# Patient Record
Sex: FEMALE | Race: WHITE | Employment: FULL TIME | URBAN - METROPOLITAN AREA
[De-identification: names, ages, dates, MRNs, and addresses within clinical notes are randomized per-mention and may not be internally consistent; named-entity substitution may affect disease eponyms.]

---

## 2017-01-31 DIAGNOSIS — E89.0 POSTOPERATIVE HYPOTHYROIDISM: ICD-10-CM

## 2017-01-31 RX ORDER — NORETHINDRONE ACETATE AND ETHINYL ESTRADIOL 1MG-20(21)
1 KIT ORAL DAILY
Qty: 28 TABLET | Refills: 6 | Status: SHIPPED | OUTPATIENT
Start: 2017-01-31 | End: 2017-07-19 | Stop reason: SDUPTHER

## 2017-02-13 DIAGNOSIS — R11.0 NAUSEA: ICD-10-CM

## 2017-02-13 RX ORDER — ELETRIPTAN HYDROBROMIDE 40 MG/1
40 TABLET, FILM COATED ORAL
Qty: 6 TABLET | Refills: 12 | Status: SHIPPED | OUTPATIENT
Start: 2017-02-13 | End: 2017-08-18 | Stop reason: ALTCHOICE

## 2017-02-14 RX ORDER — ONDANSETRON 4 MG/1
4 TABLET, FILM COATED ORAL EVERY 12 HOURS PRN
Qty: 10 TABLET | Refills: 1 | Status: SHIPPED | OUTPATIENT
Start: 2017-02-14 | End: 2017-07-19 | Stop reason: SDUPTHER

## 2017-03-31 ENCOUNTER — EMPLOYEE WELLNESS (OUTPATIENT)
Dept: OTHER | Age: 42
End: 2017-03-31

## 2017-07-19 DIAGNOSIS — E89.0 POSTOPERATIVE HYPOTHYROIDISM: ICD-10-CM

## 2017-07-19 DIAGNOSIS — R11.0 NAUSEA: ICD-10-CM

## 2017-07-19 RX ORDER — ONDANSETRON 4 MG/1
4 TABLET, FILM COATED ORAL EVERY 12 HOURS PRN
Qty: 10 TABLET | Refills: 1 | Status: SHIPPED | OUTPATIENT
Start: 2017-07-19 | End: 2018-01-08 | Stop reason: SDUPTHER

## 2017-07-19 RX ORDER — NORETHINDRONE ACETATE AND ETHINYL ESTRADIOL 1MG-20(21)
1 KIT ORAL DAILY
Qty: 28 TABLET | Refills: 6 | Status: SHIPPED | OUTPATIENT
Start: 2017-07-19 | End: 2018-01-29 | Stop reason: SDUPTHER

## 2017-08-09 ENCOUNTER — OFFICE VISIT (OUTPATIENT)
Dept: SURGERY | Age: 42
End: 2017-08-09

## 2017-08-09 VITALS
SYSTOLIC BLOOD PRESSURE: 132 MMHG | BODY MASS INDEX: 35.68 KG/M2 | WEIGHT: 222 LBS | HEIGHT: 66 IN | DIASTOLIC BLOOD PRESSURE: 84 MMHG | HEART RATE: 69 BPM

## 2017-08-09 DIAGNOSIS — E89.0 POSTOPERATIVE HYPOTHYROIDISM: Primary | ICD-10-CM

## 2017-08-09 DIAGNOSIS — E66.09 NON MORBID OBESITY DUE TO EXCESS CALORIES: ICD-10-CM

## 2017-08-09 PROCEDURE — 99213 OFFICE O/P EST LOW 20 MIN: CPT | Performed by: INTERNAL MEDICINE

## 2017-08-09 RX ORDER — LEVOTHYROXINE SODIUM 0.12 MG/1
125 TABLET ORAL DAILY
Qty: 90 TABLET | Refills: 3 | Status: SHIPPED | OUTPATIENT
Start: 2017-08-09 | End: 2018-11-01 | Stop reason: SDUPTHER

## 2017-08-09 ASSESSMENT — ENCOUNTER SYMPTOMS: NAUSEA: 1

## 2017-11-07 ENCOUNTER — OFFICE VISIT (OUTPATIENT)
Dept: FAMILY MEDICINE CLINIC | Age: 42
End: 2017-11-07

## 2017-11-07 VITALS
BODY MASS INDEX: 35.72 KG/M2 | DIASTOLIC BLOOD PRESSURE: 72 MMHG | WEIGHT: 222.3 LBS | SYSTOLIC BLOOD PRESSURE: 138 MMHG | OXYGEN SATURATION: 97 % | HEIGHT: 66 IN | HEART RATE: 71 BPM

## 2017-11-07 DIAGNOSIS — G43.109 MIGRAINE WITH AURA AND WITHOUT STATUS MIGRAINOSUS, NOT INTRACTABLE: Primary | ICD-10-CM

## 2017-11-07 DIAGNOSIS — Z79.899 HIGH RISK MEDICATION USE: ICD-10-CM

## 2017-11-07 DIAGNOSIS — Z00.00 WELLNESS EXAMINATION: ICD-10-CM

## 2017-11-07 PROCEDURE — 99214 OFFICE O/P EST MOD 30 MIN: CPT | Performed by: FAMILY MEDICINE

## 2017-11-07 RX ORDER — BUTALBITAL, ACETAMINOPHEN AND CAFFEINE 50; 325; 40 MG/1; MG/1; MG/1
1 TABLET ORAL EVERY 4 HOURS PRN
Qty: 12 TABLET | Refills: 3 | Status: SHIPPED | OUTPATIENT
Start: 2017-11-07 | End: 2018-04-19 | Stop reason: SDUPTHER

## 2017-11-07 ASSESSMENT — PATIENT HEALTH QUESTIONNAIRE - PHQ9
SUM OF ALL RESPONSES TO PHQ QUESTIONS 1-9: 0
SUM OF ALL RESPONSES TO PHQ9 QUESTIONS 1 & 2: 0
2. FEELING DOWN, DEPRESSED OR HOPELESS: 0
1. LITTLE INTEREST OR PLEASURE IN DOING THINGS: 0

## 2017-11-07 ASSESSMENT — ENCOUNTER SYMPTOMS
ABDOMINAL PAIN: 0
SHORTNESS OF BREATH: 0
PHOTOPHOBIA: 1
COUGH: 0
SORE THROAT: 0

## 2017-11-07 NOTE — PROGRESS NOTES
Subjective  Monika Lawrence, 38 y.o. female presents today with:  Chief Complaint   Patient presents with    1 Year Follow Up       Otalgia    There is pain in the left ear. This is a new problem. The current episode started today. The problem has been unchanged. There has been no fever. Pertinent negatives include no abdominal pain, coughing, rash or sore throat. She has tried nothing for the symptoms. Migraine    This is a chronic problem. The current episode started more than 1 year ago. The problem occurs monthly (about once a month). The problem has been gradually worsening. The pain is located in the bilateral region. The pain does not radiate. The pain quality is similar to prior headaches. The quality of the pain is described as throbbing. The pain is at a severity of 10/10. The pain is severe. Associated symptoms include ear pain and photophobia. Pertinent negatives include no abdominal pain, coughing, fever or sore throat. She has tried triptans for the symptoms. The treatment provided mild relief. Here today for yearly check up. She is taking synthroid, OCP's, zyrtec and flonase for Allergic rhinitis. For her migraines she has used relpax but it is no longer working for her. Review of Systems   Constitutional: Negative for fever. HENT: Positive for ear pain. Negative for sore throat. Eyes: Positive for photophobia. Respiratory: Negative for cough and shortness of breath. Cardiovascular: Negative for chest pain. Gastrointestinal: Negative for abdominal pain. Skin: Negative for rash.        Past Medical History:   Diagnosis Date    Allergic rhinitis     Asplenia     Bilateral hand pain     CTS (carpal tunnel syndrome) 05/2016    bilateral    External hemorrhoid     Fatigue     History of ITP 2004    had spenectomy    Hypothyroidism     Hypothyroidism     Irregular periods     Meniere disease     Migraine     Migraine headache     take relpax    Multinodular goiter MG tablet Take 1 tablet by mouth 2 times daily (with meals) 60 tablet 1    hydrocortisone (ANUSOL-HC) 25 MG suppository Place 1 suppository rectally 2 times daily 60 suppository 0    Calcium Carbonate-Vit D-Min (CALCIUM 1200) 6317-0090 MG-UNIT CHEW Take by mouth      cetirizine (ZYRTEC) 10 MG tablet Take 10 mg by mouth daily      ferrous sulfate 325 (65 FE) MG tablet Take 325 mg by mouth daily (with breakfast)      fluticasone (FLONASE) 50 MCG/ACT nasal spray 1 spray by Nasal route daily. 1 Bottle 06    meclizine (ANTIVERT) 12.5 MG tablet Take 12.5 mg by mouth 3 times daily as needed. No current facility-administered medications for this visit. Objective    Vitals:    11/07/17 1659   BP: 138/72   Pulse: 71   SpO2: 97%   Weight: 222 lb 4.8 oz (100.8 kg)   Height: 5' 6\" (1.676 m)       Physical Exam   Constitutional: She appears well-developed and well-nourished. HENT:   Head: Normocephalic and atraumatic. Right Ear: Tympanic membrane, external ear and ear canal normal.   Left Ear: Tympanic membrane, external ear and ear canal normal.   Nose: Nose normal.   Mouth/Throat: Uvula is midline, oropharynx is clear and moist and mucous membranes are normal.   Neck: Normal range of motion. Neck supple. Cardiovascular: Normal rate, regular rhythm and normal heart sounds. No murmur heard. Pulmonary/Chest: Effort normal and breath sounds normal. She has no wheezes. Lymphadenopathy:     She has no cervical adenopathy. Skin: Skin is warm and dry. No rash noted. Assessment & Plan   1. Migraine with aura and without status migrainosus, not intractable  butalbital-acetaminophen-caffeine (FIORICET, ESGIC) -40 MG per tablet   2. High risk medication use     3. Wellness examination       Plan as noted above. F/u 3 months. No orders of the defined types were placed in this encounter.     Orders Placed This Encounter   Medications    butalbital-acetaminophen-caffeine (Cantu Notice) -40 MG per tablet     Sig: Take 1 tablet by mouth every 4 hours as needed for Headaches     Dispense:  12 tablet     Refill:  3     There are no discontinued medications. Return in about 3 months (around 2/7/2018).     Terrell Lara MD

## 2018-01-08 DIAGNOSIS — R11.0 NAUSEA: ICD-10-CM

## 2018-01-08 RX ORDER — ONDANSETRON 4 MG/1
4 TABLET, FILM COATED ORAL EVERY 12 HOURS PRN
Qty: 10 TABLET | Refills: 1 | Status: SHIPPED | OUTPATIENT
Start: 2018-01-08 | End: 2018-04-11 | Stop reason: SDUPTHER

## 2018-01-29 DIAGNOSIS — E89.0 POSTOPERATIVE HYPOTHYROIDISM: ICD-10-CM

## 2018-01-29 RX ORDER — NORETHINDRONE ACETATE AND ETHINYL ESTRADIOL 1MG-20(21)
1 KIT ORAL DAILY
Qty: 28 TABLET | Refills: 6 | Status: CANCELLED | OUTPATIENT
Start: 2018-01-29

## 2018-01-29 RX ORDER — NORETHINDRONE ACETATE AND ETHINYL ESTRADIOL 1MG-20(21)
1 KIT ORAL DAILY
Qty: 28 TABLET | Refills: 6 | Status: SHIPPED | OUTPATIENT
Start: 2018-01-29 | End: 2018-08-08 | Stop reason: SDUPTHER

## 2018-01-29 NOTE — TELEPHONE ENCOUNTER
From: Earnest Raza  Sent: 1/29/2018 1:26 PM EST  Subject: Medication Renewal Request    Earnest Luz Marina would like a refill of the following medications:  norethindrone-ethinyl estradiol (JUNEL FE 1/20) 1-20 MG-MCG per tablet Joann Brannon MD]    Preferred pharmacy: [facility] [address], [address] [phone] - F [phone]    Comment:  Please send to [facility], [facility]. This doesn't have to go through [facility] mail order prescriptions. Have a great day!!!  Thank you, Be Watson

## 2018-02-13 ENCOUNTER — OFFICE VISIT (OUTPATIENT)
Dept: FAMILY MEDICINE CLINIC | Age: 43
End: 2018-02-13
Payer: COMMERCIAL

## 2018-02-13 VITALS
WEIGHT: 219 LBS | HEIGHT: 66 IN | BODY MASS INDEX: 35.2 KG/M2 | OXYGEN SATURATION: 96 % | SYSTOLIC BLOOD PRESSURE: 130 MMHG | DIASTOLIC BLOOD PRESSURE: 88 MMHG | HEART RATE: 78 BPM

## 2018-02-13 DIAGNOSIS — G43.109 MIGRAINE WITH AURA AND WITHOUT STATUS MIGRAINOSUS, NOT INTRACTABLE: ICD-10-CM

## 2018-02-13 DIAGNOSIS — G89.29 CHRONIC HEEL PAIN, LEFT: Primary | ICD-10-CM

## 2018-02-13 DIAGNOSIS — Z12.31 SCREENING MAMMOGRAM, ENCOUNTER FOR: ICD-10-CM

## 2018-02-13 DIAGNOSIS — M79.672 CHRONIC HEEL PAIN, LEFT: Primary | ICD-10-CM

## 2018-02-13 PROCEDURE — 99214 OFFICE O/P EST MOD 30 MIN: CPT | Performed by: FAMILY MEDICINE

## 2018-02-13 ASSESSMENT — ENCOUNTER SYMPTOMS
PHOTOPHOBIA: 1
SHORTNESS OF BREATH: 0

## 2018-02-13 NOTE — PROGRESS NOTES
Subjective  Bhavna Choudhury, 38 y.o. female presents today with:  Chief Complaint   Patient presents with    3 Month Follow-Up     left heel pain       Migraine    This is a chronic problem. The current episode started more than 1 year ago. The problem occurs monthly (about once a month). The problem has been gradually worsening. The pain is located in the bilateral region. The pain does not radiate. The pain quality is similar to prior headaches. The quality of the pain is described as throbbing. The pain is at a severity of 10/10. The pain is severe. Associated symptoms include photophobia. Pertinent negatives include no fever. She has tried triptans (fioricet) for the symptoms. The treatment provided significant relief. Foot Pain   This is a chronic problem. The current episode started more than 1 year ago. The problem occurs constantly. The problem has been gradually worsening. Pertinent negatives include no chest pain or fever. She has tried NSAIDs for the symptoms. The treatment provided mild relief. Here today for heel pain. She sees Dr. Neto Gandhi for management of her hypothyroidism. She has a past medical history significant for migraine headaches and allergic rhinitis. Her ObGyn is Dr. Georgie Cantu. She is taking synthroid, OCP's, zyrtec and flonase. For her migraines she has used relpax but it was not working so we put her on fioricet and that is helping but she takes 2. Review of Systems   Constitutional: Negative for fever. Eyes: Positive for photophobia. Respiratory: Negative for shortness of breath. Cardiovascular: Negative for chest pain.        Past Medical History:   Diagnosis Date    Allergic rhinitis     Asplenia     Bilateral hand pain     Chronic heel pain, left     CTS (carpal tunnel syndrome) 05/2016    bilateral    External hemorrhoid     Fatigue     History of ITP 2004    had spenectomy    Hypothyroidism     Hypothyroidism     Irregular periods     Meniere disease  Migraine headache     Multinodular goiter     Nail fungus     Pain in right lower leg      Past Surgical History:   Procedure Laterality Date    CARPAL TUNNEL RELEASE Right 07/12/2016    LAPAROSCOPIC SPLENECTOMY N/A 2004    done for ITP    THYROIDECTOMY, COMPLETION Left 2014    frozen section inconclusive    THYROIDECTOMY, PARTIAL Right 2011    Benign nodule     Social History     Social History    Marital status:      Spouse name: N/A    Number of children: 0    Years of education: N/A     Occupational History    medical records      Social History Main Topics    Smoking status: Former Smoker     Quit date: 11/17/2010    Smokeless tobacco: Never Used    Alcohol use 0.0 oz/week      Comment: socially    Drug use: No    Sexual activity: Not on file     Other Topics Concern    Not on file     Social History Narrative    No narrative on file     Family History   Problem Relation Age of Onset    High Blood Pressure Mother     Anemia Mother     Heart Disease Father     Cancer Paternal Aunt     Cancer Paternal Grandmother      Allergies   Allergen Reactions    Penicillins Anaphylaxis    Codeine Itching, Swelling and Rash     OK with Tylenol #3 per pt    Percocet [Oxycodone-Acetaminophen] Itching, Swelling and Rash    Bee Venom Itching and Swelling    Tape [Adhesive Tape]     Hydrocodone Itching, Swelling and Rash    Protonix [Pantoprazole Sodium] Swelling and Rash     Current Outpatient Prescriptions   Medication Sig Dispense Refill    norethindrone-ethinyl estradiol (JUNEL FE 1/20) 1-20 MG-MCG per tablet Take 1 tablet by mouth daily 28 tablet 06    ondansetron (ZOFRAN) 4 MG tablet Take 1 tablet by mouth every 12 hours as needed for Nausea or Vomiting 10 tablet 1    butalbital-acetaminophen-caffeine (FIORICET, ESGIC) -40 MG per tablet Take 1 tablet by mouth every 4 hours as needed for Headaches 12 tablet 3    levothyroxine (SYNTHROID) 125 MCG tablet Take 1 tablet by

## 2018-03-20 VITALS — BODY MASS INDEX: 35.67 KG/M2 | WEIGHT: 221 LBS

## 2018-04-11 ENCOUNTER — TELEPHONE (OUTPATIENT)
Dept: ENDOCRINOLOGY | Age: 43
End: 2018-04-11

## 2018-04-11 DIAGNOSIS — K64.4 EXTERNAL HEMORRHOID: ICD-10-CM

## 2018-04-11 DIAGNOSIS — R11.0 NAUSEA: ICD-10-CM

## 2018-04-11 RX ORDER — HYDROCORTISONE ACETATE 25 MG/1
25 SUPPOSITORY RECTAL 2 TIMES DAILY
Qty: 60 SUPPOSITORY | Refills: 0 | Status: SHIPPED | OUTPATIENT
Start: 2018-04-11 | End: 2018-04-12

## 2018-04-11 RX ORDER — ONDANSETRON 4 MG/1
4 TABLET, FILM COATED ORAL EVERY 12 HOURS PRN
Qty: 10 TABLET | Refills: 1 | Status: SHIPPED | OUTPATIENT
Start: 2018-04-11 | End: 2018-08-31 | Stop reason: SDUPTHER

## 2018-04-12 DIAGNOSIS — K64.4 EXTERNAL HEMORRHOID: ICD-10-CM

## 2018-04-12 RX ORDER — HYDROCORTISONE ACETATE 25 MG/1
25 SUPPOSITORY RECTAL 2 TIMES DAILY
Qty: 60 SUPPOSITORY | Refills: 0 | Status: SHIPPED | OUTPATIENT
Start: 2018-04-12 | End: 2019-03-07 | Stop reason: SDUPTHER

## 2018-04-19 DIAGNOSIS — G43.109 MIGRAINE WITH AURA AND WITHOUT STATUS MIGRAINOSUS, NOT INTRACTABLE: ICD-10-CM

## 2018-04-19 RX ORDER — BUTALBITAL, ACETAMINOPHEN AND CAFFEINE 50; 325; 40 MG/1; MG/1; MG/1
TABLET ORAL
Qty: 12 TABLET | Refills: 5 | Status: SHIPPED | OUTPATIENT
Start: 2018-04-19 | End: 2019-03-07 | Stop reason: SDUPTHER

## 2018-05-07 ENCOUNTER — EMPLOYEE WELLNESS (OUTPATIENT)
Dept: OTHER | Age: 43
End: 2018-05-07

## 2018-05-07 ENCOUNTER — OFFICE VISIT (OUTPATIENT)
Dept: FAMILY MEDICINE CLINIC | Age: 43
End: 2018-05-07
Payer: COMMERCIAL

## 2018-05-07 VITALS
OXYGEN SATURATION: 98 % | HEIGHT: 66 IN | SYSTOLIC BLOOD PRESSURE: 130 MMHG | WEIGHT: 218 LBS | HEART RATE: 81 BPM | DIASTOLIC BLOOD PRESSURE: 86 MMHG | BODY MASS INDEX: 35.03 KG/M2

## 2018-05-07 DIAGNOSIS — M77.8 FOREARM TENDONITIS: Primary | ICD-10-CM

## 2018-05-07 LAB
CHOLESTEROL, TOTAL: 174 MG/DL (ref 0–199)
GLUCOSE BLD-MCNC: 88 MG/DL (ref 74–109)
HDLC SERPL-MCNC: 47 MG/DL (ref 40–59)
LDL CHOLESTEROL CALCULATED: 101 MG/DL (ref 0–129)
TRIGL SERPL-MCNC: 132 MG/DL (ref 0–200)

## 2018-05-07 PROCEDURE — 99213 OFFICE O/P EST LOW 20 MIN: CPT | Performed by: FAMILY MEDICINE

## 2018-05-07 RX ORDER — NAPROXEN 500 MG/1
500 TABLET ORAL 2 TIMES DAILY WITH MEALS
Qty: 60 TABLET | Refills: 2 | Status: SHIPPED | OUTPATIENT
Start: 2018-05-07 | End: 2019-02-12

## 2018-05-12 ENCOUNTER — HOSPITAL ENCOUNTER (OUTPATIENT)
Dept: WOMENS IMAGING | Age: 43
Discharge: HOME | End: 2018-05-14
Payer: COMMERCIAL

## 2018-05-12 DIAGNOSIS — Z12.31 SCREENING MAMMOGRAM, ENCOUNTER FOR: ICD-10-CM

## 2018-05-12 PROCEDURE — 77063 BREAST TOMOSYNTHESIS BI: CPT

## 2018-05-14 VITALS — WEIGHT: 216 LBS | BODY MASS INDEX: 34.86 KG/M2

## 2018-06-27 ENCOUNTER — HOSPITAL ENCOUNTER (INPATIENT)
Age: 43
LOS: 1 days | Discharge: HOME | DRG: 310 | End: 2018-06-28
Attending: EMERGENCY MEDICINE | Admitting: INTERNAL MEDICINE
Payer: COMMERCIAL

## 2018-06-27 ENCOUNTER — APPOINTMENT (OUTPATIENT)
Dept: CT IMAGING | Age: 43
DRG: 310 | End: 2018-06-27
Payer: COMMERCIAL

## 2018-06-27 ENCOUNTER — APPOINTMENT (OUTPATIENT)
Dept: GENERAL RADIOLOGY | Age: 43
DRG: 310 | End: 2018-06-27
Payer: COMMERCIAL

## 2018-06-27 ENCOUNTER — NURSE TRIAGE (OUTPATIENT)
Dept: OTHER | Facility: CLINIC | Age: 43
End: 2018-06-27

## 2018-06-27 DIAGNOSIS — I48.91 ATRIAL FIBRILLATION WITH RAPID VENTRICULAR RESPONSE (HCC): Primary | ICD-10-CM

## 2018-06-27 LAB
ALBUMIN SERPL-MCNC: 3.8 G/DL (ref 3.9–4.9)
ALP BLD-CCNC: 102 U/L (ref 40–130)
ALT SERPL-CCNC: 15 U/L (ref 0–33)
AMPHETAMINE SCREEN, URINE: ABNORMAL
ANION GAP SERPL CALCULATED.3IONS-SCNC: 14 MEQ/L (ref 7–13)
AST SERPL-CCNC: 12 U/L (ref 0–35)
BACTERIA: NORMAL /HPF
BARBITURATE SCREEN URINE: POSITIVE
BASOPHILS ABSOLUTE: 0.1 K/UL (ref 0–0.2)
BASOPHILS RELATIVE PERCENT: 0.9 %
BENZODIAZEPINE SCREEN, URINE: ABNORMAL
BILIRUB SERPL-MCNC: <0.2 MG/DL (ref 0–1.2)
BILIRUBIN URINE: NEGATIVE
BLOOD, URINE: ABNORMAL
BUN BLDV-MCNC: 12 MG/DL (ref 6–20)
CALCIUM SERPL-MCNC: 10 MG/DL (ref 8.6–10.2)
CANNABINOID SCREEN URINE: ABNORMAL
CHLORIDE BLD-SCNC: 103 MEQ/L (ref 98–107)
CHP ED QC CHECK: NORMAL
CLARITY: CLEAR
CO2: 24 MEQ/L (ref 22–29)
COCAINE METABOLITE SCREEN URINE: ABNORMAL
COLOR: YELLOW
CREAT SERPL-MCNC: 0.68 MG/DL (ref 0.5–0.9)
D DIMER: 0.68 MG/L FEU (ref 0–0.5)
EOSINOPHILS ABSOLUTE: 0.1 K/UL (ref 0–0.7)
EOSINOPHILS RELATIVE PERCENT: 1.3 %
EPITHELIAL CELLS, UA: NORMAL /HPF
GFR AFRICAN AMERICAN: >60
GFR NON-AFRICAN AMERICAN: >60
GLOBULIN: 3.7 G/DL (ref 2.3–3.5)
GLUCOSE BLD-MCNC: 121 MG/DL (ref 74–109)
GLUCOSE BLD-MCNC: 127 MG/DL
GLUCOSE BLD-MCNC: 127 MG/DL (ref 60–115)
GLUCOSE URINE: NEGATIVE MG/DL
HCT VFR BLD CALC: 44.1 % (ref 37–47)
HEMOGLOBIN: 14.2 G/DL (ref 12–16)
KETONES, URINE: NEGATIVE MG/DL
LEUKOCYTE ESTERASE, URINE: NEGATIVE
LYMPHOCYTES ABSOLUTE: 3.5 K/UL (ref 1–4.8)
LYMPHOCYTES RELATIVE PERCENT: 30 %
Lab: ABNORMAL
MAGNESIUM: 2.3 MG/DL (ref 1.7–2.3)
MCH RBC QN AUTO: 28.9 PG (ref 27–31.3)
MCHC RBC AUTO-ENTMCNC: 32.2 % (ref 33–37)
MCV RBC AUTO: 89.8 FL (ref 82–100)
MONOCYTES ABSOLUTE: 0.7 K/UL (ref 0.2–0.8)
MONOCYTES RELATIVE PERCENT: 6.3 %
NEUTROPHILS ABSOLUTE: 7.1 K/UL (ref 1.4–6.5)
NEUTROPHILS RELATIVE PERCENT: 61.5 %
NITRITE, URINE: NEGATIVE
OPIATE SCREEN URINE: ABNORMAL
PDW BLD-RTO: 14.3 % (ref 11.5–14.5)
PERFORMED ON: ABNORMAL
PH UA: 6.5 (ref 5–9)
PHENCYCLIDINE SCREEN URINE: ABNORMAL
PLATELET # BLD: 476 K/UL (ref 130–400)
POTASSIUM SERPL-SCNC: 4.4 MEQ/L (ref 3.5–5.1)
PROTEIN UA: NEGATIVE MG/DL
RBC # BLD: 4.91 M/UL (ref 4.2–5.4)
RBC UA: NORMAL /HPF (ref 0–2)
SODIUM BLD-SCNC: 141 MEQ/L (ref 132–144)
SPECIFIC GRAVITY UA: 1.01 (ref 1–1.03)
TOTAL CK: 47 U/L (ref 0–170)
TOTAL PROTEIN: 7.5 G/DL (ref 6.4–8.1)
TROPONIN: <0.01 NG/ML (ref 0–0.01)
TSH SERPL DL<=0.05 MIU/L-ACNC: 1.37 UIU/ML (ref 0.27–4.2)
URINE REFLEX TO CULTURE: YES
UROBILINOGEN, URINE: 0.2 E.U./DL
WBC # BLD: 11.5 K/UL (ref 4.8–10.8)
WBC UA: NORMAL /HPF (ref 0–5)

## 2018-06-27 PROCEDURE — 6360000002 HC RX W HCPCS: Performed by: INTERNAL MEDICINE

## 2018-06-27 PROCEDURE — 81001 URINALYSIS AUTO W/SCOPE: CPT

## 2018-06-27 PROCEDURE — 71275 CT ANGIOGRAPHY CHEST: CPT

## 2018-06-27 PROCEDURE — 6370000000 HC RX 637 (ALT 250 FOR IP): Performed by: EMERGENCY MEDICINE

## 2018-06-27 PROCEDURE — 2580000003 HC RX 258: Performed by: INTERNAL MEDICINE

## 2018-06-27 PROCEDURE — 83735 ASSAY OF MAGNESIUM: CPT

## 2018-06-27 PROCEDURE — 87086 URINE CULTURE/COLONY COUNT: CPT

## 2018-06-27 PROCEDURE — 6360000004 HC RX CONTRAST MEDICATION: Performed by: EMERGENCY MEDICINE

## 2018-06-27 PROCEDURE — 96372 THER/PROPH/DIAG INJ SC/IM: CPT

## 2018-06-27 PROCEDURE — 80307 DRUG TEST PRSMV CHEM ANLYZR: CPT

## 2018-06-27 PROCEDURE — 2060000000 HC ICU INTERMEDIATE R&B

## 2018-06-27 PROCEDURE — 96366 THER/PROPH/DIAG IV INF ADDON: CPT

## 2018-06-27 PROCEDURE — 99285 EMERGENCY DEPT VISIT HI MDM: CPT

## 2018-06-27 PROCEDURE — 6370000000 HC RX 637 (ALT 250 FOR IP): Performed by: INTERNAL MEDICINE

## 2018-06-27 PROCEDURE — 36415 COLL VENOUS BLD VENIPUNCTURE: CPT

## 2018-06-27 PROCEDURE — 2580000003 HC RX 258: Performed by: NURSE PRACTITIONER

## 2018-06-27 PROCEDURE — 2500000003 HC RX 250 WO HCPCS: Performed by: EMERGENCY MEDICINE

## 2018-06-27 PROCEDURE — 85379 FIBRIN DEGRADATION QUANT: CPT

## 2018-06-27 PROCEDURE — 2580000003 HC RX 258: Performed by: EMERGENCY MEDICINE

## 2018-06-27 PROCEDURE — 71045 X-RAY EXAM CHEST 1 VIEW: CPT

## 2018-06-27 PROCEDURE — 93005 ELECTROCARDIOGRAM TRACING: CPT

## 2018-06-27 PROCEDURE — 85025 COMPLETE CBC W/AUTO DIFF WBC: CPT

## 2018-06-27 PROCEDURE — 84443 ASSAY THYROID STIM HORMONE: CPT

## 2018-06-27 PROCEDURE — 96365 THER/PROPH/DIAG IV INF INIT: CPT

## 2018-06-27 PROCEDURE — 93010 ELECTROCARDIOGRAM REPORT: CPT | Performed by: INTERNAL MEDICINE

## 2018-06-27 PROCEDURE — 84484 ASSAY OF TROPONIN QUANT: CPT

## 2018-06-27 PROCEDURE — 82550 ASSAY OF CK (CPK): CPT

## 2018-06-27 PROCEDURE — 80053 COMPREHEN METABOLIC PANEL: CPT

## 2018-06-27 PROCEDURE — 6360000002 HC RX W HCPCS: Performed by: EMERGENCY MEDICINE

## 2018-06-27 PROCEDURE — 99223 1ST HOSP IP/OBS HIGH 75: CPT | Performed by: INTERNAL MEDICINE

## 2018-06-27 RX ORDER — CETIRIZINE HYDROCHLORIDE 10 MG/1
10 TABLET ORAL DAILY
Status: DISCONTINUED | OUTPATIENT
Start: 2018-06-27 | End: 2018-06-28 | Stop reason: HOSPADM

## 2018-06-27 RX ORDER — ONDANSETRON 2 MG/ML
4 INJECTION INTRAMUSCULAR; INTRAVENOUS EVERY 6 HOURS PRN
Status: DISCONTINUED | OUTPATIENT
Start: 2018-06-27 | End: 2018-06-28 | Stop reason: HOSPADM

## 2018-06-27 RX ORDER — TRAMADOL HYDROCHLORIDE 50 MG/1
50 TABLET ORAL EVERY 6 HOURS PRN
Status: DISCONTINUED | OUTPATIENT
Start: 2018-06-27 | End: 2018-06-28 | Stop reason: HOSPADM

## 2018-06-27 RX ORDER — NORETHINDRONE ACETATE AND ETHINYL ESTRADIOL 1MG-20(21)
1 KIT ORAL DAILY
Status: DISCONTINUED | OUTPATIENT
Start: 2018-06-27 | End: 2018-06-27 | Stop reason: RX

## 2018-06-27 RX ORDER — ONDANSETRON 4 MG/1
4 TABLET, FILM COATED ORAL EVERY 12 HOURS PRN
Status: DISCONTINUED | OUTPATIENT
Start: 2018-06-27 | End: 2018-06-28 | Stop reason: HOSPADM

## 2018-06-27 RX ORDER — LOPERAMIDE HYDROCHLORIDE 2 MG/1
2 CAPSULE ORAL ONCE
Status: COMPLETED | OUTPATIENT
Start: 2018-06-27 | End: 2018-06-27

## 2018-06-27 RX ORDER — SODIUM CHLORIDE 0.9 % (FLUSH) 0.9 %
10 SYRINGE (ML) INJECTION PRN
Status: DISCONTINUED | OUTPATIENT
Start: 2018-06-27 | End: 2018-06-28 | Stop reason: HOSPADM

## 2018-06-27 RX ORDER — ACETAMINOPHEN 325 MG/1
650 TABLET ORAL EVERY 4 HOURS PRN
Status: DISCONTINUED | OUTPATIENT
Start: 2018-06-27 | End: 2018-06-28 | Stop reason: HOSPADM

## 2018-06-27 RX ORDER — HYDROCORTISONE ACETATE 25 MG/1
25 SUPPOSITORY RECTAL 2 TIMES DAILY
Status: DISCONTINUED | OUTPATIENT
Start: 2018-06-27 | End: 2018-06-28 | Stop reason: HOSPADM

## 2018-06-27 RX ORDER — LEVOTHYROXINE SODIUM 0.12 MG/1
125 TABLET ORAL DAILY
Status: DISCONTINUED | OUTPATIENT
Start: 2018-06-27 | End: 2018-06-28 | Stop reason: HOSPADM

## 2018-06-27 RX ORDER — DIPHENHYDRAMINE HYDROCHLORIDE 50 MG/ML
25 INJECTION INTRAMUSCULAR; INTRAVENOUS EVERY 6 HOURS PRN
Status: DISCONTINUED | OUTPATIENT
Start: 2018-06-27 | End: 2018-06-28 | Stop reason: HOSPADM

## 2018-06-27 RX ORDER — DILTIAZEM HYDROCHLORIDE 5 MG/ML
10 INJECTION INTRAVENOUS ONCE
Status: COMPLETED | OUTPATIENT
Start: 2018-06-27 | End: 2018-06-27

## 2018-06-27 RX ORDER — FERROUS SULFATE 325(65) MG
325 TABLET ORAL
Status: DISCONTINUED | OUTPATIENT
Start: 2018-06-28 | End: 2018-06-28 | Stop reason: HOSPADM

## 2018-06-27 RX ORDER — MECLIZINE HCL 12.5 MG/1
12.5 TABLET ORAL 3 TIMES DAILY PRN
Status: DISCONTINUED | OUTPATIENT
Start: 2018-06-27 | End: 2018-06-28 | Stop reason: HOSPADM

## 2018-06-27 RX ORDER — 0.9 % SODIUM CHLORIDE 0.9 %
2000 INTRAVENOUS SOLUTION INTRAVENOUS ONCE
Status: COMPLETED | OUTPATIENT
Start: 2018-06-27 | End: 2018-06-27

## 2018-06-27 RX ORDER — SODIUM CHLORIDE 0.9 % (FLUSH) 0.9 %
10 SYRINGE (ML) INJECTION EVERY 12 HOURS SCHEDULED
Status: DISCONTINUED | OUTPATIENT
Start: 2018-06-27 | End: 2018-06-28 | Stop reason: HOSPADM

## 2018-06-27 RX ORDER — FLUTICASONE PROPIONATE 50 MCG
1 SPRAY, SUSPENSION (ML) NASAL DAILY
Status: DISCONTINUED | OUTPATIENT
Start: 2018-06-27 | End: 2018-06-28 | Stop reason: HOSPADM

## 2018-06-27 RX ADMIN — TRAMADOL HYDROCHLORIDE 50 MG: 50 TABLET, FILM COATED ORAL at 23:05

## 2018-06-27 RX ADMIN — SODIUM CHLORIDE 2000 ML: 9 INJECTION, SOLUTION INTRAVENOUS at 07:40

## 2018-06-27 RX ADMIN — IOPAMIDOL 100 ML: 755 INJECTION, SOLUTION INTRAVENOUS at 09:49

## 2018-06-27 RX ADMIN — METOPROLOL TARTRATE 25 MG: 25 TABLET ORAL at 15:13

## 2018-06-27 RX ADMIN — Medication 10 ML: at 23:07

## 2018-06-27 RX ADMIN — ENOXAPARIN SODIUM 100 MG: 100 INJECTION SUBCUTANEOUS at 08:31

## 2018-06-27 RX ADMIN — CETIRIZINE HYDROCHLORIDE 10 MG: 10 TABLET, FILM COATED ORAL at 23:05

## 2018-06-27 RX ADMIN — LOPERAMIDE HYDROCHLORIDE 2 MG: 2 CAPSULE ORAL at 09:06

## 2018-06-27 RX ADMIN — ACETAMINOPHEN 650 MG: 325 TABLET ORAL at 18:56

## 2018-06-27 RX ADMIN — RIVAROXABAN 20 MG: 20 TABLET, FILM COATED ORAL at 23:05

## 2018-06-27 RX ADMIN — DILTIAZEM HYDROCHLORIDE 10 MG/HR: 5 INJECTION INTRAVENOUS at 08:46

## 2018-06-27 RX ADMIN — DILTIAZEM HYDROCHLORIDE 10 MG: 5 INJECTION INTRAVENOUS at 08:36

## 2018-06-27 RX ADMIN — DIPHENHYDRAMINE HYDROCHLORIDE 25 MG: 50 INJECTION, SOLUTION INTRAMUSCULAR; INTRAVENOUS at 23:06

## 2018-06-27 ASSESSMENT — ENCOUNTER SYMPTOMS
NAUSEA: 0
GASTROINTESTINAL NEGATIVE: 1
ABDOMINAL DISTENTION: 0
RHINORRHEA: 0
ABDOMINAL PAIN: 0
SHORTNESS OF BREATH: 1
FACIAL SWELLING: 0
HEMOPTYSIS: 0
PHOTOPHOBIA: 0
EYE DISCHARGE: 0
VOMITING: 0
EYES NEGATIVE: 1
ORTHOPNEA: 0
CHEST TIGHTNESS: 1
SHORTNESS OF BREATH: 0
WHEEZING: 0
COLOR CHANGE: 0

## 2018-06-27 ASSESSMENT — PAIN SCALES - GENERAL
PAINLEVEL_OUTOF10: 10
PAINLEVEL_OUTOF10: 9

## 2018-06-27 NOTE — ED PROVIDER NOTES
[address] [facility] ED  eMERGENCY dEPARTMENT eNCOUnter      Pt Name: Chivo Bellamy  MRN: [id number]  Zaragfcami 1975  Date of evaluation: 6/27/2018  Provider: Aarti Cantu, [address] [place] [address]       Chief Complaint   Patient presents with    Tachycardia     x 3 days         HISTORY OF PRESENT ILLNESS   (Location/Symptom, Timing/Onset, Context/Setting, Quality, Duration, Modifying Factors, Severity)  Note limiting factors. Chivo Bellamy is a 37 y.o. female who presents to the emergency department With a chief complaint of three-day history of feeling heart palpitations. Patient states over the course of 3 days she has noticed increased fatigue and difficulty taking a full breath in. She feels like her chest is tight all the time. Patient has never had this sensation before. She has a caffeine consumer but denies any over-the-counter sinus medication or cold medication. She has a thyroid history with total thyroidectomy. She denies any lower extremity edema, hemoptysis, or significant difficulty of breathing. HPI    Nursing Notes were reviewed. REVIEW OF SYSTEMS    (2-9 systems for level 4, 10 or more for level 5)     Review of Systems   Constitutional: Positive for fatigue. Negative for activity change and appetite change. HENT: Negative for congestion, facial swelling and rhinorrhea. Eyes: Negative for photophobia and discharge. Respiratory: Positive for chest tightness and shortness of breath. Negative for wheezing. Cardiovascular: Positive for palpitations. Negative for chest pain and leg swelling. Gastrointestinal: Negative for abdominal distention, abdominal pain and vomiting. Endocrine: Negative for polydipsia and polyphagia. Genitourinary: Negative for difficulty urinating, frequency, vaginal bleeding and vaginal discharge. Musculoskeletal: Negative for gait problem. Skin: Negative for color change. Allergic/Immunologic: Negative for immunocompromised state. Neurological: Negative for dizziness, weakness and light-headedness. Hematological: Negative for adenopathy. Psychiatric/Behavioral: Negative for behavioral problems. Except as noted above the remainder of the review of systems was reviewed and negative. PAST MEDICAL HISTORY     Past Medical History:   Diagnosis Date    Allergic rhinitis     Asplenia     Bilateral hand pain     CTS (carpal tunnel syndrome) 05/2016    bilateral    External hemorrhoid     Fatigue     Heart murmur     History of ITP 2004    had spenectomy    Hypothyroidism     Hypothyroidism     Irregular periods     Meniere disease     Migraine headache     Multinodular goiter     Nail fungus     Pain in right lower leg     Plantar fasciitis, left     Dr. Trevor Henson       Past Surgical History:   Procedure Laterality Date    CARPAL TUNNEL RELEASE Right 07/12/2016    LAPAROSCOPIC SPLENECTOMY N/A 2004    done for ITP    THYROIDECTOMY, COMPLETION Left 2014    frozen section inconclusive    THYROIDECTOMY, PARTIAL Right 2011    Benign nodule         CURRENT MEDICATIONS       Previous Medications    BUTALBITAL-ACETAMINOPHEN-CAFFEINE (FIORICET, ESGIC) -40 MG PER TABLET    take 1 tablet by mouth every 4 hours as needed for headache    CALCIUM CARBONATE-VIT D-MIN (CALCIUM 1200) 1762-7008 MG-UNIT CHEW    Take by mouth    CETIRIZINE (ZYRTEC) 10 MG TABLET    Take 10 mg by mouth daily    FERROUS SULFATE 325 (65 FE) MG TABLET    Take 325 mg by mouth daily (with breakfast)    FLUTICASONE (FLONASE) 50 MCG/ACT NASAL SPRAY    1 spray by Nasal route daily. HYDROCORTISONE (ANUSOL-HC) 25 MG SUPPOSITORY    Place 1 suppository rectally 2 times daily    LEVOTHYROXINE (SYNTHROID) 125 MCG TABLET    Take 1 tablet by mouth Daily    MECLIZINE (ANTIVERT) 12.5 MG TABLET    Take 12.5 mg by mouth 3 times daily as needed.     NAPROXEN (NAPROSYN) 500 MG TABLET    Take 1 tablet by mouth 2 times daily (with meals) NORETHINDRONE-ETHINYL ESTRADIOL (JUNEL FE 1/20) 1-20 MG-MCG PER TABLET    Take 1 tablet by mouth daily    ONDANSETRON (ZOFRAN) 4 MG TABLET    Take 1 tablet by mouth every 12 hours as needed for Nausea or Vomiting       ALLERGIES     Penicillins; Codeine; Percocet [oxycodone-acetaminophen]; Bee venom; Tape [adhesive tape]; Hydrocodone; and Protonix [pantoprazole sodium]    FAMILY HISTORY       Family History   Problem Relation Age of Onset    High Blood Pressure Mother     Anemia Mother     Heart Disease Father     Cancer Paternal Aunt     Cancer Paternal Grandmother           SOCIAL HISTORY       Social History     Social History    Marital status:      Spouse name: N/A    Number of children: 0    Years of education: N/A     Occupational History    medical records      Social History Main Topics    Smoking status: Former Smoker     Quit date: 11/17/2010    Smokeless tobacco: Never Used    Alcohol use 0.0 oz/week      Comment: socially    Drug use: No    Sexual activity: Not Asked     Other Topics Concern    None     Social History Narrative    None       SCREENINGS             PHYSICAL EXAM    (up to 7 for level 4, 8 or more for level 5)     ED Triage Vitals [06/27/18 0710]   BP Temp Temp Source Pulse Resp SpO2 Height Weight   115/69 98.6 °F (37 °C) Oral 132 18 99 % 5' 7\" (1.702 m) 216 lb (98 kg)       Physical Exam   Constitutional: She is oriented to person, place, and time. She appears well-developed and well-nourished. HENT:   Head: Normocephalic and atraumatic. Eyes: Conjunctivae and EOM are normal. Pupils are equal, round, and reactive to light. Neck: Normal range of motion. Neck supple. No JVD present. Cardiovascular:   irr irr and tachy   Pulmonary/Chest: Effort normal.   Abdominal: Soft. Bowel sounds are normal. There is no tenderness. Musculoskeletal: Normal range of motion. She exhibits no edema. Neurological: She is alert and oriented to person, place, and time.  She CONSULTS:  IP CONSULT TO CARDIOLOGY    PROCEDURES:  Unless otherwise noted below, none     Procedures    FINAL IMPRESSION      1. Atrial fibrillation with rapid ventricular response (HCC)          DISPOSITION/PLAN   DISPOSITION        PATIENT REFERRED TO:  No follow-up provider specified.     DISCHARGE MEDICATIONS:  New Prescriptions    No medications on file          (Please note that portions of this note were completed with a voice recognition program.  Efforts were made to edit the dictations but occasionally words are mis-transcribed.)    Yony Graham DO (electronically signed)  Attending Emergency Physician          Yony Graham DO  06/27/18 1042

## 2018-06-27 NOTE — ED NOTES
Pt alert, oriented, family at bedside. pt and family aware  Of impending admit. respiraitons unlabored at this time. Denies pain at this time. nsr on monitor.  IV infusing without difficulty     Pennie Pastor RN  06/27/18 0870

## 2018-06-27 NOTE — LETTER
[address] [facility]  [place] [address] [facility]  Phone: [phone]             June 28, 2018    Patient: Candy Smith   YOB: 1975   Date of Visit: 6/27/2018       To Whom It May Concern:    Candy Smith was seen and treated in our facility  beginning 6/27/2018 until 6/28/18 under the care of Dr. Elisabeth Alfaro and Dr. Marcell Merchant. She may return to work on Monday July 2nd.

## 2018-06-27 NOTE — H&P
Chief Complaint   Patient presents with    Tachycardia     x 3 days        Patient is a 37 y.o. female who presents with a chief complaint of palpitations. Patient is followed on a regular basis by Dr. Leon Schuster MD. Had palpiations for the past 2 days, noted to be in new onset afibb with RVR in ER. Started on cardizem gtt and converted to NSR spontaneously. No hx of MI, CHF or arrhythmia. No hx of LHC or stress test. No excessive caffeine or ETOH. States she is sensitive and has a lot of allergies. Takes Zyrtec. No smoking. No hx of DM, HTN or HLP. On synthroid and  TSH is WNL. S/p CTA of chest without evidence of PE.      Past Medical History:   Diagnosis Date    Allergic rhinitis     Asplenia     Bilateral hand pain     CTS (carpal tunnel syndrome) 05/2016    bilateral    External hemorrhoid     Fatigue     Heart murmur     History of ITP 2004    had spenectomy    Hypothyroidism     Hypothyroidism     Irregular periods     Meniere disease     Migraine headache     Multinodular goiter     Nail fungus     Pain in right lower leg     Plantar fasciitis, left     Dr. Justo Hutchins      Patient Active Problem List   Diagnosis    Hypothyroidism    Obesity    Meniere disease    Irregular periods    Asplenia    Nail fungus    Allergic rhinitis    Migraine    External hemorrhoid    Pain in right lower leg    Multinodular goiter    CTS (carpal tunnel syndrome)    Migraine headache    Plantar fasciitis, left    Atrial fibrillation with RVR ([address]       Past Surgical History:   Procedure Laterality Date    CARPAL TUNNEL RELEASE Right 07/12/2016    LAPAROSCOPIC SPLENECTOMY N/A 2004    done for ITP    THYROIDECTOMY, COMPLETION Left 2014    frozen section inconclusive    THYROIDECTOMY, PARTIAL Right 2011    Benign nodule       Social History     Social History    Marital status:      Spouse name: N/A    Number of children: 0    Years of education: N/A     Occupational History    Codeine; Percocet [oxycodone-acetaminophen]; Bee venom; Tape [adhesive tape]; Hydrocodone; and Protonix [pantoprazole sodium]    Review of Systems   Constitutional: Negative. Negative for chills, fever and weight loss. HENT: Negative. Eyes: Negative. Respiratory: Negative for hemoptysis, shortness of breath and wheezing. Cardiovascular: Positive for palpitations. Negative for chest pain, orthopnea, claudication, leg swelling and PND. Gastrointestinal: Negative. Negative for abdominal pain, nausea and vomiting. Genitourinary: Negative. Musculoskeletal: Negative. Skin: Negative. Negative for rash. Neurological: Negative for dizziness, loss of consciousness, weakness and headaches. Endo/Heme/Allergies: Negative. Psychiatric/Behavioral: Negative. VITALS:  Blood pressure 120/75, pulse 84, temperature 98.6 °F (37 °C), temperature source Oral, resp. rate 19, height 5' 7\" (1.702 m), weight 216 lb (98 kg), SpO2 96 %, not currently breastfeeding. Body mass index is 33.83 kg/m². Physical Exam   Constitutional: She is oriented to person, place, and time. She appears well-developed and well-nourished. HENT:   Head: Normocephalic and atraumatic. Eyes: Pupils are equal, round, and reactive to light. Neck: Normal range of motion. Neck supple. No JVD present. No tracheal deviation present. No thyromegaly present. Cardiovascular: Normal rate, regular rhythm, normal heart sounds and intact distal pulses. PMI is not displaced. Exam reveals no gallop, no S3, no distant heart sounds and no friction rub. No murmur heard. Pulmonary/Chest: No respiratory distress. She has no wheezes. She has no rales. She exhibits no tenderness. Abdominal: Soft. Bowel sounds are normal. She exhibits no distension and no mass. There is no tenderness. There is no rebound and no guarding. Musculoskeletal: She exhibits no edema. Neurological: She is alert and oriented to person, place, and time.  No cranial nerve deficit. Skin: Skin is warm and dry. No rash noted. She is not diaphoretic. No erythema. No pallor. Psychiatric: She has a normal mood and affect.  Her behavior is normal. Judgment and thought content normal.       LABS:  Recent Results (from the past 24 hour(s))   EKG 12 Lead    Collection Time: 06/27/18  7:20 AM   Result Value Ref Range    Ventricular Rate 141 BPM    Atrial Rate 197 BPM    QRS Duration 76 ms    Q-T Interval 288 ms    QTc Calculation (Bazett) 441 ms    R Axis 30 degrees    T Axis -23 degrees   CBC Auto Differential    Collection Time: 06/27/18  7:30 AM   Result Value Ref Range    WBC 11.5 (H) 4.8 - 10.8 K/uL    RBC 4.91 4.20 - 5.40 M/uL    Hemoglobin 14.2 12.0 - 16.0 g/dL    Hematocrit 44.1 37.0 - 47.0 %    MCV 89.8 82.0 - 100.0 fL    MCH 28.9 27.0 - 31.3 pg    MCHC 32.2 (L) 33.0 - 37.0 %    RDW 14.3 11.5 - 14.5 %    Platelets 079 (H) 669 - 400 K/uL    Neutrophils % 61.5 %    Lymphocytes % 30.0 %    Monocytes % 6.3 %    Eosinophils % 1.3 %    Basophils % 0.9 %    Neutrophils # 7.1 (H) 1.4 - 6.5 K/uL    Lymphocytes # 3.5 1.0 - 4.8 K/uL    Monocytes # 0.7 0.2 - 0.8 K/uL    Eosinophils # 0.1 0.0 - 0.7 K/uL    Basophils # 0.1 0.0 - 0.2 K/uL   Comprehensive Metabolic Panel    Collection Time: 06/27/18  7:30 AM   Result Value Ref Range    Sodium 141 132 - 144 mEq/L    Potassium 4.4 3.5 - 5.1 mEq/L    Chloride 103 98 - 107 mEq/L    CO2 24 22 - 29 mEq/L    Anion Gap 14 (H) 7 - 13 mEq/L    Glucose 121 (H) 74 - 109 mg/dL    BUN 12 6 - 20 mg/dL    CREATININE 0.68 0.50 - 0.90 mg/dL    GFR Non-African American >60.0 >60    GFR  >60.0 >60    Calcium 10.0 8.6 - 10.2 mg/dL    Total Protein 7.5 6.4 - 8.1 g/dL    Alb 3.8 (L) 3.9 - 4.9 g/dL    Total Bilirubin <0.2 0.0 - 1.2 mg/dL    Alkaline Phosphatase 102 40 - 130 U/L    ALT 15 0 - 33 U/L    AST 12 0 - 35 U/L    Globulin 3.7 (H) 2.3 - 3.5 g/dL   Urinalysis Reflex to Culture    Collection Time: 06/27/18  7:30 AM   Result Value Ref

## 2018-06-27 NOTE — ED NOTES
Pharmacy called regarding  dilitiazem drip.  Medicine to be sent asa     April L [place] DEAN Richter  06/27/18 7246

## 2018-06-27 NOTE — ED NOTES
Blood and urine specimens labeled and sent to lab via tube system     Leanna Dumont RN  06/27/18 0803

## 2018-06-28 VITALS
OXYGEN SATURATION: 97 % | BODY MASS INDEX: 33.9 KG/M2 | SYSTOLIC BLOOD PRESSURE: 129 MMHG | WEIGHT: 216 LBS | RESPIRATION RATE: 18 BRPM | DIASTOLIC BLOOD PRESSURE: 67 MMHG | HEIGHT: 67 IN | HEART RATE: 75 BPM | TEMPERATURE: 98.4 F

## 2018-06-28 LAB
ANION GAP SERPL CALCULATED.3IONS-SCNC: 12 MEQ/L (ref 7–13)
BUN BLDV-MCNC: 9 MG/DL (ref 6–20)
CALCIUM SERPL-MCNC: 8.4 MG/DL (ref 8.6–10.2)
CHLORIDE BLD-SCNC: 105 MEQ/L (ref 98–107)
CHOLESTEROL, TOTAL: 166 MG/DL (ref 0–199)
CO2: 24 MEQ/L (ref 22–29)
CREAT SERPL-MCNC: 0.63 MG/DL (ref 0.5–0.9)
EKG ATRIAL RATE: 197 BPM
EKG ATRIAL RATE: 64 BPM
EKG ATRIAL RATE: 70 BPM
EKG P AXIS: 48 DEGREES
EKG P-R INTERVAL: 152 MS
EKG P-R INTERVAL: 168 MS
EKG Q-T INTERVAL: 288 MS
EKG Q-T INTERVAL: 378 MS
EKG Q-T INTERVAL: 430 MS
EKG QRS DURATION: 76 MS
EKG QRS DURATION: 82 MS
EKG QRS DURATION: 84 MS
EKG QTC CALCULATION (BAZETT): 408 MS
EKG QTC CALCULATION (BAZETT): 441 MS
EKG QTC CALCULATION (BAZETT): 443 MS
EKG R AXIS: 21 DEGREES
EKG R AXIS: 27 DEGREES
EKG R AXIS: 30 DEGREES
EKG T AXIS: -23 DEGREES
EKG T AXIS: 17 DEGREES
EKG T AXIS: 21 DEGREES
EKG VENTRICULAR RATE: 141 BPM
EKG VENTRICULAR RATE: 64 BPM
EKG VENTRICULAR RATE: 70 BPM
GFR AFRICAN AMERICAN: >60
GFR NON-AFRICAN AMERICAN: >60
GLUCOSE BLD-MCNC: 91 MG/DL (ref 74–109)
HCT VFR BLD CALC: 37.7 % (ref 37–47)
HDLC SERPL-MCNC: 42 MG/DL (ref 40–59)
HEMOGLOBIN: 12.7 G/DL (ref 12–16)
LDL CHOLESTEROL CALCULATED: 105 MG/DL (ref 0–129)
LV EF: 55 %
LVEF MODALITY: NORMAL
MCH RBC QN AUTO: 30.5 PG (ref 27–31.3)
MCHC RBC AUTO-ENTMCNC: 33.8 % (ref 33–37)
MCV RBC AUTO: 90.1 FL (ref 82–100)
PDW BLD-RTO: 14.3 % (ref 11.5–14.5)
PLATELET # BLD: 427 K/UL (ref 130–400)
POTASSIUM REFLEX MAGNESIUM: 4 MEQ/L (ref 3.5–5.1)
RBC # BLD: 4.18 M/UL (ref 4.2–5.4)
SODIUM BLD-SCNC: 141 MEQ/L (ref 132–144)
TRIGL SERPL-MCNC: 93 MG/DL (ref 0–200)
URINE CULTURE, ROUTINE: NORMAL
WBC # BLD: 10 K/UL (ref 4.8–10.8)

## 2018-06-28 PROCEDURE — 80048 BASIC METABOLIC PNL TOTAL CA: CPT

## 2018-06-28 PROCEDURE — 93306 TTE W/DOPPLER COMPLETE: CPT

## 2018-06-28 PROCEDURE — 80061 LIPID PANEL: CPT

## 2018-06-28 PROCEDURE — 99238 HOSP IP/OBS DSCHRG MGMT 30/<: CPT | Performed by: INTERNAL MEDICINE

## 2018-06-28 PROCEDURE — 93005 ELECTROCARDIOGRAM TRACING: CPT

## 2018-06-28 PROCEDURE — 6370000000 HC RX 637 (ALT 250 FOR IP): Performed by: INTERNAL MEDICINE

## 2018-06-28 PROCEDURE — 2580000003 HC RX 258: Performed by: INTERNAL MEDICINE

## 2018-06-28 PROCEDURE — 6360000002 HC RX W HCPCS: Performed by: INTERNAL MEDICINE

## 2018-06-28 PROCEDURE — 36415 COLL VENOUS BLD VENIPUNCTURE: CPT

## 2018-06-28 PROCEDURE — 85027 COMPLETE CBC AUTOMATED: CPT

## 2018-06-28 RX ORDER — BUTALBITAL, ACETAMINOPHEN AND CAFFEINE 300; 40; 50 MG/1; MG/1; MG/1
1 CAPSULE ORAL EVERY 4 HOURS PRN
Status: DISCONTINUED | OUTPATIENT
Start: 2018-06-28 | End: 2018-06-28 | Stop reason: HOSPADM

## 2018-06-28 RX ORDER — MAGNESIUM OXIDE 400 MG/1
400 TABLET ORAL DAILY
COMMUNITY
End: 2018-07-13 | Stop reason: DRUGHIGH

## 2018-06-28 RX ADMIN — Medication 10 ML: at 09:27

## 2018-06-28 RX ADMIN — LEVOTHYROXINE SODIUM 125 MCG: 125 TABLET ORAL at 06:09

## 2018-06-28 RX ADMIN — FERROUS SULFATE TAB 325 MG (65 MG ELEMENTAL FE) 325 MG: 325 (65 FE) TAB at 09:27

## 2018-06-28 RX ADMIN — BUTALBITAL, ACETAMINOPHEN AND CAFFEINE 1 CAPSULE: 300; 40; 50 CAPSULE ORAL at 13:17

## 2018-06-28 RX ADMIN — METOPROLOL TARTRATE 25 MG: 25 TABLET ORAL at 09:27

## 2018-06-28 RX ADMIN — DIPHENHYDRAMINE HYDROCHLORIDE 25 MG: 50 INJECTION, SOLUTION INTRAMUSCULAR; INTRAVENOUS at 09:27

## 2018-06-28 RX ADMIN — TRAMADOL HYDROCHLORIDE 50 MG: 50 TABLET, FILM COATED ORAL at 09:27

## 2018-06-28 ASSESSMENT — PAIN DESCRIPTION - LOCATION: LOCATION: HEAD

## 2018-06-28 ASSESSMENT — PAIN SCALES - GENERAL
PAINLEVEL_OUTOF10: 7
PAINLEVEL_OUTOF10: 8
PAINLEVEL_OUTOF10: 5

## 2018-06-28 ASSESSMENT — PAIN DESCRIPTION - PAIN TYPE: TYPE: CHRONIC PAIN

## 2018-06-28 NOTE — PLAN OF CARE
Problem: Pain:  Goal: Patient's pain/discomfort is manageable  Patient's pain/discomfort is manageable  Outcome: Ongoing

## 2018-06-28 NOTE — CARE COORDINATION
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

## 2018-06-28 NOTE — DISCHARGE SUMMARY
Cardiology Discharge Summary      Patient Identification:  Haley Monroy  : 1975  MRN: [id number]   Account: [de-identified]     Admit date: 2018  Discharge date: 18   Attending provider: Gary Mccarthy DO        Primary care provider: Mylene Wayne MD     Admission Diagnoses:  <principal problem not specified>         Discharge Diagnoses: Active Hospital Problems    Diagnosis Date Noted    Atrial fibrillation with RVR [facility]) [I48.91] 2018          Hospital Course:   Haley Monroy is a 37 y.o. female admitted to [facility] on 2018 for paroxysmal afib converted spontaneously to NSR. Normal ECHO. Procedures:   none     Consults:   none    Examination:  /67   Pulse 75   Temp 98.4 °F (36.9 °C) (Oral)   Resp 18   Ht 5' 7\" (1.702 m)   Wt 216 lb (98 kg)   SpO2 97%   BMI 33.83 kg/m²    Physical Exam   Constitutional: She is oriented to person, place, and time. She appears well-developed and well-nourished. HENT:   Head: Normocephalic and atraumatic. Eyes: Pupils are equal, round, and reactive to light. Neck: Normal range of motion. Neck supple. Cardiovascular: Normal rate and regular rhythm. Exam reveals no gallop and no friction rub. No murmur heard. Pulmonary/Chest: Effort normal and breath sounds normal. No respiratory distress. She has no wheezes. She has no rales. She exhibits no tenderness. Abdominal: Soft. Bowel sounds are normal. She exhibits no distension. There is no tenderness. Musculoskeletal: Normal range of motion. She exhibits no edema. Neurological: She is alert and oriented to person, place, and time. No cranial nerve deficit. Coordination normal.   Skin: Skin is warm and dry. Psychiatric: She has a normal mood and affect.        Medications:  Current Discharge Medication List      START taking these medications    Details   rivaroxaban (XARELTO) 20 MG TABS tablet Unspecified hypothyroidism; Menorrhagia; Weight gain             Significant Diagnostics:   Radiology: Cta Chest W Wo  (pe Study)    Result Date: 6/27/2018  EXAM: CT SCAN OF THE THORAX WITH CONTRAST/PE PROTOCOL/ CTA CHEST COMPARISON: CHEST RADIOGRAPH EARLIER TODAY. REASON FOR EXAMINATION:  A. FIB WITH SHORTNESS OF BREATH TECHNIQUE: Helical CTA was performed through the chest utilizing 100 cc of Isovue 370 intravenous contrast.  Images were obtained with bolus tracking in order to opacify the pulmonary arteries. Thick section coronal MIP 3D reconstructions were performed  on a separate workstation. FINDINGS: No intraluminal filling defects pulmonary arterial tree. Thoracic aorta normal in course and caliber. No pericardial effusion. Cardiac size upper limits of normal. No hilar, mediastinal, or axillary lymph node enlargement. Right and left lungs are clear. Limited imaging upper abdomen shows adrenal glands without anomaly. Mild diffuse disc space narrowing with anterior osteophytes, thoracic spine. No CT evidence pulmonary embolism. All CT scans at this facility use dose modulation, iterative reconstruction, and/or weight based dosing when appropriate to reduce radiation dose to as low as reasonably achievable. Xr Chest Portable    Result Date: 6/27/2018  EXAMINATION: AP erect portable  CLINICAL HISTORY: Tachycardia COMPARISONS: None  FINDINGS: Two views of the chest are submitted. The cardiac silhouette is of normal size configuration. The mediastinum is unremarkable. Pulmonary vascular is attenuated, lungs are hyperinflated and there is coarsening of the interstitium. Right sided trachea. No focal infiltrates. No effusions. Pneumothoraces. NO ACUTE ACTIVE CARDIOPULMONARY PROCESS. RADIOGRAPHIC FINDINGS SUGGESTIVE OF COPD. CORRELATE CLINICALLY.       Labs:   Recent Results (from the past 72 hour(s))   EKG 12 Lead UA Negative Negative mg/dL    Urobilinogen, Urine 0.2 <2.0 E.U./dL    Nitrite, Urine Negative Negative    Leukocyte Esterase, Urine Negative Negative    Urine Reflex to Culture YES    Urine Drug Screen    Collection Time: 06/27/18  7:30 AM   Result Value Ref Range    Amphetamine Screen, Urine Neg Negative <1000 ng/mL    Barbiturate Screen, Ur POSITIVE (A) Negative < 200 ng/mL    Benzodiazepine Screen, Urine Neg Negative < 200 ng/mL    Cannabinoid Scrn, Ur Neg Negative < 50 ng/mL    COCAINE METABOLITE SCREEN URINE Neg Negative < 300 ng/mL    Opiate Scrn, Ur Neg Negative < 300 ng/mL    PCP Scrn, Ur Neg Negative < 25 ng/mL    Drug Screen Comment: see below    Troponin    Collection Time: 06/27/18  7:30 AM   Result Value Ref Range    Troponin <0.010 0.000 - 0.010 ng/mL   CK    Collection Time: 06/27/18  7:30 AM   Result Value Ref Range    Total CK 47 0 - 170 U/L   Magnesium    Collection Time: 06/27/18  7:30 AM   Result Value Ref Range    Magnesium 2.3 1.7 - 2.3 mg/dL   TSH without Reflex    Collection Time: 06/27/18  7:30 AM   Result Value Ref Range    TSH 1.370 0.270 - 4.200 uIU/mL   Urine Culture    Collection Time: 06/27/18  7:30 AM   Result Value Ref Range    Urine Culture, Routine No growth 24 hours    Microscopic Urinalysis    Collection Time: 06/27/18  7:30 AM   Result Value Ref Range    WBC, UA 0-2 0 - 5 /HPF    RBC, UA 0-2 0 - 2 /HPF    Epi Cells 5-10 /HPF    Bacteria, UA Few /HPF   D-Dimer, Quantitative    Collection Time: 06/27/18  7:30 AM   Result Value Ref Range    D-Dimer, Quant 0.68 (H) 0.00 - 0.50 mg/L FEU   POCT Glucose    Collection Time: 06/27/18  7:31 AM   Result Value Ref Range    POC Glucose 127 (H) 60 - 115 mg/dl    Performed on ACCU-CHEK    POCT Glucose    Collection Time: 06/27/18  7:39 AM   Result Value Ref Range    Glucose 127 mg/dL    QC OK? ok    EKG 12 Lead    Collection Time: 06/27/18 10:36 AM   Result Value Ref Range    Ventricular Rate 70 BPM    Atrial Rate 70 BPM    P-R Interval 168 ms Assessment:  Active Hospital Problems    Diagnosis Date Noted    Atrial fibrillation with RVR ([facility] [address] [I48.91] 06/27/2018         Plan:   1. F/U 2 weeks.         Electronically signed by Fernando Pina MD on 6/28/2018 at 3:50 PM

## 2018-06-29 ENCOUNTER — CARE COORDINATION (OUTPATIENT)
Dept: CASE MANAGEMENT | Age: 43
End: 2018-06-29

## 2018-06-29 ENCOUNTER — CARE COORDINATION (OUTPATIENT)
Dept: CARE COORDINATION | Age: 43
End: 2018-06-29

## 2018-06-29 DIAGNOSIS — I48.91 ATRIAL FIBRILLATION WITH RVR (HCC): Primary | ICD-10-CM

## 2018-06-29 PROCEDURE — 1111F DSCHRG MED/CURRENT MED MERGE: CPT | Performed by: FAMILY MEDICINE

## 2018-07-02 ENCOUNTER — CARE COORDINATION (OUTPATIENT)
Dept: FAMILY MEDICINE CLINIC | Age: 43
End: 2018-07-02

## 2018-07-03 ENCOUNTER — TELEPHONE (OUTPATIENT)
Dept: CARDIOLOGY CLINIC | Age: 43
End: 2018-07-03

## 2018-07-03 NOTE — TELEPHONE ENCOUNTER
PT RECENTLY INPT AT [facility] FOR NEW ONSET A-FIB. PUT ON METOPROLOL 25 MG BID. HAS BEEN FEELING \"OFF\" AND SLUGGISH SINCE STARTING MEDICATION. PLEASE ADVISE.

## 2018-07-03 NOTE — TELEPHONE ENCOUNTER
PER DR. HOLIDAY CHANGE TO LOPRESSOR 12.5 MG BID. SPOKE WITH PT.  AWARE OF DR. HERNANDEZ'S INSTRUCTIONS.

## 2018-07-05 ENCOUNTER — TELEPHONE (OUTPATIENT)
Dept: CARDIOLOGY CLINIC | Age: 43
End: 2018-07-05

## 2018-07-13 ENCOUNTER — TELEPHONE (OUTPATIENT)
Dept: CARDIOLOGY CLINIC | Age: 43
End: 2018-07-13

## 2018-07-13 ENCOUNTER — OFFICE VISIT (OUTPATIENT)
Dept: CARDIOLOGY CLINIC | Age: 43
End: 2018-07-13
Payer: COMMERCIAL

## 2018-07-13 VITALS
WEIGHT: 219 LBS | RESPIRATION RATE: 16 BRPM | BODY MASS INDEX: 34.3 KG/M2 | DIASTOLIC BLOOD PRESSURE: 86 MMHG | OXYGEN SATURATION: 98 % | SYSTOLIC BLOOD PRESSURE: 160 MMHG | HEART RATE: 82 BPM

## 2018-07-13 DIAGNOSIS — I48.91 ATRIAL FIBRILLATION WITH RVR (HCC): Primary | ICD-10-CM

## 2018-07-13 PROCEDURE — 99213 OFFICE O/P EST LOW 20 MIN: CPT | Performed by: INTERNAL MEDICINE

## 2018-07-13 RX ORDER — DILTIAZEM HYDROCHLORIDE 120 MG/1
120 CAPSULE, EXTENDED RELEASE ORAL 2 TIMES DAILY
Qty: 60 CAPSULE | Refills: 6 | Status: SHIPPED | OUTPATIENT
Start: 2018-07-13 | End: 2018-07-16 | Stop reason: ALTCHOICE

## 2018-07-13 RX ORDER — CHOLECALCIFEROL (VITAMIN D3) 125 MCG
500 CAPSULE ORAL DAILY
COMMUNITY

## 2018-07-13 RX ORDER — MULTIVITAMIN WITH IRON
250 TABLET ORAL DAILY
COMMUNITY

## 2018-07-13 RX ORDER — CALCIUM/MAGNESIUM/ZINC 333-133 MG
TABLET ORAL DAILY
COMMUNITY

## 2018-07-13 NOTE — PROGRESS NOTES
done for ITP    THYROIDECTOMY, COMPLETION Left 2014    frozen section inconclusive    THYROIDECTOMY, PARTIAL Right 2011    Benign nodule       Social History     Social History    Marital status:      Spouse name: N/A    Number of children: 0    Years of education: N/A     Occupational History    medical records      Social History Main Topics    Smoking status: Former Smoker     Quit date: 11/17/2010    Smokeless tobacco: Never Used    Alcohol use 0.0 oz/week      Comment: socially    Drug use: No    Sexual activity: Not Asked     Other Topics Concern    None     Social History Narrative    None       Family History   Problem Relation Age of Onset    High Blood Pressure Mother     Anemia Mother     Heart Disease Father     Cancer Paternal Aunt     Cancer Paternal Grandmother        Current Outpatient Prescriptions   Medication Sig Dispense Refill    magnesium (MAGNESIUM-OXIDE) 250 MG TABS tablet Take 250 mg by mouth daily      vitamin B-12 (CYANOCOBALAMIN) 500 MCG tablet Take 500 mcg by mouth daily      zinc 50 MG CAPS Take by mouth      Echinacea 400 MG CAPS Take by mouth      diltiazem (CARDIZEM 12 HR) 120 MG extended release capsule Take 1 capsule by mouth 2 times daily 60 capsule 6    rivaroxaban (XARELTO) 20 MG TABS tablet Take 1 tablet by mouth daily 30 tablet 3    naproxen (NAPROSYN) 500 MG tablet Take 1 tablet by mouth 2 times daily (with meals) 60 tablet 2    butalbital-acetaminophen-caffeine (FIORICET, ESGIC) -40 MG per tablet take 1 tablet by mouth every 4 hours as needed for headache 12 tablet 5    hydrocortisone (ANUSOL-HC) 25 MG suppository Place 1 suppository rectally 2 times daily 60 suppository 0    ondansetron (ZOFRAN) 4 MG tablet Take 1 tablet by mouth every 12 hours as needed for Nausea or Vomiting 10 tablet 1    norethindrone-ethinyl estradiol (JUNEL FE 1/20) 1-20 MG-MCG per tablet Take 1 tablet by mouth daily 28 tablet 06    levothyroxine (SYNTHROID) 125 MCG tablet Take 1 tablet by mouth Daily 90 tablet 03    Calcium Carbonate-Vit D-Min (CALCIUM 1200) 2663-3432 MG-UNIT CHEW Take by mouth      cetirizine (ZYRTEC) 10 MG tablet Take 10 mg by mouth daily      ferrous sulfate 325 (65 FE) MG tablet Take 325 mg by mouth daily (with breakfast)      fluticasone (FLONASE) 50 MCG/ACT nasal spray 1 spray by Nasal route daily. 1 Bottle 06    meclizine (ANTIVERT) 12.5 MG tablet Take 12.5 mg by mouth 3 times daily as needed. No current facility-administered medications for this visit. Penicillins; Codeine; Percocet [oxycodone-acetaminophen]; Bee venom; Tape [adhesive tape]; Hydrocodone; and Protonix [pantoprazole sodium]    Review of Systems:  General ROS: negative  Psychological ROS: negative  Hematological and Lymphatic ROS: No history of blood clots or bleeding disorder. Respiratory ROS: no cough, shortness of breath, or wheezing  Cardiovascular ROS: no chest pain or dyspnea on exertion  Gastrointestinal ROS: no abdominal pain, change in bowel habits, or black or bloody stools  Genito-Urinary ROS: no dysuria, trouble voiding, or hematuria  Musculoskeletal ROS: negative  Neurological ROS: no TIA or stroke symptoms  Dermatological ROS: negative    VITALS:  Blood pressure (!) 160/86, pulse 82, resp. rate 16, weight 219 lb (99.3 kg), SpO2 98 %, not currently breastfeeding. Body mass index is 34.3 kg/m². Physical Examination:  General appearance - alert, well appearing, and in no distress  Mental status - alert, oriented to person, place, and time  Neck - Neck is supple, no JVD or carotid bruits. No thyromegaly or adenopathy.    Chest - clear to auscultation, no wheezes, rales or rhonchi, symmetric air entry  Heart - normal rate, regular rhythm, normal S1, S2, no murmurs, rubs, clicks or gallops  Abdomen - soft, nontender, nondistended, no masses or organomegaly  Neurological - alert, oriented, normal speech, no focal findings or movement disorder

## 2018-07-16 RX ORDER — DILTIAZEM HYDROCHLORIDE 120 MG/1
120 CAPSULE, COATED, EXTENDED RELEASE ORAL DAILY
Qty: 30 CAPSULE | Refills: 6
Start: 2018-07-16 | End: 2019-01-18 | Stop reason: SDUPTHER

## 2018-07-27 DIAGNOSIS — E89.0 POSTOPERATIVE HYPOTHYROIDISM: ICD-10-CM

## 2018-07-27 LAB
T4 FREE: 1.69 NG/DL (ref 0.93–1.7)
TSH SERPL DL<=0.05 MIU/L-ACNC: 0.94 UIU/ML (ref 0.27–4.2)

## 2018-08-08 ENCOUNTER — OFFICE VISIT (OUTPATIENT)
Dept: ENDOCRINOLOGY | Age: 43
End: 2018-08-08
Payer: COMMERCIAL

## 2018-08-08 VITALS
SYSTOLIC BLOOD PRESSURE: 130 MMHG | WEIGHT: 218 LBS | HEART RATE: 62 BPM | HEIGHT: 66 IN | BODY MASS INDEX: 35.03 KG/M2 | DIASTOLIC BLOOD PRESSURE: 80 MMHG

## 2018-08-08 DIAGNOSIS — E89.0 POSTOPERATIVE HYPOTHYROIDISM: Primary | ICD-10-CM

## 2018-08-08 PROCEDURE — 99213 OFFICE O/P EST LOW 20 MIN: CPT | Performed by: INTERNAL MEDICINE

## 2018-08-08 RX ORDER — NORETHINDRONE ACETATE AND ETHINYL ESTRADIOL 1MG-20(21)
1 KIT ORAL DAILY
Qty: 28 TABLET | Refills: 6 | Status: SHIPPED | OUTPATIENT
Start: 2018-08-08 | End: 2019-02-08 | Stop reason: ALTCHOICE

## 2018-08-08 NOTE — PROGRESS NOTES
Subjective:      Patient ID: Brea Broderick is a 37 y.o. female. Other   This is a chronic (hypothyroidism) problem. The current episode started more than 1 year ago. The problem has been waxing and waning. Associated symptoms include fatigue. Exacerbated by: thyroidectomy. Treatments tried: synthyroid. The treatment provided moderate relief. Recent admission at [facility] for AFIB 6/2018     PT WANTS A REFILL OF BIRTH CONTROL PILLS       12 MONTH F/U   Obesity Body mass index is 35.19 kg/m².       Patient Active Problem List   Diagnosis    Hypothyroidism    Obesity    Meniere disease    Irregular periods    Asplenia    Nail fungus    Allergic rhinitis    Migraine    External hemorrhoid    Pain in right lower leg    Multinodular goiter    CTS (carpal tunnel syndrome)    Migraine headache    Plantar fasciitis, left    Atrial fibrillation with RVR (HCC)         Current Outpatient Prescriptions:     diltiazem (CARDIZEM CD) 120 MG extended release capsule, Take 1 capsule by mouth daily, Disp: 30 capsule, Rfl: 6    magnesium (MAGNESIUM-OXIDE) 250 MG TABS tablet, Take 250 mg by mouth daily, Disp: , Rfl:     vitamin B-12 (CYANOCOBALAMIN) 500 MCG tablet, Take 500 mcg by mouth daily, Disp: , Rfl:     zinc 50 MG CAPS, Take by mouth, Disp: , Rfl:     Echinacea 400 MG CAPS, Take by mouth, Disp: , Rfl:     rivaroxaban (XARELTO) 20 MG TABS tablet, Take 1 tablet by mouth daily, Disp: 30 tablet, Rfl: 3    naproxen (NAPROSYN) 500 MG tablet, Take 1 tablet by mouth 2 times daily (with meals), Disp: 60 tablet, Rfl: 2    butalbital-acetaminophen-caffeine (FIORICET, ESGIC) -40 MG per tablet, take 1 tablet by mouth every 4 hours as needed for headache, Disp: 12 tablet, Rfl: 5    hydrocortisone (ANUSOL-HC) 25 MG suppository, Place 1 suppository rectally 2 times daily, Disp: 60 suppository, Rfl: 0    ondansetron (ZOFRAN) 4 MG tablet, Take 1 tablet by mouth every 12 hours as needed for Nausea or Vomiting, Disp: 10 tablet, Rfl: 1    norethindrone-ethinyl estradiol (JUNEL FE 1/20) 1-20 MG-MCG per tablet, Take 1 tablet by mouth daily, Disp: 28 tablet, Rfl: 06    levothyroxine (SYNTHROID) 125 MCG tablet, Take 1 tablet by mouth Daily, Disp: 90 tablet, Rfl: 03    Calcium Carbonate-Vit D-Min (CALCIUM 1200) 6581-7988 MG-UNIT CHEW, Take by mouth, Disp: , Rfl:     cetirizine (ZYRTEC) 10 MG tablet, Take 10 mg by mouth daily, Disp: , Rfl:     ferrous sulfate 325 (65 FE) MG tablet, Take 325 mg by mouth daily (with breakfast), Disp: , Rfl:     fluticasone (FLONASE) 50 MCG/ACT nasal spray, 1 spray by Nasal route daily. , Disp: 1 Bottle, Rfl: 06    meclizine (ANTIVERT) 12.5 MG tablet, Take 12.5 mg by mouth 3 times daily as needed. , Disp: , Rfl:       Review of Systems   Constitutional: Positive for fatigue. Cardiovascular: Negative. Genitourinary: Positive for menstrual problem. All other systems reviewed and are negative. Vitals:    08/08/18 1305   BP: 130/80   Site: Left Arm   Position: Sitting   Cuff Size: Medium Adult   Pulse: 62   Weight: 218 lb (98.9 kg)   Height: 5' 6\" (1.676 m)       Objective:   Physical Exam   Constitutional: She appears well-developed and well-nourished. HENT:   Head: Atraumatic. Eyes: Conjunctivae are normal.   Neck:       Cardiovascular: Normal rate. Abdominal:   Obese    Musculoskeletal: Normal range of motion. Neurological: She is alert. Skin: Skin is warm. Psychiatric: She has a normal mood and affect. Results for Teri Pelaez (MRN [id number]) as of 8/8/2018 13:23   Ref. Range 7/27/2018 12:27   TSH Latest Ref Range: 0.270 - 4.200 uIU/mL 0.939   T4 Free Latest Ref Range: 0.93 - 1.70 ng/dL 1.69       Assessment:       Diagnosis Orders   1.  Postoperative hypothyroidism  T4, Free    TSH without Reflex           Plan:      Orders Placed This Encounter   Procedures    T4, Free     Standing Status:   Future     Standing Expiration Date:   8/8/2019    TSH without Reflex

## 2018-08-14 ENCOUNTER — OFFICE VISIT (OUTPATIENT)
Dept: FAMILY MEDICINE CLINIC | Age: 43
End: 2018-08-14
Payer: COMMERCIAL

## 2018-08-14 VITALS
OXYGEN SATURATION: 98 % | HEIGHT: 66 IN | SYSTOLIC BLOOD PRESSURE: 148 MMHG | WEIGHT: 220.6 LBS | HEART RATE: 70 BPM | DIASTOLIC BLOOD PRESSURE: 78 MMHG | BODY MASS INDEX: 35.45 KG/M2

## 2018-08-14 DIAGNOSIS — G43.109 MIGRAINE WITH AURA AND WITHOUT STATUS MIGRAINOSUS, NOT INTRACTABLE: ICD-10-CM

## 2018-08-14 DIAGNOSIS — F51.01 PRIMARY INSOMNIA: ICD-10-CM

## 2018-08-14 DIAGNOSIS — E89.0 POSTOPERATIVE HYPOTHYROIDISM: ICD-10-CM

## 2018-08-14 DIAGNOSIS — I48.91 ATRIAL FIBRILLATION WITH RVR (HCC): Primary | ICD-10-CM

## 2018-08-14 DIAGNOSIS — I10 ESSENTIAL HYPERTENSION: ICD-10-CM

## 2018-08-14 DIAGNOSIS — J30.9 ALLERGIC RHINITIS, UNSPECIFIED SEASONALITY, UNSPECIFIED TRIGGER: ICD-10-CM

## 2018-08-14 DIAGNOSIS — H92.03 ACUTE EAR PAIN, BILATERAL: ICD-10-CM

## 2018-08-14 PROCEDURE — 99214 OFFICE O/P EST MOD 30 MIN: CPT | Performed by: FAMILY MEDICINE

## 2018-08-14 ASSESSMENT — ENCOUNTER SYMPTOMS
ABDOMINAL PAIN: 0
SHORTNESS OF BREATH: 0

## 2018-08-14 NOTE — PROGRESS NOTES
Subjective  Mauri Lani, 37 y.o. female presents today with:  Chief Complaint   Patient presents with    6 Month Follow-Up     bi-lateral ear pain       Otalgia    There is pain in both ears. This is a new problem. The current episode started in the past 7 days. The problem occurs constantly. The problem has been unchanged. There has been no fever. Pertinent negatives include no abdominal pain or rash. Here today for 6 month follow-up on chronic problems including A. Fib, hypothyroidism, allergic rhinitis, migraines. Also c/o above. She was hospitalized back in June for A. fib with rapid ventricular response. Her regular cardiologist is Dr. Sol Jones. She sees Dr. Patrick Webster for management of her thyroid. She also has a past medical history significant for migraine headaches and allergic rhinitis. Her ObGyn is Dr. Scarlet Lorenzo. On her new blood thinner she is having heavy periods so she is going to see Dr. Scarlet Lorenzo. She is taking synthroid, OCP's, zyrtec and flonase. For her migraines she uses fioricet prn. C/o insomnia. Trouble falling asleep. Review of Systems   Constitutional: Negative for fever. HENT: Positive for ear pain. Respiratory: Negative for shortness of breath. Cardiovascular: Negative for chest pain. Gastrointestinal: Negative for abdominal pain. Skin: Negative for rash.        Past Medical History:   Diagnosis Date    Allergic rhinitis     Asplenia     Atrial fibrillation with RVR (HCC)     Dr. Clemons Dural Bilateral hand pain     CTS (carpal tunnel syndrome) 05/2016    bilateral    External hemorrhoid     Fatigue     Heart murmur     History of ITP 2004    had spenectomy    Hypertension     Hypothyroidism     Hypothyroidism     Insomnia     Irregular periods     Meniere disease     Migraine headache     Multinodular goiter     Nail fungus     Pain in right lower leg     Plantar fasciitis, left     Dr. Clydene Crigler     Past Surgical History:   Procedure Laterality Date times daily (with meals) 60 tablet 2    butalbital-acetaminophen-caffeine (FIORICET, ESGIC) -40 MG per tablet take 1 tablet by mouth every 4 hours as needed for headache 12 tablet 5    hydrocortisone (ANUSOL-HC) 25 MG suppository Place 1 suppository rectally 2 times daily 60 suppository 0    ondansetron (ZOFRAN) 4 MG tablet Take 1 tablet by mouth every 12 hours as needed for Nausea or Vomiting 10 tablet 1    levothyroxine (SYNTHROID) 125 MCG tablet Take 1 tablet by mouth Daily 90 tablet 03    Calcium Carbonate-Vit D-Min (CALCIUM 1200) 4090-9713 MG-UNIT CHEW Take by mouth      cetirizine (ZYRTEC) 10 MG tablet Take 10 mg by mouth daily      ferrous sulfate 325 (65 FE) MG tablet Take 325 mg by mouth daily (with breakfast)      fluticasone (FLONASE) 50 MCG/ACT nasal spray 1 spray by Nasal route daily. 1 Bottle 06    meclizine (ANTIVERT) 12.5 MG tablet Take 12.5 mg by mouth 3 times daily as needed. No current facility-administered medications for this visit. Objective    Vitals:    08/14/18 1550 08/14/18 1552   BP: (!) 150/78 (!) 148/78   Pulse: 70    SpO2: 98%    Weight: 220 lb 9.6 oz (100.1 kg)    Height: 5' 6\" (1.676 m)        Physical Exam   Constitutional: She appears well-developed and well-nourished. HENT:   Head: Normocephalic and atraumatic. Right Ear: Hearing, tympanic membrane, external ear and ear canal normal.   Left Ear: Hearing, tympanic membrane, external ear and ear canal normal.   Nose: Nose normal.   Mouth/Throat: Uvula is midline, oropharynx is clear and moist and mucous membranes are normal.   Neck: Normal range of motion. Neck supple. Cardiovascular: Normal rate, regular rhythm and normal heart sounds. No murmur heard. Pulmonary/Chest: Effort normal and breath sounds normal. She has no wheezes. Musculoskeletal: Normal range of motion. Lymphadenopathy:     She has no cervical adenopathy. Skin: Skin is warm and dry. No rash noted.        Assessment & Plan Diagnosis Orders   1. Atrial fibrillation with RVR ([facility] [address]     2. Migraine with aura and without status migrainosus, not intractable     3. Allergic rhinitis, unspecified seasonality, unspecified trigger     4. Postoperative hypothyroidism     5. Primary insomnia     6. Essential hypertension     7. Acute ear pain, bilateral  Referral To External ENT - Juan R Gonzalez MD     A fib stable and managed per Dr. Nenita Gonzales. Migraines stable on naprosyn and fioricet prn. Allergic rhinitis stable. She will resume flonase and f/u with ENT if not better in 2 weeks. Plan as noted above. F/u 6 months. She will f/u with Dr. Nenita Gonzales as directed. Orders Placed This Encounter   Procedures    Referral To External ENT - Juan R Gonzalez MD     Referral Priority:   Routine     Referral Type:   Eval and Treat     Referral Reason:   Specialty Services Required     Referred to Provider:   Aryan Downing MD     Requested Specialty:   Otolaryngology     Number of Visits Requested:   1     No orders of the defined types were placed in this encounter. There are no discontinued medications. Return in about 6 months (around 2/14/2019), or if symptoms worsen or fail to improve.     Brenda Hannah MD

## 2018-08-31 DIAGNOSIS — R11.0 NAUSEA: ICD-10-CM

## 2018-08-31 RX ORDER — ONDANSETRON 4 MG/1
4 TABLET, FILM COATED ORAL EVERY 12 HOURS PRN
Qty: 60 TABLET | Refills: 1 | Status: SHIPPED | OUTPATIENT
Start: 2018-08-31 | End: 2019-05-15 | Stop reason: SDUPTHER

## 2018-10-10 ENCOUNTER — PATIENT MESSAGE (OUTPATIENT)
Dept: CARDIOLOGY CLINIC | Age: 43
End: 2018-10-10

## 2018-11-01 DIAGNOSIS — E89.0 POSTOPERATIVE HYPOTHYROIDISM: ICD-10-CM

## 2018-11-01 RX ORDER — LEVOTHYROXINE SODIUM 0.12 MG/1
125 TABLET ORAL DAILY
Qty: 90 TABLET | Refills: 1 | Status: SHIPPED | OUTPATIENT
Start: 2018-11-01 | End: 2019-05-15 | Stop reason: SDUPTHER

## 2018-12-17 ENCOUNTER — OFFICE VISIT (OUTPATIENT)
Dept: FAMILY MEDICINE CLINIC | Age: 43
End: 2018-12-17
Payer: COMMERCIAL

## 2018-12-17 VITALS
TEMPERATURE: 98.2 F | OXYGEN SATURATION: 98 % | WEIGHT: 218.8 LBS | BODY MASS INDEX: 35.17 KG/M2 | HEIGHT: 66 IN | DIASTOLIC BLOOD PRESSURE: 76 MMHG | SYSTOLIC BLOOD PRESSURE: 134 MMHG | HEART RATE: 78 BPM

## 2018-12-17 DIAGNOSIS — M54.50 ACUTE MIDLINE LOW BACK PAIN WITHOUT SCIATICA: Primary | ICD-10-CM

## 2018-12-17 PROCEDURE — 99213 OFFICE O/P EST LOW 20 MIN: CPT | Performed by: NURSE PRACTITIONER

## 2018-12-17 RX ORDER — CYCLOBENZAPRINE HCL 10 MG
10 TABLET ORAL 3 TIMES DAILY PRN
Qty: 15 TABLET | Refills: 0 | Status: SHIPPED | OUTPATIENT
Start: 2018-12-17 | End: 2018-12-20

## 2018-12-17 ASSESSMENT — PATIENT HEALTH QUESTIONNAIRE - PHQ9
SUM OF ALL RESPONSES TO PHQ QUESTIONS 1-9: 0
SUM OF ALL RESPONSES TO PHQ9 QUESTIONS 1 & 2: 0
1. LITTLE INTEREST OR PLEASURE IN DOING THINGS: 0
2. FEELING DOWN, DEPRESSED OR HOPELESS: 0
SUM OF ALL RESPONSES TO PHQ QUESTIONS 1-9: 0

## 2018-12-17 ASSESSMENT — ENCOUNTER SYMPTOMS
COLOR CHANGE: 0
CHEST TIGHTNESS: 0
ABDOMINAL PAIN: 0
BOWEL INCONTINENCE: 0
BACK PAIN: 1
SHORTNESS OF BREATH: 0

## 2018-12-17 NOTE — LETTER
[address] [place] [address], [address]  [address]  Phone: [phone]  Fax: [phone] Ken Sorto NP        December 17, 2018     Patient: Keek May   YOB: 1975   Date of Visit: 12/17/2018       To Whom it May Concern:    Keke May was seen in my clinic on 12/17/2018. She may return to work on 12/19/2018 with no restrictions. Please excuse 12/17/2018 and 12/18/2019. If you have any questions or concerns, please don't hesitate to call.     Sincerely,         CASE Josue NP

## 2018-12-17 NOTE — PROGRESS NOTES
Subjective  Lorenza Warner, 37 y.o. female presents today with:  Chief Complaint   Patient presents with    Back Pain     Pt presents today c/o low middle back pain x 1 day. Pt thinks she pulled a muscle wrapping presents. Pt tried Tramadol with some relief,       Back Pain   This is a new problem. The current episode started yesterday. The problem occurs constantly. The problem has been gradually improving since onset. The pain is present in the lumbar spine. The pain does not radiate. The pain is moderate. The pain is worse during the day. The symptoms are aggravated by bending and twisting. Pertinent negatives include no abdominal pain, bladder incontinence, bowel incontinence, chest pain, dysuria, fever, headaches, leg pain, numbness, paresis, paresthesias, pelvic pain, tingling or weakness. She has tried analgesics and heat for the symptoms. The treatment provided moderate relief. Review of Systems   Constitutional: Negative for chills, diaphoresis, fatigue and fever. Respiratory: Negative for chest tightness and shortness of breath. Cardiovascular: Negative for chest pain. Gastrointestinal: Negative for abdominal pain and bowel incontinence. Genitourinary: Negative for bladder incontinence, dysuria and pelvic pain. Musculoskeletal: Positive for back pain and gait problem (back pain with position changes). Negative for arthralgias, joint swelling, myalgias, neck pain and neck stiffness. Skin: Negative for color change, pallor, rash and wound. Neurological: Negative for tingling, weakness, numbness, headaches and paresthesias. Objective    Vitals:    12/17/18 1326   BP: 134/76   Pulse: 78   Temp: 98.2 °F (36.8 °C)   TempSrc: Temporal   SpO2: 98%   Weight: 218 lb 12.8 oz (99.2 kg)   Height: 5' 6\" (1.676 m)       Physical Exam   Constitutional: She is oriented to person, place, and time. She appears well-developed and well-nourished. She is active. No distress.    HENT:   Head:

## 2019-01-18 ENCOUNTER — OFFICE VISIT (OUTPATIENT)
Dept: CARDIOLOGY CLINIC | Age: 44
End: 2019-01-18
Payer: COMMERCIAL

## 2019-01-18 VITALS
OXYGEN SATURATION: 98 % | HEART RATE: 68 BPM | DIASTOLIC BLOOD PRESSURE: 70 MMHG | WEIGHT: 220 LBS | RESPIRATION RATE: 14 BRPM | HEIGHT: 66 IN | SYSTOLIC BLOOD PRESSURE: 118 MMHG | BODY MASS INDEX: 35.36 KG/M2

## 2019-01-18 DIAGNOSIS — E66.9 CLASS 2 OBESITY WITHOUT SERIOUS COMORBIDITY WITH BODY MASS INDEX (BMI) OF 35.0 TO 35.9 IN ADULT, UNSPECIFIED OBESITY TYPE: ICD-10-CM

## 2019-01-18 DIAGNOSIS — I48.91 ATRIAL FIBRILLATION WITH RVR (HCC): Primary | ICD-10-CM

## 2019-01-18 DIAGNOSIS — I10 ESSENTIAL HYPERTENSION: ICD-10-CM

## 2019-01-18 PROCEDURE — 93000 ELECTROCARDIOGRAM COMPLETE: CPT | Performed by: INTERNAL MEDICINE

## 2019-01-18 PROCEDURE — 99213 OFFICE O/P EST LOW 20 MIN: CPT | Performed by: INTERNAL MEDICINE

## 2019-01-18 RX ORDER — DILTIAZEM HYDROCHLORIDE 120 MG/1
120 CAPSULE, COATED, EXTENDED RELEASE ORAL DAILY
Qty: 90 CAPSULE | Refills: 2 | Status: SHIPPED | OUTPATIENT
Start: 2019-01-18 | End: 2019-02-19 | Stop reason: DRUGHIGH

## 2019-01-22 DIAGNOSIS — N84.1 CERVICAL POLYP: ICD-10-CM

## 2019-01-22 DIAGNOSIS — Z01.419 WOMEN'S ANNUAL ROUTINE GYNECOLOGICAL EXAMINATION: ICD-10-CM

## 2019-02-04 DIAGNOSIS — E89.0 POSTOPERATIVE HYPOTHYROIDISM: ICD-10-CM

## 2019-02-04 LAB
T4 FREE: 1.34 NG/DL (ref 0.93–1.7)
TSH SERPL DL<=0.05 MIU/L-ACNC: 1.57 UIU/ML (ref 0.27–4.2)

## 2019-02-08 ENCOUNTER — OFFICE VISIT (OUTPATIENT)
Dept: ENDOCRINOLOGY | Age: 44
End: 2019-02-08
Payer: COMMERCIAL

## 2019-02-08 VITALS
WEIGHT: 221 LBS | OXYGEN SATURATION: 96 % | HEIGHT: 66 IN | DIASTOLIC BLOOD PRESSURE: 82 MMHG | HEART RATE: 63 BPM | BODY MASS INDEX: 35.52 KG/M2 | SYSTOLIC BLOOD PRESSURE: 125 MMHG

## 2019-02-08 DIAGNOSIS — E03.9 HYPOTHYROIDISM, UNSPECIFIED TYPE: Primary | ICD-10-CM

## 2019-02-08 PROCEDURE — 99213 OFFICE O/P EST LOW 20 MIN: CPT | Performed by: INTERNAL MEDICINE

## 2019-02-12 ENCOUNTER — NURSE TRIAGE (OUTPATIENT)
Dept: OTHER | Facility: CLINIC | Age: 44
End: 2019-02-12

## 2019-02-12 ENCOUNTER — OFFICE VISIT (OUTPATIENT)
Dept: FAMILY MEDICINE CLINIC | Age: 44
End: 2019-02-12
Payer: COMMERCIAL

## 2019-02-12 ENCOUNTER — HOSPITAL ENCOUNTER (EMERGENCY)
Age: 44
Discharge: HOME | End: 2019-02-12
Attending: EMERGENCY MEDICINE
Payer: COMMERCIAL

## 2019-02-12 VITALS
TEMPERATURE: 98.7 F | HEART RATE: 83 BPM | HEIGHT: 65 IN | SYSTOLIC BLOOD PRESSURE: 133 MMHG | WEIGHT: 220 LBS | OXYGEN SATURATION: 98 % | DIASTOLIC BLOOD PRESSURE: 84 MMHG | RESPIRATION RATE: 18 BRPM | BODY MASS INDEX: 36.65 KG/M2

## 2019-02-12 VITALS
HEIGHT: 66 IN | OXYGEN SATURATION: 98 % | HEART RATE: 96 BPM | DIASTOLIC BLOOD PRESSURE: 104 MMHG | SYSTOLIC BLOOD PRESSURE: 160 MMHG | WEIGHT: 224 LBS | BODY MASS INDEX: 36 KG/M2

## 2019-02-12 DIAGNOSIS — R00.2 PALPITATIONS: Primary | ICD-10-CM

## 2019-02-12 DIAGNOSIS — M76.52 PATELLAR TENDINITIS OF LEFT KNEE: ICD-10-CM

## 2019-02-12 DIAGNOSIS — H81.09 MENIERE'S DISEASE, UNSPECIFIED LATERALITY: ICD-10-CM

## 2019-02-12 DIAGNOSIS — E04.2 MULTINODULAR GOITER: ICD-10-CM

## 2019-02-12 DIAGNOSIS — I10 ESSENTIAL HYPERTENSION: ICD-10-CM

## 2019-02-12 DIAGNOSIS — G43.109 MIGRAINE WITH AURA AND WITHOUT STATUS MIGRAINOSUS, NOT INTRACTABLE: ICD-10-CM

## 2019-02-12 DIAGNOSIS — E03.9 HYPOTHYROIDISM, UNSPECIFIED TYPE: ICD-10-CM

## 2019-02-12 DIAGNOSIS — F51.01 PRIMARY INSOMNIA: ICD-10-CM

## 2019-02-12 DIAGNOSIS — I48.91 ATRIAL FIBRILLATION WITH RVR (HCC): Primary | ICD-10-CM

## 2019-02-12 DIAGNOSIS — Q89.01 ASPLENIA: ICD-10-CM

## 2019-02-12 DIAGNOSIS — E66.9 CLASS 2 OBESITY WITHOUT SERIOUS COMORBIDITY WITH BODY MASS INDEX (BMI) OF 35.0 TO 35.9 IN ADULT, UNSPECIFIED OBESITY TYPE: ICD-10-CM

## 2019-02-12 LAB
ALBUMIN SERPL-MCNC: 3.8 G/DL (ref 3.5–4.6)
ALP BLD-CCNC: 106 U/L (ref 40–130)
ALT SERPL-CCNC: 20 U/L (ref 0–33)
ANION GAP SERPL CALCULATED.3IONS-SCNC: 12 MEQ/L (ref 9–15)
AST SERPL-CCNC: 16 U/L (ref 0–35)
BASOPHILS ABSOLUTE: 0 K/UL (ref 0–0.2)
BASOPHILS RELATIVE PERCENT: 0 %
BILIRUB SERPL-MCNC: <0.2 MG/DL (ref 0.2–0.7)
BUN BLDV-MCNC: 7 MG/DL (ref 6–20)
CALCIUM SERPL-MCNC: 9.5 MG/DL (ref 8.5–9.9)
CHLORIDE BLD-SCNC: 101 MEQ/L (ref 95–107)
CO2: 25 MEQ/L (ref 20–31)
CREAT SERPL-MCNC: 0.66 MG/DL (ref 0.5–0.9)
EKG ATRIAL RATE: 82 BPM
EKG P AXIS: 22 DEGREES
EKG P-R INTERVAL: 146 MS
EKG Q-T INTERVAL: 366 MS
EKG QRS DURATION: 78 MS
EKG QTC CALCULATION (BAZETT): 427 MS
EKG R AXIS: 13 DEGREES
EKG T AXIS: 16 DEGREES
EKG VENTRICULAR RATE: 82 BPM
EOSINOPHILS ABSOLUTE: 0.1 K/UL (ref 0–0.7)
EOSINOPHILS RELATIVE PERCENT: 1 %
GFR AFRICAN AMERICAN: >60
GFR NON-AFRICAN AMERICAN: >60
GLOBULIN: 3.5 G/DL (ref 2.3–3.5)
GLUCOSE BLD-MCNC: 107 MG/DL (ref 70–99)
HCT VFR BLD CALC: 43.6 % (ref 37–47)
HEMOGLOBIN: 14.5 G/DL (ref 12–16)
LYMPHOCYTES ABSOLUTE: 5.3 K/UL (ref 1–4.8)
LYMPHOCYTES RELATIVE PERCENT: 36 %
MCH RBC QN AUTO: 30.2 PG (ref 27–31.3)
MCHC RBC AUTO-ENTMCNC: 33.3 % (ref 33–37)
MCV RBC AUTO: 90.7 FL (ref 82–100)
MONOCYTES ABSOLUTE: 0.9 K/UL (ref 0.2–0.8)
MONOCYTES RELATIVE PERCENT: 6 %
NEUTROPHILS ABSOLUTE: 8.4 K/UL (ref 1.4–6.5)
NEUTROPHILS RELATIVE PERCENT: 57 %
PDW BLD-RTO: 14.5 % (ref 11.5–14.5)
PLATELET # BLD: 434 K/UL (ref 130–400)
PLATELET SLIDE REVIEW: ABNORMAL
POTASSIUM SERPL-SCNC: 4 MEQ/L (ref 3.4–4.9)
RBC # BLD: 4.81 M/UL (ref 4.2–5.4)
RBC # BLD: NORMAL 10*6/UL
SLIDE REVIEW: ABNORMAL
SODIUM BLD-SCNC: 138 MEQ/L (ref 135–144)
TOTAL PROTEIN: 7.3 G/DL (ref 6.3–8)
TROPONIN: <0.01 NG/ML (ref 0–0.01)
WBC # BLD: 14.7 K/UL (ref 4.8–10.8)

## 2019-02-12 PROCEDURE — 80053 COMPREHEN METABOLIC PANEL: CPT

## 2019-02-12 PROCEDURE — 99213 OFFICE O/P EST LOW 20 MIN: CPT | Performed by: FAMILY MEDICINE

## 2019-02-12 PROCEDURE — 96365 THER/PROPH/DIAG IV INF INIT: CPT

## 2019-02-12 PROCEDURE — 84484 ASSAY OF TROPONIN QUANT: CPT

## 2019-02-12 PROCEDURE — 93005 ELECTROCARDIOGRAM TRACING: CPT

## 2019-02-12 PROCEDURE — 6360000002 HC RX W HCPCS: Performed by: EMERGENCY MEDICINE

## 2019-02-12 PROCEDURE — 99285 EMERGENCY DEPT VISIT HI MDM: CPT

## 2019-02-12 PROCEDURE — 85025 COMPLETE CBC W/AUTO DIFF WBC: CPT

## 2019-02-12 RX ORDER — MAGNESIUM SULFATE IN WATER 40 MG/ML
2 INJECTION, SOLUTION INTRAVENOUS ONCE
Status: COMPLETED | OUTPATIENT
Start: 2019-02-12 | End: 2019-02-12

## 2019-02-12 RX ADMIN — MAGNESIUM SULFATE HEPTAHYDRATE 2 G: 40 INJECTION, SOLUTION INTRAVENOUS at 21:04

## 2019-02-12 ASSESSMENT — ENCOUNTER SYMPTOMS
COUGH: 0
PHOTOPHOBIA: 0
SHORTNESS OF BREATH: 0
SHORTNESS OF BREATH: 0
ABDOMINAL PAIN: 0
SORE THROAT: 0
VOMITING: 0
ABDOMINAL DISTENTION: 0
WHEEZING: 0
CHEST TIGHTNESS: 0
EYE DISCHARGE: 0

## 2019-02-12 ASSESSMENT — PATIENT HEALTH QUESTIONNAIRE - PHQ9
2. FEELING DOWN, DEPRESSED OR HOPELESS: 0
SUM OF ALL RESPONSES TO PHQ9 QUESTIONS 1 & 2: 0
1. LITTLE INTEREST OR PLEASURE IN DOING THINGS: 0
SUM OF ALL RESPONSES TO PHQ QUESTIONS 1-9: 0
SUM OF ALL RESPONSES TO PHQ QUESTIONS 1-9: 0

## 2019-02-13 ENCOUNTER — PATIENT MESSAGE (OUTPATIENT)
Dept: CARDIOLOGY CLINIC | Age: 44
End: 2019-02-13

## 2019-02-13 PROCEDURE — 93010 ELECTROCARDIOGRAM REPORT: CPT | Performed by: INTERNAL MEDICINE

## 2019-02-18 RX ORDER — DILTIAZEM HYDROCHLORIDE 180 MG/1
180 CAPSULE, COATED, EXTENDED RELEASE ORAL DAILY
Qty: 30 CAPSULE | Refills: 5 | Status: SHIPPED | OUTPATIENT
Start: 2019-02-18 | End: 2019-02-19 | Stop reason: SDUPTHER

## 2019-02-19 RX ORDER — DILTIAZEM HYDROCHLORIDE 180 MG/1
180 CAPSULE, COATED, EXTENDED RELEASE ORAL DAILY
Qty: 30 CAPSULE | Refills: 5 | Status: SHIPPED | OUTPATIENT
Start: 2019-02-19 | End: 2019-07-25 | Stop reason: SDUPTHER

## 2019-03-07 ENCOUNTER — PATIENT MESSAGE (OUTPATIENT)
Dept: FAMILY MEDICINE CLINIC | Age: 44
End: 2019-03-07

## 2019-03-07 DIAGNOSIS — K64.4 EXTERNAL HEMORRHOID: ICD-10-CM

## 2019-03-07 DIAGNOSIS — G43.109 MIGRAINE WITH AURA AND WITHOUT STATUS MIGRAINOSUS, NOT INTRACTABLE: ICD-10-CM

## 2019-03-07 RX ORDER — HYDROCORTISONE ACETATE 25 MG/1
25 SUPPOSITORY RECTAL 2 TIMES DAILY
Qty: 60 SUPPOSITORY | Refills: 0 | Status: SHIPPED | OUTPATIENT
Start: 2019-03-07 | End: 2019-10-30 | Stop reason: SDUPTHER

## 2019-03-07 RX ORDER — BUTALBITAL, ACETAMINOPHEN AND CAFFEINE 50; 325; 40 MG/1; MG/1; MG/1
1 TABLET ORAL EVERY 4 HOURS PRN
Qty: 12 TABLET | Refills: 5 | Status: ON HOLD | OUTPATIENT
Start: 2019-03-07 | End: 2019-12-16 | Stop reason: HOSPADM

## 2019-04-29 ENCOUNTER — OFFICE VISIT (OUTPATIENT)
Dept: FAMILY MEDICINE CLINIC | Age: 44
End: 2019-04-29
Payer: COMMERCIAL

## 2019-04-29 VITALS
TEMPERATURE: 98.1 F | OXYGEN SATURATION: 98 % | SYSTOLIC BLOOD PRESSURE: 128 MMHG | HEART RATE: 86 BPM | RESPIRATION RATE: 16 BRPM | BODY MASS INDEX: 37.82 KG/M2 | HEIGHT: 65 IN | DIASTOLIC BLOOD PRESSURE: 90 MMHG | WEIGHT: 227 LBS

## 2019-04-29 DIAGNOSIS — M54.50 LUMBAR BACK PAIN: Primary | ICD-10-CM

## 2019-04-29 PROCEDURE — 99213 OFFICE O/P EST LOW 20 MIN: CPT | Performed by: NURSE PRACTITIONER

## 2019-04-29 RX ORDER — CYCLOBENZAPRINE HCL 10 MG
10 TABLET ORAL 3 TIMES DAILY PRN
Qty: 15 TABLET | Refills: 0 | Status: SHIPPED | OUTPATIENT
Start: 2019-04-29 | End: 2019-05-02

## 2019-04-29 ASSESSMENT — ENCOUNTER SYMPTOMS
SHORTNESS OF BREATH: 0
NAUSEA: 0
SORE THROAT: 0
COUGH: 0
DIARRHEA: 0
RHINORRHEA: 0
WHEEZING: 0
ABDOMINAL PAIN: 0
VOMITING: 0
BACK PAIN: 1

## 2019-04-29 NOTE — PROGRESS NOTES
Subjective  Eloina Swartz, 53 y.o. female presents today with:  Chief Complaint   Patient presents with    Back Pain     lower back pain and hip pain on left side that radiates down her leg, was helping take decorations down and felt a twinge in her back. Has tried at home remedies with no relief. onset friday       Back Pain   This is a new problem. Episode onset: 4 days ago. The problem occurs constantly. The problem is unchanged. The pain is present in the lumbar spine. The pain radiates to the right thigh. The pain is at a severity of 10/10. The pain is severe. The pain is the same all the time. The symptoms are aggravated by twisting and position. Associated symptoms include leg pain (occasional). Pertinent negatives include no abdominal pain, chest pain, fever, headaches, numbness or tingling. She has tried heat and muscle relaxant (icy hot) for the symptoms. The treatment provided moderate (heat and muscle relaxer) relief. Review of Systems   Constitutional: Positive for activity change. Negative for chills, fatigue and fever. HENT: Negative for congestion, rhinorrhea and sore throat. Respiratory: Negative for cough, shortness of breath and wheezing. Cardiovascular: Negative for chest pain and palpitations. Gastrointestinal: Negative for abdominal pain, diarrhea, nausea and vomiting. Musculoskeletal: Positive for back pain and myalgias. Neurological: Negative for dizziness, tingling, numbness and headaches.        Past Medical History:   Diagnosis Date    Allergic rhinitis     Asplenia     Atrial fibrillation (HCC)     Atrial fibrillation with RVR ([facility])     Dr. 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    Hypertension     Hypothyroidism     Hypothyroidism     Insomnia     Irregular periods     Meniere disease     Migraine headache Anemia Mother     Heart Disease Father     Cancer Paternal Aunt     Cancer Paternal Grandmother      Allergies   Allergen Reactions    Penicillins Anaphylaxis    Codeine Itching, Swelling and Rash     OK with Tylenol #3 per pt    Percocet [Oxycodone-Acetaminophen] Itching, Swelling and Rash    Bee Venom Itching and Swelling    Tape [Adhesive Tape]     Hydrocodone Itching, Swelling and Rash    Protonix [Pantoprazole Sodium] Swelling and Rash     Current Outpatient Medications   Medication Sig Dispense Refill    cyclobenzaprine (FLEXERIL) 10 MG tablet Take 1 tablet by mouth 3 times daily as needed for Muscle spasms Start with 1/2 tab. Do not drive or operate machinery while taking muscle relaxers.  15 tablet 0    butalbital-acetaminophen-caffeine (FIORICET, ESGIC) -40 MG per tablet Take 1 tablet by mouth every 4 hours as needed for Headaches 12 tablet 5    hydrocortisone (ANUSOL-HC) 25 MG suppository Place 1 suppository rectally 2 times daily 60 suppository 0    diltiazem (CARDIZEM CD) 180 MG extended release capsule Take 1 capsule by mouth daily 30 capsule 5    diclofenac sodium 1 % GEL Apply 4 g topically 4 times daily 2 Tube 0    levothyroxine (SYNTHROID) 125 MCG tablet Take 1 tablet by mouth Daily 90 tablet 1    rivaroxaban (XARELTO) 20 MG TABS tablet Take 1 tablet by mouth daily 90 tablet 3    ondansetron (ZOFRAN) 4 MG tablet Take 1 tablet by mouth every 12 hours as needed for Nausea or Vomiting 60 tablet 1    magnesium (MAGNESIUM-OXIDE) 250 MG TABS tablet Take 250 mg by mouth three times a week       vitamin B-12 (CYANOCOBALAMIN) 500 MCG tablet Take 500 mcg by mouth daily      zinc 50 MG CAPS Take by mouth      Echinacea 400 MG CAPS Take by mouth      Calcium Carbonate-Vit D-Min (CALCIUM 1200) 2884-7179 MG-UNIT CHEW Take by mouth      cetirizine (ZYRTEC) 10 MG tablet Take 10 mg by mouth daily      ferrous sulfate 325 (65 FE) MG tablet Take 325 mg by mouth daily (with breakfast)  fluticasone (FLONASE) 50 MCG/ACT nasal spray 1 spray by Nasal route daily. 1 Bottle 06    meclizine (ANTIVERT) 12.5 MG tablet Take 12.5 mg by mouth 3 times daily as needed. No current facility-administered medications for this visit. PMH, Surgical Hx, Family Hx, and Social Hx reviewed and updated. Health Maintenance reviewed. Objective  Vitals:    04/29/19 1521 04/29/19 1528   BP: (!) 128/90 (!) 128/90   Site: Left Upper Arm    Position: Sitting    Cuff Size: Large Adult    Pulse: 86    Resp: 16    Temp: 98.1 °F (36.7 °C)    TempSrc: Tympanic    SpO2: 98%    Weight: 227 lb (103 kg)    Height: 5' 5\" (1.651 m)      BP Readings from Last 3 Encounters:   04/29/19 (!) 128/90   04/23/19 136/84   02/12/19 133/84     Wt Readings from Last 3 Encounters:   04/29/19 227 lb (103 kg)   04/23/19 228 lb (103.4 kg)   02/12/19 220 lb (99.8 kg)     Physical Exam   Constitutional: She is oriented to person, place, and time. She appears well-developed and well-nourished. She is active. HENT:   Right Ear: Tympanic membrane normal.   Left Ear: Tympanic membrane normal.   Nose: Nose normal. Right sinus exhibits no maxillary sinus tenderness and no frontal sinus tenderness. Left sinus exhibits no maxillary sinus tenderness and no frontal sinus tenderness. Mouth/Throat: Uvula is midline, oropharynx is clear and moist and mucous membranes are normal.   Eyes: Pupils are equal, round, and reactive to light. Conjunctivae and EOM are normal.   Neck: Full passive range of motion without pain. No muscular tenderness present. Cardiovascular: Normal rate, regular rhythm, S1 normal, S2 normal and normal heart sounds. Pulmonary/Chest: Effort normal and breath sounds normal. No respiratory distress. She has no wheezes. She has no rhonchi. She has no rales. Musculoskeletal:   Pain with palpation to right lower lumbar area and right hip. With patient supine, able to straight leg raise right leg and hold >5 sec.   Denies numbness or tingling to RLE. Pain to right hip and right lumbar area with rt leg at 90 degrees and outward rotation of the hip. + Pedal push/pulls/adduction/abduction strength, +pulses, +sensation intact   Neurological: She is alert and oriented to person, place, and time. She has normal strength. Skin: Skin is warm, dry and intact. No rash noted. Psychiatric: She has a normal mood and affect. Her speech is normal and behavior is normal. Thought content normal.     Assessment & Plan    Diagnosis Orders   1. Lumbar back pain  cyclobenzaprine (FLEXERIL) 10 MG tablet     No orders of the defined types were placed in this encounter. Orders Placed This Encounter   Medications    cyclobenzaprine (FLEXERIL) 10 MG tablet     Sig: Take 1 tablet by mouth 3 times daily as needed for Muscle spasms Start with 1/2 tab. Do not drive or operate machinery while taking muscle relaxers. Dispense:  15 tablet     Refill:  0     There are no discontinued medications. Return in about 2 weeks (around 5/13/2019), or if symptoms worsen or fail to improve, for follow up with PCP. Reviewed with the patient: current clinical status,medications, activities and diet. Pt declined PT due to cost, alternative chiropractor  Continue what helps, ibuprofen and heat  Sleep with pillow under knees to relieve lumbar pressure    Side effects, adverse effects of themedication prescribed today, as well as treatment plan/ rationale and result expectations have been discussed with the patient who expresses understanding and desires to proceed. Close follow up to evaluate treatment results and for coordination of care. I have reviewed the patient's medical history in detail and updated the computerized patient record.     CASE Muñiz

## 2019-04-29 NOTE — PATIENT INSTRUCTIONS
Patient Education        Back Strain: Care Instructions  Overview    A back strain happens when you overstretch, or pull, a muscle in your back. You may hurt your back in an accident or when you exercise or lift something. Sometimes you may not know how you hurt your back. Most back pain will get better with rest and time. You can take care of yourself at home to help your back heal.  Follow-up care is a key part of your treatment and safety. Be sure to make and go to all appointments, and call your doctor if you are having problems. It's also a good idea to know your test results and keep a list of the medicines you take. How can you care for yourself at home? · Try to stay as active as you can, but stop or reduce any activity that causes pain. · Put ice or a cold pack on the sore muscle for 10 to 20 minutes at a time to stop swelling. Try this every 1 to 2 hours for 3 days (when you are awake) or until the swelling goes down. Put a thin cloth between the ice pack and your skin. · After 2 or 3 days, apply a heating pad on low or a warm cloth to your back. Some doctors suggest that you go back and forth between hot and cold treatments. · Take pain medicines exactly as directed. ? If the doctor gave you a prescription medicine for pain, take it as prescribed. ? If you are not taking a prescription pain medicine, ask your doctor if you can take an over-the-counter medicine. · Try sleeping on your side with a pillow between your legs. Or put a pillow under your knees when you lie on your back. These measures can ease pain in your lower back. · Return to your usual level of activity slowly. When should you call for help? Call 911 anytime you think you may need emergency care. For example, call if:    · You are unable to move a leg at all.   [facility] your doctor now or seek immediate medical care if:    · You have new or worse symptoms in your legs, belly, or buttocks.  Symptoms may include:  ? Numbness or tingling. ? Weakness. ? Pain.     · You lose bladder or bowel control.    Watch closely for changes in your health, and be sure to contact your doctor if:    · You have a fever, lose weight, or don't feel well.     · You are not getting better as expected. Where can you learn more? Go to https://chpepiceweb.Chekkt.com. org and sign in to your Tripda account. Enter U045 in the [employer] box to learn more about \"Back Strain: Care Instructions. \"     If you do not have an account, please click on the \"Sign Up Now\" link. Current as of: September 20, 2018  Content Version: 11.9  © 0216-2982 [employer]. Care instructions adapted under license by [facility] ([facility]). If you have questions about a medical condition or this instruction, always ask your healthcare professional. [address] any warranty or liability for your use of this information. Patient Education        Back Care and Preventing Injuries: Care Instructions  Your Care Instructions    You can hurt your back doing many everyday activities: lifting a heavy box, bending down to garden, exercising at the gym, and even getting out of bed. But you can keep your back strong and healthy by doing some exercises. You also can follow a few tips for sitting, sleeping, and lifting to avoid hurting your back again. Talk to your doctor before you start an exercise program. Ask for help if you want to learn more about keeping your back healthy. Follow-up care is a key part of your treatment and safety. Be sure to make and go to all appointments, and call your doctor if you are having problems. It's also a good idea to know your test results and keep a list of the medicines you take. How can you care for yourself at home? · Stay at a healthy weight to avoid strain on your lower back. · Do not smoke. Smoking increases the risk of osteoporosis, which weakens the spine.  If you need help quitting, talk to your your shoulders toward your knees using a smooth, slow motion. Keep your arms folded across your chest. If this bothers your neck, try putting your hands behind your neck (not your head), with your elbows spread apart. ? Lie on your back with your knees bent and your feet flat on the floor. Tighten your belly muscles, and then push with your feet and raise your buttocks up a few inches. Hold this position 6 seconds as you continue to breathe normally, then lower yourself slowly to the floor. Repeat 8 to 12 times. ? If you like group exercise, try Pilates or yoga. These classes have poses that strengthen the core muscles. Protect your back when you sit  · Place a small pillow, a rolled-up towel, or a lumbar roll in the curve of your back if you need extra support. · Sit in a chair that is low enough to let you place both feet flat on the floor with both knees nearly level with your hips. If your chair or desk is too high, use a foot rest to raise your knees. · When driving, keep your knees nearly level with your hips. Sit straight, and drive with both hands on the steering wheel. Your arms should be in a slightly bent position. · Try a kneeling chair, which helps tilt your hips forward. This takes pressure off your lower back. · Try sitting on an exercise ball. It can rock from side to side, which helps keep your back loose. Lift properly  · Squat down, bending at the hips and knees only. If you need to, put one knee to the floor and extend your other knee in front of you, bent at a right angle (half kneeling). · Press your chest straight forward. This helps keep your upper back straight while keeping a slight arch in your low back. · Hold the load as close to your body as possible, at the level of your navel. · Use your feet to change direction, taking small steps. · Lead with your hips as you change direction. Keep your shoulders in line with your hips as you move. Do not twist your body.   · Set down and lower back. Hold this stretch for as long as it feels comfortable, or about 15 to 30 seconds. 3. Return to the starting position with a flat back while you are on your hands and knees. 4. Let your back sway by pressing your stomach toward the floor. Lift your buttocks toward the ceiling. 5. Hold this position for 15 to 30 seconds. 6. Repeat 2 to 4 times. Follow-up care is a key part of your treatment and safety. Be sure to make and go to all appointments, and call your doctor if you are having problems. It's also a good idea to know your test results and keep a list of the medicines you take. Where can you learn more? Go to https://Cesscorp World Wide.Local Geek PC [employer]air. org and sign in to your in3Depth account. Enter G353 in the [employer] box to learn more about \"Sciatica: Exercises. \"     If you do not have an account, please click on the \"Sign Up Now\" link. Current as of: September 20, 2018  Content Version: 11.9  © 9242-0334 [employer]. Care instructions adapted under license by [facility] ([facility]). If you have questions about a medical condition or this instruction, always ask your healthcare professional. [place] [address] any warranty or liability for your use of this information. Patient Education        Low Back Pain: Exercises  Your Care Instructions  Here are some examples of typical rehabilitation exercises for your condition. Start each exercise slowly. Ease off the exercise if you start to have pain. Your doctor or physical therapist will tell you when you can start these exercises and which ones will work best for you. How to do the exercises  Press-up    1. Lie on your stomach, supporting your body with your forearms. 2. Press your elbows down into the floor to raise your upper back. As you do this, relax your stomach muscles and allow your back to arch without using your back muscles.  As your press up, do not let your hips or pelvis come off the floor.  3. Hold for 15 to 30 seconds, then relax. 4. Repeat 2 to 4 times. Alternate arm and leg (bird dog) exercise    1. Start on the floor, on your hands and knees. 2. Tighten your belly muscles. 3. Raise one leg off the floor, and hold it straight out behind you. Be careful not to let your hip drop down, because that will twist your trunk. 4. Hold for about 6 seconds, then lower your leg and switch to the other leg. 5. Repeat 8 to 12 times on each leg. 6. Over time, work up to holding for 10 to 30 seconds each time. 7. If you feel stable and secure with your leg raised, try raising the opposite arm straight out in front of you at the same time. Knee-to-chest exercise    1. Lie on your back with your knees bent and your feet flat on the floor. 2. Bring one knee to your chest, keeping the other foot flat on the floor (or keeping the other leg straight, whichever feels better on your lower back). 3. Keep your lower back pressed to the floor. Hold for at least 15 to 30 seconds. 4. Relax, and lower the knee to the starting position. 5. Repeat with the other leg. Repeat 2 to 4 times with each leg. 6. To get more stretch, put your other leg flat on the floor while pulling your knee to your chest.    Curl-ups    1. Lie on the floor on your back with your knees bent at a 90-degree angle. Your feet should be flat on the floor, about 12 inches from your buttocks. 2. Cross your arms over your chest. If this bothers your neck, try putting your hands behind your neck (not your head), with your elbows spread apart. 3. Slowly tighten your belly muscles and raise your shoulder blades off the floor. 4. Keep your head in line with your body, and do not press your chin to your chest.  5. Hold this position for 1 or 2 seconds, then slowly lower yourself back down to the floor. 6. Repeat 8 to 12 times. Pelvic tilt exercise    1. Lie on your back with your knees bent. 2. \"Brace\" your stomach.  This means to the ceiling until you feel a nice stretch in your upper, middle, and lower back. Hold this stretch for as long as it feels comfortable, or about 15 to 30 seconds. 9. Return to the starting position with a flat back while you are on your hands and knees. 10. Let your back sway by pressing your stomach toward the floor. Lift your buttocks toward the ceiling. 11. Hold this position for 15 to 30 seconds. 12. Repeat 2 to 4 times. Follow-up care is a key part of your treatment and safety. Be sure to make and go to all appointments, and call your doctor if you are having problems. It's also a good idea to know your test results and keep a list of the medicines you take. Where can you learn more? Go to https://OneAway.Blendspace. org and sign in to your Neurelis account. Enter B088 in the [employer] box to learn more about \"Acute Low Back Pain: Exercises. \"     If you do not have an account, please click on the \"Sign Up Now\" link. Current as of: September 20, 2018  Content Version: 11.9  © 1418-3592 [employer]. Care instructions adapted under license by [facility] ([facility]). If you have questions about a medical condition or this instruction, always ask your healthcare professional. [employer] Page any warranty or liability for your use of this information.

## 2019-05-13 ENCOUNTER — EMPLOYEE WELLNESS (OUTPATIENT)
Dept: OTHER | Age: 44
End: 2019-05-13

## 2019-05-13 LAB
CHOLESTEROL, TOTAL: 187 MG/DL (ref 0–199)
GLUCOSE BLD-MCNC: 70 MG/DL (ref 70–99)
HDLC SERPL-MCNC: 49 MG/DL (ref 40–59)
LDL CHOLESTEROL CALCULATED: 114 MG/DL (ref 0–129)
TRIGL SERPL-MCNC: 121 MG/DL (ref 0–150)

## 2019-05-15 DIAGNOSIS — E89.0 POSTOPERATIVE HYPOTHYROIDISM: ICD-10-CM

## 2019-05-15 DIAGNOSIS — R11.0 NAUSEA: ICD-10-CM

## 2019-05-15 RX ORDER — LEVOTHYROXINE SODIUM 0.12 MG/1
125 TABLET ORAL DAILY
Qty: 90 TABLET | Refills: 1 | Status: SHIPPED | OUTPATIENT
Start: 2019-05-15 | End: 2019-10-09 | Stop reason: SDUPTHER

## 2019-05-15 RX ORDER — ONDANSETRON 4 MG/1
4 TABLET, FILM COATED ORAL EVERY 12 HOURS PRN
Qty: 60 TABLET | Refills: 1 | Status: SHIPPED | OUTPATIENT
Start: 2019-05-15 | End: 2020-02-18 | Stop reason: SDUPTHER

## 2019-05-28 VITALS — WEIGHT: 225 LBS | BODY MASS INDEX: 37.44 KG/M2

## 2019-06-08 ENCOUNTER — HOSPITAL ENCOUNTER (OUTPATIENT)
Dept: WOMENS IMAGING | Age: 44
Discharge: HOME | End: 2019-06-10
Payer: COMMERCIAL

## 2019-06-08 DIAGNOSIS — Z12.39 SCREENING BREAST EXAMINATION: ICD-10-CM

## 2019-06-08 PROCEDURE — 77063 BREAST TOMOSYNTHESIS BI: CPT

## 2019-07-25 DIAGNOSIS — I48.91 ATRIAL FIBRILLATION WITH RVR (HCC): Primary | ICD-10-CM

## 2019-07-25 DIAGNOSIS — I10 ESSENTIAL HYPERTENSION: ICD-10-CM

## 2019-07-26 RX ORDER — DILTIAZEM HYDROCHLORIDE 180 MG/1
CAPSULE, COATED, EXTENDED RELEASE ORAL
Qty: 30 CAPSULE | Refills: 5 | Status: SHIPPED | OUTPATIENT
Start: 2019-07-26 | End: 2020-01-24

## 2019-08-12 ENCOUNTER — OFFICE VISIT (OUTPATIENT)
Dept: FAMILY MEDICINE CLINIC | Age: 44
End: 2019-08-12
Payer: COMMERCIAL

## 2019-08-12 VITALS
OXYGEN SATURATION: 97 % | HEIGHT: 65 IN | SYSTOLIC BLOOD PRESSURE: 138 MMHG | DIASTOLIC BLOOD PRESSURE: 80 MMHG | WEIGHT: 227 LBS | BODY MASS INDEX: 37.82 KG/M2 | HEART RATE: 69 BPM

## 2019-08-12 DIAGNOSIS — Z23 NEED FOR VACCINATION AGAINST STREPTOCOCCUS PNEUMONIAE: ICD-10-CM

## 2019-08-12 DIAGNOSIS — I48.91 ATRIAL FIBRILLATION WITH RVR (HCC): Primary | ICD-10-CM

## 2019-08-12 DIAGNOSIS — E03.9 HYPOTHYROIDISM, UNSPECIFIED TYPE: ICD-10-CM

## 2019-08-12 DIAGNOSIS — I10 ESSENTIAL HYPERTENSION: ICD-10-CM

## 2019-08-12 DIAGNOSIS — Z23 NEED FOR PROPHYLACTIC VACCINATION AND INOCULATION AGAINST MENINGOCOCCUS: ICD-10-CM

## 2019-08-12 DIAGNOSIS — Q89.01 ASPLENIA: ICD-10-CM

## 2019-08-12 PROCEDURE — 90732 PPSV23 VACC 2 YRS+ SUBQ/IM: CPT | Performed by: FAMILY MEDICINE

## 2019-08-12 PROCEDURE — 90472 IMMUNIZATION ADMIN EACH ADD: CPT | Performed by: FAMILY MEDICINE

## 2019-08-12 PROCEDURE — 90471 IMMUNIZATION ADMIN: CPT | Performed by: FAMILY MEDICINE

## 2019-08-12 PROCEDURE — 90734 MENACWYD/MENACWYCRM VACC IM: CPT | Performed by: FAMILY MEDICINE

## 2019-08-12 PROCEDURE — 99213 OFFICE O/P EST LOW 20 MIN: CPT | Performed by: FAMILY MEDICINE

## 2019-08-12 ASSESSMENT — ENCOUNTER SYMPTOMS: SHORTNESS OF BREATH: 0

## 2019-08-12 NOTE — PROGRESS NOTES
After obtaining consent, and per orders of Dr. Betsey Bell, injection of Meningitis, [id number]  given in Right and left deltoid by Rory Nguyen. Patient instructed to remain in clinic for 20 minutes afterwards, and to report any adverse reaction to me immediately.

## 2019-08-13 ENCOUNTER — OFFICE VISIT (OUTPATIENT)
Dept: OBGYN CLINIC | Age: 44
End: 2019-08-13
Payer: COMMERCIAL

## 2019-08-13 VITALS — WEIGHT: 228 LBS | SYSTOLIC BLOOD PRESSURE: 138 MMHG | BODY MASS INDEX: 37.94 KG/M2 | DIASTOLIC BLOOD PRESSURE: 68 MMHG

## 2019-08-13 DIAGNOSIS — N92.0 MENORRHAGIA WITH REGULAR CYCLE: Primary | ICD-10-CM

## 2019-08-13 PROCEDURE — 99213 OFFICE O/P EST LOW 20 MIN: CPT | Performed by: OBSTETRICS & GYNECOLOGY

## 2019-08-13 ASSESSMENT — ENCOUNTER SYMPTOMS
CONSTIPATION: 0
BLOOD IN STOOL: 0
VOICE CHANGE: 0
ABDOMINAL DISTENTION: 0
VOMITING: 0
WHEEZING: 0
BACK PAIN: 0
ABDOMINAL PAIN: 0
NAUSEA: 0
COLOR CHANGE: 0
SHORTNESS OF BREATH: 0
TROUBLE SWALLOWING: 0
COUGH: 0
SORE THROAT: 0
CHEST TIGHTNESS: 0

## 2019-08-13 NOTE — PROGRESS NOTES
Subjective: Patient has had 3 menstrual cycles over the last 3 months that have been extremely heavy. She is on Xarelto for A. fib. Patient ID: Divina Ng is a 53 y.o. female. HPI    Review of Systems   Constitutional: Negative for activity change, appetite change, fatigue and unexpected weight change. HENT: Negative for dental problem, ear pain, hearing loss, nosebleeds, sore throat, trouble swallowing and voice change. Eyes: Negative for visual disturbance. Respiratory: Negative for cough, chest tightness, shortness of breath and wheezing. Cardiovascular: Negative for chest pain and palpitations. Gastrointestinal: Negative for abdominal distention, abdominal pain, blood in stool, constipation, nausea and vomiting. Endocrine: Negative for cold intolerance, heat intolerance, polydipsia, polyphagia and polyuria. Genitourinary: Positive for menstrual problem. Negative for difficulty urinating, dyspareunia, dysuria, flank pain, frequency, genital sores, hematuria, pelvic pain, urgency, vaginal bleeding, vaginal discharge and vaginal pain. Musculoskeletal: Negative for arthralgias, back pain, joint swelling and myalgias. Skin: Negative for color change and rash. Allergic/Immunologic: Negative for environmental allergies, food allergies and immunocompromised state. Neurological: Negative for dizziness, seizures, syncope, speech difficulty, weakness, numbness and headaches. Hematological: Negative for adenopathy. Does not bruise/bleed easily. Psychiatric/Behavioral: Negative for agitation, behavioral problems, confusion, decreased concentration, dysphoric mood and suicidal ideas. The patient is not nervous/anxious and is not hyperactive. Objective:   Physical Exam   Constitutional: Vital signs are normal. She appears well-developed and well-nourished. Assessment:    Menorrhagia      Plan:    Recommend endometrial ablation.   Pelvic ultrasound to rule out uterine

## 2019-10-09 DIAGNOSIS — E89.0 POSTOPERATIVE HYPOTHYROIDISM: ICD-10-CM

## 2019-10-09 RX ORDER — LEVOTHYROXINE SODIUM 125 MCG
TABLET ORAL
Qty: 90 TABLET | Refills: 1 | Status: SHIPPED | OUTPATIENT
Start: 2019-10-09 | End: 2020-05-01

## 2019-10-18 ENCOUNTER — OFFICE VISIT (OUTPATIENT)
Dept: CARDIOLOGY CLINIC | Age: 44
End: 2019-10-18
Payer: COMMERCIAL

## 2019-10-18 VITALS
BODY MASS INDEX: 37.99 KG/M2 | DIASTOLIC BLOOD PRESSURE: 88 MMHG | OXYGEN SATURATION: 98 % | SYSTOLIC BLOOD PRESSURE: 138 MMHG | WEIGHT: 228 LBS | HEIGHT: 65 IN | HEART RATE: 71 BPM

## 2019-10-18 DIAGNOSIS — E66.9 CLASS 2 OBESITY WITHOUT SERIOUS COMORBIDITY WITH BODY MASS INDEX (BMI) OF 35.0 TO 35.9 IN ADULT, UNSPECIFIED OBESITY TYPE: ICD-10-CM

## 2019-10-18 DIAGNOSIS — I48.91 ATRIAL FIBRILLATION WITH RVR (HCC): Primary | ICD-10-CM

## 2019-10-18 DIAGNOSIS — I10 ESSENTIAL HYPERTENSION: ICD-10-CM

## 2019-10-18 PROCEDURE — 99213 OFFICE O/P EST LOW 20 MIN: CPT | Performed by: INTERNAL MEDICINE

## 2019-10-22 ENCOUNTER — HOSPITAL ENCOUNTER (OUTPATIENT)
Dept: ULTRASOUND IMAGING | Age: 44
Discharge: HOME | End: 2019-10-24
Payer: COMMERCIAL

## 2019-10-22 DIAGNOSIS — N92.0 MENORRHAGIA WITH REGULAR CYCLE: ICD-10-CM

## 2019-10-22 PROCEDURE — 76830 TRANSVAGINAL US NON-OB: CPT

## 2019-10-22 PROCEDURE — 76856 US EXAM PELVIC COMPLETE: CPT

## 2019-10-23 ENCOUNTER — NURSE ONLY (OUTPATIENT)
Dept: FAMILY MEDICINE CLINIC | Age: 44
End: 2019-10-23
Payer: COMMERCIAL

## 2019-10-23 DIAGNOSIS — Z23 NEED FOR MENACTRA VACCINATION: Primary | ICD-10-CM

## 2019-10-23 PROCEDURE — 90734 MENACWYD/MENACWYCRM VACC IM: CPT | Performed by: NURSE PRACTITIONER

## 2019-10-23 PROCEDURE — 90471 IMMUNIZATION ADMIN: CPT | Performed by: NURSE PRACTITIONER

## 2019-10-30 DIAGNOSIS — K64.4 EXTERNAL HEMORRHOID: ICD-10-CM

## 2019-10-31 RX ORDER — HYDROCORTISONE ACETATE 25 MG/1
25 SUPPOSITORY RECTAL 2 TIMES DAILY
Qty: 60 SUPPOSITORY | Refills: 0 | Status: ON HOLD | OUTPATIENT
Start: 2019-10-31 | End: 2019-12-16 | Stop reason: HOSPADM

## 2019-11-14 ENCOUNTER — OFFICE VISIT (OUTPATIENT)
Dept: OBGYN CLINIC | Age: 44
End: 2019-11-14
Payer: COMMERCIAL

## 2019-11-14 VITALS
BODY MASS INDEX: 39.27 KG/M2 | DIASTOLIC BLOOD PRESSURE: 70 MMHG | SYSTOLIC BLOOD PRESSURE: 128 MMHG | WEIGHT: 236 LBS | HEART RATE: 70 BPM

## 2019-11-14 DIAGNOSIS — N95.1 PERIMENOPAUSE: ICD-10-CM

## 2019-11-14 DIAGNOSIS — N92.0 MENORRHAGIA WITH REGULAR CYCLE: Primary | ICD-10-CM

## 2019-11-14 PROCEDURE — 99213 OFFICE O/P EST LOW 20 MIN: CPT | Performed by: OBSTETRICS & GYNECOLOGY

## 2019-11-14 ASSESSMENT — ENCOUNTER SYMPTOMS
SORE THROAT: 0
BACK PAIN: 0
ABDOMINAL DISTENTION: 0
COUGH: 0
TROUBLE SWALLOWING: 0
CONSTIPATION: 0
WHEEZING: 0
NAUSEA: 0
BLOOD IN STOOL: 0
COLOR CHANGE: 0
CHEST TIGHTNESS: 0
VOMITING: 0
ABDOMINAL PAIN: 0
VOICE CHANGE: 0
SHORTNESS OF BREATH: 0

## 2019-11-21 ENCOUNTER — TELEPHONE (OUTPATIENT)
Dept: CARDIOLOGY CLINIC | Age: 44
End: 2019-11-21

## 2019-12-02 ENCOUNTER — HOSPITAL ENCOUNTER (OUTPATIENT)
Dept: PREADMISSION TESTING | Age: 44
Discharge: HOME | End: 2019-12-06
Payer: COMMERCIAL

## 2019-12-02 VITALS
HEART RATE: 71 BPM | HEIGHT: 66 IN | DIASTOLIC BLOOD PRESSURE: 59 MMHG | SYSTOLIC BLOOD PRESSURE: 129 MMHG | OXYGEN SATURATION: 98 % | RESPIRATION RATE: 20 BRPM | TEMPERATURE: 97.5 F | BODY MASS INDEX: 37.16 KG/M2 | WEIGHT: 231.25 LBS

## 2019-12-02 PROBLEM — N92.0 MENORRHAGIA: Status: ACTIVE | Noted: 2019-12-02

## 2019-12-02 LAB
ABO/RH: NORMAL
ANION GAP SERPL CALCULATED.3IONS-SCNC: 12 MEQ/L (ref 9–15)
ANTIBODY SCREEN: NORMAL
BUN BLDV-MCNC: 9 MG/DL (ref 6–20)
CALCIUM SERPL-MCNC: 9.1 MG/DL (ref 8.5–9.9)
CHLORIDE BLD-SCNC: 104 MEQ/L (ref 95–107)
CO2: 24 MEQ/L (ref 20–31)
CREAT SERPL-MCNC: 0.67 MG/DL (ref 0.5–0.9)
EKG ATRIAL RATE: 55 BPM
EKG P AXIS: 41 DEGREES
EKG P-R INTERVAL: 144 MS
EKG Q-T INTERVAL: 432 MS
EKG QRS DURATION: 84 MS
EKG QTC CALCULATION (BAZETT): 413 MS
EKG R AXIS: 19 DEGREES
EKG T AXIS: 30 DEGREES
EKG VENTRICULAR RATE: 55 BPM
GFR AFRICAN AMERICAN: >60
GFR NON-AFRICAN AMERICAN: >60
GLUCOSE BLD-MCNC: 112 MG/DL (ref 70–99)
HCT VFR BLD CALC: 41.1 % (ref 37–47)
HEMOGLOBIN: 13.3 G/DL (ref 12–16)
MCH RBC QN AUTO: 29.4 PG (ref 27–31.3)
MCHC RBC AUTO-ENTMCNC: 32.4 % (ref 33–37)
MCV RBC AUTO: 90.6 FL (ref 82–100)
PDW BLD-RTO: 14.5 % (ref 11.5–14.5)
PLATELET # BLD: 471 K/UL (ref 130–400)
POTASSIUM SERPL-SCNC: 3.9 MEQ/L (ref 3.4–4.9)
RBC # BLD: 4.53 M/UL (ref 4.2–5.4)
SODIUM BLD-SCNC: 140 MEQ/L (ref 135–144)
WBC # BLD: 8.7 K/UL (ref 4.8–10.8)

## 2019-12-02 PROCEDURE — 86900 BLOOD TYPING SEROLOGIC ABO: CPT

## 2019-12-02 PROCEDURE — 85027 COMPLETE CBC AUTOMATED: CPT

## 2019-12-02 PROCEDURE — 86901 BLOOD TYPING SEROLOGIC RH(D): CPT

## 2019-12-02 PROCEDURE — 86850 RBC ANTIBODY SCREEN: CPT

## 2019-12-02 PROCEDURE — 93005 ELECTROCARDIOGRAM TRACING: CPT | Performed by: NURSE PRACTITIONER

## 2019-12-02 PROCEDURE — 80048 BASIC METABOLIC PNL TOTAL CA: CPT

## 2019-12-02 RX ORDER — SODIUM CHLORIDE 0.9 % (FLUSH) 0.9 %
10 SYRINGE (ML) INJECTION PRN
Status: CANCELLED | OUTPATIENT
Start: 2019-12-16

## 2019-12-02 RX ORDER — LIDOCAINE HYDROCHLORIDE 10 MG/ML
1 INJECTION, SOLUTION EPIDURAL; INFILTRATION; INTRACAUDAL; PERINEURAL
Status: CANCELLED | OUTPATIENT
Start: 2019-12-16 | End: 2019-12-16

## 2019-12-02 RX ORDER — CLINDAMYCIN PHOSPHATE 600 MG/50ML
600 INJECTION INTRAVENOUS ONCE
Status: CANCELLED | OUTPATIENT
Start: 2019-12-16

## 2019-12-02 RX ORDER — ACETAMINOPHEN 325 MG/1
650 TABLET ORAL EVERY 6 HOURS PRN
Status: ON HOLD | COMMUNITY
End: 2019-12-16 | Stop reason: HOSPADM

## 2019-12-02 RX ORDER — SODIUM CHLORIDE, SODIUM LACTATE, POTASSIUM CHLORIDE, CALCIUM CHLORIDE 600; 310; 30; 20 MG/100ML; MG/100ML; MG/100ML; MG/100ML
INJECTION, SOLUTION INTRAVENOUS CONTINUOUS
Status: CANCELLED | OUTPATIENT
Start: 2019-12-16

## 2019-12-02 RX ORDER — SODIUM CHLORIDE 0.9 % (FLUSH) 0.9 %
10 SYRINGE (ML) INJECTION EVERY 12 HOURS SCHEDULED
Status: CANCELLED | OUTPATIENT
Start: 2019-12-16

## 2019-12-02 ASSESSMENT — ENCOUNTER SYMPTOMS
SHORTNESS OF BREATH: 0
ABDOMINAL PAIN: 0
NAUSEA: 0
WHEEZING: 0
EYES NEGATIVE: 1
BACK PAIN: 1
ALLERGIC/IMMUNOLOGIC NEGATIVE: 1
DIARRHEA: 0
STRIDOR: 0
VOMITING: 0
CHEST TIGHTNESS: 0
COUGH: 0
SORE THROAT: 0
TROUBLE SWALLOWING: 0
CONSTIPATION: 0

## 2019-12-03 PROCEDURE — 93010 ELECTROCARDIOGRAM REPORT: CPT | Performed by: INTERNAL MEDICINE

## 2019-12-16 ENCOUNTER — ANESTHESIA (OUTPATIENT)
Dept: OPERATING ROOM | Age: 44
End: 2019-12-16
Payer: COMMERCIAL

## 2019-12-16 ENCOUNTER — ANESTHESIA EVENT (OUTPATIENT)
Dept: OPERATING ROOM | Age: 44
End: 2019-12-16
Payer: COMMERCIAL

## 2019-12-16 ENCOUNTER — HOSPITAL ENCOUNTER (OUTPATIENT)
Age: 44
Setting detail: OUTPATIENT SURGERY
Discharge: HOME | End: 2019-12-16
Attending: OBSTETRICS & GYNECOLOGY | Admitting: OBSTETRICS & GYNECOLOGY
Payer: COMMERCIAL

## 2019-12-16 VITALS
BODY MASS INDEX: 38.32 KG/M2 | HEIGHT: 65 IN | OXYGEN SATURATION: 94 % | TEMPERATURE: 97.3 F | DIASTOLIC BLOOD PRESSURE: 68 MMHG | HEART RATE: 83 BPM | SYSTOLIC BLOOD PRESSURE: 108 MMHG | RESPIRATION RATE: 16 BRPM | WEIGHT: 230 LBS

## 2019-12-16 VITALS — SYSTOLIC BLOOD PRESSURE: 121 MMHG | TEMPERATURE: 95.5 F | DIASTOLIC BLOOD PRESSURE: 82 MMHG | OXYGEN SATURATION: 92 %

## 2019-12-16 DIAGNOSIS — G89.18 POSTOPERATIVE PAIN: Primary | ICD-10-CM

## 2019-12-16 LAB
HCG, URINE, POC: NEGATIVE
Lab: NORMAL
NEGATIVE QC PASS/FAIL: NORMAL
POSITIVE QC PASS/FAIL: NORMAL

## 2019-12-16 PROCEDURE — 2580000003 HC RX 258: Performed by: OBSTETRICS & GYNECOLOGY

## 2019-12-16 PROCEDURE — 3600000004 HC SURGERY LEVEL 4 BASE: Performed by: OBSTETRICS & GYNECOLOGY

## 2019-12-16 PROCEDURE — 2580000003 HC RX 258: Performed by: NURSE PRACTITIONER

## 2019-12-16 PROCEDURE — 88305 TISSUE EXAM BY PATHOLOGIST: CPT

## 2019-12-16 PROCEDURE — 7100000010 HC PHASE II RECOVERY - FIRST 15 MIN: Performed by: OBSTETRICS & GYNECOLOGY

## 2019-12-16 PROCEDURE — 2709999900 HC NON-CHARGEABLE SUPPLY: Performed by: OBSTETRICS & GYNECOLOGY

## 2019-12-16 PROCEDURE — 3600000014 HC SURGERY LEVEL 4 ADDTL 15MIN: Performed by: OBSTETRICS & GYNECOLOGY

## 2019-12-16 PROCEDURE — 6360000002 HC RX W HCPCS: Performed by: NURSE ANESTHETIST, CERTIFIED REGISTERED

## 2019-12-16 PROCEDURE — 7100000011 HC PHASE II RECOVERY - ADDTL 15 MIN: Performed by: OBSTETRICS & GYNECOLOGY

## 2019-12-16 PROCEDURE — 6360000002 HC RX W HCPCS: Performed by: ANESTHESIOLOGY

## 2019-12-16 PROCEDURE — 7100000001 HC PACU RECOVERY - ADDTL 15 MIN: Performed by: OBSTETRICS & GYNECOLOGY

## 2019-12-16 PROCEDURE — 3700000000 HC ANESTHESIA ATTENDED CARE: Performed by: OBSTETRICS & GYNECOLOGY

## 2019-12-16 PROCEDURE — 2720000010 HC SURG SUPPLY STERILE: Performed by: OBSTETRICS & GYNECOLOGY

## 2019-12-16 PROCEDURE — 2500000003 HC RX 250 WO HCPCS: Performed by: NURSE PRACTITIONER

## 2019-12-16 PROCEDURE — 3700000001 HC ADD 15 MINUTES (ANESTHESIA): Performed by: OBSTETRICS & GYNECOLOGY

## 2019-12-16 PROCEDURE — 6360000002 HC RX W HCPCS: Performed by: NURSE PRACTITIONER

## 2019-12-16 PROCEDURE — 58563 HYSTEROSCOPY ABLATION: CPT | Performed by: OBSTETRICS & GYNECOLOGY

## 2019-12-16 PROCEDURE — 7100000000 HC PACU RECOVERY - FIRST 15 MIN: Performed by: OBSTETRICS & GYNECOLOGY

## 2019-12-16 RX ORDER — TRAMADOL HYDROCHLORIDE 50 MG/1
50 TABLET ORAL EVERY 6 HOURS PRN
Qty: 12 TABLET | Refills: 0 | Status: SHIPPED | OUTPATIENT
Start: 2019-12-16 | End: 2019-12-19

## 2019-12-16 RX ORDER — MAGNESIUM HYDROXIDE 1200 MG/15ML
LIQUID ORAL CONTINUOUS PRN
Status: COMPLETED | OUTPATIENT
Start: 2019-12-16 | End: 2019-12-16

## 2019-12-16 RX ORDER — MEPERIDINE HYDROCHLORIDE 25 MG/ML
12.5 INJECTION INTRAMUSCULAR; INTRAVENOUS; SUBCUTANEOUS EVERY 5 MIN PRN
Status: DISCONTINUED | OUTPATIENT
Start: 2019-12-16 | End: 2019-12-16 | Stop reason: HOSPADM

## 2019-12-16 RX ORDER — SODIUM CHLORIDE 0.9 % (FLUSH) 0.9 %
10 SYRINGE (ML) INJECTION PRN
Status: CANCELLED | OUTPATIENT
Start: 2019-12-16

## 2019-12-16 RX ORDER — ONDANSETRON 2 MG/ML
4 INJECTION INTRAMUSCULAR; INTRAVENOUS
Status: DISCONTINUED | OUTPATIENT
Start: 2019-12-16 | End: 2019-12-16 | Stop reason: HOSPADM

## 2019-12-16 RX ORDER — SODIUM CHLORIDE, SODIUM LACTATE, POTASSIUM CHLORIDE, CALCIUM CHLORIDE 600; 310; 30; 20 MG/100ML; MG/100ML; MG/100ML; MG/100ML
INJECTION, SOLUTION INTRAVENOUS CONTINUOUS
Status: CANCELLED | OUTPATIENT
Start: 2019-12-16

## 2019-12-16 RX ORDER — MIDAZOLAM HYDROCHLORIDE 1 MG/ML
INJECTION INTRAMUSCULAR; INTRAVENOUS PRN
Status: DISCONTINUED | OUTPATIENT
Start: 2019-12-16 | End: 2019-12-16 | Stop reason: SDUPTHER

## 2019-12-16 RX ORDER — FENTANYL CITRATE 50 UG/ML
INJECTION, SOLUTION INTRAMUSCULAR; INTRAVENOUS PRN
Status: DISCONTINUED | OUTPATIENT
Start: 2019-12-16 | End: 2019-12-16 | Stop reason: SDUPTHER

## 2019-12-16 RX ORDER — DIPHENHYDRAMINE HYDROCHLORIDE 50 MG/ML
12.5 INJECTION INTRAMUSCULAR; INTRAVENOUS
Status: DISCONTINUED | OUTPATIENT
Start: 2019-12-16 | End: 2019-12-16 | Stop reason: HOSPADM

## 2019-12-16 RX ORDER — FENTANYL CITRATE 50 UG/ML
25 INJECTION, SOLUTION INTRAMUSCULAR; INTRAVENOUS EVERY 10 MIN PRN
Status: DISCONTINUED | OUTPATIENT
Start: 2019-12-16 | End: 2019-12-16 | Stop reason: HOSPADM

## 2019-12-16 RX ORDER — TRAMADOL HYDROCHLORIDE 50 MG/1
50 TABLET ORAL EVERY 6 HOURS PRN
Status: CANCELLED | OUTPATIENT
Start: 2019-12-16

## 2019-12-16 RX ORDER — METOCLOPRAMIDE HYDROCHLORIDE 5 MG/ML
10 INJECTION INTRAMUSCULAR; INTRAVENOUS
Status: DISCONTINUED | OUTPATIENT
Start: 2019-12-16 | End: 2019-12-16 | Stop reason: HOSPADM

## 2019-12-16 RX ORDER — LIDOCAINE HYDROCHLORIDE 10 MG/ML
1 INJECTION, SOLUTION EPIDURAL; INFILTRATION; INTRACAUDAL; PERINEURAL
Status: COMPLETED | OUTPATIENT
Start: 2019-12-16 | End: 2019-12-16

## 2019-12-16 RX ORDER — SODIUM CHLORIDE, SODIUM LACTATE, POTASSIUM CHLORIDE, CALCIUM CHLORIDE 600; 310; 30; 20 MG/100ML; MG/100ML; MG/100ML; MG/100ML
INJECTION, SOLUTION INTRAVENOUS CONTINUOUS
Status: DISCONTINUED | OUTPATIENT
Start: 2019-12-16 | End: 2019-12-16 | Stop reason: HOSPADM

## 2019-12-16 RX ORDER — SODIUM CHLORIDE 0.9 % (FLUSH) 0.9 %
10 SYRINGE (ML) INJECTION EVERY 12 HOURS SCHEDULED
Status: DISCONTINUED | OUTPATIENT
Start: 2019-12-16 | End: 2019-12-16 | Stop reason: HOSPADM

## 2019-12-16 RX ORDER — IBUPROFEN 400 MG/1
400 TABLET ORAL EVERY 6 HOURS PRN
Status: CANCELLED | OUTPATIENT
Start: 2019-12-16

## 2019-12-16 RX ORDER — ONDANSETRON 2 MG/ML
INJECTION INTRAMUSCULAR; INTRAVENOUS PRN
Status: DISCONTINUED | OUTPATIENT
Start: 2019-12-16 | End: 2019-12-16 | Stop reason: SDUPTHER

## 2019-12-16 RX ORDER — CLINDAMYCIN PHOSPHATE 600 MG/50ML
600 INJECTION INTRAVENOUS ONCE
Status: COMPLETED | OUTPATIENT
Start: 2019-12-16 | End: 2019-12-16

## 2019-12-16 RX ORDER — SODIUM CHLORIDE 0.9 % (FLUSH) 0.9 %
10 SYRINGE (ML) INJECTION EVERY 12 HOURS SCHEDULED
Status: CANCELLED | OUTPATIENT
Start: 2019-12-16

## 2019-12-16 RX ORDER — PROPOFOL 10 MG/ML
INJECTION, EMULSION INTRAVENOUS PRN
Status: DISCONTINUED | OUTPATIENT
Start: 2019-12-16 | End: 2019-12-16 | Stop reason: SDUPTHER

## 2019-12-16 RX ORDER — ONDANSETRON 2 MG/ML
4 INJECTION INTRAMUSCULAR; INTRAVENOUS EVERY 8 HOURS PRN
Status: CANCELLED | OUTPATIENT
Start: 2019-12-16

## 2019-12-16 RX ORDER — SODIUM CHLORIDE 0.9 % (FLUSH) 0.9 %
10 SYRINGE (ML) INJECTION PRN
Status: DISCONTINUED | OUTPATIENT
Start: 2019-12-16 | End: 2019-12-16 | Stop reason: HOSPADM

## 2019-12-16 RX ORDER — LIDOCAINE HYDROCHLORIDE 20 MG/ML
INJECTION, SOLUTION INTRAVENOUS PRN
Status: DISCONTINUED | OUTPATIENT
Start: 2019-12-16 | End: 2019-12-16 | Stop reason: SDUPTHER

## 2019-12-16 RX ORDER — KETOROLAC TROMETHAMINE 30 MG/ML
30 INJECTION, SOLUTION INTRAMUSCULAR; INTRAVENOUS EVERY 6 HOURS
Status: CANCELLED | OUTPATIENT
Start: 2019-12-16 | End: 2019-12-18

## 2019-12-16 RX ORDER — KETOROLAC TROMETHAMINE 30 MG/ML
INJECTION, SOLUTION INTRAMUSCULAR; INTRAVENOUS PRN
Status: DISCONTINUED | OUTPATIENT
Start: 2019-12-16 | End: 2019-12-16 | Stop reason: SDUPTHER

## 2019-12-16 RX ORDER — DEXAMETHASONE SODIUM PHOSPHATE 4 MG/ML
INJECTION, SOLUTION INTRA-ARTICULAR; INTRALESIONAL; INTRAMUSCULAR; INTRAVENOUS; SOFT TISSUE PRN
Status: DISCONTINUED | OUTPATIENT
Start: 2019-12-16 | End: 2019-12-16 | Stop reason: SDUPTHER

## 2019-12-16 RX ADMIN — LIDOCAINE HYDROCHLORIDE 50 MG: 20 INJECTION, SOLUTION INTRAVENOUS at 11:08

## 2019-12-16 RX ADMIN — FENTANYL CITRATE 50 MCG: 50 INJECTION, SOLUTION INTRAMUSCULAR; INTRAVENOUS at 11:08

## 2019-12-16 RX ADMIN — ONDANSETRON 4 MG: 2 INJECTION INTRAMUSCULAR; INTRAVENOUS at 11:20

## 2019-12-16 RX ADMIN — LIDOCAINE HYDROCHLORIDE 1 ML: 10 INJECTION, SOLUTION EPIDURAL; INFILTRATION; INTRACAUDAL; PERINEURAL at 09:23

## 2019-12-16 RX ADMIN — FENTANYL CITRATE 50 MCG: 50 INJECTION, SOLUTION INTRAMUSCULAR; INTRAVENOUS at 11:21

## 2019-12-16 RX ADMIN — PROPOFOL 200 MG: 10 INJECTION, EMULSION INTRAVENOUS at 11:08

## 2019-12-16 RX ADMIN — SODIUM CHLORIDE, POTASSIUM CHLORIDE, SODIUM LACTATE AND CALCIUM CHLORIDE: 600; 310; 30; 20 INJECTION, SOLUTION INTRAVENOUS at 09:23

## 2019-12-16 RX ADMIN — MIDAZOLAM HYDROCHLORIDE 2 MG: 2 INJECTION, SOLUTION INTRAMUSCULAR; INTRAVENOUS at 11:00

## 2019-12-16 RX ADMIN — KETOROLAC TROMETHAMINE 30 MG: 30 INJECTION, SOLUTION INTRAMUSCULAR at 11:26

## 2019-12-16 RX ADMIN — GENTAMICIN SULFATE 120 MG: 40 INJECTION, SOLUTION INTRAMUSCULAR; INTRAVENOUS at 11:02

## 2019-12-16 RX ADMIN — FENTANYL CITRATE 25 MCG: 50 INJECTION, SOLUTION INTRAMUSCULAR; INTRAVENOUS at 12:14

## 2019-12-16 RX ADMIN — CLINDAMYCIN IN 5 PERCENT DEXTROSE 600 MG: 12 INJECTION, SOLUTION INTRAVENOUS at 11:02

## 2019-12-16 RX ADMIN — DEXAMETHASONE SODIUM PHOSPHATE 4 MG: 4 INJECTION, SOLUTION INTRA-ARTICULAR; INTRALESIONAL; INTRAMUSCULAR; INTRAVENOUS; SOFT TISSUE at 11:08

## 2019-12-16 ASSESSMENT — PULMONARY FUNCTION TESTS
PIF_VALUE: 0
PIF_VALUE: 5
PIF_VALUE: 21
PIF_VALUE: 4
PIF_VALUE: 0
PIF_VALUE: 4
PIF_VALUE: 17
PIF_VALUE: 5
PIF_VALUE: 4
PIF_VALUE: 4
PIF_VALUE: 5
PIF_VALUE: 20
PIF_VALUE: 0
PIF_VALUE: 0
PIF_VALUE: 2
PIF_VALUE: 4
PIF_VALUE: 3
PIF_VALUE: 3
PIF_VALUE: 10
PIF_VALUE: 4
PIF_VALUE: 5
PIF_VALUE: 1
PIF_VALUE: 1
PIF_VALUE: 4
PIF_VALUE: 3
PIF_VALUE: 4
PIF_VALUE: 2
PIF_VALUE: 1
PIF_VALUE: 3
PIF_VALUE: 1
PIF_VALUE: 0
PIF_VALUE: 4
PIF_VALUE: 1
PIF_VALUE: 2
PIF_VALUE: 16
PIF_VALUE: 4
PIF_VALUE: 8

## 2019-12-16 ASSESSMENT — PAIN SCALES - GENERAL
PAINLEVEL_OUTOF10: 7
PAINLEVEL_OUTOF10: 0
PAINLEVEL_OUTOF10: 1
PAINLEVEL_OUTOF10: 0
PAINLEVEL_OUTOF10: 4

## 2019-12-16 ASSESSMENT — PAIN - FUNCTIONAL ASSESSMENT: PAIN_FUNCTIONAL_ASSESSMENT: 0-10

## 2020-01-21 ENCOUNTER — OFFICE VISIT (OUTPATIENT)
Dept: OBGYN CLINIC | Age: 45
End: 2020-01-21

## 2020-01-21 VITALS — SYSTOLIC BLOOD PRESSURE: 122 MMHG | DIASTOLIC BLOOD PRESSURE: 70 MMHG | BODY MASS INDEX: 38.44 KG/M2 | WEIGHT: 231 LBS

## 2020-01-21 PROCEDURE — 99024 POSTOP FOLLOW-UP VISIT: CPT | Performed by: OBSTETRICS & GYNECOLOGY

## 2020-01-24 RX ORDER — DILTIAZEM HYDROCHLORIDE 180 MG/1
180 CAPSULE, COATED, EXTENDED RELEASE ORAL DAILY
Qty: 30 CAPSULE | Refills: 5 | Status: SHIPPED
Start: 2020-01-24 | End: 2020-03-04 | Stop reason: SDUPTHER

## 2020-02-13 ENCOUNTER — OFFICE VISIT (OUTPATIENT)
Dept: FAMILY MEDICINE CLINIC | Age: 45
End: 2020-02-13
Payer: COMMERCIAL

## 2020-02-13 VITALS
DIASTOLIC BLOOD PRESSURE: 78 MMHG | SYSTOLIC BLOOD PRESSURE: 118 MMHG | HEIGHT: 66 IN | WEIGHT: 230 LBS | HEART RATE: 56 BPM | TEMPERATURE: 97.6 F | OXYGEN SATURATION: 98 % | RESPIRATION RATE: 16 BRPM | BODY MASS INDEX: 36.96 KG/M2

## 2020-02-13 DIAGNOSIS — E03.9 HYPOTHYROIDISM, UNSPECIFIED TYPE: ICD-10-CM

## 2020-02-13 LAB
T4 FREE: 1.33 NG/DL (ref 0.84–1.68)
TSH REFLEX: 1.52 UIU/ML (ref 0.44–3.86)

## 2020-02-13 PROCEDURE — 99213 OFFICE O/P EST LOW 20 MIN: CPT | Performed by: FAMILY MEDICINE

## 2020-02-13 RX ORDER — TIZANIDINE 4 MG/1
4 TABLET ORAL EVERY 8 HOURS PRN
Qty: 30 TABLET | Refills: 1 | Status: SHIPPED | OUTPATIENT
Start: 2020-02-13 | End: 2020-12-17 | Stop reason: SDUPTHER

## 2020-02-13 ASSESSMENT — PATIENT HEALTH QUESTIONNAIRE - PHQ9
SUM OF ALL RESPONSES TO PHQ QUESTIONS 1-9: 0
2. FEELING DOWN, DEPRESSED OR HOPELESS: 0
1. LITTLE INTEREST OR PLEASURE IN DOING THINGS: 0
SUM OF ALL RESPONSES TO PHQ QUESTIONS 1-9: 0
SUM OF ALL RESPONSES TO PHQ9 QUESTIONS 1 & 2: 0

## 2020-02-13 ASSESSMENT — ENCOUNTER SYMPTOMS: SHORTNESS OF BREATH: 0

## 2020-02-18 ENCOUNTER — OFFICE VISIT (OUTPATIENT)
Dept: ENDOCRINOLOGY | Age: 45
End: 2020-02-18
Payer: COMMERCIAL

## 2020-02-18 VITALS
DIASTOLIC BLOOD PRESSURE: 81 MMHG | SYSTOLIC BLOOD PRESSURE: 137 MMHG | BODY MASS INDEX: 36.8 KG/M2 | WEIGHT: 229 LBS | HEIGHT: 66 IN | HEART RATE: 57 BPM

## 2020-02-18 PROCEDURE — 99213 OFFICE O/P EST LOW 20 MIN: CPT | Performed by: INTERNAL MEDICINE

## 2020-02-18 RX ORDER — ONDANSETRON HYDROCHLORIDE 8 MG/1
8 TABLET, FILM COATED ORAL EVERY 12 HOURS PRN
Qty: 30 TABLET | Refills: 2 | Status: SHIPPED | OUTPATIENT
Start: 2020-02-18 | End: 2021-02-03 | Stop reason: SDUPTHER

## 2020-02-18 ASSESSMENT — ENCOUNTER SYMPTOMS: NAUSEA: 1

## 2020-03-13 ENCOUNTER — OFFICE VISIT (OUTPATIENT)
Dept: OBGYN CLINIC | Age: 45
End: 2020-03-13
Payer: COMMERCIAL

## 2020-03-13 VITALS
DIASTOLIC BLOOD PRESSURE: 76 MMHG | BODY MASS INDEX: 35.67 KG/M2 | HEART RATE: 80 BPM | SYSTOLIC BLOOD PRESSURE: 122 MMHG | WEIGHT: 221 LBS

## 2020-03-13 PROCEDURE — 99213 OFFICE O/P EST LOW 20 MIN: CPT | Performed by: OBSTETRICS & GYNECOLOGY

## 2020-03-13 RX ORDER — HYDROXYZINE PAMOATE 25 MG/1
25 CAPSULE ORAL 4 TIMES DAILY PRN
Qty: 30 CAPSULE | Refills: 1 | Status: SHIPPED | OUTPATIENT
Start: 2020-03-13 | End: 2020-11-02

## 2020-03-13 ASSESSMENT — ENCOUNTER SYMPTOMS
ABDOMINAL DISTENTION: 0
SHORTNESS OF BREATH: 0
NAUSEA: 0
CONSTIPATION: 0
TROUBLE SWALLOWING: 0
COUGH: 0
WHEEZING: 0
SORE THROAT: 0
CHEST TIGHTNESS: 0
BLOOD IN STOOL: 0
ABDOMINAL PAIN: 0
VOICE CHANGE: 0
VOMITING: 0
COLOR CHANGE: 0
BACK PAIN: 0

## 2020-03-13 NOTE — PROGRESS NOTES
Plan:    Prometrium 200 mg at at bedtime day 16 through 30 of each cycle  Vistaril for anxiety.   Follow-up in 3 months to see if this is working        [employer], DO

## 2020-03-26 RX ORDER — BUTALBITAL, ACETAMINOPHEN AND CAFFEINE 50; 325; 40 MG/1; MG/1; MG/1
1 TABLET ORAL EVERY 4 HOURS PRN
Qty: 12 TABLET | Refills: 0 | Status: SHIPPED | OUTPATIENT
Start: 2020-03-26 | End: 2020-06-06 | Stop reason: SDUPTHER

## 2020-03-31 DIAGNOSIS — R30.0 DYSURIA: ICD-10-CM

## 2020-03-31 LAB
BILIRUBIN URINE: NEGATIVE
BLOOD, URINE: NEGATIVE
CLARITY: CLEAR
COLOR: YELLOW
GLUCOSE URINE: NEGATIVE MG/DL
KETONES, URINE: NEGATIVE MG/DL
LEUKOCYTE ESTERASE, URINE: NEGATIVE
NITRITE, URINE: NEGATIVE
PH UA: 6.5 (ref 5–9)
PROTEIN UA: NEGATIVE MG/DL
SPECIFIC GRAVITY UA: 1 (ref 1–1.03)
UROBILINOGEN, URINE: 0.2 E.U./DL

## 2020-04-02 LAB — URINE CULTURE, ROUTINE: NORMAL

## 2020-05-01 RX ORDER — LEVOTHYROXINE SODIUM 0.12 MG/1
TABLET ORAL
Qty: 90 TABLET | Refills: 1 | Status: SHIPPED | OUTPATIENT
Start: 2020-05-01 | End: 2020-11-17

## 2020-05-05 RX ORDER — LEVOTHYROXINE SODIUM 0.12 MG/1
TABLET ORAL
Qty: 90 TABLET | Refills: 1 | OUTPATIENT
Start: 2020-05-05

## 2020-06-09 ENCOUNTER — HOSPITAL ENCOUNTER (OUTPATIENT)
Dept: WOMENS IMAGING | Age: 45
Discharge: HOME | End: 2020-06-11
Payer: COMMERCIAL

## 2020-06-09 PROCEDURE — 77063 BREAST TOMOSYNTHESIS BI: CPT

## 2020-06-10 RX ORDER — BUTALBITAL, ACETAMINOPHEN AND CAFFEINE 50; 325; 40 MG/1; MG/1; MG/1
1 TABLET ORAL EVERY 4 HOURS PRN
Qty: 12 TABLET | Refills: 0 | Status: SHIPPED | OUTPATIENT
Start: 2020-06-10 | End: 2020-08-29 | Stop reason: SDUPTHER

## 2020-06-11 ENCOUNTER — OFFICE VISIT (OUTPATIENT)
Dept: OBGYN CLINIC | Age: 45
End: 2020-06-11
Payer: COMMERCIAL

## 2020-06-11 VITALS
BODY MASS INDEX: 34.86 KG/M2 | HEART RATE: 70 BPM | WEIGHT: 216 LBS | SYSTOLIC BLOOD PRESSURE: 116 MMHG | DIASTOLIC BLOOD PRESSURE: 74 MMHG

## 2020-06-11 PROCEDURE — 99213 OFFICE O/P EST LOW 20 MIN: CPT | Performed by: OBSTETRICS & GYNECOLOGY

## 2020-06-11 ASSESSMENT — ENCOUNTER SYMPTOMS
WHEEZING: 0
CONSTIPATION: 0
COLOR CHANGE: 0
ABDOMINAL PAIN: 0
TROUBLE SWALLOWING: 0
CHEST TIGHTNESS: 0
VOMITING: 0
COUGH: 0
BACK PAIN: 0
SHORTNESS OF BREATH: 0
BLOOD IN STOOL: 0
NAUSEA: 0
VOICE CHANGE: 0
SORE THROAT: 0
ABDOMINAL DISTENTION: 0

## 2020-08-05 DIAGNOSIS — Q89.01 ASPLENIA: ICD-10-CM

## 2020-08-05 LAB
HCT VFR BLD CALC: 42.1 % (ref 37–47)
HEMOGLOBIN: 13.5 G/DL (ref 12–16)
MCH RBC QN AUTO: 29.2 PG (ref 27–31.3)
MCHC RBC AUTO-ENTMCNC: 32 % (ref 33–37)
MCV RBC AUTO: 91.3 FL (ref 82–100)
PDW BLD-RTO: 14.7 % (ref 11.5–14.5)
PLATELET # BLD: 408 K/UL (ref 130–400)
RBC # BLD: 4.62 M/UL (ref 4.2–5.4)
WBC # BLD: 13.1 K/UL (ref 4.8–10.8)

## 2020-08-13 ENCOUNTER — OFFICE VISIT (OUTPATIENT)
Dept: FAMILY MEDICINE CLINIC | Age: 45
End: 2020-08-13
Payer: COMMERCIAL

## 2020-08-13 VITALS
BODY MASS INDEX: 35.84 KG/M2 | HEART RATE: 70 BPM | OXYGEN SATURATION: 97 % | WEIGHT: 223 LBS | TEMPERATURE: 97.5 F | HEIGHT: 66 IN | DIASTOLIC BLOOD PRESSURE: 86 MMHG | SYSTOLIC BLOOD PRESSURE: 132 MMHG

## 2020-08-13 PROCEDURE — 99213 OFFICE O/P EST LOW 20 MIN: CPT | Performed by: FAMILY MEDICINE

## 2020-08-13 RX ORDER — DOXYCYCLINE HYCLATE 100 MG
100 TABLET ORAL 2 TIMES DAILY
Qty: 20 TABLET | Refills: 0 | Status: SHIPPED | OUTPATIENT
Start: 2020-08-13 | End: 2020-08-23

## 2020-08-13 SDOH — ECONOMIC STABILITY: TRANSPORTATION INSECURITY
IN THE PAST 12 MONTHS, HAS LACK OF TRANSPORTATION KEPT YOU FROM MEETINGS, WORK, OR FROM GETTING THINGS NEEDED FOR DAILY LIVING?: NO

## 2020-08-13 SDOH — ECONOMIC STABILITY: TRANSPORTATION INSECURITY
IN THE PAST 12 MONTHS, HAS THE LACK OF TRANSPORTATION KEPT YOU FROM MEDICAL APPOINTMENTS OR FROM GETTING MEDICATIONS?: NO

## 2020-08-13 SDOH — ECONOMIC STABILITY: INCOME INSECURITY: HOW HARD IS IT FOR YOU TO PAY FOR THE VERY BASICS LIKE FOOD, HOUSING, MEDICAL CARE, AND HEATING?: NOT HARD AT ALL

## 2020-08-13 SDOH — ECONOMIC STABILITY: FOOD INSECURITY: WITHIN THE PAST 12 MONTHS, YOU WORRIED THAT YOUR FOOD WOULD RUN OUT BEFORE YOU GOT MONEY TO BUY MORE.: NEVER TRUE

## 2020-08-13 SDOH — ECONOMIC STABILITY: FOOD INSECURITY: WITHIN THE PAST 12 MONTHS, THE FOOD YOU BOUGHT JUST DIDN'T LAST AND YOU DIDN'T HAVE MONEY TO GET MORE.: NEVER TRUE

## 2020-08-13 ASSESSMENT — ENCOUNTER SYMPTOMS
SHORTNESS OF BREATH: 0
SINUS COMPLAINT: 1

## 2020-08-13 NOTE — PROGRESS NOTES
Subjective  Felisha Muhammad, 50 y.o. female presents today with:  Chief Complaint   Patient presents with    Other     FMLA needs filled out    Sinus Problem     stinus started draining, have ear pain and sore throat        Knee Pain      Migraine      Other     Sinus Problem   Pertinent negatives include no shortness of breath. Here today for 6 month follow-up on chronic problems including A. Fib, hypothyroidism, allergic rhinitis, migraines. Also c/o above. Hx of A. fib with rapid ventricular response. Her regular cardiologist is Dr. Candace Akins. She sees Dr. Mertie Meckel for management of her thyroid. She also has a past medical history significant for migraine headaches and allergic rhinitis. Her ObGyn is Dr. Bhumi Domingo. O.  She is taking synthroid, OCP's, zyrtec and flonase. For her migraines she uses fioricet prn. Needs FMLA updated/renewed    She does monitor bp at home     Sinus drainage, ear pressure, sore throat  Started as allergy symptoms  No fever  sinus pressure and some sweating   Tylenol for pain yesterday  No cough or SOB      Review of Systems   Respiratory: Negative for shortness of breath.         Past Medical History:   Diagnosis Date    Allergic rhinitis     Arthritis     hands    Asplenia     Atrial fibrillation (HCC)     Atrial fibrillation with RVR (HCC)     Dr. 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 Iram Morales     Past Surgical History:   Procedure Laterality Date    CARPAL TUNNEL RELEASE Right 07/12/2016    DILATION AND CURETTAGE OF UTERUS N/A 12/16/2019 Mother         pacemaker    Heart Disease Mother     Sleep Apnea Mother     Vision Loss Mother         histoplasmosis    Heart Disease Father         CAD / Triple bypass    Diabetes Father     Other Father         Fuck's dystropy / corneal transplants / AAA    Cancer Paternal Aunt     Cancer Paternal Grandmother     Sleep Apnea Sister     Alcohol Abuse Brother     Other Sister         killed by drunk      Allergies   Allergen Reactions    Penicillins Anaphylaxis    Codeine Itching, Swelling and Rash     OK with Tylenol #3 per pt    Percocet [Oxycodone-Acetaminophen] Itching, Swelling and Rash    Bee Venom Itching and Swelling    Tape [Adhesive Tape]      Skin irritation     Hydrocodone Itching, Swelling and Rash    Protonix [Pantoprazole Sodium] Swelling and Rash     Caused thrush     Current Outpatient Medications   Medication Sig Dispense Refill    doxycycline hyclate (VIBRA-TABS) 100 MG tablet Take 1 tablet by mouth 2 times daily for 10 days 20 tablet 0    butalbital-acetaminophen-caffeine (FIORICET, ESGIC) -40 MG per tablet Take 1 tablet by mouth every 4 hours as needed for Headaches 12 tablet 0    levothyroxine (SYNTHROID) 125 MCG tablet TAKE 1 TABLET BY MOUTH ONE TIME A DAY 90 tablet 1    XARELTO 20 MG TABS tablet TAKE 1 TABLET BY MOUTH ONE TIME A DAY 90 tablet 2    ondansetron (ZOFRAN) 8 MG tablet Take 1 tablet by mouth every 12 hours as needed for Nausea or Vomiting 30 tablet 02    tiZANidine (ZANAFLEX) 4 MG tablet Take 1 tablet by mouth every 8 hours as needed (spasm) 30 tablet 1    diltiazem (CARDIZEM CD) 180 MG extended release capsule TAKE 1 CAPSULE BY MOUTH ONE TIME A DAY 90 capsule 2    diclofenac sodium 1 % GEL Apply 4 g topically 4 times daily 2 Tube 0    magnesium (MAGNESIUM-OXIDE) 250 MG TABS tablet Take 250 mg by mouth three times a week       vitamin B-12 (CYANOCOBALAMIN) 500 MCG tablet Take 500 mcg by mouth daily      zinc 50 MG CAPS Take by mouth daily  Echinacea 400 MG CAPS Take by mouth daily       Calcium Carbonate-Vit D-Min (CALCIUM 1200) 1363-9473 MG-UNIT CHEW Take by mouth daily       cetirizine (ZYRTEC) 10 MG tablet Take 10 mg by mouth daily      ferrous sulfate 325 (65 FE) MG tablet Take 325 mg by mouth daily (with breakfast)      fluticasone (FLONASE) 50 MCG/ACT nasal spray 1 spray by Nasal route daily. 1 Bottle 06    meclizine (ANTIVERT) 12.5 MG tablet Take 12.5 mg by mouth 3 times daily as needed. No current facility-administered medications for this visit. Objective    Vitals:    08/13/20 1536   BP: 132/86   Site: Left Upper Arm   Pulse: 70   Temp: 97.5 °F (36.4 °C)   SpO2: 97%   Weight: 223 lb (101.2 kg)   Height: 5' 6\" (1.676 m)       Physical Exam   Constitutional: She appears well-developed and well-nourished. HENT:   Head: Normocephalic and atraumatic. Right Ear: Hearing, tympanic membrane, external ear and ear canal normal.   Left Ear: Hearing, tympanic membrane, external ear and ear canal normal.   Nose: Nose normal.   Mouth/Throat: Uvula is midline, oropharynx is clear and moist and mucous membranes are normal.   Neck: Normal range of motion. Neck supple. Cardiovascular: Normal rate, regular rhythm and normal heart sounds. No murmur heard. Pulmonary/Chest: Effort normal and breath sounds normal. She has no wheezes. Musculoskeletal: Normal range of motion. Lymphadenopathy:     She has no cervical adenopathy. Skin: Skin is warm and dry. No rash noted. Left shoulder with full ROM, but some pain with abduction and adduction across the chest    Assessment & Plan    Diagnosis Orders   1. Acute otitis media, unspecified otitis media type  doxycycline hyclate (VIBRA-TABS) 100 MG tablet   2. Atrial fibrillation with RVR ([address]     3. Asplenia     4. Hypothyroidism, unspecified type     5. Essential hypertension       A fib managed by Dr. Bonnie Frank. Migraines stable on naprosyn and fioricet prn.   She will f/u with

## 2020-08-31 RX ORDER — BUTALBITAL, ACETAMINOPHEN AND CAFFEINE 50; 325; 40 MG/1; MG/1; MG/1
1 TABLET ORAL EVERY 4 HOURS PRN
Qty: 12 TABLET | Refills: 0 | Status: SHIPPED | OUTPATIENT
Start: 2020-08-31 | End: 2020-12-17 | Stop reason: SDUPTHER

## 2020-08-31 NOTE — TELEPHONE ENCOUNTER
requesting medication refill.  Please approve or deny this request.    Rx requested:  Requested Prescriptions     Pending Prescriptions Disp Refills    butalbital-acetaminophen-caffeine (FIORICET, ESGIC) -40 MG per tablet 12 tablet 0     Sig: Take 1 tablet by mouth every 4 hours as needed for Headaches         Last Office Visit:   Visit date not found      Next Visit Date:  Future Appointments   Date Time Provider Corby Andersen   10/23/2020  9:00 AM Ronald Guevara DO L [facility]   1/21/2021  3:30 PM Kirby Huynh DO [facility] [address] [place] [address]   2/12/2021  4:00 PM [facility]   2/18/2021  3:30 PM Sudhakar Broderick MD [facility]

## 2020-10-23 ENCOUNTER — OFFICE VISIT (OUTPATIENT)
Dept: CARDIOLOGY CLINIC | Age: 45
End: 2020-10-23
Payer: COMMERCIAL

## 2020-10-23 VITALS
WEIGHT: 223 LBS | HEART RATE: 52 BPM | SYSTOLIC BLOOD PRESSURE: 138 MMHG | BODY MASS INDEX: 35.99 KG/M2 | DIASTOLIC BLOOD PRESSURE: 86 MMHG

## 2020-10-23 PROBLEM — I48.0 PAROXYSMAL ATRIAL FIBRILLATION (HCC): Status: ACTIVE | Noted: 2020-10-23

## 2020-10-23 PROBLEM — I48.91 ATRIAL FIBRILLATION WITH RVR (HCC): Status: RESOLVED | Noted: 2018-06-27 | Resolved: 2020-10-23

## 2020-10-23 PROCEDURE — 99213 OFFICE O/P EST LOW 20 MIN: CPT | Performed by: INTERNAL MEDICINE

## 2020-10-23 PROCEDURE — 93000 ELECTROCARDIOGRAM COMPLETE: CPT | Performed by: INTERNAL MEDICINE

## 2020-10-23 RX ORDER — DILTIAZEM HYDROCHLORIDE 180 MG/1
CAPSULE, COATED, EXTENDED RELEASE ORAL
Qty: 90 CAPSULE | Refills: 3 | Status: SHIPPED | OUTPATIENT
Start: 2020-10-23 | End: 2021-10-20

## 2020-10-23 NOTE — PROGRESS NOTES
caffeine. Not able to lose much weight. 10-18-19: Pt denies chest pain, dyspnea, dyspnea on exertion, change in exercise capacity,   nausea, vomiting, diarrhea, constipation, motor weakness, insomnia, weight loss, syncope, dizziness, lightheadedness, palpitations, PND, orthopnea, or claudication. No nitro use. BP and hr are good. CAD is stable. No LE discoloration or ulcers. No LE edema. No CHF type symptoms. Lipid profile is normal. No recent hospitalization. No change in meds. Did not have ekg done today. She can feel when she goes into afib. She is fatigued and has some stress issues. On DOAC, no bleeding issues. 10-23-30: Pt denies chest pain, dyspnea, dyspnea on exertion, change in exercise capacity, fatigue,  nausea, vomiting, diarrhea, constipation, motor weakness, insomnia, weight loss, syncope, dizziness, lightheadedness, palpitations, PND, orthopnea, or claudication. No nitro use. BP and hr are good. CAD is stable. No LE discoloration or ulcers. No LE edema. No CHF type symptoms. Lipid profile is normal. No recent hospitalization. No change in meds. With history of paroxysmal atrial fibrillation on oral anticoagulation. She can feel when she goes into atrial fibrillation. Placed On anxiety medications. Weight is about the same.   With normal sinus rhythm, no ischemia    Patient Active Problem List   Diagnosis    Hypothyroidism    Obesity    Meniere disease    Irregular periods    Asplenia    Allergic rhinitis    Migraine    Multinodular goiter    Migraine headache    Insomnia    Hypertension    Menorrhagia    Pelvic pain    Postoperative pain    Paroxysmal atrial fibrillation [facility])       Past Surgical History:   Procedure Laterality Date    CARPAL TUNNEL RELEASE Right 07/12/2016    DILATION AND CURETTAGE OF UTERUS N/A 12/16/2019    HYSTEROSCOPY NOVASURE ABLATION D&C performed by Tapan Jerome DO at [facility] [place] [place] N/A 2004    done for ITP    THYROIDECTOMY, COMPLETION Left 2014    frozen section inconclusive    THYROIDECTOMY, PARTIAL Right 2011    Benign nodule       Social History     Socioeconomic History    Marital status:      Spouse name: Not on file    Number of children: 0    Years of education: Not on file    Highest education level: Not on file   Occupational History    Occupation: medical records   Social Needs    Financial resource strain: Not hard at all   Mode-Carlos A insecurity     Worry: Never true     Inability: Never true   [employer] needs     Medical: No     Non-medical: No   Tobacco Use    Smoking status: Former Smoker     Packs/day: 0.50     Years: 10.00     Pack years: 5.00     Last attempt to quit: 2010     Years since quittin.9    Smokeless tobacco: Never Used   Substance and Sexual Activity    Alcohol use:  Yes     Alcohol/week: 0.0 standard drinks     Comment: socially    Drug use: No    Sexual activity: Not on file   Lifestyle    Physical activity     Days per week: Not on file     Minutes per session: Not on file    Stress: Not on file   Relationships    Social connections     Talks on phone: Not on file     Gets together: Not on file     Attends Christianity service: Not on file     Active member of club or organization: Not on file     Attends meetings of clubs or organizations: Not on file     Relationship status: Not on file    Intimate partner violence     Fear of current or ex partner: Not on file     Emotionally abused: Not on file     Physically abused: Not on file     Forced sexual activity: Not on file   Other Topics Concern    Not on file   Social History Narrative    Not on file       Family History   Problem Relation Age of Onset    High Blood Pressure Mother     Anemia Mother     Atrial Fibrillation Mother     Heart Failure Mother     Other Mother         pacemaker    Heart Disease Mother     Sleep Apnea Mother     Vision Loss Mother         histoplasmosis    Heart Disease Father         CAD / Triple bypass    Diabetes Father     Other Father         Fuck's dystropy / corneal transplants / AAA    Cancer Paternal Aunt     Cancer Paternal Grandmother     Sleep Apnea Sister     Alcohol Abuse Brother     Other Sister         killed by drunk        Current Outpatient Medications   Medication Sig Dispense Refill    rivaroxaban (XARELTO) 20 MG TABS tablet TAKE 1 TABLET BY MOUTH ONE TIME A DAY 90 tablet 3    dilTIAZem (CARDIZEM CD) 180 MG extended release capsule TAKE 1 CAPSULE BY MOUTH ONE TIME A DAY 90 capsule 3    butalbital-acetaminophen-caffeine (FIORICET, ESGIC) -40 MG per tablet Take 1 tablet by mouth every 4 hours as needed for Headaches 12 tablet 0    levothyroxine (SYNTHROID) 125 MCG tablet TAKE 1 TABLET BY MOUTH ONE TIME A DAY 90 tablet 1    ondansetron (ZOFRAN) 8 MG tablet Take 1 tablet by mouth every 12 hours as needed for Nausea or Vomiting 30 tablet 02    tiZANidine (ZANAFLEX) 4 MG tablet Take 1 tablet by mouth every 8 hours as needed (spasm) 30 tablet 1    diclofenac sodium 1 % GEL Apply 4 g topically 4 times daily 2 Tube 0    magnesium (MAGNESIUM-OXIDE) 250 MG TABS tablet Take 250 mg by mouth three times a week       vitamin B-12 (CYANOCOBALAMIN) 500 MCG tablet Take 500 mcg by mouth daily      zinc 50 MG CAPS Take by mouth daily       Echinacea 400 MG CAPS Take by mouth daily       Calcium Carbonate-Vit D-Min (CALCIUM 1200) 8164-4666 MG-UNIT CHEW Take by mouth daily       cetirizine (ZYRTEC) 10 MG tablet Take 10 mg by mouth daily      ferrous sulfate 325 (65 FE) MG tablet Take 325 mg by mouth daily (with breakfast)      fluticasone (FLONASE) 50 MCG/ACT nasal spray 1 spray by Nasal route daily. 1 Bottle 06    meclizine (ANTIVERT) 12.5 MG tablet Take 12.5 mg by mouth 3 times daily as needed. No current facility-administered medications for this visit. Penicillins; Codeine; Percocet [oxycodone-acetaminophen]; Bee venom;  Tape Izzy Wilfrid tape]; Hydrocodone; and Protonix [pantoprazole sodium]    Review of Systems:  General ROS: negative  Psychological ROS: negative  Hematological and Lymphatic ROS: No history of blood clots or bleeding disorder. Respiratory ROS: no cough, shortness of breath, or wheezing  Cardiovascular ROS: no chest pain or dyspnea on exertion  Gastrointestinal ROS: no abdominal pain, change in bowel habits, or black or bloody stools  Genito-Urinary ROS: no dysuria, trouble voiding, or hematuria  Musculoskeletal ROS: negative  Neurological ROS: no TIA or stroke symptoms  Dermatological ROS: negative    VITALS:  Blood pressure 138/86, pulse 52, weight 223 lb (101.2 kg), not currently breastfeeding. Body mass index is 35.99 kg/m². Physical Examination:  General appearance - alert, well appearing, and in no distress  Mental status - alert, oriented to person, place, and time  Neck - Neck is supple, no JVD or carotid bruits. No thyromegaly or adenopathy. Chest - clear to auscultation, no wheezes, rales or rhonchi, symmetric air entry  Heart - normal rate, regular rhythm, normal S1, S2, no murmurs, rubs, clicks or gallops  Abdomen - soft, nontender, nondistended, no masses or organomegaly  Neurological - alert, oriented, normal speech, no focal findings or movement disorder noted  Extremities - peripheral pulses normal, no pedal edema, no clubbing or cyanosis  Skin - normal coloration and turgor, no rashes, no suspicious skin lesions noted      Orders Placed This Encounter   Procedures    EKG 12 Lead       ASSESSMENT:     Diagnosis Orders   1. Atrial fibrillation with RVR (HCC)  EKG 12 Lead    dilTIAZem (CARDIZEM CD) 180 MG extended release capsule   2. Essential hypertension  dilTIAZem (CARDIZEM CD) 180 MG extended release capsule   3. Paroxysmal atrial fibrillation (HCC)     4. Class 2 obesity without serious comorbidity with body mass index (BMI) of 35.0 to 35.9 in adult, unspecified obesity type     2. Overweight. PLAN:     Patient will need to continue to follow up with you for their general medical care     As always, aggressive risk factor modification is strongly recommended. We should adhere to the JNC VIII guidelines for HTN management and the NCEP ATP III guidelines for LDL-C management. Cardiac diet is always recommended with low fat, cholesterol, calories and sodium. Continue medications at current doses. Cont with NOAC     Check EKG      cardizem CD 120mg daily. Consider INESSA testing in future    Patient is to avoid any excessive caffeine, chocolate, or OTC stimulants. Patient was advised and encouraged to check blood pressure at home or at a pharmacy, maintain a logbook, and also call us back if blood pressure are above the target ranges or if it is low. Patient clearly understands and agrees to the instructions. We will need to continue to monitor muscle and liver enzymes, BUN, CR, and electrolytes.     Electronically signed by Neeru Spence DO on 10/23/2020 at 9:18 AM

## 2020-11-13 ENCOUNTER — PATIENT MESSAGE (OUTPATIENT)
Dept: FAMILY MEDICINE CLINIC | Age: 45
End: 2020-11-13

## 2020-11-13 NOTE — TELEPHONE ENCOUNTER
From: Paris Bourgeois  To: Angela Benton MD  Sent: 11/13/2020 9:20 AM EST  Subject: Prescription Question    Dr. Urban Rush,    May I get a prescription for a handicap placard for my car? I have shortness of breath due to more frequent episodes of anxiety and my afib gets exacerbated by the longer walk from the parking lot to my office at work, at the grocery store, etc., where more often I'm having an Afib episode because of it. I am on medication for my anxiety but I take it as needed, which is all the time. Since covid, I'm having to park further away from the entrance I come into at work. Also, having a handicap placard and allowing me to park closer to my destination I believe, would help minimize how often I'm having an afib episode because of my anxiety.

## 2020-11-17 RX ORDER — LEVOTHYROXINE SODIUM 0.12 MG/1
TABLET ORAL
Qty: 90 TABLET | Refills: 1 | Status: SHIPPED
Start: 2020-11-17 | End: 2021-02-18 | Stop reason: DRUGHIGH

## 2020-12-17 RX ORDER — BUTALBITAL, ACETAMINOPHEN AND CAFFEINE 50; 325; 40 MG/1; MG/1; MG/1
1 TABLET ORAL EVERY 4 HOURS PRN
Qty: 12 TABLET | Refills: 0 | Status: SHIPPED | OUTPATIENT
Start: 2020-12-17 | End: 2021-02-03 | Stop reason: SDUPTHER

## 2020-12-17 RX ORDER — TIZANIDINE 4 MG/1
4 TABLET ORAL EVERY 8 HOURS PRN
Qty: 30 TABLET | Refills: 1 | Status: SHIPPED | OUTPATIENT
Start: 2020-12-17 | End: 2021-07-13 | Stop reason: SDUPTHER

## 2020-12-21 ENCOUNTER — VIRTUAL VISIT (OUTPATIENT)
Dept: FAMILY MEDICINE CLINIC | Age: 45
End: 2020-12-21
Payer: COMMERCIAL

## 2020-12-21 PROCEDURE — 99213 OFFICE O/P EST LOW 20 MIN: CPT | Performed by: NURSE PRACTITIONER

## 2020-12-21 RX ORDER — CLARITHROMYCIN 250 MG/1
250 TABLET, FILM COATED ORAL 2 TIMES DAILY
Qty: 14 TABLET | Refills: 0 | Status: SHIPPED | OUTPATIENT
Start: 2020-12-21 | End: 2020-12-28

## 2020-12-21 RX ORDER — ECHINACEA PURPUREA EXTRACT 125 MG
1 TABLET ORAL PRN
Qty: 1 BOTTLE | Refills: 3 | Status: SHIPPED | OUTPATIENT
Start: 2020-12-21

## 2020-12-21 ASSESSMENT — ENCOUNTER SYMPTOMS
SHORTNESS OF BREATH: 0
SINUS PAIN: 1
DIARRHEA: 0
NAUSEA: 0
ABDOMINAL PAIN: 0
CHEST TIGHTNESS: 0
COUGH: 0
SORE THROAT: 1
WHEEZING: 0
RHINORRHEA: 1
SINUS PRESSURE: 1

## 2020-12-21 NOTE — PROGRESS NOTES
Constitutional: Negative for activity change, appetite change, chills, diaphoresis, fatigue and fever. HENT: Positive for congestion, postnasal drip, rhinorrhea, sinus pressure, sinus pain and sore throat. Respiratory: Negative for cough, chest tightness, shortness of breath and wheezing. Gastrointestinal: Negative for abdominal pain, diarrhea and nausea. Musculoskeletal: Negative for myalgias. Neurological: Negative for dizziness, light-headedness and headaches. Prior to Visit Medications    Medication Sig Taking?  Authorizing Provider   clarithromycin (BIAXIN) 250 MG tablet Take 1 tablet by mouth 2 times daily for 7 days Yes CASE Parks NP   sodium chloride (ALTAMIST SPRAY) 0.65 % nasal spray 1 spray by Nasal route as needed for Congestion Yes CASE Parks NP   tiZANidine (ZANAFLEX) 4 MG tablet Take 1 tablet by mouth every 8 hours as needed (spasm) Yes Cabrera Lazcano MD   butalbital-acetaminophen-caffeine (FIORICET, ESGIC) -40 MG per tablet Take 1 tablet by mouth every 4 hours as needed for Headaches Yes Cabrera Lazcano MD   levothyroxine (SYNTHROID) 125 MCG tablet TAKE 1 TABLET BY MOUTH ONE TIME A DAY Yes Cristina Alvarenga MD   Handicap Placard MISC by Does not apply route Exp 5 years Yes Cabrera Lazcano MD   hydrOXYzine (VISTARIL) 25 MG capsule TAKE ONE CAPSULE BY MOUTH FOUR TIMES A DAY AS NEEDED FOR ITCHING OR ANXIETY Yes Mp Ervin, DO   rivaroxaban (XARELTO) 20 MG TABS tablet TAKE 1 TABLET BY MOUTH ONE TIME A DAY Yes Ronald J Holiday, DO   dilTIAZem (CARDIZEM CD) 180 MG extended release capsule TAKE 1 CAPSULE BY MOUTH ONE TIME A DAY Yes Wes J Holiday, DO   ondansetron (ZOFRAN) 8 MG tablet Take 1 tablet by mouth every 12 hours as needed for Nausea or Vomiting Yes Cristina Alvarenga MD   diclofenac sodium 1 % GEL Apply 4 g topically 4 times daily Yes Cabrera Lazcano MD  Diabetes Father     Other Father         Fuck's dystropy / corneal transplants / AAA    Cancer Paternal Aunt     Cancer Paternal Grandmother     Sleep Apnea Sister     Alcohol Abuse Brother     Other Sister         killed by drunk      Allergies   Allergen Reactions    Penicillins Anaphylaxis    Codeine Itching, Swelling and Rash     OK with Tylenol #3 per pt    Percocet [Oxycodone-Acetaminophen] Itching, Swelling and Rash    Bee Venom Itching and Swelling    Tape [Adhesive Tape]      Skin irritation     Hydrocodone Itching, Swelling and Rash    Protonix [Pantoprazole Sodium] Swelling and Rash     Caused thrush       PMH, Surgical Hx, Family Hx, and Social Hx reviewed and updated. PHYSICAL EXAMINATION: N/A. VV Audio       ASSESSMENT/PLAN:  Assessment & Plan   Rose Marie Kat was seen today for otalgia and sinus problem. Diagnoses and all orders for this visit:    Acute non-recurrent frontal sinusitis  -     clarithromycin (BIAXIN) 250 MG tablet; Take 1 tablet by mouth 2 times daily for 7 days  -     sodium chloride (ALTAMIST SPRAY) 0.65 % nasal spray; 1 spray by Nasal route as needed for Congestion    Otalgia of both ears  -     clarithromycin (BIAXIN) 250 MG tablet; Take 1 tablet by mouth 2 times daily for 7 days      No orders of the defined types were placed in this encounter. Orders Placed This Encounter   Medications    clarithromycin (BIAXIN) 250 MG tablet     Sig: Take 1 tablet by mouth 2 times daily for 7 days     Dispense:  14 tablet     Refill:  0    sodium chloride (ALTAMIST SPRAY) 0.65 % nasal spray     Si spray by Nasal route as needed for Congestion     Dispense:  1 Bottle     Refill:  3     Medications Discontinued During This Encounter   Medication Reason    clarithromycin (BIAXIN) 250 MG tablet REORDER     Return if symptoms worsen or fail to improve, for follow up with PCP. Reviewed with the patient: current clinical status & medications. Side effects, adverse effects of the medication prescribed today, as well as treatment plan/rrationale and result expectations have been discussed with the patient who expresses understanding. When should you call for help? Call your doctor now or seek immediate medical care if:    · You have new or worse swelling or redness in your face or around your eyes.     · You have a new or higher fever. Watch closely for changes in your health, and be sure to contact your doctor if:    · You have new or worse facial pain.     · The mucus from your nose becomes thicker (like pus) or has new blood in it.     · You are not getting better as expected. Close follow up to evaluate treatment results and for coordination of care. I have reviewed the patient's medical history in detail and updated the computerized patient record. Patient identification was verified at the start of the visit: Yes    Total time spent on this encounter: 15 minutes       --CASE Whitehead NP on 12/21/2020 at 5:40 PM    An electronic signature was used to authenticate this note.

## 2021-01-19 ENCOUNTER — VIRTUAL VISIT (OUTPATIENT)
Dept: FAMILY MEDICINE CLINIC | Age: 46
End: 2021-01-19
Payer: COMMERCIAL

## 2021-01-19 DIAGNOSIS — J01.90 ACUTE BACTERIAL SINUSITIS: Primary | ICD-10-CM

## 2021-01-19 DIAGNOSIS — B96.89 ACUTE BACTERIAL SINUSITIS: Primary | ICD-10-CM

## 2021-01-19 PROCEDURE — 99213 OFFICE O/P EST LOW 20 MIN: CPT | Performed by: STUDENT IN AN ORGANIZED HEALTH CARE EDUCATION/TRAINING PROGRAM

## 2021-01-19 RX ORDER — DOXYCYCLINE HYCLATE 100 MG
100 TABLET ORAL 2 TIMES DAILY
Qty: 20 TABLET | Refills: 0 | Status: SHIPPED | OUTPATIENT
Start: 2021-01-19 | End: 2021-01-29

## 2021-01-19 ASSESSMENT — PATIENT HEALTH QUESTIONNAIRE - PHQ9
SUM OF ALL RESPONSES TO PHQ QUESTIONS 1-9: 0
SUM OF ALL RESPONSES TO PHQ QUESTIONS 1-9: 0
SUM OF ALL RESPONSES TO PHQ9 QUESTIONS 1 & 2: 0

## 2021-01-19 ASSESSMENT — ENCOUNTER SYMPTOMS
COUGH: 0
SHORTNESS OF BREATH: 0
SORE THROAT: 1
ABDOMINAL PAIN: 0
SINUS PRESSURE: 1
SINUS PAIN: 1
VOMITING: 0
RHINORRHEA: 1

## 2021-01-19 NOTE — PROGRESS NOTES
2021    TELEHEALTH EVALUATION -- Audio/Visual (During WHJYT-20 public health emergency)    Due to Matthlana 19 outbreak, patient's office visit was converted to a virtual visit. Patient was contacted and agreed to proceed with a virtual visit via [employer]. me  The risks and benefits of converting to a virtual visit were discussed in light of the current infectious disease epidemic. Patient also understood that insurance coverage and co-pays are up to their individual insurance plans. HPI:  Harleen Napier (:  1975) has requested an audio/video evaluation for the following concern(s):  Chief Complaint   Patient presents with    URI     Sinus and ears, started Sun, nasal is dry, all in head, head congestion. ears are on fire, forehead sweating (no were else)? NO SPLEEN      Sick  Symptoms started two days ago  Pt endorsing sinus pressure, pain, nasal congestion and ear pain  No recent sick contacts  Denied fevers,chills, cough, SOB      Review of Systems   Constitutional: Negative for chills and fever. HENT: Positive for ear pain, rhinorrhea, sinus pressure, sinus pain and sore throat. Negative for congestion. Respiratory: Negative for cough and shortness of breath. Cardiovascular: Negative for chest pain and palpitations. Gastrointestinal: Negative for abdominal pain and vomiting. Musculoskeletal: Negative for arthralgias and myalgias. Skin: Negative for rash and wound. Neurological: Negative for speech difficulty and light-headedness. Psychiatric/Behavioral: Negative for suicidal ideas. The patient is not nervous/anxious. Prior to Visit Medications    Medication Sig Taking?  Authorizing Provider   doxycycline hyclate (VIBRA-TABS) 100 MG tablet Take 1 tablet by mouth 2 times daily for 10 days Yes Russell Whittaker DO   sodium chloride (ALTAMIST SPRAY) 0.65 % nasal spray 1 spray by Nasal route as needed for Congestion Yes CASE Jones - NP tiZANidine (ZANAFLEX) 4 MG tablet Take 1 tablet by mouth every 8 hours as needed (spasm) Yes Josh Perdomo MD   butalbital-acetaminophen-caffeine (FIORICET, ESGIC) -40 MG per tablet Take 1 tablet by mouth every 4 hours as needed for Headaches Yes Josh Perdomo MD   levothyroxine (SYNTHROID) 125 MCG tablet TAKE 1 TABLET BY MOUTH Gayla Zepeda Yes Yee Pedraza MD   Handicap Placard MISC by Does not apply route Exp 5 years Yes Josh Perdomo MD   hydrOXYzine (VISTARIL) 25 MG capsule TAKE ONE CAPSULE BY MOUTH FOUR TIMES A DAY AS NEEDED FOR ITCHING OR ANXIETY Yes Ok Macon Blencoe, DO   rivaroxaban (XARELTO) 20 MG TABS tablet TAKE 1 TABLET BY MOUTH ONE TIME A DAY Yes TalkTo Holiday, DO   dilTIAZem (CARDIZEM CD) 180 MG extended release capsule TAKE 1 CAPSULE BY MOUTH ONE TIME A DAY Yes Ronald  Holiday, DO   ondansetron (ZOFRAN) 8 MG tablet Take 1 tablet by mouth every 12 hours as needed for Nausea or Vomiting Yes Yee Pedraza MD   diclofenac sodium 1 % GEL Apply 4 g topically 4 times daily Yes Josh Perdomo MD   magnesium (MAGNESIUM-OXIDE) 250 MG TABS tablet Take 250 mg by mouth three times a week  Yes Historical Provider, MD   vitamin B-12 (CYANOCOBALAMIN) 500 MCG tablet Take 500 mcg by mouth daily Yes Historical Provider, MD   zinc 50 MG CAPS Take by mouth daily  Yes Historical Provider, MD   Echinacea 400 MG CAPS Take by mouth daily  Yes Historical Provider, MD   Calcium Carbonate-Vit D-Min (CALCIUM 1200) 3872-7437 MG-UNIT CHEW Take by mouth daily  Yes Historical Provider, MD   cetirizine (ZYRTEC) 10 MG tablet Take 10 mg by mouth daily Yes Historical Provider, MD   ferrous sulfate 325 (65 FE) MG tablet Take 325 mg by mouth daily (with breakfast) Yes Historical Provider, MD   fluticasone (FLONASE) 50 MCG/ACT nasal spray 1 spray by Nasal route daily. Yes Yee Pedraza MD   meclizine (ANTIVERT) 12.5 MG tablet Take 12.5 mg by mouth 3 times daily as needed.  Yes Historical Provider, MD       Social History     Tobacco Use  Smoking status: Former Smoker     Packs/day: 0.50     Years: 10.00     Pack years: 5.00     Quit date: 11/17/2010     Years since quitting: 10.1    Smokeless tobacco: Never Used   Substance Use Topics    Alcohol use:  Yes     Alcohol/week: 0.0 standard drinks     Comment: socially    Drug use: No        Allergies   Allergen Reactions    Penicillins Anaphylaxis    Codeine Itching, Swelling and Rash     OK with Tylenol #3 per pt    Percocet [Oxycodone-Acetaminophen] Itching, Swelling and Rash    Bee Venom Itching and Swelling    Tape [address] [place]]      Skin irritation     Hydrocodone Itching, Swelling and Rash    Protonix [Pantoprazole Sodium] Swelling and Rash     Caused thrush   ,   Past Medical History:   Diagnosis Date    Allergic rhinitis     Arthritis     hands    Asplenia     [facility]rial fibrillation ([facility])     Atrial fibrillation with RVR ([facility])     Dr. Orquidea Knutson    Bilateral hand pain     CTS (carpal tunnel syndrome) 05/2016    bilateral    External hemorrhoid     Fatigue     Heart murmur     History of ITP 2004    had spenectomy    Hypertension     meds > 1 yrs    Hypothyroidism     meds > 5 yrs    Hypothyroidism     Insomnia     Irregular periods     Meniere disease     Migraine headache     Multinodular goiter     Nail fungus     Pain in right lower leg     Paroxysmal atrial fibrillation (HCC) 10/23/2020    Plantar fasciitis, left     Dr. Beltran Alfred   ,   Past Surgical History:   Procedure Laterality Date    CARPAL TUNNEL RELEASE Right 07/12/2016    DILATION AND CURETTAGE OF UTERUS N/A 12/16/2019    HYSTEROSCOPY NOVASURE ABLATION D&C performed by Raegan Friedman DO at [facility] N/A 2004    done for ITP    THYROIDECTOMY, COMPLETION Left 2014    frozen section inconclusive    THYROIDECTOMY, PARTIAL Right 2011    Benign nodule   ,   Social History     Tobacco Use    Smoking status: Former Smoker     Packs/day: 0.50     Years: 10.00 Pack years: 5.00     Quit date: 11/17/2010     Years since quitting: 10.1    Smokeless tobacco: Never Used   Substance Use Topics    Alcohol use:  Yes     Alcohol/week: 0.0 standard drinks     Comment: socially    Drug use: No   ,   Family History   Problem Relation Age of Onset    High Blood Pressure Mother     Anemia Mother     Atrial Fibrillation Mother     Heart Failure Mother     Other Mother         pacemaker    Heart Disease Mother     Sleep Apnea Mother     Vision Loss Mother         histoplasmosis    Heart Disease Father         CAD / Triple bypass    Diabetes Father     Other Father         Fuck's dystropy / corneal transplants / AAA    Cancer Paternal Aunt     Cancer Paternal Grandmother     Sleep Apnea Sister     Alcohol Abuse Brother     Other Sister         killed by drunk    ,   Immunization History   Administered Date(s) Administered    Influenza Vaccine, unspecified formulation 09/01/2016    Influenza Virus Vaccine 10/05/2017, 10/04/2018, 10/23/2019, 10/13/2020    Influenza Whole 10/27/2015    Meningococcal MCV4P (Menactra) 08/12/2019, 10/23/2019    Pneumococcal Polysaccharide (Timpokmsp51) 10/09/2009, 10/09/2014, 08/12/2019    Tdap (Boostrix, Adacel) 11/17/2015       PHYSICAL EXAMINATION:  [ INSTRUCTIONS:  \"[x]\" Indicates a positive item  \"[]\" Indicates a negative item  -- DELETE ALL ITEMS NOT EXAMINED]  [x] Alert  [] Oriented to person/place/time    [] No apparent distress  [] Toxic appearing    [] Face flushed appearing [x] Sclera clear  [] Lips are cyanotic      [x] Breathing appears normal  [] Appears tachypneic      [] Rash on visible skin    [x] Cranial Nerves II-XII grossly intact    [x] Motor grossly intact in visible upper extremities    [x] Motor grossly intact in visible lower extremities    [x] Normal Mood  [] Anxious appearing    [] Depressed appearing  [] Confused appearing      [] Poor short term memory  [] Poor long term memory    [] OTHER: Due to this being a TeleHealth encounter, evaluation of the following organ systems is limited: Vitals/Constitutional/EENT/Resp/CV/GI//MS/Neuro/Skin/Heme-Lymph-Imm. ASSESSMENT/PLAN:  1. Acute bacterial sinusitis  - Declined COVID testing at this time  - Will treat with abx at this time  - Has taken doxy before and done well as long as she eats a small meal before taking abx  - Pt to call if symptoms persist or do not improve    Return if symptoms worsen or fail to improve. An  electronic signature was used to authenticate this note. --Sarahi Zayas DO on 1/19/2021 at 2:42 PM        Pursuant to the emergency declaration under the [address] [place] [address], [address] [place] [address] authority and the [employer] Act, this Virtual  Visit was conducted, with patient's consent, to reduce the patient's risk of exposure to COVID-19 and provide continuity of care for an established patient. Services were provided through a video synchronous discussion virtually to substitute for in-person clinic visit.

## 2021-01-19 NOTE — PATIENT INSTRUCTIONS
Patient Education        Sinusitis: Care Instructions  Your Care Instructions     Sinusitis is an infection of the lining of the sinus cavities in your head. Sinusitis often follows a cold. It causes pain and pressure in your head and face. In most cases, sinusitis gets better on its own in 1 to 2 weeks. But some mild symptoms may last for several weeks. Sometimes antibiotics are needed. Follow-up care is a key part of your treatment and safety. Be sure to make and go to all appointments, and call your doctor if you are having problems. It's also a good idea to know your test results and keep a list of the medicines you take. How can you care for yourself at home? · Take an over-the-counter pain medicine, such as acetaminophen (Tylenol), ibuprofen (Advil, Motrin), or naproxen (Aleve). Read and follow all instructions on the label. · If the doctor prescribed antibiotics, take them as directed. Do not stop taking them just because you feel better. You need to take the full course of antibiotics. · Be careful when taking over-the-counter cold or flu medicines and Tylenol at the same time. Many of these medicines have acetaminophen, which is Tylenol. Read the labels to make sure that you are not taking more than the recommended dose. Too much acetaminophen (Tylenol) can be harmful. · Breathe warm, moist air from a steamy shower, a hot bath, or a sink filled with hot water. Avoid cold, dry air. Using a humidifier in your home may help. Follow the directions for cleaning the machine. · Use saline (saltwater) nasal washes to help keep your nasal passages open and wash out mucus and bacteria. You can buy saline nose drops at a grocery store or drugstore. Or you can make your own at home by adding 1 teaspoon of salt and 1 teaspoon of baking soda to 2 cups of distilled water. If you make your own, fill a bulb syringe with the solution, insert the tip into your nostril, and squeeze gently. Tanna Dandy your nose. · Put a hot, wet towel or a warm gel pack on your face 3 or 4 times a day for 5 to 10 minutes each time. · Try a decongestant nasal spray like oxymetazoline (Afrin). Do not use it for more than 3 days in a row. Using it for more than 3 days can make your congestion worse. When should you call for help? Call your doctor now or seek immediate medical care if:    · You have new or worse swelling or redness in your face or around your eyes.     · You have a new or higher fever. Watch closely for changes in your health, and be sure to contact your doctor if:    · You have new or worse facial pain.     · The mucus from your nose becomes thicker (like pus) or has new blood in it.     · You are not getting better as expected. Where can you learn more? Go to [email or web address]://[email or web address].[email or web address]. org and sign in to your [employer] account. Enter L922 in the [employer] box to learn more about \"Sinusitis: Care Instructions. \"     If you do not have an account, please click on the \"Sign Up Now\" link. Current as of: April 15, 2020               Content Version: 12.6  © 6147-5024 [employer]. Care instructions adapted under license by [facility] ([facility]). If you have questions about a medical condition or this instruction, always ask your healthcare professional. [address] any warranty or liability for your use of this information.

## 2021-01-20 DIAGNOSIS — E89.0 POSTOPERATIVE HYPOTHYROIDISM: ICD-10-CM

## 2021-01-20 DIAGNOSIS — R73.9 HYPERGLYCEMIA: ICD-10-CM

## 2021-01-20 LAB
ANION GAP SERPL CALCULATED.3IONS-SCNC: 11 MEQ/L (ref 9–15)
BUN BLDV-MCNC: 13 MG/DL (ref 6–20)
CALCIUM SERPL-MCNC: 9.2 MG/DL (ref 8.5–9.9)
CHLORIDE BLD-SCNC: 101 MEQ/L (ref 95–107)
CO2: 26 MEQ/L (ref 20–31)
CREAT SERPL-MCNC: 0.61 MG/DL (ref 0.5–0.9)
GFR AFRICAN AMERICAN: >60
GFR NON-AFRICAN AMERICAN: >60
GLUCOSE BLD-MCNC: 106 MG/DL (ref 70–99)
HBA1C MFR BLD: 6 % (ref 4.8–5.9)
POTASSIUM SERPL-SCNC: 4.3 MEQ/L (ref 3.4–4.9)
SODIUM BLD-SCNC: 138 MEQ/L (ref 135–144)
T4 FREE: 1.52 NG/DL (ref 0.84–1.68)
TSH REFLEX: 0.23 UIU/ML (ref 0.44–3.86)

## 2021-01-21 ENCOUNTER — OFFICE VISIT (OUTPATIENT)
Dept: OBGYN CLINIC | Age: 46
End: 2021-01-21
Payer: COMMERCIAL

## 2021-01-21 VITALS
WEIGHT: 225 LBS | DIASTOLIC BLOOD PRESSURE: 100 MMHG | BODY MASS INDEX: 36.32 KG/M2 | HEART RATE: 82 BPM | SYSTOLIC BLOOD PRESSURE: 140 MMHG

## 2021-01-21 DIAGNOSIS — N95.1 PERIMENOPAUSE: ICD-10-CM

## 2021-01-21 DIAGNOSIS — Z11.51 ENCOUNTER FOR SCREENING FOR HUMAN PAPILLOMAVIRUS (HPV): Primary | ICD-10-CM

## 2021-01-21 DIAGNOSIS — Z12.31 ENCOUNTER FOR SCREENING MAMMOGRAM FOR BREAST CANCER: ICD-10-CM

## 2021-01-21 DIAGNOSIS — Z01.419 WOMEN'S ANNUAL ROUTINE GYNECOLOGICAL EXAMINATION: ICD-10-CM

## 2021-01-21 PROCEDURE — 99396 PREV VISIT EST AGE 40-64: CPT | Performed by: OBSTETRICS & GYNECOLOGY

## 2021-01-21 RX ORDER — MEDROXYPROGESTERONE ACETATE 10 MG/1
10 TABLET ORAL DAILY
Qty: 30 TABLET | Refills: 0 | Status: SHIPPED | OUTPATIENT
Start: 2021-01-21 | End: 2021-07-01

## 2021-01-21 ASSESSMENT — ENCOUNTER SYMPTOMS
CONSTIPATION: 0
BACK PAIN: 0
VOICE CHANGE: 0
ABDOMINAL PAIN: 0
WHEEZING: 0
SORE THROAT: 0
COLOR CHANGE: 0
NAUSEA: 0
SHORTNESS OF BREATH: 0
VOMITING: 0
BLOOD IN STOOL: 0
CHEST TIGHTNESS: 0
COUGH: 0
TROUBLE SWALLOWING: 0
ABDOMINAL DISTENTION: 0

## 2021-01-21 NOTE — PROGRESS NOTES
CHIEF COMPLAINT: Sanya Pritchard is here for a well woman exam.  She is a 39-year-old female. Her last menstrual period was September. She is not having any menopausal symptoms. She denies vaginitis. No pelvic pain no vaginal pain. Did have a NovaSure ablation in 2019 was having some bleeding after that  Chief Complaint   Patient presents with    Annual Exam         HISTORY OF PRESENT ILLNESS:  50 y.o. female presents for her annual exam. She had no concernsor complaints today. Her menses is  absent. She denies breakthrough bleeding, pelvic pain, or abnormal discharge. Bowel and bladder function is normal. Her health overallhas been good.      Past Medical History:   Diagnosis Date    Abnormal Pap smear of cervix     Allergic rhinitis     Arthritis     hands    Asplenia     Atrial fibrillation (HCC)     Atrial fibrillation with RVR ([facility])     Dr. Ro Gatica    Bilateral hand pain     CTS (carpal tunnel syndrome) 05/2016    bilateral    External hemorrhoid     Fatigue     Heart murmur     History of ITP 2004    had spenectomy    Hypertension     meds > 1 yrs    Hypothyroidism     meds > 5 yrs    Hypothyroidism     Insomnia     Irregular periods     Meniere disease     Migraine headache     Multinodular goiter     Nail fungus     Pain in right lower leg     Paroxysmal atrial fibrillation (HCC) 10/23/2020    Plantar fasciitis, left      [facility]     Past Surgical History:   Procedure Laterality Date    CARPAL TUNNEL RELEASE Right 07/12/2016    DILATION AND CURETTAGE OF UTERUS N/A 12/16/2019    HYSTEROSCOPY NOVASURE ABLATION D&C performed by Stephen Harding DO at [facility] N/A 2004    done for ITP    THYROIDECTOMY, COMPLETION Left 2014    frozen section inconclusive    THYROIDECTOMY, PARTIAL Right 2011    Benign nodule     Family History   Problem Relation Age of Onset    High Blood Pressure Mother     Anemia Mother     Atrial Fibrillation Mother     Heart Failure Social History Narrative    Not on file     Allergies:  Penicillins, Codeine, Percocet [oxycodone-acetaminophen], Bee venom, Tape [adhesive tape], Hydrocodone, and Protonix [pantoprazole sodium]  Current Outpatient Medications on File Prior to Visit   Medication Sig Dispense Refill    doxycycline hyclate (VIBRA-TABS) 100 MG tablet Take 1 tablet by mouth 2 times daily for 10 days 20 tablet 0    sodium chloride (ALTAMIST SPRAY) 0.65 % nasal spray 1 spray by Nasal route as needed for Congestion 1 Bottle 3    tiZANidine (ZANAFLEX) 4 MG tablet Take 1 tablet by mouth every 8 hours as needed (spasm) 30 tablet 1    butalbital-acetaminophen-caffeine (FIORICET, ESGIC) -40 MG per tablet Take 1 tablet by mouth every 4 hours as needed for Headaches 12 tablet 0    levothyroxine (SYNTHROID) 125 MCG tablet TAKE 1 TABLET BY MOUTH ONE TIME A DAY 90 tablet 1    Handicap Placard MISC by Does not apply route Exp 5 years 1 each 0    hydrOXYzine (VISTARIL) 25 MG capsule TAKE ONE CAPSULE BY MOUTH FOUR TIMES A DAY AS NEEDED FOR ITCHING OR ANXIETY 30 capsule 1    rivaroxaban (XARELTO) 20 MG TABS tablet TAKE 1 TABLET BY MOUTH ONE TIME A DAY 90 tablet 3    dilTIAZem (CARDIZEM CD) 180 MG extended release capsule TAKE 1 CAPSULE BY MOUTH ONE TIME A DAY 90 capsule 3    ondansetron (ZOFRAN) 8 MG tablet Take 1 tablet by mouth every 12 hours as needed for Nausea or Vomiting 30 tablet 02    diclofenac sodium 1 % GEL Apply 4 g topically 4 times daily 2 Tube 0    magnesium (MAGNESIUM-OXIDE) 250 MG TABS tablet Take 250 mg by mouth three times a week       vitamin B-12 (CYANOCOBALAMIN) 500 MCG tablet Take 500 mcg by mouth daily      zinc 50 MG CAPS Take by mouth daily       Echinacea 400 MG CAPS Take by mouth daily       Calcium Carbonate-Vit D-Min (CALCIUM 1200) 9197-6907 MG-UNIT CHEW Take by mouth daily       cetirizine (ZYRTEC) 10 MG tablet Take 10 mg by mouth daily      ferrous sulfate 325 (65 FE) MG tablet Take 325 mg by mouth daily (with breakfast)      fluticasone (FLONASE) 50 MCG/ACT nasal spray 1 spray by Nasal route daily. 1 Bottle 06    meclizine (ANTIVERT) 12.5 MG tablet Take 12.5 mg by mouth 3 times daily as needed. No current facility-administered medications on file prior to visit. OB History    No obstetric history on file. Patient's medications, allergies, past medical, surgical,social and family histories were reviewed and updated as appropriate. Review of Systems   Constitutional: Negative for activity change, appetite change, fatigue and unexpected weight change. HENT: Negative for dental problem, ear pain, hearing loss, nosebleeds, sore throat, trouble swallowing and voice change. Eyes: Negative for visual disturbance. Respiratory: Negative for cough, chest tightness, shortness of breath and wheezing. Cardiovascular: Negative for chest pain and palpitations. Gastrointestinal: Negative for abdominal distention, abdominal pain, blood in stool, constipation, nausea and vomiting. Endocrine: Negative for cold intolerance, heat intolerance, polydipsia, polyphagia and polyuria. Genitourinary: Negative for difficulty urinating, dyspareunia, dysuria, flank pain, frequency, genital sores, hematuria, menstrual problem, pelvic pain, urgency, vaginal bleeding, vaginal discharge and vaginal pain. Musculoskeletal: Negative for arthralgias, back pain, joint swelling and myalgias. Skin: Negative for color change and rash. Allergic/Immunologic: Negative for environmental allergies, food allergies and immunocompromised state. Neurological: Negative for dizziness, seizures, syncope, speech difficulty, weakness, numbness and headaches. Hematological: Negative for adenopathy. Does not bruise/bleed easily. Psychiatric/Behavioral: Negative for agitation, behavioral problems, confusion, decreased concentration, dysphoric mood and suicidal ideas.  The patient is not nervous/anxious and is not hyperactive. All other systems reviewed and are negative. PHYSICAL EXAM  BP (!) 140/100   Pulse 82   Wt 225 lb (102.1 kg)   BMI 36.32 kg/m²      Physical Exam  Chest:       Genitourinary:                  ASSESSMENT : Routine Annual Exam      Diagnosis Orders   1. Encounter for screening for human papillomavirus (HPV)     2. Women's annual routine gynecological examination  PAP SMEAR   3. Encounter for screening mammogram for breast cancer  YOLY DIGITAL SCREEN W OR WO CAD BILATERAL   Secondary amenorrhea. Perimenopause versus menopause. Status post ablation. PLAN: Have her Provera 10 mg for 10 days to see if she has any type of withdrawal bleed. She is due for mammogram in June  Pap smear obtained  Orders Placed This Encounter   Procedures    YOLY DIGITAL SCREEN W OR WO CAD BILATERAL     Standing Status:   Future     Standing Expiration Date:   3/20/2022     Order Specific Question:   Reason for exam:     Answer:   Yearly mammogram    PAP SMEAR     Standing Status:   Future     Standing Expiration Date:   1/20/2022     Order Specific Question:   Collection Type     Answer: Thin Prep     Order Specific Question:   Prior Abnormal Pap Test     Answer:   No     Order Specific Question:   Screening or Diagnostic     Answer:   Screening     Order Specific Question:   HPV Requested? Answer:   Yes     Order Specific Question:   High Risk Patient     Answer:   N/A     Orders Placed This Encounter   Medications    medroxyPROGESTERone (PROVERA) 10 MG tablet     Sig: Take 1 tablet by mouth daily For 10 days     Dispense:  30 tablet     Refill:  0       Repeat Annual every 1 year. New ASCCP guidelines regarding pap and Hi Risk HPV co-testing reviewed. Cervical Cytology Evaluation begins at 24years old. If Negative Cytology, Follow-upscreening per current  ASCCP guidelines. Mammograms every 1 year. If 35 yo and last mammogram was negative. Calcium and Vitamin D dosing reviewed.   Colonoscopy screening guidelines reviewed as well as onset forbone density testing. Birth control and barrier methods and recommendations discussed. STD counseling and prevention reviewed. Gardisil counseling completed for all patients 7-35 yo. Routine health maintenance per patients PCP.        Electronically signed by Mitch Padron DO on 1/21/21

## 2021-01-22 DIAGNOSIS — Z01.419 WOMEN'S ANNUAL ROUTINE GYNECOLOGICAL EXAMINATION: ICD-10-CM

## 2021-01-27 LAB
HPV COMMENT: NORMAL
HPV TYPE 16: NOT DETECTED
HPV TYPE 18: NOT DETECTED
HPVOH (OTHER TYPES): NOT DETECTED

## 2021-02-03 DIAGNOSIS — R11.0 NAUSEA: ICD-10-CM

## 2021-02-03 DIAGNOSIS — G43.109 MIGRAINE WITH AURA AND WITHOUT STATUS MIGRAINOSUS, NOT INTRACTABLE: ICD-10-CM

## 2021-02-03 RX ORDER — ONDANSETRON HYDROCHLORIDE 8 MG/1
8 TABLET, FILM COATED ORAL EVERY 12 HOURS PRN
Qty: 10 TABLET | Refills: 0 | Status: SHIPPED | OUTPATIENT
Start: 2021-02-03 | End: 2021-07-13 | Stop reason: SDUPTHER

## 2021-02-04 RX ORDER — ONDANSETRON HYDROCHLORIDE 8 MG/1
8 TABLET, FILM COATED ORAL EVERY 12 HOURS PRN
Qty: 30 TABLET | Refills: 2 | OUTPATIENT
Start: 2021-02-04

## 2021-02-04 RX ORDER — BUTALBITAL, ACETAMINOPHEN AND CAFFEINE 50; 325; 40 MG/1; MG/1; MG/1
1 TABLET ORAL EVERY 4 HOURS PRN
Qty: 12 TABLET | Refills: 0 | Status: SHIPPED | OUTPATIENT
Start: 2021-02-04 | End: 2021-07-13 | Stop reason: SDUPTHER

## 2021-02-08 ENCOUNTER — TELEPHONE (OUTPATIENT)
Dept: OBGYN CLINIC | Age: 46
End: 2021-02-08

## 2021-02-08 NOTE — TELEPHONE ENCOUNTER
Patient took provera for 10 days. Had premenstrual symptoms but had no bleeding. Please advise. Colpo appt in 2 weeks.

## 2021-02-12 ENCOUNTER — OFFICE VISIT (OUTPATIENT)
Dept: FAMILY MEDICINE CLINIC | Age: 46
End: 2021-02-12
Payer: COMMERCIAL

## 2021-02-12 VITALS
HEIGHT: 66 IN | SYSTOLIC BLOOD PRESSURE: 130 MMHG | DIASTOLIC BLOOD PRESSURE: 84 MMHG | HEART RATE: 64 BPM | WEIGHT: 227 LBS | TEMPERATURE: 98 F | BODY MASS INDEX: 36.48 KG/M2 | OXYGEN SATURATION: 98 %

## 2021-02-12 DIAGNOSIS — E03.9 HYPOTHYROIDISM, UNSPECIFIED TYPE: ICD-10-CM

## 2021-02-12 DIAGNOSIS — I48.91 ATRIAL FIBRILLATION WITH RVR (HCC): ICD-10-CM

## 2021-02-12 DIAGNOSIS — R35.0 URINE FREQUENCY: Primary | ICD-10-CM

## 2021-02-12 DIAGNOSIS — I10 ESSENTIAL HYPERTENSION: ICD-10-CM

## 2021-02-12 DIAGNOSIS — G43.109 MIGRAINE WITH AURA AND WITHOUT STATUS MIGRAINOSUS, NOT INTRACTABLE: ICD-10-CM

## 2021-02-12 LAB
BILIRUBIN, POC: NORMAL
BLOOD URINE, POC: NORMAL
CLARITY, POC: CLEAR
COLOR, POC: YELLOW
GLUCOSE URINE, POC: NORMAL
KETONES, POC: NORMAL
LEUKOCYTE EST, POC: NORMAL
NITRITE, POC: NORMAL
PH, POC: 5.5
PROTEIN, POC: 15
SPECIFIC GRAVITY, POC: 1.03
UROBILINOGEN, POC: 0.2

## 2021-02-12 PROCEDURE — 99213 OFFICE O/P EST LOW 20 MIN: CPT | Performed by: FAMILY MEDICINE

## 2021-02-12 PROCEDURE — 81002 URINALYSIS NONAUTO W/O SCOPE: CPT | Performed by: FAMILY MEDICINE

## 2021-02-12 ASSESSMENT — ENCOUNTER SYMPTOMS: SHORTNESS OF BREATH: 0

## 2021-02-12 NOTE — PROGRESS NOTES
Subjective  River Saleh, 50 y.o. female presents today with:  Chief Complaint   Patient presents with    Urinary Tract Infection     urine frequency but also under a lot of stress        Knee Pain     Migraine     Urinary Tract Infection       Here today for 6 month follow-up on chronic problems including A. Fib, hypothyroidism, allergic rhinitis, migraines. Also c/o above. Hx of A. fib with rapid ventricular response. Her regular cardiologist is Dr. Maggie Bailey. She sees Dr. Herlinda Rutledge for management of her thyroid. She also has a past medical history significant for migraine headaches and allergic rhinitis. Her ObGyn is Dr. Nuria Torres. O.  She is taking synthroid, OCP's, zyrtec and flonase. For her migraines she uses fioricet prn. Needs FMLA updated/renewed    She is going to have to have colposcopy  Causing some stress    She does monitor bp at home     Urinary frequency    She did have a-fib episode this week     Review of Systems   Respiratory: Negative for shortness of breath.         Past Medical History:   Diagnosis Date    Abnormal Pap smear of cervix     Allergic rhinitis     Arthritis     hands    Asplenia     Atrial fibrillation (HCC)     Atrial fibrillation with RVR (HCC)     Dr. Clemente Padilla    Bilateral hand pain     CTS (carpal tunnel syndrome) 05/2016    bilateral    External hemorrhoid     Fatigue     Heart murmur     History of ITP 2004    had spenectomy    Hypertension     meds > 1 yrs    Hypothyroidism     meds > 5 yrs    Hypothyroidism     Insomnia     Irregular periods     Meniere disease     Migraine headache     Multinodular goiter     Nail fungus     Pain in right lower leg     Paroxysmal atrial fibrillation (HCC) 10/23/2020    Plantar fasciitis, left     Dr. Johanna Farnsworth     Past Surgical History:   Procedure Laterality Date    CARPAL TUNNEL RELEASE Right 07/12/2016    DILATION AND CURETTAGE OF UTERUS N/A 12/16/2019    HYSTEROSCOPY NOVASURE ABLATION D&C performed by Anthony Henry, DO at [facility] N/A 2004    done for ITP    THYROIDECTOMY, COMPLETION Left 2014    frozen section inconclusive    THYROIDECTOMY, PARTIAL Right 2011    Benign nodule     Social History     Socioeconomic History    Marital status:      Spouse name: Not on file    Number of children: 0    Years of education: Not on file    Highest education level: Not on file   Occupational History    Occupation: medical records   Social Needs    Financial resource strain: Not hard at all   Nae-Carlos A insecurity     Worry: Never true     Inability: Never true    Transportation needs     Medical: No     Non-medical: No   Tobacco Use    Smoking status: Former Smoker     Packs/day: 0.50     Years: 10.00     Pack years: 5.00     Quit date: 11/17/2010     Years since quitting: 10.2    Smokeless tobacco: Never Used   Substance and Sexual Activity    Alcohol use:  Yes     Alcohol/week: 0.0 standard drinks     Comment: socially    Drug use: No    Sexual activity: Not on file   Lifestyle    Physical activity     Days per week: Not on file     Minutes per session: Not on file    Stress: Not on file   Relationships    Social connections     Talks on phone: Not on file     Gets together: Not on file     Attends Scientologist service: Not on file     Active member of club or organization: Not on file     Attends meetings of clubs or organizations: Not on file     Relationship status: Not on file    Intimate partner violence     Fear of current or ex partner: Not on file     Emotionally abused: Not on file     Physically abused: Not on file     Forced sexual activity: Not on file   Other Topics Concern    Not on file   Social History Narrative    Not on file     Family History   Problem Relation Age of Onset    High Blood Pressure Mother     Anemia Mother     Atrial Fibrillation Mother     Heart Failure Mother     Other Mother         pacemaker    Heart Disease Mother     Sleep Apnea Mother     Vision Loss Mother         histoplasmosis    Heart Disease Father         CAD / Triple bypass    Diabetes Father     Other Father         Fuck's dystropy / corneal transplants / AAA    Cancer Paternal Aunt     Cancer Paternal Grandmother     Breast Cancer Paternal Grandmother     Sleep Apnea Sister     Alcohol Abuse Brother     Other Sister         killed by drunk      Allergies   Allergen Reactions    Penicillins Anaphylaxis    Codeine Itching, Swelling and Rash     OK with Tylenol #3 per pt    Percocet [Oxycodone-Acetaminophen] Itching, Swelling and Rash    Bee Venom Itching and Swelling    Tape [Adhesive Tape]      Skin irritation     Hydrocodone Itching, Swelling and Rash    Protonix [Pantoprazole Sodium] Swelling and Rash     Caused thrush     Current Outpatient Medications   Medication Sig Dispense Refill    butalbital-acetaminophen-caffeine (FIORICET, ESGIC) -40 MG per tablet Take 1 tablet by mouth every 4 hours as needed for Headaches 12 tablet 0    ondansetron (ZOFRAN) 8 MG tablet Take 1 tablet by mouth every 12 hours as needed for Nausea or Vomiting 10 tablet 0    sodium chloride (ALTAMIST SPRAY) 0.65 % nasal spray 1 spray by Nasal route as needed for Congestion 1 Bottle 3    tiZANidine (ZANAFLEX) 4 MG tablet Take 1 tablet by mouth every 8 hours as needed (spasm) 30 tablet 1    levothyroxine (SYNTHROID) 125 MCG tablet TAKE 1 TABLET BY MOUTH ONE TIME A DAY 90 tablet 1    Handicap Placard MISC by Does not apply route Exp 5 years 1 each 0    hydrOXYzine (VISTARIL) 25 MG capsule TAKE ONE CAPSULE BY MOUTH FOUR TIMES A DAY AS NEEDED FOR ITCHING OR ANXIETY 30 capsule 1    rivaroxaban (XARELTO) 20 MG TABS tablet TAKE 1 TABLET BY MOUTH ONE TIME A DAY 90 tablet 3    dilTIAZem (CARDIZEM CD) 180 MG extended release capsule TAKE 1 CAPSULE BY MOUTH ONE TIME A DAY 90 capsule 3    diclofenac sodium 1 % GEL Apply 4 g topically 4 times daily 2 Tube 0    magnesium (MAGNESIUM-OXIDE) 250 MG TABS tablet Take 250 mg by mouth three times a week       vitamin B-12 (CYANOCOBALAMIN) 500 MCG tablet Take 500 mcg by mouth daily      zinc 50 MG CAPS Take by mouth daily       Echinacea 400 MG CAPS Take by mouth daily       Calcium Carbonate-Vit D-Min (CALCIUM 1200) 8337-3151 MG-UNIT CHEW Take by mouth daily       cetirizine (ZYRTEC) 10 MG tablet Take 10 mg by mouth daily      ferrous sulfate 325 (65 FE) MG tablet Take 325 mg by mouth daily (with breakfast)      fluticasone (FLONASE) 50 MCG/ACT nasal spray 1 spray by Nasal route daily. 1 Bottle 06    meclizine (ANTIVERT) 12.5 MG tablet Take 12.5 mg by mouth 3 times daily as needed.  medroxyPROGESTERone (PROVERA) 10 MG tablet Take 1 tablet by mouth daily For 10 days (Patient not taking: Reported on 2/12/2021) 30 tablet 0     No current facility-administered medications for this visit. Objective    Vitals:    02/12/21 1553   BP: 130/84   Site: Left Upper Arm   Pulse: 64   Temp: 98 °F (36.7 °C)   SpO2: 98%   Weight: 227 lb (103 kg)   Height: 5' 6\" (1.676 m)       Physical Exam   Constitutional: She appears well-developed and well-nourished. HENT:   Head: Normocephalic and atraumatic. Right Ear: Hearing, tympanic membrane, external ear and ear canal normal.   Left Ear: Hearing, tympanic membrane, external ear and ear canal normal.   Nose: Nose normal.   Mouth/Throat: Uvula is midline, oropharynx is clear and moist and mucous membranes are normal.   Neck: Normal range of motion. Neck supple. Cardiovascular: Normal rate, regular rhythm and normal heart sounds. No murmur heard. Pulmonary/Chest: Effort normal and breath sounds normal. She has no wheezes. Musculoskeletal: Normal range of motion. Lymphadenopathy:     She has no cervical adenopathy. Skin: Skin is warm and dry. No rash noted.      Results for POC orders placed in visit on 02/12/21   POCT Urinalysis no Micro   Result Value Ref Range    Color, UA yellow Clarity, UA clear     Glucose, UA POC neg     Bilirubin, UA neg     Ketones, UA neg     Spec Grav, UA 1.030     Blood, UA POC neg     pH, UA 5.5     Protein, UA POC 15     Urobilinogen, UA 0.2     Leukocytes, UA neg     Nitrite, UA neg         Urine dip with small amount of protein      Assessment & Plan    Diagnosis Orders   1. Urine frequency  POCT Urinalysis no Micro   2. Migraine with aura and without status migrainosus, not intractable     3. Atrial fibrillation with RVR ([address]     4. Hypothyroidism, unspecified type     5. Essential hypertension       A fib managed by Dr. Simon Ren. Migraines stable on naprosyn and fioricet prn. She will f/u with Dr. Simon Ren as directed. She also follows with Dr. Mosher Fraction paperwork        Orders Placed This Encounter   Procedures    POCT Urinalysis no Micro     No orders of the defined types were placed in this encounter. There are no discontinued medications. No follow-ups on file.     Norma Eng MD

## 2021-02-18 ENCOUNTER — OFFICE VISIT (OUTPATIENT)
Dept: ENDOCRINOLOGY | Age: 46
End: 2021-02-18
Payer: COMMERCIAL

## 2021-02-18 VITALS
SYSTOLIC BLOOD PRESSURE: 126 MMHG | HEIGHT: 66 IN | OXYGEN SATURATION: 96 % | DIASTOLIC BLOOD PRESSURE: 67 MMHG | HEART RATE: 65 BPM | BODY MASS INDEX: 36.16 KG/M2 | WEIGHT: 225 LBS

## 2021-02-18 DIAGNOSIS — R73.9 HYPERGLYCEMIA: ICD-10-CM

## 2021-02-18 DIAGNOSIS — E89.0 POSTOPERATIVE HYPOTHYROIDISM: Primary | ICD-10-CM

## 2021-02-18 PROCEDURE — 99213 OFFICE O/P EST LOW 20 MIN: CPT | Performed by: INTERNAL MEDICINE

## 2021-02-18 RX ORDER — LEVOTHYROXINE SODIUM 0.1 MG/1
100 TABLET ORAL DAILY
Qty: 90 TABLET | Refills: 3 | Status: SHIPPED | OUTPATIENT
Start: 2021-02-18 | End: 2022-01-14

## 2021-02-18 NOTE — PROGRESS NOTES
Subjective:      Patient ID: Lori Aguilar is a 50 y.o. female. Follow-up on hypothyroidism postop TSH was slightly suppressed patient does complain of palpitations labs also show slightly high glucose of 106  Other  This is a chronic (Hypothyroidism) problem. The current episode started more than 1 year ago. The problem has been waxing and waning. Exacerbated by: Thyroidectomy. Treatments tried: Levothyroxine. The treatment provided moderate relief. Results for Stephany Cortez" (MRN [id number]) as of 2/18/2021 15:48   Ref. Range 7/27/2018 12:27 2/4/2019 10:31 2/13/2020 13:43 1/20/2021 12:45   TSH Latest Ref Range: 0.440 - 3.860 uIU/mL 0.939 1.570 1.520 0.231 (L)       Results for Stephany Cortez" (MRN [id number]) as of 2/18/2021 15:48   Ref.  Range 1/20/2021 12:45   Sodium Latest Ref Range: 135 - 144 mEq/L 138   Potassium Latest Ref Range: 3.4 - 4.9 mEq/L 4.3   Chloride Latest Ref Range: 95 - 107 mEq/L 101   CO2 Latest Ref Range: 20 - 31 mEq/L 26   BUN Latest Ref Range: 6 - 20 mg/dL 13   Creatinine Latest Ref Range: 0.50 - 0.90 mg/dL 0.61   Anion Gap Latest Ref Range: 9 - 15 mEq/L 11   GFR Non- Latest Ref Range: >60  >60.0   GFR African American Latest Ref Range: >60  >60.0   Glucose Latest Ref Range: 70 - 99 mg/dL 106 (H)   Calcium Latest Ref Range: 8.5 - 9.9 mg/dL 9.2   Hemoglobin A1C Latest Ref Range: 4.8 - 5.9 % 6.0 (H)   TSH Latest Ref Range: 0.440 - 3.860 uIU/mL 0.231 (L)   T4 Free Latest Ref Range: 0.84 - 1.68 ng/dL 1.52     Patient Active Problem List   Diagnosis    Hypothyroidism    Obesity    Meniere disease    Irregular periods    Asplenia    Allergic rhinitis    Migraine    Multinodular goiter    Migraine headache    Insomnia    Hypertension    Menorrhagia    Pelvic pain    Postoperative pain    Paroxysmal atrial fibrillation (HCC)     Allergies   Allergen Reactions    Penicillins Anaphylaxis    Codeine Itching, Swelling and Rash OK with Tylenol #3 per pt    Percocet [Oxycodone-Acetaminophen] Itching, Swelling and Rash    Bee Venom Itching and Swelling    Tape [Adhesive Tape]      Skin irritation     Hydrocodone Itching, Swelling and Rash    Protonix [Pantoprazole Sodium] Swelling and Rash     Caused thrush       Current Outpatient Medications:     levothyroxine (SYNTHROID) 100 MCG tablet, Take 1 tablet by mouth daily, Disp: 90 tablet, Rfl: 03    butalbital-acetaminophen-caffeine (FIORICET, ESGIC) -40 MG per tablet, Take 1 tablet by mouth every 4 hours as needed for Headaches, Disp: 12 tablet, Rfl: 0    ondansetron (ZOFRAN) 8 MG tablet, Take 1 tablet by mouth every 12 hours as needed for Nausea or Vomiting, Disp: 10 tablet, Rfl: 0    medroxyPROGESTERone (PROVERA) 10 MG tablet, Take 1 tablet by mouth daily For 10 days, Disp: 30 tablet, Rfl: 0    sodium chloride (ALTAMIST SPRAY) 0.65 % nasal spray, 1 spray by Nasal route as needed for Congestion, Disp: 1 Bottle, Rfl: 3    tiZANidine (ZANAFLEX) 4 MG tablet, Take 1 tablet by mouth every 8 hours as needed (spasm), Disp: 30 tablet, Rfl: 1    Handicap Placard MISC, by Does not apply route Exp 5 years, Disp: 1 each, Rfl: 0    hydrOXYzine (VISTARIL) 25 MG capsule, TAKE ONE CAPSULE BY MOUTH FOUR TIMES A DAY AS NEEDED FOR ITCHING OR ANXIETY, Disp: 30 capsule, Rfl: 1    rivaroxaban (XARELTO) 20 MG TABS tablet, TAKE 1 TABLET BY MOUTH ONE TIME A DAY, Disp: 90 tablet, Rfl: 3    dilTIAZem (CARDIZEM CD) 180 MG extended release capsule, TAKE 1 CAPSULE BY MOUTH ONE TIME A DAY, Disp: 90 capsule, Rfl: 3    diclofenac sodium 1 % GEL, Apply 4 g topically 4 times daily, Disp: 2 Tube, Rfl: 0    magnesium (MAGNESIUM-OXIDE) 250 MG TABS tablet, Take 250 mg by mouth three times a week , Disp: , Rfl:     vitamin B-12 (CYANOCOBALAMIN) 500 MCG tablet, Take 500 mcg by mouth daily, Disp: , Rfl:     zinc 50 MG CAPS, Take by mouth daily , Disp: , Rfl:   Echinacea 400 MG CAPS, Take by mouth daily , Disp: , Rfl:     Calcium Carbonate-Vit D-Min (CALCIUM 1200) 1599-7003 MG-UNIT CHEW, Take by mouth daily , Disp: , Rfl:     cetirizine (ZYRTEC) 10 MG tablet, Take 10 mg by mouth daily, Disp: , Rfl:     ferrous sulfate 325 (65 FE) MG tablet, Take 325 mg by mouth daily (with breakfast), Disp: , Rfl:     fluticasone (FLONASE) 50 MCG/ACT nasal spray, 1 spray by Nasal route daily. , Disp: 1 Bottle, Rfl: 06    meclizine (ANTIVERT) 12.5 MG tablet, Take 12.5 mg by mouth 3 times daily as needed. , Disp: , Rfl:       Review of Systems   Cardiovascular: Positive for palpitations. Endocrine: Negative. All other systems reviewed and are negative. Vitals:    02/18/21 1531   BP: 126/67   Pulse: 65   SpO2: 96%   Weight: 225 lb (102.1 kg)   Height: 5' 6\" (1.676 m)       Objective:   Physical Exam  Vitals signs reviewed. Constitutional:       Appearance: Normal appearance. She is obese. HENT:      Head: Normocephalic and atraumatic. Right Ear: External ear normal.      Left Ear: External ear normal.      Nose: Nose normal.   Neck:      Musculoskeletal: Normal range of motion and neck supple. Cardiovascular:      Rate and Rhythm: Normal rate. Pulmonary:      Effort: Pulmonary effort is normal.   Musculoskeletal: Normal range of motion. Neurological:      General: No focal deficit present. Mental Status: She is alert and oriented to person, place, and time. Psychiatric:         Mood and Affect: Mood normal.         Behavior: Behavior normal.         Assessment:       Diagnosis Orders   1. Postoperative hypothyroidism  T4, Free    TSH with Reflex    Hemoglobin G3Y    Basic Metabolic Panel   2.  Hyperglycemia             Plan:      Orders Placed This Encounter   Procedures    T4, Free     Standing Status:   Future     Standing Expiration Date:   2/18/2022    TSH with Reflex     Standing Status:   Future     Standing Expiration Date:   2/18/2022  Hemoglobin A1C     Standing Status:   Future     Standing Expiration Date:   2/18/2022    Basic Metabolic Panel     Standing Status:   Future     Standing Expiration Date:   2/18/2022     Low-dose of [id number] mcg daily  Orders Placed This Encounter   Medications    levothyroxine (SYNTHROID) 100 MCG tablet     Sig: Take 1 tablet by mouth daily     Dispense:  90 tablet     Refill:  Leann Hand MD

## 2021-02-23 ENCOUNTER — PROCEDURE VISIT (OUTPATIENT)
Dept: OBGYN CLINIC | Age: 46
End: 2021-02-23
Payer: COMMERCIAL

## 2021-02-23 VITALS
DIASTOLIC BLOOD PRESSURE: 90 MMHG | WEIGHT: 224 LBS | BODY MASS INDEX: 36.15 KG/M2 | SYSTOLIC BLOOD PRESSURE: 138 MMHG | HEART RATE: 94 BPM

## 2021-02-23 DIAGNOSIS — R87.613 HGSIL (HIGH GRADE SQUAMOUS INTRAEPITHELIAL LESION) ON PAP SMEAR OF CERVIX: ICD-10-CM

## 2021-02-23 DIAGNOSIS — R87.619 ABNORMAL CERVICAL PAPANICOLAOU SMEAR, UNSPECIFIED ABNORMAL PAP FINDING: Primary | ICD-10-CM

## 2021-02-23 DIAGNOSIS — R87.619 ABNORMAL CERVICAL PAPANICOLAOU SMEAR, UNSPECIFIED ABNORMAL PAP FINDING: ICD-10-CM

## 2021-02-23 DIAGNOSIS — F41.9 ANXIETY: ICD-10-CM

## 2021-02-23 PROCEDURE — 57454 BX/CURETT OF CERVIX W/SCOPE: CPT | Performed by: OBSTETRICS & GYNECOLOGY

## 2021-02-23 RX ORDER — BUSPIRONE HYDROCHLORIDE 7.5 MG/1
7.5 TABLET ORAL 3 TIMES DAILY PRN
Qty: 90 TABLET | Refills: 0 | Status: SHIPPED | OUTPATIENT
Start: 2021-02-23 | End: 2021-03-25

## 2021-02-23 NOTE — PROGRESS NOTES
Colposcopy Procedure Note    Transformation Zone:  [x] Fully Visualized   [] Not Fully Visualized   [] Unsatisfactory    Indications: HGSIL. Negative high risk HPV  Procedure Details   The risks and benefits of the procedure and Verbal and written informed consent obtained. Speculum placed in vagina and excellent visualization of cervix achieved, cervix swabbed x 3 with acetic acid solution. Findings:  Cervix: acetowhite lesion(s) noted at 5:00 and 7:00 o'clock; cervical biopsies taken at 5 ,7 o'clock. Vaginal inspection: vaginal colposcopy not performed. Vulvar colposcopy: vulvar colposcopy not performed. Specimens: Biopsies and ECC    Complications: none. Plan:  Specimens labelled and sent to Pathology.   Call the patient with the results of the biopsies

## 2021-03-02 ENCOUNTER — OFFICE VISIT (OUTPATIENT)
Dept: OBGYN CLINIC | Age: 46
End: 2021-03-02
Payer: COMMERCIAL

## 2021-03-02 VITALS
BODY MASS INDEX: 36.64 KG/M2 | WEIGHT: 227 LBS | DIASTOLIC BLOOD PRESSURE: 74 MMHG | SYSTOLIC BLOOD PRESSURE: 134 MMHG | HEART RATE: 64 BPM

## 2021-03-02 DIAGNOSIS — R87.613 HGSIL (HIGH GRADE SQUAMOUS INTRAEPITHELIAL LESION) ON PAP SMEAR OF CERVIX: Primary | ICD-10-CM

## 2021-03-02 DIAGNOSIS — N95.1 PERIMENOPAUSE: ICD-10-CM

## 2021-03-02 PROCEDURE — 99214 OFFICE O/P EST MOD 30 MIN: CPT | Performed by: OBSTETRICS & GYNECOLOGY

## 2021-03-02 ASSESSMENT — ENCOUNTER SYMPTOMS
WHEEZING: 0
VOICE CHANGE: 0
CONSTIPATION: 0
BACK PAIN: 0
SHORTNESS OF BREATH: 0
ABDOMINAL DISTENTION: 0
COLOR CHANGE: 0
SORE THROAT: 0
TROUBLE SWALLOWING: 0
BLOOD IN STOOL: 0
VOMITING: 0
ABDOMINAL PAIN: 0
CHEST TIGHTNESS: 0
NAUSEA: 0
COUGH: 0

## 2021-03-02 NOTE — PROGRESS NOTES
Sarahi James (: 1975) is a 50 y.o. female, Established patient, here for evaluation of the following chief complaint(s):  Results (colpo)    M had a colposcopy which showed moderate dysplasia negative ECC we discussed options of treatment including LEEP. I was concerned with the size of the lesion in the far lateral location of the lesion and discussed the amount of cervix that would need to be removed to safely and completely remove the lesion. Offered hysterectomy. We had a lengthy discussion discussion regarding that including recurrence risk due to immune compromise. Also discussed preservation of the ovaries versus removal of the ovaries. A lengthy discussion patient has decided that she would like to proceed with an LAVH and bilateral salpingectomy with preservation of the ovaries    SUBJECTIVE/OBJECTIVE:  HPI    Past Surgical History:   Procedure Laterality Date    CARPAL TUNNEL RELEASE Right 2016    DILATION AND CURETTAGE OF UTERUS N/A 2019    HYSTEROSCOPY NOVASURE ABLATION D&C performed by Mitch Padron DO at [facility] N/A     done for ITP    THYROIDECTOMY, COMPLETION Left     frozen section inconclusive    THYROIDECTOMY, PARTIAL Right     Benign nodule        Review of Systems   Constitutional: Negative for activity change, appetite change, fatigue and unexpected weight change. HENT: Negative for dental problem, ear pain, hearing loss, nosebleeds, sore throat, trouble swallowing and voice change. Eyes: Negative for visual disturbance. Respiratory: Negative for cough, chest tightness, shortness of breath and wheezing. Cardiovascular: Negative for chest pain and palpitations. Gastrointestinal: Negative for abdominal distention, abdominal pain, blood in stool, constipation, nausea and vomiting. Endocrine: Negative for cold intolerance, heat intolerance, polydipsia, polyphagia and polyuria.

## 2021-03-29 ENCOUNTER — PATIENT MESSAGE (OUTPATIENT)
Dept: FAMILY MEDICINE CLINIC | Age: 46
End: 2021-03-29

## 2021-03-29 DIAGNOSIS — K64.4 EXTERNAL HEMORRHOID: ICD-10-CM

## 2021-03-29 RX ORDER — HYDROCORTISONE ACETATE 25 MG/1
25 SUPPOSITORY RECTAL 2 TIMES DAILY
Qty: 60 SUPPOSITORY | Refills: 0 | Status: SHIPPED | OUTPATIENT
Start: 2021-03-29 | End: 2022-09-15 | Stop reason: SDUPTHER

## 2021-03-29 NOTE — TELEPHONE ENCOUNTER
From: River Saleh  To: Prince Ricco MD  Sent: 3/29/2021 12:12 PM EDT  Subject: Prescription Question    Dr. Irwin Marcial,    I used to have in my med list, suppositories for my hemorrhoids that Dr. Elaine Giles had prescribed and it's not there anymore. Would it be possible to get a new prescription for that? I have a few still left but the box says Hydrocortisone AC 25mg, qty. 60. I hope that helps you. Have a great day and thank you.

## 2021-05-28 ENCOUNTER — TELEPHONE (OUTPATIENT)
Dept: OBGYN CLINIC | Age: 46
End: 2021-05-28

## 2021-06-16 ENCOUNTER — APPOINTMENT (OUTPATIENT)
Dept: CT IMAGING | Age: 46
End: 2021-06-16
Payer: COMMERCIAL

## 2021-06-16 ENCOUNTER — HOSPITAL ENCOUNTER (EMERGENCY)
Age: 46
Discharge: HOME | End: 2021-06-16
Attending: STUDENT IN AN ORGANIZED HEALTH CARE EDUCATION/TRAINING PROGRAM
Payer: COMMERCIAL

## 2021-06-16 ENCOUNTER — NURSE TRIAGE (OUTPATIENT)
Dept: OTHER | Facility: CLINIC | Age: 46
End: 2021-06-16

## 2021-06-16 VITALS
WEIGHT: 219 LBS | RESPIRATION RATE: 16 BRPM | TEMPERATURE: 98.2 F | HEIGHT: 66 IN | OXYGEN SATURATION: 98 % | HEART RATE: 52 BPM | BODY MASS INDEX: 35.2 KG/M2 | DIASTOLIC BLOOD PRESSURE: 81 MMHG | SYSTOLIC BLOOD PRESSURE: 134 MMHG

## 2021-06-16 DIAGNOSIS — J35.1 LINGUAL TONSIL HYPERTROPHY: ICD-10-CM

## 2021-06-16 DIAGNOSIS — R59.1 DIFFUSE LYMPHADENOPATHY: ICD-10-CM

## 2021-06-16 DIAGNOSIS — L03.211 FACIAL CELLULITIS: Primary | ICD-10-CM

## 2021-06-16 LAB
ALBUMIN SERPL-MCNC: 4 G/DL (ref 3.5–4.6)
ALP BLD-CCNC: 97 U/L (ref 40–130)
ALT SERPL-CCNC: 19 U/L (ref 0–33)
AMYLASE: 37 U/L (ref 22–93)
ANION GAP SERPL CALCULATED.3IONS-SCNC: 8 MEQ/L (ref 9–15)
AST SERPL-CCNC: 16 U/L (ref 0–35)
BASOPHILS ABSOLUTE: 0.1 K/UL (ref 0–0.2)
BASOPHILS RELATIVE PERCENT: 0.5 %
BILIRUB SERPL-MCNC: <0.2 MG/DL (ref 0.2–0.7)
BUN BLDV-MCNC: 9 MG/DL (ref 6–20)
C-REACTIVE PROTEIN: 16.5 MG/L (ref 0–5)
CALCIUM SERPL-MCNC: 9.3 MG/DL (ref 8.5–9.9)
CHLORIDE BLD-SCNC: 100 MEQ/L (ref 95–107)
CO2: 26 MEQ/L (ref 20–31)
CREAT SERPL-MCNC: 0.62 MG/DL (ref 0.5–0.9)
EOSINOPHILS ABSOLUTE: 0.1 K/UL (ref 0–0.7)
EOSINOPHILS RELATIVE PERCENT: 1.3 %
GFR AFRICAN AMERICAN: >60
GFR AFRICAN AMERICAN: >60
GFR NON-AFRICAN AMERICAN: >60
GFR NON-AFRICAN AMERICAN: >60
GLOBULIN: 3.6 G/DL (ref 2.3–3.5)
GLUCOSE BLD-MCNC: 110 MG/DL (ref 70–99)
HCG, URINE, POC: NEGATIVE
HCT VFR BLD CALC: 41.1 % (ref 37–47)
HEMOGLOBIN: 14.1 G/DL (ref 12–16)
LYMPHOCYTES ABSOLUTE: 3.1 K/UL (ref 1–4.8)
LYMPHOCYTES RELATIVE PERCENT: 28.5 %
Lab: NORMAL
MCH RBC QN AUTO: 30.9 PG (ref 27–31.3)
MCHC RBC AUTO-ENTMCNC: 34.3 % (ref 33–37)
MCV RBC AUTO: 90 FL (ref 82–100)
MONOCYTES ABSOLUTE: 0.8 K/UL (ref 0.2–0.8)
MONOCYTES RELATIVE PERCENT: 7.5 %
NEGATIVE QC PASS/FAIL: NORMAL
NEUTROPHILS ABSOLUTE: 6.8 K/UL (ref 1.4–6.5)
NEUTROPHILS RELATIVE PERCENT: 62.2 %
PDW BLD-RTO: 14.7 % (ref 11.5–14.5)
PERFORMED ON: NORMAL
PLATELET # BLD: 409 K/UL (ref 130–400)
POC CREATININE: 0.7 MG/DL (ref 0.6–1.1)
POC SAMPLE TYPE: NORMAL
POSITIVE QC PASS/FAIL: NORMAL
POTASSIUM SERPL-SCNC: 3.9 MEQ/L (ref 3.4–4.9)
PROCALCITONIN: 0.06 NG/ML (ref 0–0.15)
RBC # BLD: 4.57 M/UL (ref 4.2–5.4)
SODIUM BLD-SCNC: 134 MEQ/L (ref 135–144)
TOTAL PROTEIN: 7.6 G/DL (ref 6.3–8)
WBC # BLD: 11 K/UL (ref 4.8–10.8)

## 2021-06-16 PROCEDURE — 85025 COMPLETE CBC W/AUTO DIFF WBC: CPT

## 2021-06-16 PROCEDURE — 84145 PROCALCITONIN (PCT): CPT

## 2021-06-16 PROCEDURE — 70491 CT SOFT TISSUE NECK W/DYE: CPT

## 2021-06-16 PROCEDURE — 80053 COMPREHEN METABOLIC PANEL: CPT

## 2021-06-16 PROCEDURE — 36415 COLL VENOUS BLD VENIPUNCTURE: CPT

## 2021-06-16 PROCEDURE — 99283 EMERGENCY DEPT VISIT LOW MDM: CPT

## 2021-06-16 PROCEDURE — 86735 MUMPS ANTIBODY: CPT

## 2021-06-16 PROCEDURE — 86140 C-REACTIVE PROTEIN: CPT

## 2021-06-16 PROCEDURE — 6360000004 HC RX CONTRAST MEDICATION: Performed by: STUDENT IN AN ORGANIZED HEALTH CARE EDUCATION/TRAINING PROGRAM

## 2021-06-16 PROCEDURE — 82150 ASSAY OF AMYLASE: CPT

## 2021-06-16 PROCEDURE — 6370000000 HC RX 637 (ALT 250 FOR IP): Performed by: STUDENT IN AN ORGANIZED HEALTH CARE EDUCATION/TRAINING PROGRAM

## 2021-06-16 RX ORDER — ACETAMINOPHEN 500 MG
1000 TABLET ORAL EVERY 6 HOURS PRN
Qty: 120 TABLET | Refills: 0 | Status: SHIPPED | OUTPATIENT
Start: 2021-06-16

## 2021-06-16 RX ORDER — SODIUM CHLORIDE 0.9 % (FLUSH) 0.9 %
10 SYRINGE (ML) INJECTION ONCE
Status: DISCONTINUED | OUTPATIENT
Start: 2021-06-16 | End: 2021-06-16 | Stop reason: HOSPADM

## 2021-06-16 RX ORDER — SULFAMETHOXAZOLE AND TRIMETHOPRIM 800; 160 MG/1; MG/1
1 TABLET ORAL ONCE
Status: COMPLETED | OUTPATIENT
Start: 2021-06-16 | End: 2021-06-16

## 2021-06-16 RX ORDER — SULFAMETHOXAZOLE AND TRIMETHOPRIM 800; 160 MG/1; MG/1
1 TABLET ORAL 2 TIMES DAILY
Qty: 20 TABLET | Refills: 0 | Status: SHIPPED | OUTPATIENT
Start: 2021-06-16 | End: 2021-06-26

## 2021-06-16 RX ADMIN — IOPAMIDOL 100 ML: 612 INJECTION, SOLUTION INTRAVENOUS at 18:45

## 2021-06-16 RX ADMIN — SULFAMETHOXAZOLE AND TRIMETHOPRIM 1 TABLET: 800; 160 TABLET ORAL at 20:04

## 2021-06-16 ASSESSMENT — PAIN DESCRIPTION - LOCATION: LOCATION: JAW

## 2021-06-16 ASSESSMENT — ENCOUNTER SYMPTOMS
COUGH: 0
FACIAL SWELLING: 1
BACK PAIN: 0
SINUS PRESSURE: 0
TROUBLE SWALLOWING: 0
DIARRHEA: 0
VOMITING: 0
SHORTNESS OF BREATH: 0
CHEST TIGHTNESS: 0
ABDOMINAL PAIN: 0

## 2021-06-16 ASSESSMENT — PAIN DESCRIPTION - ORIENTATION: ORIENTATION: RIGHT

## 2021-06-16 ASSESSMENT — PAIN DESCRIPTION - DESCRIPTORS: DESCRIPTORS: SHOOTING

## 2021-06-16 ASSESSMENT — PAIN DESCRIPTION - PAIN TYPE: TYPE: ACUTE PAIN

## 2021-06-16 ASSESSMENT — PAIN SCALES - GENERAL: PAINLEVEL_OUTOF10: 9

## 2021-06-16 NOTE — ED TRIAGE NOTES
PATIENT PRESENTS TO THE ER WITH COMPLAINTS OF MINIMAL SWELLING TO THE RIGHT JAW  STATES THAT SHE HAS HAD PAIN FOR THE PAST FEW DAYS  SOME REDNESS NOTED  AFEBRILE  DENIES SOB  DENIES SWALLOWING TROUBLE  DENIES TONGUE SWELLING  STATES THAT SHE DOESN'T BELIEVE IT IS A DENTAL ISSUE EITHER

## 2021-06-16 NOTE — TELEPHONE ENCOUNTER
Reason for Disposition   [1] New onset jaw pain AND [2] unknown cause AND [3] at least one cardiac risk factor (i.e., hypertension, diabetes, obesity, smoker or strong family history of heart disease)    Answer Assessment - Initial Assessment Questions  1. ONSET: \"When did the pain start? \" (e.g., minutes, hours, days)     Two days ago    2. ONSET: \"Does the pain come and go, or has it been constant since it started? \" (e.g., constant, intermittent, fleeting)      Constant    3. SEVERITY: \"How bad is the pain? \"   (Scale 1-10; mild, moderate or severe)    - MILD (1-3): doesn't interfere with normal activities     - MODERATE (4-7): interferes with normal activities or awakens from sleep     - SEVERE (8-10): excruciating pain, unable to do any normal activities       9    4. LOCATION: \"Where does it hurt? \"       Right jaw    5. RASH: \"Is there any redness, rash, or swelling of the face? \"      Swelling, red and hot to touch    6. FEVER: \"Do you have a fever? \" If so, ask: \"What is it, how was it measured, and when did it start? \"       No    7. OTHER SYMPTOMS: \"Do you have any other symptoms? \" (e.g., fever, toothache, nasal discharge, nasal congestion, clicking sensation in jaw joint)      Gums are sore, pain when sucking through a straw, hurts when opening mouth    8. PREGNANCY: \"Is there any chance you are pregnant? \" \"When was your last menstrual period? \"      No, had an ablation    Protocols used: FACE PAIN-ADULT-    Brief description of triage: jaw pain and swelling    Triage indicates for patient to go to the E.D. Care advice provided, patient verbalizes understanding; denies any other questions or concerns; instructed to call back for any new or worsening symptoms. Attention Provider: Thank you for allowing me to participate in the care of your patient. The patient was connected to triage in response to symptoms provided.    Please do not respond through this encounter as the response is not directed to a shared pool.

## 2021-06-17 NOTE — ED PROVIDER NOTES
[address] [facility] ED  eMERGENCY dEPARTMENT eNCOUnter      Pt Name: Bianka Patterson  MRN: [id number]  [place] 1975  Date of evaluation: 6/16/2021  Provider: Felipe Bass, [facility]       Chief Complaint   Patient presents with    Jaw Pain     RIGHT SIDED, GUM PAIN          HISTORY OF PRESENT ILLNESS   (Location/Symptom, Timing/Onset,Context/Setting, Quality, Duration, Modifying Factors, Severity)  Note limiting factors. Bianka Patterson is a 48 y.o. female who presents to the emergency department with complaint of pain to the right side of her face with redness and warmth. The patient also states she has pain near her gum. Patient denies any swelling on exam there is no swelling to the gingiva. Patient denies any fever, chills or cough. Patient does not feel that the pain is worse when she bites down. On exam patient has edema to the inferior portion of the right cheek with erythema. Palpation of this area overlies the parotid and causes tenderness. Was present x3 days. Patient states to touch causes this area to hurt more. Patient denies any dry mouth. The history is provided by the patient. NursingNotes were reviewed. REVIEW OF SYSTEMS    (2-9 systems for level 4, 10 or more for level 5)     Review of Systems   Constitutional: Negative for activity change, appetite change, chills, fever and unexpected weight change. HENT: Positive for facial swelling. Negative for drooling, ear pain, nosebleeds, sinus pressure and trouble swallowing. Respiratory: Negative for cough, chest tightness and shortness of breath. Cardiovascular: Negative for chest pain and leg swelling. Gastrointestinal: Negative for abdominal pain, diarrhea and vomiting. Endocrine: Negative for polydipsia and polyphagia. Genitourinary: Negative for dysuria, flank pain and frequency. Musculoskeletal: Negative for back pain and myalgias. Skin: Negative for pallor and rash.    Neurological: Negative for syncope, weakness and headaches. Hematological: Does not bruise/bleed easily. All other systems reviewed and are negative. Except as noted above the remainder of the review of systems was reviewed and negative.        PAST MEDICAL HISTORY     Past Medical History:   Diagnosis Date    Abnormal Pap smear of cervix     Allergic rhinitis     Arthritis     hands    Asplenia     Atrial fibrillation (HCC)     Atrial fibrillation with RVR (HCC)     Dr. Chidi Smith Bilateral hand pain     CTS (carpal tunnel syndrome) 05/2016    bilateral    External hemorrhoid     Fatigue     Heart murmur     History of ITP 2004    had spenectomy    Hypertension     meds > 1 yrs    Hypothyroidism     meds > 5 yrs    Hypothyroidism     Insomnia     Irregular periods     Meniere disease     Migraine headache     Multinodular goiter     Nail fungus     Pain in right lower leg     Paroxysmal atrial fibrillation (HCC) 10/23/2020    Plantar fasciitis, left     Dr. Jeter Kidney       Past Surgical History:   Procedure Laterality Date    CARPAL TUNNEL RELEASE Right 07/12/2016    DILATION AND CURETTAGE OF UTERUS N/A 12/16/2019    HYSTEROSCOPY NOVASURE ABLATION D&C performed by [facility] D Mclain DO at [facility] N/A 2004    done for ITP    THYROIDECTOMY, COMPLETION Left 2014    frozen section inconclusive    THYROIDECTOMY, PARTIAL Right 2011    Benign nodule         CURRENT MEDICATIONS       Discharge Medication List as of 6/16/2021  8:00 PM      CONTINUE these medications which have NOT CHANGED    Details   hydrocortisone (ANUSOL-HC) 25 MG suppository Place 1 suppository rectally 2 times daily, Disp-60 suppository, R-0Normal      levothyroxine (SYNTHROID) 100 MCG tablet Take 1 tablet by mouth daily, Disp-90 tablet, R-03Normal      butalbital-acetaminophen-caffeine (FIORICET, ESGIC) -40 MG per tablet Take 1 tablet by mouth every 4 hours as needed for Headaches, Disp-12 tablet, R-0Normal      ondansetron (ZOFRAN) 8 MG tablet Take 1 tablet by mouth every 12 hours as needed for Nausea or Vomiting, Disp-10 tablet, R-0Normal      medroxyPROGESTERone (PROVERA) 10 MG tablet Take 1 tablet by mouth daily For 10 days, Disp-30 tablet, R-0Normal      sodium chloride (ALTAMIST SPRAY) 0.65 % nasal spray 1 spray by Nasal route as needed for Congestion, Disp-1 Bottle, R-3Normal      tiZANidine (ZANAFLEX) 4 MG tablet Take 1 tablet by mouth every 8 hours as needed (spasm), Disp-30 tablet, R-1Normal      Handicap Placard MISC Starting Fri 11/13/2020, Disp-1 each,R-0, PrintExp 5 years       hydrOXYzine (VISTARIL) 25 MG capsule TAKE ONE CAPSULE BY MOUTH FOUR TIMES A DAY AS NEEDED FOR ITCHING OR ANXIETY, Disp-30 capsule, R-1Normal      rivaroxaban (XARELTO) 20 MG TABS tablet TAKE 1 TABLET BY MOUTH ONE TIME A DAY, Disp-90 tablet,R-3Normal      dilTIAZem (CARDIZEM CD) 180 MG extended release capsule TAKE 1 CAPSULE BY MOUTH ONE TIME A DAY, Disp-90 capsule,R-3Normal      diclofenac sodium 1 % GEL Apply 4 g topically 4 times daily, Topical, 4 TIMES DAILY Starting Tue 2/12/2019, Disp-2 Tube, R-0, Normal      magnesium (MAGNESIUM-OXIDE) 250 MG TABS tablet Take 250 mg by mouth three times a week Historical Med      vitamin B-12 (CYANOCOBALAMIN) 500 MCG tablet Take 500 mcg by mouth dailyHistorical Med      zinc 50 MG CAPS Take by mouth daily Historical Med      Echinacea 400 MG CAPS Take by mouth daily Historical Med      Calcium Carbonate-Vit D-Min (CALCIUM 1200) 6960-6700 MG-UNIT CHEW Take by mouth daily Historical Med      cetirizine (ZYRTEC) 10 MG tablet Take 10 mg by mouth daily      ferrous sulfate 325 (65 FE) MG tablet Take 325 mg by mouth daily (with breakfast)      fluticasone (FLONASE) 50 MCG/ACT nasal spray 1 spray by Nasal route daily. , Disp-1 Bottle, R-06      meclizine (ANTIVERT) 12.5 MG tablet Take 12.5 mg by mouth 3 times daily as needed.              ALLERGIES     Penicillins,  Days of Exercise per Week:     Minutes of Exercise per Session:    Stress:     Feeling of Stress :    Social Connections:     Frequency of Communication with Friends and Family:     Frequency of Social Gatherings with Friends and Family:     Attends Sikh Services:     Active Member of Clubs or Organizations:     Attends Club or Organization Meetings:     Marital Status:    Intimate Partner Violence:     Fear of Current or Ex-Partner:     Emotionally Abused:     Physically Abused:     Sexually Abused:        SCREENINGS      @FLOW([id number])@      PHYSICAL EXAM    (up to 7 for level 4, 8 or more for level 5)     ED Triage Vitals [06/16/21 1708]   BP Temp Temp Source Pulse Resp SpO2 Height Weight   (!) 169/76 98.2 °F (36.8 °C) Oral 68 16 97 % 5' 6\" (1.676 m) 219 lb (99.3 kg)       Physical Exam  Vitals and nursing note reviewed. Constitutional:       General: She is awake. She is not in acute distress. Appearance: Normal appearance. She is well-developed and normal weight. She is not ill-appearing, toxic-appearing or diaphoretic. Comments: No photophobia. No phonophobia. HENT:      Head: Normocephalic and atraumatic. No Oconnor's sign. Jaw: There is normal jaw occlusion. No trismus or swelling. Right Ear: External ear normal.      Left Ear: External ear normal.      Nose: Nose normal. No congestion or rhinorrhea. Mouth/Throat:      Mouth: Mucous membranes are moist.      Pharynx: Oropharynx is clear. No oropharyngeal exudate or posterior oropharyngeal erythema. Eyes:      General: No scleral icterus. Right eye: No foreign body or discharge. Left eye: No discharge. Extraocular Movements: Extraocular movements intact. Conjunctiva/sclera: Conjunctivae normal.      Left eye: No exudate. Pupils: Pupils are equal, round, and reactive to light. Neck:      Vascular: No JVD. Trachea: No tracheal deviation.       Comments: No meningismus. Cardiovascular:      Rate and Rhythm: Normal rate and regular rhythm. Pulses: Normal pulses. Heart sounds: Normal heart sounds. Heart sounds not distant. No murmur heard. No friction rub. No gallop. Pulmonary:      Effort: Pulmonary effort is normal. No respiratory distress. Breath sounds: Normal breath sounds. No stridor. No wheezing, rhonchi or rales. Chest:      Chest wall: No tenderness. Abdominal:      General: Abdomen is flat. Bowel sounds are normal. There is no distension. Palpations: Abdomen is soft. There is no mass. Tenderness: There is no abdominal tenderness. There is no right CVA tenderness, left CVA tenderness, guarding or rebound. Hernia: No hernia is present. Musculoskeletal:         General: No swelling, tenderness, deformity or signs of injury. Normal range of motion. Cervical back: Normal range of motion and neck supple. No rigidity. Lymphadenopathy:      Head:      Right side of head: No submental adenopathy. Left side of head: No submental adenopathy. Skin:     General: Skin is warm and dry. Capillary Refill: Capillary refill takes less than 2 seconds. Coloration: Skin is not jaundiced or pale. Findings: No bruising, erythema, lesion or rash. Neurological:      General: No focal deficit present. Mental Status: She is alert and oriented to person, place, and time. Mental status is at baseline. Cranial Nerves: No cranial nerve deficit. Sensory: No sensory deficit. Motor: No weakness. Coordination: Coordination normal.      Deep Tendon Reflexes: Reflexes are normal and symmetric. Psychiatric:         Mood and Affect: Mood normal.         Behavior: Behavior normal. Behavior is cooperative. Thought Content:  Thought content normal.         Judgment: Judgment normal.         DIAGNOSTIC RESULTS     EKG: All EKG's are interpreted by the Emergency Department Physician who either signs or Co-signsthis chart in the absence of a cardiologist.        RADIOLOGY:   Non-plain filmimages such as CT, Ultrasound and MRI are read by the radiologist. Plain radiographic images are visualized and preliminarily interpreted by the emergency physician with the below findings:        Interpretation per the Radiologist below, if available at the time ofthis note:    CT SOFT TISSUE NECK W CONTRAST   Final Result   1. THERE IS A TONSILLAR ENLARGEMENT BILATERALLY. THERE IS ALSO SOFT TISSUE DENSITY SEEN WITHIN THE PREGLOTTIC SPACE WHICH MAY REPRESENT MASS AND OR ENLARGED LINGUAL TONSILS. Nadyne Jordy SIMILARLY THERE IS BILATERAL ADENOPATHY AT BOTH LEVELS 2 AND 3. ETIOLOGY OF    THIS IS UNCERTAIN. FINDINGS COULD REPRESENT THAT OF A LYMPHOPROLIFERATIVE DISORDER OR THIS MAY BE RELATED TO THE FACT THE PATIENT HAS HISTORY OF PRIOR SPLENECTOMY. WILL NEED FURTHER EVALUATION. RECOMMEND ENT CONSULT TO FURTHER EVALUATE. 2. OTHER FINDINGS DETAILED ABOVE         All CT scans at this facility use dose modulation, iterative reconstruction, and/or weight based dosing when appropriate to reduce radiation dose to as low as reasonably achievable.             ED BEDSIDE ULTRASOUND:   Performed by ED Physician - none    LABS:  Labs Reviewed   CBC WITH AUTO DIFFERENTIAL - Abnormal; Notable for the following components:       Result Value    WBC 11.0 (*)     RDW 14.7 (*)     Platelets 080 (*)     Neutrophils Absolute 6.8 (*)     All other components within normal limits   COMPREHENSIVE METABOLIC PANEL - Abnormal; Notable for the following components:    Sodium 134 (*)     Anion Gap 8 (*)     Glucose 110 (*)     Globulin 3.6 (*)     All other components within normal limits   C-REACTIVE PROTEIN - Abnormal; Notable for the following components:    CRP 16.5 (*)     All other components within normal limits   AMYLASE   PROCALCITONIN   MUMPS ANTIBODY, IGM   MUMPS ANTIBODY, IGG   POC PREGNANCY UR-QUAL   POCT VENOUS       All other labs were within normal range or not returned as of this dictation. EMERGENCY DEPARTMENT COURSE and DIFFERENTIAL DIAGNOSIS/MDM:   Vitals:    Vitals:    06/16/21 1708 06/16/21 1927   BP: (!) 169/76 134/81   Pulse: 68 52   Resp: 16 16   Temp: 98.2 °F (36.8 °C)    TempSrc: Oral    SpO2: 97% 98%   Weight: 219 lb (99.3 kg)    Height: 5' 6\" (1.676 m)            MDM  Molecular testing with IgG and IgM for mumps was done. Patient has erythema to the skin. Radiologist called reporting that the parotid gland appears normal however there is tonsillar hypertrophy. The patient does admit that sometimes when she is eating certain things the food gets stuck and she has to clear it out of her throat with her finger and that this has been going on for multiple months. The patient was advised that she will need to follow-up with ENT about this. The radiologist also had reported the patient had diffuse lymphadenopathy. The ER physician reported that the patient in the past had low platelets and they had to remove her spleen for treatment of it and that her platelets returned back to normal.  The radiologist states that this could be why there is hypertrophy of lymph nodes to both sides of her neck and it is symmetric. Patient was informed of this. There is erythema to the skin the ER physician is going to treat the patient as if she has a cellulitis with staph. She is allergic to penicillin. Patient is able to take Tylenol but not oxycodone. Patient advised if fever, drooling, trouble swallowing, she is to call 911 or go to the nearest emergency room. The patient verbalized understanding the care and has no further questions. CONSULTS:  None    PROCEDURES:  Unless otherwise noted below, none     Procedures    FINAL IMPRESSION      1. Facial cellulitis    2. Diffuse lymphadenopathy    3.  Lingual tonsil hypertrophy          DISPOSITION/PLAN   DISPOSITION Decision To Discharge 06/16/2021 07:55:07 PM      PATIENT REFERRED TO:  Tru Raygoza [address]  [place] [address]  [phone]    In 1 day  To make a follow up visit for enlarged lingular tonsils.     Jan Long MD  [facility]  [phone]    Call in 1 day        DISCHARGE MEDICATIONS:  Discharge Medication List as of 6/16/2021  8:00 PM      START taking these medications    Details   sulfamethoxazole-trimethoprim (BACTRIM DS) 800-160 MG per tablet Take 1 tablet by mouth 2 times daily for 10 days, Disp-20 tablet, R-0Print      acetaminophen (APAP EXTRA STRENGTH) 500 MG tablet Take 2 tablets by mouth every 6 hours as needed for Pain, Disp-120 tablet, R-0Print                (Please note that portions of this note were completed with a voice recognition program.  Efforts were made to edit the dictations but occasionally words are mis-transcribed.)    Rosie Mueller DO (electronically signed)  Attending Emergency Physician          Rosie Mueller DO  06/16/21 9523

## 2021-06-18 ENCOUNTER — CARE COORDINATION (OUTPATIENT)
Dept: OTHER | Facility: CLINIC | Age: 46
End: 2021-06-18

## 2021-06-18 LAB
MUMPS IGM ANTIBODY: 0.18 IV
MUV IGG SER QL: 246 AU/ML

## 2021-06-21 ENCOUNTER — CARE COORDINATION (OUTPATIENT)
Dept: OTHER | Facility: CLINIC | Age: 46
End: 2021-06-21

## 2021-06-21 NOTE — CARE COORDINATION
[facility] ED Follow Up Call    2021    Patient: Dolores Eng Patient : 1975   MRN: [id number]  Reason for Admission: Facial Celluliits  Discharge Date: 21    Ambulatory Care Manager (ACM) attempted to contact the patient again by telephone to perform post ED visit assessment. Left HIPPA compliant message providing introduction to self and requested a call back. Will continue to outreach to patient. Will send [employer] message in the meantime.         Care Transitions ED Follow Up    Care Transitions Interventions  Do you have all of your prescriptions and are they filled?: Yes

## 2021-06-24 ENCOUNTER — HOSPITAL ENCOUNTER (OUTPATIENT)
Dept: WOMENS IMAGING | Age: 46
Discharge: HOME | End: 2021-06-26
Payer: COMMERCIAL

## 2021-06-24 ENCOUNTER — CARE COORDINATION (OUTPATIENT)
Dept: OTHER | Facility: CLINIC | Age: 46
End: 2021-06-24

## 2021-06-24 DIAGNOSIS — Z12.31 ENCOUNTER FOR SCREENING MAMMOGRAM FOR BREAST CANCER: ICD-10-CM

## 2021-06-24 PROCEDURE — 77063 BREAST TOMOSYNTHESIS BI: CPT

## 2021-06-24 NOTE — CARE COORDINATION
[address] [facility] ED Follow Up Call    2021    Patient: Wilian Hull Patient : 1975   MRN: [id number]  Reason for Admission: Facial Celluliits  Discharge Date: 2021       Care Transitions ED Follow Up    Care Transitions Interventions  Do you have any ongoing symptoms?: No   Do you have a copy of your discharge instructions?: Yes   Do you understand what to report and when to return?: Yes   Are you following your discharge instructions?: Yes   Do you have all of your prescriptions and are they filled?: Yes   Have you scheduled your follow up appointment?: Yes   How are you going to get to your appointment?: Car - drive self   Were you discharged with any Home Care or Post Acute Services or do you currently have any active services?: No              Needs to be reviewed by the provider   Additional needs identified to be addressed with provider No  none           Discussed COVID-19 related testing which was not done at this time. Test results were not done. Patient informed of results, if available? N/A    Ambulatory Care Manager (ACM) received return call from the patient by telephone to perform post ED visit assessment. Call within 2 business days of discharge: Yes; initial ED follow up call attempted 2021 . Verified name and  with patient as identifiers. Patient reports feeling better after recently following up with dentist. [facility] dentist believe the mouth pain is related to patient's mouth guard (that was new in March). [facility] dentist advised her not to wear mouth guard x1 week and see if pain is improved. States if pain improved without mouth guard, dentist will make new mouth guard for patient that is not as thick as current guard. Patient states she called nurse access line prior to recent ED visit and glad she was advised to go to ED for evaluation as they found lymph nodes swollen and tonsils with access tissue.  States she had ENT office visit on Fri, June 18 and was told she may need ablation or tonsillectomy in the near future. Patient states ENT follow up is 7/6/2021. Patient reports taking Bactrim DS as prescribed but states she believe [facility] shorted her one dose. States she will complete the RX tomorrow. Patient states followed soft diet for ~1 week but plans to start regular foods this evening with salad. Reports swelling and redness have improved since ED visit. Denies concerns or needs for ACM at present. Interventions:    Counseling and education provided at today's visit on:   Red Flag symptoms to report  Diet education/compliance  Medication compliance  Provider follow up appointment compliance  Specialist appointment compliance  Reinforced Discharge instructions    Medication reconciliation was performed with patient, who verbalizes understanding of administration of home medications. Advised obtaining a 90-day supply of all daily and as-needed medications. Reinforced resources available to patient including: PCP, Specialist and Benefits related nurse triage line. ACM encouraged outreach to Nurse Access Line and/or ACM for assessment and intervention guidance as needed. ACM encouraged patient to contact 911 for life threatening emergencies and PCP office 24/7 for non life-threatening symptoms. Reminded patient of alternatives to ED such as urgent care, walk in clinics and e-visits as available. Reviewed proper ED utilization using Right Care, Right Place, Right Time Flyer. Discussed follow-up appointments. If no appointment was previously scheduled, appointment scheduling offered: N/A- patient already had ENT follow up. Is follow up appointment scheduled within 7 days of discharge?  Yes    [facility] follow up appointment(s):   Future Appointments   Date Time Provider Corby Andersen   7/1/2021  2:00 PM Ronald Becerra  [facility]   7/22/2021 10:15 AM Cristino Hoffman DO [address]   8/12/2021  4:00 PM Robyn Rashid MD [facility]   8/19/2021  3:30 PM Neto Gong MD [facility]   [facility]22[facility]2021 10[facility]00 [facility]   1/27/2022 10:00 AM Marcell Lutz DO [facility] [address]     [facility] follow up appointment(s):   Follow-up With  Details  Why  Contact Info   Leticia Gee MD  On 7/6/2021    [address] [facility] [phone]     Plan:  Continue weekly outreaches to provide telephonic support, education and resources as needed. Discuss / follow up on:   Red Flag symptoms to report  Utilization of appropriate level of care  Goal Progress      Patient verbalized understanding and is agreeable. Goals      Conditions and Symptoms      I will keep my appointment or reschedule if I have to cancel. I will notify my provider of any barriers to my plan of care. I will notify my provider of any symptoms that indicate a worsening of my condition. Barriers: none  Plan for overcoming my barriers: N/A  Confidence: 10/10  Anticipated Goal Completion Date: 7/2/2021 6/24/2021: Patient states had follow up with ENT and dentist. Has upcoming appt with Dr. Jorge Alva 7/1/2021 for pre-op clearance. Randi Espinosa RN BSN  Associate Care Manager  Phone: [phone]  Email: [email or web address]

## 2021-06-25 DIAGNOSIS — R92.8 ABNORMAL MAMMOGRAM: Primary | ICD-10-CM

## 2021-06-30 ENCOUNTER — HOSPITAL ENCOUNTER (OUTPATIENT)
Dept: WOMENS IMAGING | Age: 46
Discharge: HOME | End: 2021-07-02
Payer: COMMERCIAL

## 2021-06-30 ENCOUNTER — APPOINTMENT (OUTPATIENT)
Dept: ULTRASOUND IMAGING | Age: 46
End: 2021-06-30
Payer: COMMERCIAL

## 2021-06-30 DIAGNOSIS — R92.8 ABNORMAL MAMMOGRAM: ICD-10-CM

## 2021-06-30 PROCEDURE — G0279 TOMOSYNTHESIS, MAMMO: HCPCS

## 2021-07-01 ENCOUNTER — OFFICE VISIT (OUTPATIENT)
Dept: CARDIOLOGY CLINIC | Age: 46
End: 2021-07-01
Payer: COMMERCIAL

## 2021-07-01 VITALS
SYSTOLIC BLOOD PRESSURE: 130 MMHG | BODY MASS INDEX: 35.77 KG/M2 | WEIGHT: 221.6 LBS | DIASTOLIC BLOOD PRESSURE: 74 MMHG | HEART RATE: 54 BPM

## 2021-07-01 DIAGNOSIS — I48.91 ATRIAL FIBRILLATION WITH RVR (HCC): ICD-10-CM

## 2021-07-01 DIAGNOSIS — I48.0 PAROXYSMAL ATRIAL FIBRILLATION (HCC): ICD-10-CM

## 2021-07-01 DIAGNOSIS — E66.9 CLASS 2 OBESITY WITHOUT SERIOUS COMORBIDITY WITH BODY MASS INDEX (BMI) OF 35.0 TO 35.9 IN ADULT, UNSPECIFIED OBESITY TYPE: ICD-10-CM

## 2021-07-01 DIAGNOSIS — Z01.818 PRE-OPERATIVE CLEARANCE: Primary | ICD-10-CM

## 2021-07-01 DIAGNOSIS — I10 ESSENTIAL HYPERTENSION: ICD-10-CM

## 2021-07-01 PROCEDURE — 99213 OFFICE O/P EST LOW 20 MIN: CPT | Performed by: INTERNAL MEDICINE

## 2021-07-01 PROCEDURE — 93000 ELECTROCARDIOGRAM COMPLETE: CPT | Performed by: INTERNAL MEDICINE

## 2021-07-01 RX ORDER — BUSPIRONE HYDROCHLORIDE 7.5 MG/1
7.5 TABLET ORAL PRN
COMMUNITY
End: 2021-07-27 | Stop reason: SDUPTHER

## 2021-07-01 NOTE — PROGRESS NOTES
Chief Complaint   Patient presents with    Cardiac Clearance     DR Artem Meeks 7/12/21 6-27-18: Patient is a 37 y.o. female who presents with a chief complaint of palpitations. Patient is followed on a regular basis by Dr. Jacobo Rivera MD. Had palpiations for the past 2 days, noted to be in new onset afibb with RVR in ER. Started on cardizem gtt and converted to NSR spontaneously. No hx of MI, CHF or arrhythmia. No hx of LHC or stress test. No excessive caffeine or ETOH. States she is sensitive and has a lot of allergies. Takes Zyrtec. No smoking. No hx of DM, HTN or HLP. On synthroid and  TSH is WNL. S/p CTA of chest without evidence of PE.    7-13-18: Patient presents for initial medical evaluation. Patient is followed on a regular basis by Dr. Alesha Zapata MD. Did not tolerate lopressor. Diagnosed with new onset afibb and placed on NOAC. BP is elevated today at 160/88. Pt denies chest pain, dyspnea, dyspnea on exertion, change in exercise capacity, fatigue,  nausea, vomiting, diarrhea, constipation, motor weakness, insomnia, weight loss, syncope, dizziness, lightheadedness, palpitations, PND, orthopnea, or claudication. No nitro use. BP and hr are good. CAD is stable. No LE discoloration or ulcers. No LE edema. No CHF type symptoms. Lipid profile is normal. No recent hospitalization. No change in meds. S/p ECHO with EF of 55%, grade I DD.     1-18-19: states she has about one episode of afibb a month. Has underlying stress. Pt denies chest pain, dyspnea, dyspnea on exertion, change in exercise capacity, fatigue,  nausea, vomiting, diarrhea, constipation, motor weakness, insomnia, weight loss, syncope, dizziness, lightheadedness, palpitations, PND, orthopnea, or claudication. No nitro use. BP and hr are good. . No LE discoloration or ulcers. No LE edema. No CHF type symptoms. Lipid profile is normal. No recent hospitalization. No change in meds. EKG with NSR. On NOAC, no bleeding issues.    no excessive caffeine. Not able to lose much weight. 10-18-19: Pt denies chest pain, dyspnea, dyspnea on exertion, change in exercise capacity,   nausea, vomiting, diarrhea, constipation, motor weakness, insomnia, weight loss, syncope, dizziness, lightheadedness, palpitations, PND, orthopnea, or claudication. No nitro use. BP and hr are good. CAD is stable. No LE discoloration or ulcers. No LE edema. No CHF type symptoms. Lipid profile is normal. No recent hospitalization. No change in meds. Did not have ekg done today. She can feel when she goes into afib. She is fatigued and has some stress issues. On DOAC, no bleeding issues. 10-23-30: Pt denies chest pain, dyspnea, dyspnea on exertion, change in exercise capacity, fatigue,  nausea, vomiting, diarrhea, constipation, motor weakness, insomnia, weight loss, syncope, dizziness, lightheadedness, palpitations, PND, orthopnea, or claudication. No nitro use. BP and hr are good. CAD is stable. No LE discoloration or ulcers. No LE edema. No CHF type symptoms. Lipid profile is normal. No recent hospitalization. No change in meds. With history of paroxysmal atrial fibrillation on oral anticoagulation. She can feel when she goes into atrial fibrillation. Placed On anxiety medications. Weight is about the same. With normal sinus rhythm, no ischemia    2021-07-01: Patient needs preoperative clearance for hysterectomy. Patient with history of paroxysmal atrial fibrillation and is on oral anticoagulation. She consents when she developed atrial fibrillation and developed an episode last night. Pt denies chest pain, dyspnea, dyspnea on exertion, change in exercise capacity, fatigue,  nausea, vomiting, diarrhea, constipation, motor weakness, insomnia, weight loss, syncope, dizziness, lightheadedness, palpitations, PND, orthopnea, or claudication.   ekg TODAY WITH NSR, NO ISCHEMIA.      Patient Active Problem List   Diagnosis    Hypothyroidism    Obesity    Meniere disease    Irregular periods    Asplenia    Allergic rhinitis    Migraine    Multinodular goiter    Migraine headache    Insomnia    Hypertension    Menorrhagia    Pelvic pain    Postoperative pain    Paroxysmal atrial fibrillation [facility])       Past Surgical History:   Procedure Laterality Date    CARPAL TUNNEL RELEASE Right 07/12/2016    DILATION AND CURETTAGE OF UTERUS N/A 12/16/2019    HYSTEROSCOPY NOVASURE ABLATION D&C performed by Alicia Alarcon DO at [facility] N/A 2004    done for ITP    THYROIDECTOMY, COMPLETION Left 2014    frozen section inconclusive    THYROIDECTOMY, PARTIAL Right 2011    Benign nodule       Social History     Socioeconomic History    Marital status:      Spouse name: Not on file    Number of children: 0    Years of education: Not on file    Highest education level: Not on file   Occupational History    Occupation: medical records   Tobacco Use    Smoking status: Former Smoker     Packs/day: 0.50     Years: 10.00     Pack years: 5.00     Quit date: 11/17/2010     Years since quitting: 10.6    Smokeless tobacco: Never Used   Vaping Use    Vaping Use: Never used   Substance and Sexual Activity    Alcohol use: Yes     Alcohol/week: 0.0 standard drinks     Comment: socially    Drug use: No    Sexual activity: Not on file   Other Topics Concern    Not on file   Social History Narrative    Not on file     Social Determinants of Health     Financial Resource Strain: Low Risk     Difficulty of Paying Living Expenses: Not hard at all   Food Insecurity: No Food Insecurity    Worried About Running Out of Food in the Last Year: Never true    Florence of Food in the Last Year: Never true   Transportation Needs: No Transportation Needs    Lack of Transportation (Medical): No    Lack of Transportation (Non-Medical):  No   Physical Activity:     Days of Exercise per Week:     Minutes of Exercise per Session:    Stress:     Feeling of Stress : Social Connections:     Frequency of Communication with Friends and Family:     Frequency of Social Gatherings with Friends and Family:     Attends Denominational Services:     Active Member of Clubs or Organizations:     Attends Club or Organization Meetings:     Marital Status:    Intimate Partner Violence:     Fear of Current or Ex-Partner:     Emotionally Abused:     Physically Abused:     Sexually Abused:        Family History   Problem Relation Age of Onset    High Blood Pressure Mother     Anemia Mother     Atrial Fibrillation Mother     Heart Failure Mother     Other Mother         pacemaker    Heart Disease Mother     Sleep Apnea Mother     Vision Loss Mother         histoplasmosis    Heart Disease Father         CAD / Triple bypass    Diabetes Father     Other Father         Fuck's dystropy / corneal transplants / AAA    Cancer Paternal Aunt     Breast Cancer Paternal Aunt     Cancer Paternal Grandmother     Breast Cancer Paternal Grandmother     Sleep Apnea Sister     Alcohol Abuse Brother     Other Sister         killed by drunk        Current Outpatient Medications   Medication Sig Dispense Refill    busPIRone (BUSPAR) 7.5 MG tablet Take 7.5 mg by mouth as needed      Bacillus Coagulans-Inulin (PROBIOTIC FORMULA PO) Take 1 capsule by mouth daily      Folic Acid (FOLATE PO) Take 1 tablet by mouth daily      Multiple Vitamin (MULTIVITAMIN PO) Take 1 tablet by mouth daily      acetaminophen (APAP EXTRA STRENGTH) 500 MG tablet Take 2 tablets by mouth every 6 hours as needed for Pain 120 tablet 0    hydrocortisone (ANUSOL-HC) 25 MG suppository Place 1 suppository rectally 2 times daily (Patient taking differently: Place 25 mg rectally 2 times daily Uses PRN) 60 suppository 0    levothyroxine (SYNTHROID) 100 MCG tablet Take 1 tablet by mouth daily 90 tablet 03    butalbital-acetaminophen-caffeine (FIORICET, ESGIC) -40 MG per tablet Take 1 tablet by mouth every 4 hours as needed for Headaches 12 tablet 0    ondansetron (ZOFRAN) 8 MG tablet Take 1 tablet by mouth every 12 hours as needed for Nausea or Vomiting 10 tablet 0    sodium chloride (ALTAMIST SPRAY) 0.65 % nasal spray 1 spray by Nasal route as needed for Congestion 1 Bottle 3    tiZANidine (ZANAFLEX) 4 MG tablet Take 1 tablet by mouth every 8 hours as needed (spasm) 30 tablet 1    Handicap Placard MISC by Does not apply route Exp 5 years 1 each 0    rivaroxaban (XARELTO) 20 MG TABS tablet TAKE 1 TABLET BY MOUTH ONE TIME A DAY 90 tablet 3    dilTIAZem (CARDIZEM CD) 180 MG extended release capsule TAKE 1 CAPSULE BY MOUTH ONE TIME A DAY 90 capsule 3    diclofenac sodium 1 % GEL Apply 4 g topically 4 times daily 2 Tube 0    magnesium (MAGNESIUM-OXIDE) 250 MG TABS tablet Take 250 mg by mouth daily       vitamin B-12 (CYANOCOBALAMIN) 500 MCG tablet Take 500 mcg by mouth daily      zinc 50 MG CAPS Take by mouth daily       Echinacea 400 MG CAPS Take by mouth daily       Calcium Carbonate-Vit D-Min (CALCIUM 1200) 2776-0545 MG-UNIT CHEW Take by mouth daily       cetirizine (ZYRTEC) 10 MG tablet Take 10 mg by mouth daily      ferrous sulfate 325 (65 FE) MG tablet Take 65 mg by mouth daily (with breakfast)       fluticasone (FLONASE) 50 MCG/ACT nasal spray 1 spray by Nasal route daily. (Patient taking differently: 1 spray by Nasal route daily Uses PRN) 1 Bottle 06    meclizine (ANTIVERT) 12.5 MG tablet Take 12.5 mg by mouth 3 times daily as needed. No current facility-administered medications for this visit. Penicillins, Codeine, Percocet [oxycodone-acetaminophen], Bee venom, Tape [adhesive tape], Hydrocodone, and Protonix [pantoprazole sodium]    Review of Systems:  General ROS: negative  Psychological ROS: negative  Hematological and Lymphatic ROS: No history of blood clots or bleeding disorder.    Respiratory ROS: no cough, shortness of breath, or wheezing  Cardiovascular ROS: no chest pain or dyspnea on exertion  Gastrointestinal ROS: no abdominal pain, change in bowel habits, or black or bloody stools  Genito-Urinary ROS: no dysuria, trouble voiding, or hematuria  Musculoskeletal ROS: negative  Neurological ROS: no TIA or stroke symptoms  Dermatological ROS: negative    VITALS:  Blood pressure 130/74, pulse 54, weight 221 lb 9.6 oz (100.5 kg), not currently breastfeeding. Body mass index is 35.77 kg/m². Physical Examination:  General appearance - alert, well appearing, and in no distress  Mental status - alert, oriented to person, place, and time  Neck - Neck is supple, no JVD or carotid bruits. No thyromegaly or adenopathy. Chest - clear to auscultation, no wheezes, rales or rhonchi, symmetric air entry  Heart - normal rate, regular rhythm, normal S1, S2, no murmurs, rubs, clicks or gallops  Abdomen - soft, nontender, nondistended, no masses or organomegaly  Neurological - alert, oriented, normal speech, no focal findings or movement disorder noted  Extremities - peripheral pulses normal, no pedal edema, no clubbing or cyanosis  Skin - normal coloration and turgor, no rashes, no suspicious skin lesions noted      Orders Placed This Encounter   Procedures    EKG 12 Lead       ASSESSMENT:     Diagnosis Orders   1. Pre-operative clearance  EKG 12 Lead   2. Atrial fibrillation EKG 12 Lead   3. Essential hypertension     4. Paroxysmal atrial fibrillation (HCC)     5. Class 2 obesity without serious comorbidity with body mass index (BMI) of 35.0 to 35.9 in adult, unspecified obesity type     2. Overweight. PLAN:     Patient will need to continue to follow up with you for their general medical care     As always, aggressive risk factor modification is strongly recommended. We should adhere to the JNC VIII guidelines for HTN management and the NCEP ATP III guidelines for LDL-C management. Cardiac diet is always recommended with low fat, cholesterol, calories and sodium.     Continue medications at current doses. Patient is a LOW  risk patient for the proposed procedure/surgery. No active cardiac conditions such as ischemia, CHF or arrhythmias. Recomment peripoperative BBlocker, GI/DVT prophylaxis. Cont with NOAC- OK TO HOLD 3 DAYS PRIOR TO SURGERY. Check EKG      cardizem CD 120mg daily. Consider INESSA testing in future    Patient is to avoid any excessive caffeine, chocolate, or OTC stimulants. Patient was advised and encouraged to check blood pressure at home or at a pharmacy, maintain a logbook, and also call us back if blood pressure are above the target ranges or if it is low. Patient clearly understands and agrees to the instructions. We will need to continue to monitor muscle and liver enzymes, BUN, CR, and electrolytes.     Electronically signed by Stu Prince DO on 7/1/2021 at 2:10 PM

## 2021-07-07 ENCOUNTER — OFFICE VISIT (OUTPATIENT)
Dept: FAMILY MEDICINE CLINIC | Age: 46
End: 2021-07-07
Payer: COMMERCIAL

## 2021-07-07 VITALS
DIASTOLIC BLOOD PRESSURE: 78 MMHG | SYSTOLIC BLOOD PRESSURE: 132 MMHG | BODY MASS INDEX: 35.52 KG/M2 | TEMPERATURE: 97.6 F | HEIGHT: 66 IN | HEART RATE: 84 BPM | WEIGHT: 221 LBS | OXYGEN SATURATION: 98 %

## 2021-07-07 VITALS
HEIGHT: 66 IN | BODY MASS INDEX: 35.52 KG/M2 | OXYGEN SATURATION: 98 % | WEIGHT: 221 LBS | SYSTOLIC BLOOD PRESSURE: 132 MMHG | TEMPERATURE: 97.6 F | DIASTOLIC BLOOD PRESSURE: 78 MMHG | HEART RATE: 84 BPM

## 2021-07-07 DIAGNOSIS — Z01.818 PRE-OP EXAMINATION: ICD-10-CM

## 2021-07-07 DIAGNOSIS — Z01.818 PRE-OP EXAMINATION: Primary | ICD-10-CM

## 2021-07-07 DIAGNOSIS — L50.8 ACUTE URTICARIA: Primary | ICD-10-CM

## 2021-07-07 LAB
ANION GAP SERPL CALCULATED.3IONS-SCNC: 10 MEQ/L (ref 9–15)
BUN BLDV-MCNC: 11 MG/DL (ref 6–20)
CALCIUM SERPL-MCNC: 9.7 MG/DL (ref 8.5–9.9)
CHLORIDE BLD-SCNC: 103 MEQ/L (ref 95–107)
CO2: 25 MEQ/L (ref 20–31)
CREAT SERPL-MCNC: 0.71 MG/DL (ref 0.5–0.9)
GFR AFRICAN AMERICAN: >60
GFR NON-AFRICAN AMERICAN: >60
GLUCOSE BLD-MCNC: 121 MG/DL (ref 70–99)
HCT VFR BLD CALC: 42 % (ref 37–47)
HEMOGLOBIN: 14.1 G/DL (ref 12–16)
MCH RBC QN AUTO: 30.5 PG (ref 27–31.3)
MCHC RBC AUTO-ENTMCNC: 33.5 % (ref 33–37)
MCV RBC AUTO: 91.2 FL (ref 82–100)
PDW BLD-RTO: 14.9 % (ref 11.5–14.5)
PLATELET # BLD: 470 K/UL (ref 130–400)
POTASSIUM SERPL-SCNC: 4.5 MEQ/L (ref 3.4–4.9)
RBC # BLD: 4.61 M/UL (ref 4.2–5.4)
SODIUM BLD-SCNC: 138 MEQ/L (ref 135–144)
WBC # BLD: 10.3 K/UL (ref 4.8–10.8)

## 2021-07-07 PROCEDURE — 99241 PR OFFICE CONSULTATION NEW/ESTAB PATIENT 15 MIN: CPT | Performed by: NURSE PRACTITIONER

## 2021-07-07 PROCEDURE — 99213 OFFICE O/P EST LOW 20 MIN: CPT | Performed by: NURSE PRACTITIONER

## 2021-07-07 ASSESSMENT — ENCOUNTER SYMPTOMS
ABDOMINAL DISTENTION: 0
WHEEZING: 0
CHEST TIGHTNESS: 0
EYE REDNESS: 0
EYE ITCHING: 0
VOMITING: 0
COUGH: 0
SHORTNESS OF BREATH: 0
SORE THROAT: 0
NAUSEA: 0
EYE PAIN: 0
ABDOMINAL PAIN: 0
TROUBLE SWALLOWING: 0
SINUS PRESSURE: 0
SINUS PAIN: 0
DIARRHEA: 0
FACIAL SWELLING: 0
VOICE CHANGE: 0
RHINORRHEA: 0
EYE DISCHARGE: 0

## 2021-07-07 NOTE — PROGRESS NOTES
Preoperative Consultation      Anjel Turk  YOB: 1975    Date of Service:  7/7/2021    Vitals:    07/07/21 0904   BP: 132/78   Site: Right Upper Arm   Position: Sitting   Cuff Size: Large Adult   Pulse: 84   Temp: 97.6 °F (36.4 °C)   TempSrc: Temporal   SpO2: 98%   Weight: 221 lb (100.2 kg)   Height: 5' 6\" (1.676 m)      Wt Readings from Last 2 Encounters:   07/07/21 221 lb (100.2 kg)   07/07/21 221 lb (100.2 kg)     BP Readings from Last 3 Encounters:   07/07/21 132/78   07/07/21 132/78   07/01/21 130/74        Chief Complaint   Patient presents with   [facility] Pre-op Exam     Patient here for (DR. FARMER) LAPAROSCOPIC ASSISTED VAGINAL  HYSTERECTOMY BILATERAL SALPINGECTOMY DATE OF SURGERY 7/12/21     Allergies   Allergen Reactions    Penicillins Anaphylaxis    Codeine Itching, Swelling and Rash     OK with Tylenol #3 per pt    Percocet [Oxycodone-Acetaminophen] Itching, Swelling and Rash    Bee Venom Itching and Swelling    Tape [Adhesive Tape]      Skin irritation     Hydrocodone Itching, Swelling and Rash    Protonix [Pantoprazole Sodium] Swelling and Rash     Caused thrush     Outpatient Medications Marked as Taking for the 7/7/21 encounter (Office Visit) with CASE Rowe - CNP   Medication Sig Dispense Refill    busPIRone (BUSPAR) 7.5 MG tablet Take 7.5 mg by mouth as needed      Bacillus Coagulans-Inulin (PROBIOTIC FORMULA PO) Take 1 capsule by mouth daily      Folic Acid (FOLATE PO) Take 1 tablet by mouth daily      Multiple Vitamin (MULTIVITAMIN PO) Take 1 tablet by mouth daily      acetaminophen (APAP EXTRA STRENGTH) 500 MG tablet Take 2 tablets by mouth every 6 hours as needed for Pain 120 tablet 0    hydrocortisone (ANUSOL-HC) 25 MG suppository Place 1 suppository rectally 2 times daily (Patient taking differently: Place 25 mg rectally 2 times daily Uses PRN) 60 suppository 0    levothyroxine (SYNTHROID) 100 MCG tablet Take 1 tablet by mouth daily 90 tablet 03    butalbital-acetaminophen-caffeine (FIORICET, ESGIC) -40 MG per tablet Take 1 tablet by mouth every 4 hours as needed for Headaches 12 tablet 0    ondansetron (ZOFRAN) 8 MG tablet Take 1 tablet by mouth every 12 hours as needed for Nausea or Vomiting 10 tablet 0    sodium chloride (ALTAMIST SPRAY) 0.65 % nasal spray 1 spray by Nasal route as needed for Congestion 1 Bottle 3    tiZANidine (ZANAFLEX) 4 MG tablet Take 1 tablet by mouth every 8 hours as needed (spasm) 30 tablet 1    Handicap Placard MISC by Does not apply route Exp 5 years 1 each 0    rivaroxaban (XARELTO) 20 MG TABS tablet TAKE 1 TABLET BY MOUTH ONE TIME A DAY 90 tablet 3    dilTIAZem (CARDIZEM CD) 180 MG extended release capsule TAKE 1 CAPSULE BY MOUTH ONE TIME A DAY 90 capsule 3    diclofenac sodium 1 % GEL Apply 4 g topically 4 times daily 2 Tube 0    magnesium (MAGNESIUM-OXIDE) 250 MG TABS tablet Take 250 mg by mouth daily       vitamin B-12 (CYANOCOBALAMIN) 500 MCG tablet Take 500 mcg by mouth daily      zinc 50 MG CAPS Take by mouth daily       Echinacea 400 MG CAPS Take by mouth daily       Calcium Carbonate-Vit D-Min (CALCIUM 1200) 0100-3237 MG-UNIT CHEW Take by mouth daily       cetirizine (ZYRTEC) 10 MG tablet Take 10 mg by mouth daily      ferrous sulfate 325 (65 FE) MG tablet Take 65 mg by mouth daily (with breakfast)       fluticasone (FLONASE) 50 MCG/ACT nasal spray 1 spray by Nasal route daily. (Patient taking differently: 1 spray by Nasal route daily Uses PRN) 1 Bottle 06    meclizine (ANTIVERT) 12.5 MG tablet Take 12.5 mg by mouth 3 times daily as needed. This patient presents to the office today for a preoperative consultation at the request of surgeon, Dr. Fernandez Goyal, who plans on performing Lap assisted vaginal hysterectomy with bilateral salpingectomy on July 12 at . The current problem began 1 year ago, and symptoms have been unchanged with time. Conservative therapy: N/A.     Planned anesthesia: General   Known anesthesia problems: None   Bleeding risk: Anticoagulant therapy- last dose of Xerelto will be today.    Personal or FH of DVT/PE: No    Patient objection to receiving blood products: No    Patient Active Problem List   Diagnosis    Hypothyroidism    Obesity    Meniere disease    Irregular periods    Asplenia    Allergic rhinitis    Migraine    Multinodular goiter    Migraine headache    Insomnia    Hypertension    Menorrhagia    Pelvic pain    Postoperative pain    Paroxysmal atrial fibrillation (HCC)       Past Medical History:   Diagnosis Date    Abnormal Pap smear of cervix     Allergic rhinitis     Arthritis     hands    Asplenia     Atrial fibrillation (HCC)     Atrial fibrillation with RVR (HCC)     Dr. Jen Valadez    Bilateral hand pain     CTS (carpal tunnel syndrome) 05/2016    bilateral    External hemorrhoid     Fatigue     Heart murmur     History of ITP 2004    had spenectomy    Hypertension     meds > 1 yrs    Hypothyroidism     meds > 5 yrs    Hypothyroidism     Insomnia     Irregular periods     Meniere disease     Migraine headache     Multinodular goiter     Nail fungus     Pain in right lower leg     Paroxysmal atrial fibrillation (HCC) 10/23/2020    Plantar fasciitis, left     Dr. Prabhjot Bruno     Past Surgical History:   Procedure Laterality Date    CARPAL TUNNEL RELEASE Right 07/12/2016    DILATION AND CURETTAGE OF UTERUS N/A 12/16/2019    HYSTEROSCOPY NOVASURE ABLATION D&C performed by Magdaleno Zurita DO [facility] N/A 2004    done for ITP    THYROIDECTOMY, COMPLETION Left 2014    frozen section inconclusive    THYROIDECTOMY, PARTIAL Right 2011    Benign nodule     Family History   Problem Relation Age of Onset    High Blood Pressure Mother     Anemia Mother     Atrial Fibrillation Mother     Heart Failure Mother     Other Mother         pacemaker    Heart Disease Mother     Sleep Apnea Mother     Vision Loss Mother         histoplasmosis    Heart Disease Father         CAD / Triple bypass    Diabetes Father     Other Father         Fuck's dystropy / corneal transplants / AAA    Cancer Paternal Aunt     Breast Cancer Paternal Aunt     Cancer Paternal Grandmother     Breast Cancer Paternal Grandmother     Sleep Apnea Sister     Alcohol Abuse Brother     Other Sister         killed by drunk      Social History     Socioeconomic History    Marital status:      Spouse name: Not on file    Number of children: 0    Years of education: Not on file    Highest education level: Not on file   Occupational History    Occupation: medical records   Tobacco Use    Smoking status: Former Smoker     Packs/day: 0.50     Years: 10.00     Pack years: 5.00     Quit date: 11/17/2010     Years since quitting: 10.6    Smokeless tobacco: Never Used   Vaping Use    Vaping Use: Never used   Substance and Sexual Activity    Alcohol use: Yes     Alcohol/week: 0.0 standard drinks     Comment: socially    Drug use: No    Sexual activity: Not on file   Other Topics Concern    Not on file   Social History Narrative    Not on file     Social Determinants of Health     Financial Resource Strain: Low Risk     Difficulty of Paying Living Expenses: Not hard at all   Food Insecurity: No Food Insecurity    Worried About Running Out of Food in the Last Year: Never true    Florence of Food in the Last Year: Never true   Transportation Needs: No Transportation Needs    Lack of Transportation (Medical): No    Lack of Transportation (Non-Medical):  No   Physical Activity:     Days of Exercise per Week:     Minutes of Exercise per Session:    Stress:     Feeling of Stress :    Social Connections:     Frequency of Communication with Friends and Family:     Frequency of Social Gatherings with Friends and Family:     Attends Rastafarian Services:     Active Member of Clubs or Organizations:     Attends Club or Organization Meetings:     Marital Status:    Intimate Partner Violence:     Fear of Current or Ex-Partner:     Emotionally Abused:     Physically Abused:     Sexually Abused:        Review of Systems  A comprehensive review of systems was negative except for what was noted in the HPI. Physical Exam   Constitutional: She is oriented to person, place, and time. She appears well-developed and well-nourished. No distress. HENT:   Head: Normocephalic and atraumatic. Mouth/Throat: Uvula is midline, oropharynx is clear and moist and mucous membranes are normal.   Eyes: Conjunctivae and EOM are normal. Pupils are equal, round, and reactive to light. Neck: Trachea normal and normal range of motion. Neck supple. No JVD present. Carotid bruit is not present. No mass and no thyromegaly present. Cardiovascular: Normal rate, regular rhythm, normal heart sounds and intact distal pulses. Exam reveals no gallop and no friction rub. No murmur heard. Pulmonary/Chest: Effort normal and breath sounds normal. No respiratory distress. She has no wheezes. She has no rales. Abdominal: Soft. Normal aorta and bowel sounds are normal. She exhibits no distension and no mass. There is no hepatosplenomegaly. No tenderness. Musculoskeletal: She exhibits no edema and no tenderness. Neurological: She is alert and oriented to person, place, and time. She has normal strength. No cranial nerve deficit or sensory deficit. Coordination and gait normal.   Skin: Skin is warm and dry. No erythema. Hives noted to trunk and extremities. Psychiatric: She has a normal mood and affect. Her behavior is normal.     EKG Interpretation:  normal EKG, normal sinus rhythm, unchanged from previous tracings. - patient was cleared by cardiology Dr. Last Shaw with normal EKG. She does have HX Afib but currently in 2011 [facility]. Lab Review not applicable  Labs obtained today. Assessment:       48 y.o. patient with planned surgery as above. Known risk factors for perioperative complications: Arrhythmia (afib)- currently in RSR. Current medications which may produce withdrawal symptoms if withheld perioperatively: none      Plan:     1. Preoperative workup as follows: ECG, hemoglobin, hematocrit, electrolytes, creatinine, glucose  2. Change in medication regimen before surgery: Take Cardizem (per cardiologist) on morning of surgery with sip of water, and hold all other medications until after surgery  3. Prophylaxis for cardiac events with perioperative beta-blockers: Not indicated  ACC/AHA indications for pre-operative beta-blocker use:    · Vascular surgery with history of postitive stress test  · Intermediate or high risk surgery with history of CAD   · Intermediate or high risk surgery with multiple clinical predictors of CAD- 2 of the following: history of compensated or prior heart failure, history of cerebrovascular disease, DM, or renal insufficiency    Routine administration of higher-dose, long-acting metoprolol in beta-blockernaïve patients on the day of surgery, and in the absence of dose titration is associated with an overall increase in mortality. Beta-blockers should be started days to weeks prior to surgery and titrated to pulse < 70.  4. Deep vein thrombosis prophylaxis: regimen to be chosen by surgical team  5.  No contraindications to planned surgery

## 2021-07-07 NOTE — PATIENT INSTRUCTIONS
Patient Education        Hives: Care Instructions  Your Care Instructions  Hives are raised, red, itchy patches of skin. They are also called wheals or welts. They usually have red borders and pale centers. Hives range in size from ¼ inch to 3 inches or more across. They may seem to move from place to place on the skin. Several hives may form a large area of raised, red skin. You can get hives after an insect sting, after taking medicine or eating certain foods, or because of infection or stress. Other causes include plants, things you breathe in, makeup, heat, cold, sunlight, and latex. You cannot spread hives to other people. Hives may last a few minutes or a few days, but a single spot may last less than 36 hours. Follow-up care is a key part of your treatment and safety. Be sure to make and go to all appointments, and call your doctor if you are having problems. It's also a good idea to know your test results and keep a list of the medicines you take. How can you care for yourself at home? · Avoid whatever you think may have caused your hives, such as a certain food or medicine. However, you may not know the cause. · Put a cool, wet towel on the area to relieve itching. · Take an over-the-counter antihistamine, such as diphenhydramine (Benadryl), cetirizine (Zyrtec), or loratadine (Claritin), to help stop the hives and calm the itching. Read and follow directions on the label. These medicines can make you feel sleepy. Do not drive while using them. · Stay away from strong soaps, detergents, and chemicals. These can make itching worse. When should you call for help? Call 911 anytime you think you may need emergency care. For example, call if:    · You have symptoms of a severe allergic reaction. These may include:  ? Sudden raised, red areas (hives) all over your body. ? Swelling of the throat, mouth, lips, or tongue. ? Trouble breathing. ? Passing out (losing consciousness).  Or you may feel very lightheaded or suddenly feel weak, confused, or restless. Call your doctor now or seek immediate medical care if:    · You have symptoms of an allergic reaction, such as:  ? A rash or hives (raised, red areas on the skin). ? Itching. ? Swelling. ? Belly pain, nausea, or vomiting.     · You get hives after you start a new medicine.     · Hives have not gone away after 24 hours. Watch closely for changes in your health, and be sure to contact your doctor if:    · You do not get better as expected. Where can you learn more? Go to [email or web address] org and sign in to your Me-Mover account. Enter Z987 in the virocyt box to learn more about \"Hives: Care Instructions. \"     If you do not have an account, please click on the \"Sign Up Now\" link. Current as of: October 19, 2020               Content Version: 12.9  © 2006-2021 Healthwise, Incorporated. Care instructions adapted under license by [facility] ([facility]). If you have questions about a medical condition or this instruction, always ask your healthcare professional. [place] [address] any warranty or liability for your use of this information.

## 2021-07-07 NOTE — PATIENT INSTRUCTIONS
Patient Education        Learning About How to Prepare for Surgery  How can you prepare before surgery? You can do some things that will help you safely prepare for surgery. · Understand exactly what surgery is planned. You should know the risks, benefits, and other options. · Tell your doctors ALL the medicines, vitamins, supplements, and herbal remedies you take. Some of these can increase the risk of bleeding. Or they may interact with anesthesia. · Follow your doctor's instructions about which medicines to take or stop before your surgery. ? You may need to stop taking some medicines a week or more before surgery. ? If you take aspirin or some other blood thinner, be sure to talk to your doctor. · Follow any other instructions your doctor gave you. · If you have an advance directive, let your doctor know, and bring a copy to the hospital.   It may include a living will and a durable power of  for health care. It lets your doctor and loved ones know your health care wishes. If you don't have one, you may want to prepare one. How can you prepare on the day of surgery? Here are some tips about what to do at home before you leave for your surgery. · If your doctor told you to take your medicines on the day of surgery, take them with only a sip of water. · Follow the instructions about when to stop eating and drinking. If you don't, your surgery may be canceled. · Follow your doctor's instructions about when to bathe or shower before your surgery. · Do not shave the surgical site yourself. · Take off all jewelry and piercings. · Take out contact lenses, if you wear them. · Have a picture ID ready to take with you. Your ID will be checked before your surgery. · Know when to call your doctor. Call your doctor if you:  ? Become ill before surgery. ? Need to reschedule. ? Have changed your mind about having the surgery. What happens before surgery?   Here are some things you can expect to happen before your surgery. · Your picture ID will be checked. · The area of your body that needs surgery is often marked to make sure there are no errors. · You will be kept comfortable and safe by your anesthesia provider. The anesthesia may make you sleep. Or it may just numb the area being worked on. What happens when you are ready to go home? Be sure you have someone drive you home. Anesthesia and pain medicine make it unsafe for you to drive. You will get instructions about recovering from your surgery. This is called a discharge plan. It will cover things like diet, wound care, follow-up care, driving, and getting back to your normal routine. Follow-up care is a key part of your treatment and safety. Be sure to make and go to all appointments, and call your doctor if you are having problems. It's also a good idea to know your test results and keep a list of the medicines you take. Where can you learn more? Go to https://[email or web address]-Power.[email or web address]. org and sign in to your Typeform account. Enter Q270 in the Incuron box to learn more about \"Learning About How to Prepare for Surgery. \"     If you do not have an account, please click on the \"Sign Up Now\" link. Current as of: May 27, 2020               Content Version: 12.9  © 2006-2021 Healthwise, Incorporated. Care instructions adapted under license by [facility] ([facility]). If you have questions about a medical condition or this instruction, always ask your healthcare professional. [place] [address] any warranty or liability for your use of this information.

## 2021-07-07 NOTE — PROGRESS NOTES
Subjective:      Patient ID: Live Euceda is a 48 y.o. female who presents today for:  Chief Complaint   Patient presents with    Rash     Patient here for rash all over body,        HPI  Patient is here with rash to arms, legs, and abdomen and worse on the abdomen,. Says it started breaking out Tuesday. Says it is very itchy. Says she has not started any new medications and no diet changes. Says she has been stressed so may be secondary to stress. She has no HX of exposure to anything new and no new soaps, lotions, or detergents. Says she has not been ill and no fever, chills, or muscle aches. No one else in the house with the rash. Says she is having surgery in 1 week and worried about the rash.     Past Medical History:   Diagnosis Date    Abnormal Pap smear of cervix     Allergic rhinitis     Arthritis     hands    Asplenia     Atrial fibrillation (HCC)     Atrial fibrillation with RVR (HCC)     Dr. Eris Bergman Bilateral hand pain     CTS (carpal tunnel syndrome) 05/2016    bilateral    External hemorrhoid     Fatigue     Heart murmur     History of ITP 2004    had spenectomy    Hypertension     meds > 1 yrs    Hypothyroidism     meds > 5 yrs    Hypothyroidism     Insomnia     Irregular periods     Meniere disease     Migraine headache     Multinodular goiter     Nail fungus     Pain in right lower leg     Paroxysmal atrial fibrillation (HCC) 10/23/2020    Plantar fasciitis, left     Dr. Glen Yee     Past Surgical History:   Procedure Laterality Date    CARPAL TUNNEL RELEASE Right 07/12/2016    DILATION AND CURETTAGE OF UTERUS N/A 12/16/2019    HYSTEROSCOPY NOVASURE ABLATION D&C performed by Alicia Alarcon DO at [facility] [place] [place] N/A 2004    done for ITP    THYROIDECTOMY, COMPLETION Left 2014    frozen section inconclusive    THYROIDECTOMY, PARTIAL Right 2011    Benign nodule     Social History     Socioeconomic History    Marital status:  Spouse name: Not on file    Number of children: 0    Years of education: Not on file    Highest education level: Not on file   Occupational History    Occupation: medical records   Tobacco Use    Smoking status: Former Smoker     Packs/day: 0.50     Years: 10.00     Pack years: 5.00     Quit date: 11/17/2010     Years since quitting: 10.6    Smokeless tobacco: Never Used   Vaping Use    Vaping Use: Never used   Substance and Sexual Activity    Alcohol use: Yes     Alcohol/week: 0.0 standard drinks     Comment: socially    Drug use: No    Sexual activity: Not on file   Other Topics Concern    Not on file   Social History Narrative    Not on file     Social Determinants of Health     Financial Resource Strain: Low Risk     Difficulty of Paying Living Expenses: Not hard at all   Food Insecurity: No Food Insecurity    Worried About Running Out of Food in the Last Year: Never true    [place] of Food in the Last Year: Never true   Transportation Needs: No Transportation Needs    Lack of Transportation (Medical): No    Lack of Transportation (Non-Medical):  No   Physical Activity:     Days of Exercise per Week:     Minutes of Exercise per Session:    Stress:     Feeling of Stress :    Social Connections:     Frequency of Communication with Friends and Family:     Frequency of Social Gatherings with Friends and Family:     Attends Anglican Services:     Active Member of Clubs or Organizations:     Attends Club or Organization Meetings:     Marital Status:    Intimate Partner Violence:     Fear of Current or Ex-Partner:     Emotionally Abused:     Physically Abused:     Sexually Abused:      Family History   Problem Relation Age of Onset    High Blood Pressure Mother     Anemia Mother     Atrial Fibrillation Mother     Heart Failure Mother     Other Mother         pacemaker    Heart Disease Mother     Sleep Apnea Mother     Vision Loss Mother         histoplasmosis    Heart Disease Father         CAD / Triple bypass    Diabetes Father     Other Father         Fuck's dystropy / corneal transplants / AAA    Cancer Paternal Aunt     Breast Cancer Paternal Aunt     Cancer Paternal Grandmother     Breast Cancer Paternal Grandmother     Sleep Apnea Sister     Alcohol Abuse Brother     Other Sister         killed by drunk      Allergies   Allergen Reactions    Penicillins Anaphylaxis    Codeine Itching, Swelling and Rash     OK with Tylenol #3 per pt    Percocet [Oxycodone-Acetaminophen] Itching, Swelling and Rash    Bee Venom Itching and Swelling    Tape [Adhesive Tape]      Skin irritation     Hydrocodone Itching, Swelling and Rash    Protonix [Pantoprazole Sodium] Swelling and Rash     Caused thrush     Current Outpatient Medications   Medication Sig Dispense Refill    triamcinolone (KENALOG) 0.1 % ointment Apply topically 2 times daily for 7 days 1 Tube 0    busPIRone (BUSPAR) 7.5 MG tablet Take 7.5 mg by mouth as needed      Bacillus Coagulans-Inulin (PROBIOTIC FORMULA PO) Take 1 capsule by mouth daily      Folic Acid (FOLATE PO) Take 1 tablet by mouth daily      Multiple Vitamin (MULTIVITAMIN PO) Take 1 tablet by mouth daily      acetaminophen (APAP EXTRA STRENGTH) 500 MG tablet Take 2 tablets by mouth every 6 hours as needed for Pain 120 tablet 0    hydrocortisone (ANUSOL-HC) 25 MG suppository Place 1 suppository rectally 2 times daily (Patient taking differently: Place 25 mg rectally 2 times daily Uses PRN) 60 suppository 0    levothyroxine (SYNTHROID) 100 MCG tablet Take 1 tablet by mouth daily 90 tablet 03    butalbital-acetaminophen-caffeine (FIORICET, ESGIC) -40 MG per tablet Take 1 tablet by mouth every 4 hours as needed for Headaches 12 tablet 0    ondansetron (ZOFRAN) 8 MG tablet Take 1 tablet by mouth every 12 hours as needed for Nausea or Vomiting 10 tablet 0    sodium chloride (ALTAMIST SPRAY) 0.65 % nasal spray 1 spray by Nasal route as needed for Congestion 1 Bottle 3    tiZANidine (ZANAFLEX) 4 MG tablet Take 1 tablet by mouth every 8 hours as needed (spasm) 30 tablet 1    Handicap Placard MISC by Does not apply route Exp 5 years 1 each 0    rivaroxaban (XARELTO) 20 MG TABS tablet TAKE 1 TABLET BY MOUTH ONE TIME A DAY 90 tablet 3    dilTIAZem (CARDIZEM CD) 180 MG extended release capsule TAKE 1 CAPSULE BY MOUTH ONE TIME A DAY 90 capsule 3    diclofenac sodium 1 % GEL Apply 4 g topically 4 times daily 2 Tube 0    magnesium (MAGNESIUM-OXIDE) 250 MG TABS tablet Take 250 mg by mouth daily       vitamin B-12 (CYANOCOBALAMIN) 500 MCG tablet Take 500 mcg by mouth daily      zinc 50 MG CAPS Take by mouth daily       Echinacea 400 MG CAPS Take by mouth daily       Calcium Carbonate-Vit D-Min (CALCIUM 1200) 7778-3678 MG-UNIT CHEW Take by mouth daily       cetirizine (ZYRTEC) 10 MG tablet Take 10 mg by mouth daily      ferrous sulfate 325 (65 FE) MG tablet Take 65 mg by mouth daily (with breakfast)       fluticasone (FLONASE) 50 MCG/ACT nasal spray 1 spray by Nasal route daily. (Patient taking differently: 1 spray by Nasal route daily Uses PRN) 1 Bottle 06    meclizine (ANTIVERT) 12.5 MG tablet Take 12.5 mg by mouth 3 times daily as needed. No current facility-administered medications for this visit. Review of Systems   Constitutional: Negative for activity change, appetite change, chills, diaphoresis, fatigue, fever and unexpected weight change. HENT: Negative for congestion, ear pain, facial swelling, postnasal drip, rhinorrhea, sinus pressure, sinus pain, sore throat, tinnitus, trouble swallowing and voice change. Eyes: Negative for pain, discharge, redness and itching. Respiratory: Negative for cough, chest tightness, shortness of breath and wheezing. Cardiovascular: Negative for chest pain, palpitations and leg swelling. Gastrointestinal: Negative for abdominal distention, abdominal pain, diarrhea, nausea and vomiting. Genitourinary: Negative for dysuria, frequency, hematuria and urgency. Musculoskeletal: Negative for arthralgias and myalgias. Skin: Positive for rash. Allergic/Immunologic: Negative for environmental allergies and food allergies. Neurological: Negative for dizziness, weakness, light-headedness and numbness. Psychiatric/Behavioral: Negative for confusion. Objective:   /78 (Site: Right Upper Arm, Position: Sitting, Cuff Size: Large Adult)   Pulse 84   Temp 97.6 °F (36.4 °C) (Temporal)   Ht 5' 6\" (1.676 m)   Wt 221 lb (100.2 kg)   SpO2 98%   BMI 35.67 kg/m²     Physical Exam  Vitals reviewed. Constitutional:       General: She is awake. Appearance: Normal appearance. She is well-developed and well-groomed. She is obese. HENT:      Head: Normocephalic and atraumatic. Jaw: There is normal jaw occlusion. Right Ear: Hearing, tympanic membrane, ear canal and external ear normal.      Left Ear: Hearing, tympanic membrane, ear canal and external ear normal.      Nose: Nose normal. No congestion or rhinorrhea. Mouth/Throat:      Lips: Pink. Mouth: Mucous membranes are moist. No oral lesions. Tongue: No lesions. Palate: No lesions. Pharynx: Oropharynx is clear. Uvula midline. No oropharyngeal exudate, posterior oropharyngeal erythema or uvula swelling. Tonsils: No tonsillar exudate or tonsillar abscesses. 2+ on the right. 2+ on the left. Eyes:      General: Lids are normal.      Extraocular Movements: Extraocular movements intact. Conjunctiva/sclera: Conjunctivae normal.   Cardiovascular:      Rate and Rhythm: Normal rate and regular rhythm. Pulses: Normal pulses. Heart sounds: Normal heart sounds, S1 normal and S2 normal.   Pulmonary:      Effort: Pulmonary effort is normal.      Breath sounds: Normal breath sounds and air entry. Abdominal:      General: Bowel sounds are normal. There is no distension.       Palpations: Abdomen is soft. There is no mass. Tenderness: There is no abdominal tenderness. There is no right CVA tenderness, left CVA tenderness, guarding or rebound. Hernia: No hernia is present. Musculoskeletal:         General: Normal range of motion. Cervical back: Full passive range of motion without pain, normal range of motion and neck supple. Lymphadenopathy:      Cervical: No cervical adenopathy. Right cervical: No superficial cervical adenopathy. Left cervical: No superficial cervical adenopathy. Skin:     General: Skin is warm and dry. Capillary Refill: Capillary refill takes less than 2 seconds. Findings: Rash present. Rash is urticarial.          Neurological:      General: No focal deficit present. Mental Status: She is alert and oriented to person, place, and time. Mental status is at baseline. Psychiatric:         Attention and Perception: Attention and perception normal.         Mood and Affect: Mood and affect normal.         Speech: Speech normal.         Behavior: Behavior normal. Behavior is cooperative. Thought Content: Thought content normal.         Cognition and Memory: Cognition and memory normal.         Judgment: Judgment normal.         Assessment:       Diagnosis Orders   1. Acute urticaria       No results found for this visit on 07/07/21. Plan:     Assessment & Plan   [facility] seen today for rash. Diagnoses and all orders for this visit:    Acute urticaria  Discussed with patient rash appearing hive like. She has had similar rash in the past and received steroid cream that worked well. Will try topical steroid and avoid oral as she is having surgery in 1 week. Discussed many causes of hives. No new meds or environment changes. Discussed may be stress induced as she has felt more stressed lately and not sleeping well. Advised not to scratch the area. Advised to use the cream, cool compresses, and antihistamines.  Advised to call with any worsening and Er with any trouble breathing or swelling of lips/throat/tongue. Other orders  -     triamcinolone (KENALOG) 0.1 % ointment; Apply topically 2 times daily for 7 days      No orders of the defined types were placed in this encounter. Orders Placed This Encounter   Medications    triamcinolone (KENALOG) 0.1 % ointment     Sig: Apply topically 2 times daily for 7 days     Dispense:  1 Tube     Refill:  0     There are no discontinued medications. No follow-ups on file. Reviewed with the patient/family: current clinical status & medications. Side effects of the medication prescribed today, as well as treatment plan/rationale and result expectations have been discussed with the patient/family who expresses understanding. Patient will be discharged home in stable condition. Follow up with PCP to evaluate treatment results or return if symptoms worsen or fail to improve. Discussed signs and symptoms which require immediate follow-up in ED/call to 911. Understanding verbalized. I have reviewed the patient's medical history in detail and updated the computerized patient record.     Kimber Reynolds, CASE - CNP

## 2021-07-09 ENCOUNTER — TELEPHONE (OUTPATIENT)
Dept: OBGYN CLINIC | Age: 46
End: 2021-07-09

## 2021-07-09 DIAGNOSIS — L50.9 URTICARIA: Primary | ICD-10-CM

## 2021-07-09 RX ORDER — HYDROXYZINE HYDROCHLORIDE 25 MG/1
25 TABLET, FILM COATED ORAL EVERY 8 HOURS PRN
Qty: 30 TABLET | Refills: 0 | Status: SHIPPED | OUTPATIENT
Start: 2021-07-09 | End: 2021-07-19

## 2021-07-09 NOTE — TELEPHONE ENCOUNTER
John Nguyen called this afternoon to reschedule surgery. She was scheduled for surgery with Dr. Vincent Spence 7/12/21 but it was canceled due to incident at surgery department yesterday. She was made aware Duc storey was out of the office today but would be back on Monday. She'd like a call to reschedule as soon as possible.

## 2021-07-13 DIAGNOSIS — M62.830 BACK SPASM: ICD-10-CM

## 2021-07-13 DIAGNOSIS — G43.109 MIGRAINE WITH AURA AND WITHOUT STATUS MIGRAINOSUS, NOT INTRACTABLE: ICD-10-CM

## 2021-07-13 DIAGNOSIS — R11.0 NAUSEA: ICD-10-CM

## 2021-07-13 RX ORDER — ONDANSETRON HYDROCHLORIDE 8 MG/1
8 TABLET, FILM COATED ORAL EVERY 12 HOURS PRN
Qty: 10 TABLET | Refills: 0 | Status: SHIPPED | OUTPATIENT
Start: 2021-07-13 | End: 2021-08-12 | Stop reason: SDUPTHER

## 2021-07-13 RX ORDER — BUTALBITAL, ACETAMINOPHEN AND CAFFEINE 50; 325; 40 MG/1; MG/1; MG/1
1 TABLET ORAL EVERY 4 HOURS PRN
Qty: 12 TABLET | Refills: 0 | Status: SHIPPED | OUTPATIENT
Start: 2021-07-13 | End: 2021-12-30 | Stop reason: SDUPTHER

## 2021-07-13 RX ORDER — TIZANIDINE 4 MG/1
4 TABLET ORAL EVERY 8 HOURS PRN
Qty: 30 TABLET | Refills: 5 | Status: SHIPPED | OUTPATIENT
Start: 2021-07-13

## 2021-07-29 RX ORDER — BUSPIRONE HYDROCHLORIDE 7.5 MG/1
TABLET ORAL
Qty: 30 TABLET | Refills: 1 | Status: SHIPPED | OUTPATIENT
Start: 2021-07-29 | End: 2022-01-18

## 2021-08-02 ENCOUNTER — ANESTHESIA EVENT (OUTPATIENT)
Dept: OPERATING ROOM | Age: 46
End: 2021-08-02
Payer: COMMERCIAL

## 2021-08-02 ENCOUNTER — HOSPITAL ENCOUNTER (OUTPATIENT)
Age: 46
Setting detail: OUTPATIENT SURGERY
Discharge: HOME | End: 2021-08-02
Attending: OBSTETRICS & GYNECOLOGY | Admitting: OBSTETRICS & GYNECOLOGY
Payer: COMMERCIAL

## 2021-08-02 ENCOUNTER — ANESTHESIA (OUTPATIENT)
Dept: OPERATING ROOM | Age: 46
End: 2021-08-02
Payer: COMMERCIAL

## 2021-08-02 VITALS
DIASTOLIC BLOOD PRESSURE: 77 MMHG | SYSTOLIC BLOOD PRESSURE: 118 MMHG | HEIGHT: 66 IN | HEART RATE: 60 BPM | TEMPERATURE: 97 F | OXYGEN SATURATION: 99 % | BODY MASS INDEX: 34.87 KG/M2 | RESPIRATION RATE: 19 BRPM | WEIGHT: 217 LBS

## 2021-08-02 VITALS — DIASTOLIC BLOOD PRESSURE: 52 MMHG | OXYGEN SATURATION: 93 % | SYSTOLIC BLOOD PRESSURE: 92 MMHG

## 2021-08-02 DIAGNOSIS — G89.18 POSTOPERATIVE PAIN: Primary | ICD-10-CM

## 2021-08-02 LAB
ABO/RH: NORMAL
ANTIBODY SCREEN: NORMAL
HCG, URINE, POC: NEGATIVE
Lab: NORMAL
NEGATIVE QC PASS/FAIL: NORMAL
POSITIVE QC PASS/FAIL: NORMAL

## 2021-08-02 PROCEDURE — 2500000003 HC RX 250 WO HCPCS: Performed by: ANESTHESIOLOGY

## 2021-08-02 PROCEDURE — 2709999900 HC NON-CHARGEABLE SUPPLY: Performed by: OBSTETRICS & GYNECOLOGY

## 2021-08-02 PROCEDURE — 3700000000 HC ANESTHESIA ATTENDED CARE: Performed by: OBSTETRICS & GYNECOLOGY

## 2021-08-02 PROCEDURE — 3700000001 HC ADD 15 MINUTES (ANESTHESIA): Performed by: OBSTETRICS & GYNECOLOGY

## 2021-08-02 PROCEDURE — 2500000003 HC RX 250 WO HCPCS: Performed by: OBSTETRICS & GYNECOLOGY

## 2021-08-02 PROCEDURE — 2580000003 HC RX 258: Performed by: OBSTETRICS & GYNECOLOGY

## 2021-08-02 PROCEDURE — 88342 IMHCHEM/IMCYTCHM 1ST ANTB: CPT

## 2021-08-02 PROCEDURE — 76942 ECHO GUIDE FOR BIOPSY: CPT | Performed by: ANESTHESIOLOGY

## 2021-08-02 PROCEDURE — 7100000011 HC PHASE II RECOVERY - ADDTL 15 MIN: Performed by: OBSTETRICS & GYNECOLOGY

## 2021-08-02 PROCEDURE — 3600000004 HC SURGERY LEVEL 4 BASE: Performed by: OBSTETRICS & GYNECOLOGY

## 2021-08-02 PROCEDURE — 6360000002 HC RX W HCPCS: Performed by: OBSTETRICS & GYNECOLOGY

## 2021-08-02 PROCEDURE — 88309 TISSUE EXAM BY PATHOLOGIST: CPT

## 2021-08-02 PROCEDURE — 2500000003 HC RX 250 WO HCPCS: Performed by: NURSE ANESTHETIST, CERTIFIED REGISTERED

## 2021-08-02 PROCEDURE — 6370000000 HC RX 637 (ALT 250 FOR IP): Performed by: OBSTETRICS & GYNECOLOGY

## 2021-08-02 PROCEDURE — 7100000000 HC PACU RECOVERY - FIRST 15 MIN: Performed by: OBSTETRICS & GYNECOLOGY

## 2021-08-02 PROCEDURE — 3600000014 HC SURGERY LEVEL 4 ADDTL 15MIN: Performed by: OBSTETRICS & GYNECOLOGY

## 2021-08-02 PROCEDURE — 6360000002 HC RX W HCPCS: Performed by: ANESTHESIOLOGY

## 2021-08-02 PROCEDURE — 58571 TLH W/T/O 250 G OR LESS: CPT | Performed by: OBSTETRICS & GYNECOLOGY

## 2021-08-02 PROCEDURE — 86901 BLOOD TYPING SEROLOGIC RH(D): CPT

## 2021-08-02 PROCEDURE — 86900 BLOOD TYPING SEROLOGIC ABO: CPT

## 2021-08-02 PROCEDURE — 7100000001 HC PACU RECOVERY - ADDTL 15 MIN: Performed by: OBSTETRICS & GYNECOLOGY

## 2021-08-02 PROCEDURE — 6360000002 HC RX W HCPCS: Performed by: NURSE ANESTHETIST, CERTIFIED REGISTERED

## 2021-08-02 PROCEDURE — 2720000010 HC SURG SUPPLY STERILE: Performed by: OBSTETRICS & GYNECOLOGY

## 2021-08-02 PROCEDURE — 7100000010 HC PHASE II RECOVERY - FIRST 15 MIN: Performed by: OBSTETRICS & GYNECOLOGY

## 2021-08-02 PROCEDURE — 86850 RBC ANTIBODY SCREEN: CPT

## 2021-08-02 RX ORDER — KETOROLAC TROMETHAMINE 30 MG/ML
30 INJECTION, SOLUTION INTRAMUSCULAR; INTRAVENOUS EVERY 6 HOURS
Status: DISCONTINUED | OUTPATIENT
Start: 2021-08-02 | End: 2021-08-02 | Stop reason: HOSPADM

## 2021-08-02 RX ORDER — SODIUM CHLORIDE, SODIUM LACTATE, POTASSIUM CHLORIDE, CALCIUM CHLORIDE 600; 310; 30; 20 MG/100ML; MG/100ML; MG/100ML; MG/100ML
INJECTION, SOLUTION INTRAVENOUS CONTINUOUS
Status: DISCONTINUED | OUTPATIENT
Start: 2021-08-02 | End: 2021-08-02 | Stop reason: HOSPADM

## 2021-08-02 RX ORDER — ACETAMINOPHEN AND CODEINE PHOSPHATE 300; 30 MG/1; MG/1
1 TABLET ORAL EVERY 4 HOURS PRN
Qty: 30 TABLET | Refills: 0 | Status: SHIPPED | OUTPATIENT
Start: 2021-08-02 | End: 2021-10-15 | Stop reason: SDUPTHER

## 2021-08-02 RX ORDER — TRAMADOL HYDROCHLORIDE 50 MG/1
50 TABLET ORAL EVERY 6 HOURS PRN
Status: DISCONTINUED | OUTPATIENT
Start: 2021-08-02 | End: 2021-08-02 | Stop reason: HOSPADM

## 2021-08-02 RX ORDER — MEPERIDINE HYDROCHLORIDE 25 MG/ML
12.5 INJECTION INTRAMUSCULAR; INTRAVENOUS; SUBCUTANEOUS EVERY 5 MIN PRN
Status: DISCONTINUED | OUTPATIENT
Start: 2021-08-02 | End: 2021-08-02 | Stop reason: HOSPADM

## 2021-08-02 RX ORDER — SODIUM CHLORIDE 9 MG/ML
25 INJECTION, SOLUTION INTRAVENOUS PRN
Status: DISCONTINUED | OUTPATIENT
Start: 2021-08-02 | End: 2021-08-02 | Stop reason: HOSPADM

## 2021-08-02 RX ORDER — METOCLOPRAMIDE HYDROCHLORIDE 5 MG/ML
10 INJECTION INTRAMUSCULAR; INTRAVENOUS
Status: DISCONTINUED | OUTPATIENT
Start: 2021-08-02 | End: 2021-08-02 | Stop reason: HOSPADM

## 2021-08-02 RX ORDER — ROCURONIUM BROMIDE 10 MG/ML
INJECTION, SOLUTION INTRAVENOUS PRN
Status: DISCONTINUED | OUTPATIENT
Start: 2021-08-02 | End: 2021-08-02 | Stop reason: SDUPTHER

## 2021-08-02 RX ORDER — MAGNESIUM HYDROXIDE 1200 MG/15ML
LIQUID ORAL CONTINUOUS PRN
Status: COMPLETED | OUTPATIENT
Start: 2021-08-02 | End: 2021-08-02

## 2021-08-02 RX ORDER — PROPOFOL 10 MG/ML
INJECTION, EMULSION INTRAVENOUS PRN
Status: DISCONTINUED | OUTPATIENT
Start: 2021-08-02 | End: 2021-08-02 | Stop reason: SDUPTHER

## 2021-08-02 RX ORDER — LIDOCAINE HYDROCHLORIDE 20 MG/ML
JELLY TOPICAL PRN
Status: DISCONTINUED | OUTPATIENT
Start: 2021-08-02 | End: 2021-08-02 | Stop reason: ALTCHOICE

## 2021-08-02 RX ORDER — SCOLOPAMINE TRANSDERMAL SYSTEM 1 MG/1
1 PATCH, EXTENDED RELEASE TRANSDERMAL
Status: DISCONTINUED | OUTPATIENT
Start: 2021-08-02 | End: 2021-08-02 | Stop reason: HOSPADM

## 2021-08-02 RX ORDER — DIPHENHYDRAMINE HYDROCHLORIDE 50 MG/ML
12.5 INJECTION INTRAMUSCULAR; INTRAVENOUS
Status: DISCONTINUED | OUTPATIENT
Start: 2021-08-02 | End: 2021-08-02 | Stop reason: HOSPADM

## 2021-08-02 RX ORDER — ONDANSETRON 2 MG/ML
4 INJECTION INTRAMUSCULAR; INTRAVENOUS EVERY 6 HOURS PRN
Status: DISCONTINUED | OUTPATIENT
Start: 2021-08-02 | End: 2021-08-02 | Stop reason: HOSPADM

## 2021-08-02 RX ORDER — CLINDAMYCIN PHOSPHATE 900 MG/50ML
900 INJECTION INTRAVENOUS ONCE
Status: COMPLETED | OUTPATIENT
Start: 2021-08-02 | End: 2021-08-02

## 2021-08-02 RX ORDER — MIDAZOLAM HYDROCHLORIDE 1 MG/ML
INJECTION INTRAMUSCULAR; INTRAVENOUS PRN
Status: DISCONTINUED | OUTPATIENT
Start: 2021-08-02 | End: 2021-08-02 | Stop reason: SDUPTHER

## 2021-08-02 RX ORDER — ONDANSETRON 2 MG/ML
INJECTION INTRAMUSCULAR; INTRAVENOUS PRN
Status: DISCONTINUED | OUTPATIENT
Start: 2021-08-02 | End: 2021-08-02 | Stop reason: SDUPTHER

## 2021-08-02 RX ORDER — LIDOCAINE HYDROCHLORIDE 20 MG/ML
INJECTION, SOLUTION INTRAVENOUS PRN
Status: DISCONTINUED | OUTPATIENT
Start: 2021-08-02 | End: 2021-08-02 | Stop reason: SDUPTHER

## 2021-08-02 RX ORDER — KETOROLAC TROMETHAMINE 30 MG/ML
INJECTION, SOLUTION INTRAMUSCULAR; INTRAVENOUS PRN
Status: DISCONTINUED | OUTPATIENT
Start: 2021-08-02 | End: 2021-08-02 | Stop reason: SDUPTHER

## 2021-08-02 RX ORDER — ONDANSETRON 4 MG/1
4 TABLET, ORALLY DISINTEGRATING ORAL EVERY 8 HOURS PRN
Status: DISCONTINUED | OUTPATIENT
Start: 2021-08-02 | End: 2021-08-02 | Stop reason: HOSPADM

## 2021-08-02 RX ORDER — IBUPROFEN 400 MG/1
400 TABLET ORAL EVERY 6 HOURS PRN
Status: DISCONTINUED | OUTPATIENT
Start: 2021-08-02 | End: 2021-08-02 | Stop reason: HOSPADM

## 2021-08-02 RX ORDER — ONDANSETRON 2 MG/ML
4 INJECTION INTRAMUSCULAR; INTRAVENOUS
Status: DISCONTINUED | OUTPATIENT
Start: 2021-08-02 | End: 2021-08-02 | Stop reason: HOSPADM

## 2021-08-02 RX ORDER — FENTANYL CITRATE 50 UG/ML
INJECTION, SOLUTION INTRAMUSCULAR; INTRAVENOUS PRN
Status: DISCONTINUED | OUTPATIENT
Start: 2021-08-02 | End: 2021-08-02 | Stop reason: SDUPTHER

## 2021-08-02 RX ORDER — TRAMADOL HYDROCHLORIDE 50 MG/1
50 TABLET ORAL EVERY 6 HOURS PRN
Qty: 12 TABLET | Refills: 0 | Status: SHIPPED | OUTPATIENT
Start: 2021-08-02 | End: 2021-08-05

## 2021-08-02 RX ORDER — BUPIVACAINE HYDROCHLORIDE 2.5 MG/ML
INJECTION, SOLUTION EPIDURAL; INFILTRATION; INTRACAUDAL PRN
Status: DISCONTINUED | OUTPATIENT
Start: 2021-08-02 | End: 2021-08-02 | Stop reason: SDUPTHER

## 2021-08-02 RX ORDER — SODIUM CHLORIDE, SODIUM LACTATE, POTASSIUM CHLORIDE, CALCIUM CHLORIDE 600; 310; 30; 20 MG/100ML; MG/100ML; MG/100ML; MG/100ML
INJECTION, SOLUTION INTRAVENOUS CONTINUOUS PRN
Status: COMPLETED | OUTPATIENT
Start: 2021-08-02 | End: 2021-08-02

## 2021-08-02 RX ORDER — FENTANYL CITRATE 50 UG/ML
50 INJECTION, SOLUTION INTRAMUSCULAR; INTRAVENOUS EVERY 10 MIN PRN
Status: DISCONTINUED | OUTPATIENT
Start: 2021-08-02 | End: 2021-08-02 | Stop reason: HOSPADM

## 2021-08-02 RX ORDER — SODIUM CHLORIDE 0.9 % (FLUSH) 0.9 %
5-40 SYRINGE (ML) INJECTION EVERY 12 HOURS SCHEDULED
Status: DISCONTINUED | OUTPATIENT
Start: 2021-08-02 | End: 2021-08-02 | Stop reason: HOSPADM

## 2021-08-02 RX ORDER — SODIUM CHLORIDE 0.9 % (FLUSH) 0.9 %
5-40 SYRINGE (ML) INJECTION PRN
Status: DISCONTINUED | OUTPATIENT
Start: 2021-08-02 | End: 2021-08-02 | Stop reason: HOSPADM

## 2021-08-02 RX ADMIN — GENTAMICIN SULFATE 180 MG: 40 INJECTION, SOLUTION INTRAMUSCULAR; INTRAVENOUS at 12:09

## 2021-08-02 RX ADMIN — KETOROLAC TROMETHAMINE 30 MG: 30 INJECTION, SOLUTION INTRAMUSCULAR; INTRAVENOUS at 13:19

## 2021-08-02 RX ADMIN — BUPIVACAINE HYDROCHLORIDE 30 ML: 2.5 INJECTION, SOLUTION EPIDURAL; INFILTRATION; INTRACAUDAL; PERINEURAL at 10:32

## 2021-08-02 RX ADMIN — SUGAMMADEX 200 MG: 100 INJECTION, SOLUTION INTRAVENOUS at 13:19

## 2021-08-02 RX ADMIN — PROPOFOL 200 MG: 10 INJECTION, EMULSION INTRAVENOUS at 12:09

## 2021-08-02 RX ADMIN — CLINDAMYCIN IN 5 PERCENT DEXTROSE 900 MG: 18 INJECTION, SOLUTION INTRAVENOUS at 12:04

## 2021-08-02 RX ADMIN — BUPIVACAINE HYDROCHLORIDE 30 ML: 2.5 INJECTION, SOLUTION EPIDURAL; INFILTRATION; INTRACAUDAL; PERINEURAL at 10:30

## 2021-08-02 RX ADMIN — MIDAZOLAM HYDROCHLORIDE 2 MG: 2 INJECTION, SOLUTION INTRAMUSCULAR; INTRAVENOUS at 10:30

## 2021-08-02 RX ADMIN — LIDOCAINE HYDROCHLORIDE 50 MG: 20 INJECTION, SOLUTION INTRAVENOUS at 12:09

## 2021-08-02 RX ADMIN — SODIUM CHLORIDE, POTASSIUM CHLORIDE, SODIUM LACTATE AND CALCIUM CHLORIDE: 600; 310; 30; 20 INJECTION, SOLUTION INTRAVENOUS at 10:29

## 2021-08-02 RX ADMIN — ONDANSETRON 4 MG: 2 INJECTION INTRAMUSCULAR; INTRAVENOUS at 13:02

## 2021-08-02 RX ADMIN — FENTANYL CITRATE 50 MCG: 50 INJECTION, SOLUTION INTRAMUSCULAR; INTRAVENOUS at 13:36

## 2021-08-02 RX ADMIN — FENTANYL CITRATE 50 MCG: 50 INJECTION, SOLUTION INTRAMUSCULAR; INTRAVENOUS at 13:28

## 2021-08-02 RX ADMIN — ROCURONIUM BROMIDE 50 MG: 10 INJECTION INTRAVENOUS at 12:09

## 2021-08-02 ASSESSMENT — PULMONARY FUNCTION TESTS
PIF_VALUE: 35
PIF_VALUE: 22
PIF_VALUE: 26
PIF_VALUE: 35
PIF_VALUE: 26
PIF_VALUE: 1
PIF_VALUE: 27
PIF_VALUE: 35
PIF_VALUE: 1
PIF_VALUE: 35
PIF_VALUE: 22
PIF_VALUE: 26
PIF_VALUE: 25
PIF_VALUE: 35
PIF_VALUE: 24
PIF_VALUE: 9
PIF_VALUE: 22
PIF_VALUE: 26
PIF_VALUE: 35
PIF_VALUE: 31
PIF_VALUE: 4
PIF_VALUE: 24
PIF_VALUE: 0
PIF_VALUE: 32
PIF_VALUE: 25
PIF_VALUE: 35
PIF_VALUE: 22
PIF_VALUE: 34
PIF_VALUE: 22
PIF_VALUE: 26
PIF_VALUE: 22
PIF_VALUE: 0
PIF_VALUE: 26
PIF_VALUE: 35
PIF_VALUE: 0
PIF_VALUE: 25
PIF_VALUE: 26
PIF_VALUE: 26
PIF_VALUE: 22
PIF_VALUE: 3
PIF_VALUE: 26
PIF_VALUE: 31
PIF_VALUE: 23
PIF_VALUE: 4
PIF_VALUE: 26
PIF_VALUE: 21
PIF_VALUE: 0
PIF_VALUE: 24
PIF_VALUE: 28
PIF_VALUE: 25
PIF_VALUE: 2
PIF_VALUE: 26
PIF_VALUE: 35
PIF_VALUE: 23
PIF_VALUE: 34
PIF_VALUE: 34
PIF_VALUE: 26
PIF_VALUE: 1
PIF_VALUE: 34
PIF_VALUE: 0
PIF_VALUE: 35
PIF_VALUE: 23
PIF_VALUE: 33
PIF_VALUE: 22
PIF_VALUE: 24
PIF_VALUE: 34
PIF_VALUE: 22
PIF_VALUE: 25
PIF_VALUE: 22
PIF_VALUE: 31
PIF_VALUE: 0
PIF_VALUE: 2
PIF_VALUE: 35
PIF_VALUE: 5
PIF_VALUE: 34
PIF_VALUE: 29
PIF_VALUE: 34
PIF_VALUE: 27
PIF_VALUE: 22
PIF_VALUE: 26
PIF_VALUE: 0
PIF_VALUE: 23
PIF_VALUE: 1
PIF_VALUE: 34
PIF_VALUE: 29
PIF_VALUE: 23
PIF_VALUE: 34

## 2021-08-02 ASSESSMENT — PAIN SCALES - GENERAL
PAINLEVEL_OUTOF10: 7
PAINLEVEL_OUTOF10: 6
PAINLEVEL_OUTOF10: 5

## 2021-08-02 ASSESSMENT — PAIN DESCRIPTION - PAIN TYPE: TYPE: SURGICAL PAIN

## 2021-08-02 ASSESSMENT — PAIN DESCRIPTION - LOCATION: LOCATION: ABDOMEN

## 2021-08-02 NOTE — ANESTHESIA PRE PROCEDURE
Department of Anesthesiology  Preprocedure Note       Name:  Truong Alvarez   Age:  48 y.o.  :  1975                                          MRN:  [id number]         Date:  2021      Surgeon: Kristen Agarwal):  Julio Cesar Martinez DO    Procedure: Procedure(s):  LAPAROSCOPIC ASSISTED VAGINAL HYSTERECTOMY BILATERAL SALPINGECTOMY ( (PAT DONE AT [facility] 21)    Medications prior to admission:   Prior to Admission medications    Medication Sig Start Date End Date Taking?  Authorizing Provider   busPIRone (BUSPAR) 7.5 MG tablet Take 1 by mouth as needed 21   Julio Cesar Martinez DO   tiZANidine (ZANAFLEX) 4 MG tablet Take 1 tablet by mouth every 8 hours as needed (spasm) 21   Precious Wilson MD   butalbital-acetaminophen-caffeine (FIORICET, ESGIC) -80 MG per tablet Take 1 tablet by mouth every 4 hours as needed for Headaches 21   Precious Wilson MD   ondansetron [facility]) 8 MG tablet Take 1 tablet by mouth every 12 hours as needed for Nausea or Vomiting 21   Tanna Bergman MD   Bacillus Coagulans-Inulin (PROBIOTIC FORMULA PO) Take 1 capsule by mouth daily    Historical Provider, MD   Folic Acid (FOLATE PO) Take 1 tablet by mouth daily    Historical Provider, MD   Multiple Vitamin (MULTIVITAMIN PO) Take 1 tablet by mouth daily    Historical Provider, MD   acetaminophen (APAP EXTRA STRENGTH) 500 MG tablet Take 2 tablets by mouth every 6 hours as needed for Pain 21   Rajendra Guy DO   hydrocortisone (ANUSOL-HC) 25 MG suppository Place 1 suppository rectally 2 times daily  Patient taking differently: Place 25 mg rectally 2 times daily Uses PRN 3/29/21   Precious Wilson MD   levothyroxine (SYNTHROID) 100 MCG tablet Take 1 tablet by mouth daily 21   Tanna Bergman MD   sodium chloride (ALTAMIST SPRAY) 0.65 % nasal spray 1 spray by Nasal route as needed for Congestion 20   [facility] - [facility]   Handicap Placard MISC by Does not apply route Exp 5 years 20   Precious Wilson MD rivaroxaban (XARELTO) 20 MG TABS tablet TAKE 1 TABLET BY MOUTH ONE TIME A DAY 10/23/20   Ronald BLAKE Holiday, DO   dilTIAZem (CARDIZEM CD) 180 MG extended release capsule TAKE 1 CAPSULE BY MOUTH ONE TIME A DAY 10/23/20   Ronald HAYDEN Mirandaiday, DO   diclofenac sodium 1 % GEL Apply 4 g topically 4 times daily 2/12/19   Heidi Shabazz MD   magnesium (MAGNESIUM-OXIDE) 250 MG TABS tablet Take 250 mg by mouth daily     Historical Provider, MD   vitamin B-12 (CYANOCOBALAMIN) 500 MCG tablet Take 500 mcg by mouth daily    Historical Provider, MD   zinc 50 MG CAPS Take by mouth daily     Historical Provider, MD   Echinacea 400 MG CAPS Take by mouth daily     Historical Provider, MD   Calcium Carbonate-Vit D-Min (CALCIUM 1200) 6772-6313 MG-UNIT CHEW Take by mouth daily     Historical Provider, MD   cetirizine (ZYRTEC) 10 MG tablet Take 10 mg by mouth daily    Historical Provider, MD   ferrous sulfate 325 (65 FE) MG tablet Take 65 mg by mouth daily (with breakfast)     Historical Provider, MD   fluticasone (FLONASE) 50 MCG/ACT nasal spray 1 spray by Nasal route daily. Patient taking differently: 1 spray by Nasal route daily Uses PRN 1/27/15   Bryn Smith MD   meclizine (ANTIVERT) 12.5 MG tablet Take 12.5 mg by mouth 3 times daily as needed.     Historical Provider, MD       Current medications:    Current Facility-Administered Medications   Medication Dose Route Frequency Provider Last Rate Last Admin    lactated ringers infusion   Intravenous Continuous Vicky Arredondo,         scopolamine (TRANSDERM-SCOP) transdermal patch 1 patch  1 patch Transdermal On Call to [facility], DO        clindamycin (CLEOCIN) 900 mg in dextrose 5 % 50 mL IVPB  900 mg Intravenous Once Vicky Arredondo, DO        gentamicin (GARAMYCIN) 180 mg in dextrose 5 % 100 mL IVPB  180 mg Intravenous Once Vicky Arredondo, DO        meperidine (DEMEROL) injection 12.5 mg  12.5 mg Intravenous Q5 Min PRN Sj Fong MD        fentaNYL (SUBLIMAZE) injection 50 mcg  50 mcg Intravenous Q10 Min PRN Clair Dee MD        ondansetron [facility]) injection 4 mg  4 mg Intravenous Once PRN Clair Dee MD        metoclopramide [facility]) injection 10 mg  10 mg Intravenous Once PRN Clair Dee MD        diphenhydrAMINE (BENADRYL) injection 12.5 mg  12.5 mg Intravenous Once PRN Clair Dee MD           Allergies:     Allergies   Allergen Reactions    Penicillins Anaphylaxis    Codeine Itching, Swelling and Rash     OK with Tylenol #3 per pt    Percocet [Oxycodone-Acetaminophen] Itching, Swelling and Rash    Bee Venom Itching and Swelling    Tape Garry Mariajose Tape]      Skin irritation     Hydrocodone Itching, Swelling and Rash    Protonix [Pantoprazole Sodium] Swelling and Rash     Caused thrush       Problem List:    Patient Active Problem List   Diagnosis Code    Hypothyroidism E03.9    Obesity E66.9    Meniere disease H81.09    Irregular periods N92.6    Asplenia Q89.01    Allergic rhinitis J30.9    Migraine G43.909    Multinodular goiter E04.2    Migraine headache G43.909    Insomnia G47.00    Hypertension I10    Menorrhagia N92.0    Pelvic pain R10.2    Postoperative pain G89.18    Paroxysmal atrial fibrillation (HCC) I48.0       Past Medical History:        Diagnosis Date    Abnormal Pap smear of cervix     Allergic rhinitis     Arthritis     hands    Asplenia     Atrial fibrillation (HCC)     Atrial fibrillation with RVR (HCC)     Dr. 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 Joanne Curling       Past Surgical History:        Procedure Laterality Date    CARPAL TUNNEL RELEASE Right 07/12/2016    DILATION AND CURETTAGE OF UTERUS N/A 12/16/2019    HYSTEROSCOPY NOVASURE ABLATION D&C performed by Saige Britt DO at [facility] N/A 2004    done for ITP    THYROIDECTOMY, COMPLETION Left 2014    frozen section inconclusive    THYROIDECTOMY, PARTIAL Right 2011    Benign nodule       Social History:    Social History     Tobacco Use    Smoking status: Former Smoker     Packs/day: 0.50     Years: 10.00     Pack years: 5.00     Quit date: 11/17/2010     Years since quitting: 10.7    Smokeless tobacco: Never Used   Substance Use Topics    Alcohol use: Yes     Alcohol/week: 0.0 standard drinks     Comment: socially                                Counseling given: Not Answered      Vital Signs (Current): There were no vitals filed for this visit.                                            BP Readings from Last 3 Encounters:   07/07/21 132/78   07/07/21 132/78   07/01/21 130/74       NPO Status:                                                                                 BMI:   Wt Readings from Last 3 Encounters:   07/07/21 221 lb (100.2 kg)   07/07/21 221 lb (100.2 kg)   07/01/21 221 lb 9.6 oz (100.5 kg)     There is no height or weight on file to calculate BMI.    CBC:   Lab Results   Component Value Date    WBC 10.3 07/07/2021    RBC 4.61 07/07/2021    RBC 4.29 10/04/2011    HGB 14.1 07/07/2021    HCT 42.0 07/07/2021    MCV 91.2 07/07/2021    RDW 14.9 07/07/2021     07/07/2021       CMP:   Lab Results   Component Value Date     07/07/2021    K 4.5 07/07/2021    K 4.0 06/28/2018     07/07/2021    CO2 25 07/07/2021    BUN 11 07/07/2021    CREATININE 0.71 07/07/2021    GFRAA >60.0 07/07/2021    LABGLOM >60.0 07/07/2021    GLUCOSE 121 07/07/2021    GLUCOSE 106 12/19/2011    PROT 7.6 06/16/2021    CALCIUM 9.7 07/07/2021    BILITOT <0.2 06/16/2021    ALKPHOS 97 06/16/2021    AST 16 06/16/2021    ALT 19 06/16/2021       POC Tests: No results for input(s): POCGLU, POCNA, POCK, POCCL, POCBUN, POCHEMO, POCHCT in the last 72 hours. Coags: No results found for: PROTIME, INR, APTT    HCG (If Applicable): No results found for: PREGTESTUR, PREGSERUM, HCG, HCGQUANT     ABGs: No results found for: PHART, PO2ART, ZMX4FNX, FAJ5OYY, BEART, N9KWJQOS     Type & Screen (If Applicable):  No results found for: LABABO, LABRH    Drug/Infectious Status (If Applicable):  No results found for: HIV, HEPCAB    COVID-19 Screening (If Applicable): No results found for: COVID19        Anesthesia Evaluation  Patient summary reviewed and Nursing notes reviewed no history of anesthetic complications:   Airway: Mallampati: II  TM distance: >3 FB   Neck ROM: full  Mouth opening: > = 3 FB Dental: normal exam         Pulmonary:Negative Pulmonary ROS and normal exam                               Cardiovascular:    (+) hypertension:,                   Neuro/Psych:   (+) neuromuscular disease:, headaches:,             GI/Hepatic/Renal:   (+) morbid obesity          Endo/Other:    (+) hypothyroidism::., .          Pt had PAT visit. Abdominal:   (+) obese,           Vascular: negative vascular ROS. Other Findings:             Anesthesia Plan      general     ASA 3       Induction: intravenous. MIPS: Prophylactic antiemetics administered. Anesthetic plan and risks discussed with patient. Plan discussed with CRNA.     Attending anesthesiologist reviewed and agrees with Preprocedure content              David Nails MD   8/2/2021

## 2021-08-02 NOTE — OP NOTE
Operative Report    PATIENT NAME: [address] RECORD NO. [id number]  SURGEON: Deidre Hart  PRIMARY CARE PHYSICIAN: Helen Ortiz MD     PROCEDURE PERFORMED:  8/2/2021  PREOPERATIVE DIAGNOSIS: Pelvic pain, abnormal bleeding. POSTOPERATIVE DIAGNOSIS: Same   PROCEDURE PERFORMED:  LAVH WITH BILATERAL SALPINGECTOMY  SURGEON:  Dr. Martha Hodgson  ANESTHESIA:  GET  ESTIMATED BLOOD LOSS: 50 ml  SPECIMEN:  Uterus, Cervix and bilateral fallopian tubes sent to pathology. COMPLICATIONS:  None immediately appreciated. Uterine weight in OR:     DISCUSSION: 48 y.o. No obstetric history on file. who presents today with h/o pain abnormal bleeding high-grade cervical intraepithelial dysplasia. The patient has a h/o trial of medical therapy without relief of symptoms. Risks, benefits and alternative therapies for treatment were discussed with the patient. The patient desires definitive surgical management and the patient is planned for hysterectomy. The patient understands the risk of possible open hysterectomy and the patient desires ovarian conservation if possible. PROCEDURE:  The patient was taken to the OR where she was prepped and draped in the normal sterile fashion in a dorsal lithotomy position. Time out was then taken to ensure proper patient, placement and procedure. EUA small anteverted mobile adnexa is negative for mass  A weighted speculum and right angle retractor were used to visualize the cervix which was grasped with a double toothed tenaculum. The uterus was sounded and a uterine manipulator  and colpotomy cup were placed without difficulty. A small infraumbilical incision was made. A 5 mm laparoscope through the end of a 5 mm blunt trocar was placed into the peritoneal cavity under direct visualization without difficulty aiming toward the hollow of the sacrum directly in the midline. Pneumoperitoneum was created. the patient was placed in a steep Trendelenburg position.   The upper abdomen and pelvis were inspected. A second access port using a 5 mm blunt trocar was placed under direct visualization in the left periumbilical region along the midclavicular line. The small bowel, colon, omentum, liver, and stomach were inspected and no signs of injury were noted. The harmonic scalpel was then used to transect the proximal fallopian tubes the underlying mesosalpinx and a bilateral salpingectomy was accomplished. The fallopian tube segments were placed in the cul-de-sac the suspensory ligaments of the ovaries the round ligaments and the parametrial tissue were then transected with the harmonic scalpel. The vesicouterine reflection was incised and  from the lower uterine segment. The uterine vessels bilaterally were coagulated against the cervix. Anterior and posterior colpotomy incisions were then created. The pubocervical vesicle fascia was incised circumferentially across the colpotomy cup. Once the specimen was freed, hemostasis was again ensured. Attention was then turned vaginally. The uterus the cervix and fallopian tube segments were then removed vaginally and sent for pathologic evaluation. Vaginal cuff was then closed figure-of-eight stitches were placed in each corner incorporating the vaginal mucosa the anterior and posterior leaves of the peritoneum the uterosacral ligaments and the cardinal ligaments. The remaining vaginal cuff was closed using a running locking closure. Excellent hemostasis was noted. The vagina was irrigated and then cleansed. Attention was returned to the patients abdomen. Pneumoperitoneum was recreated. Bilateral ovaries were noted to be normal.  Vascular pedicles and vaginal cuff were hemostatic. The pressure in the pelvis was let down to less than 5 mmHg and no bleeding was noted. The ureters were visualized bilaterally and peristalsis was not seen. There was no evidence of bowel bladder or ureter injury.   Surgicel was applied to the surgical bed. The patient's abdomen was then deflated, again ensuring excellent hemostasis, and the trochars were removed under direct visualization. The patient's skin incision were closed with a subcuticular stitch utilizing 4-0 Vicryl  Cytoscopy was was performed bilateral ureteral reflux noted no bladder injury noted performed. FINDINGS:  69 g uterus normal ovaries left in situ. Postoperative findings were discussed with the patient's family. She was given discharge instructions, prescriptions for analgesics. She will follow up in my office in a for reevaluation. Electronically signed by Magdaleno Zurita DO on 8/2/21. Please note this report has been partially produced using speech recognition software and may cause contain errors related to that system including grammar, punctuation and spelling as well as words and phrases that may seem inappropriate. If there are questions or concerns please feel free to contact me to clarify.

## 2021-08-02 NOTE — H&P
Department of Obstetrics and Gynecology   History & Physical    Pt Name: Anjel Turk  MRN: [id number] [place] #: [de-identified]  YOB: 1975  Estimated Date of Delivery: None noted. HPI: The patient is a 48 y.o. No obstetric history on file. female  who presents for [facility]    Allergies:     Allergies as of 07/15/2021 - Fully Reviewed 07/07/2021  Allergen Reaction Noted   Penicillins Anaphylaxis 11/07/2011   Codeine Itching, Swelling, and Rash 11/07/2011   Percocet [oxycodone-acetaminophen] Itching, Swelling, and Rash 11/07/2011   Bee venom Itching and Swelling 07/12/2016   Tape [adhesive tape]  07/12/2016   Hydrocodone Itching, Swelling, and Rash 07/12/2016   Protonix [pantoprazole sodium] Swelling and Rash 11/07/2011      Medications:    Current Facility-Administered Medications:     lactated ringers infusion, , Intravenous, Continuous, Reshma Colby, DO, Last Rate: 125 mL/hr at 08/02/21 1029, New Bag at 08/02/21 1029    scopolamine (TRANSDERM-SCOP) transdermal patch 1 patch, 1 patch, Transdermal, On Call to OR, Reshma Colby, DO, 1 patch at 08/02/21 1029    clindamycin (CLEOCIN) 900 mg in dextrose 5 % 50 mL IVPB, 900 mg, Intravenous, Once, Reshma Colby DO    gentamicin (GARAMYCIN) 180 mg in dextrose 5 % 100 mL IVPB, 180 mg, Intravenous, Once, Rey Ervin DO    meperidine (DEMEROL) injection 12.5 mg, 12.5 mg, Intravenous, Q5 Min PRN, Richard Knutson MD    fentaNYL (SUBLIMAZE) injection 50 mcg, 50 mcg, Intravenous, Q10 Min PRN, Richard Knutson MD    ondansetron [facility]) injection 4 mg, 4 mg, Intravenous, Once PRN, Richard Knutson MD    metoclopramide [facility]) injection 10 mg, 10 mg, Intravenous, Once PRN, Richard Knutson MD    diphenhydrAMINE (BENADRYL) injection 12.5 mg, 12.5 mg, Intravenous, Once PRN, Richard Knutson MD    Facility-Administered Medications Ordered in Other Encounters:     midazolam (VERSED) injection, , 1007  BP: 134/72  Pulse: 71  Resp: 18  Temp: 97.3 °F (36.3 °C)  SpO2: 95%      General: well nourished, well developed, in no acute distress  CV: Normal heart sounds  Resp: breathing unlabored  Abdomen: Nontender, no rebound, no guarding  FH:  Cx:    NST - Cat 1: FHR 140s, moderate variability, +accels, -decels    Labs:       Assessment:   48 y.o. No obstetric history on file.  female with pelvic pain, AUB, hGSIL    Plan: 10 Jamaal Hu, DO

## 2021-08-02 NOTE — PROGRESS NOTES
Pt. C/o feeling like has to pee-bedpan provided. Explained that tineo was just removed in OR and bladder is empty. 1347-pt. Stated sensation subsided-bed pan removed-no void. Comfort maint.

## 2021-08-02 NOTE — ANESTHESIA PROCEDURE NOTES
Peripheral Block    Patient location during procedure: pre-op  Staffing  Performed: anesthesiologist   Anesthesiologist: Immanuel Heck MD  Preanesthetic Checklist  Completed: patient identified, IV checked, site marked, risks and benefits discussed, surgical consent, monitors and equipment checked, pre-op evaluation, timeout performed, anesthesia consent given, oxygen available and patient being monitored  Peripheral Block  Patient position: sitting  Prep: ChloraPrep  Patient monitoring: cardiac monitor, continuous pulse ox, frequent blood pressure checks and IV access  Block type: Erector spinae  Laterality: bilateral  Injection technique: single-shot  Guidance: ultrasound guided  Local infiltration: bupivacaine  Infiltration strength: 0.25 %  Dose: 30 mL  Provider prep: mask and sterile gloves  Local infiltration: bupivacaine  Needle  Needle type: combined needle/nerve stimulator   Needle gauge: 21 G  Needle length: 10 cm  Needle localization: ultrasound guidance  Test dose: negative  Assessment  Injection assessment: negative aspiration for heme, no paresthesia on injection and local visualized surrounding nerve on ultrasound  Paresthesia pain: none  Slow fractionated injection: yes  Hemodynamics: stable  Reason for block: post-op pain management

## 2021-08-02 NOTE — PROGRESS NOTES
PER DR. FARMER-pt to only get the pain filled that works for her and he instructed pt. To take her xeralto 12 hours after surgery.

## 2021-08-02 NOTE — BRIEF OP NOTE
Brief Postoperative Note      Patient: Dave Bedoya  YOB: 1975  MRN: [id number]    Date of Procedure: 8/2/2021    Pre-Op Diagnosis: PELVIC PAIN, ABN BLEEDING, MODERATE CERVICAL DYS    Post-Op Diagnosis: Same       Procedure(s):  LAPAROSCOPIC ASSISTED VAGINAL HYSTERECTOMY BILATERAL SALPINGECTOMY  cystoscopy  Surgeon(s):  Romelia Egan DO    Assistant:  First Assistant: [address]    Anesthesia: General    Estimated Blood Loss (mL): less than 50     Complications: None    Specimens:   ID Type Source Tests Collected by Time Destination   A : uterus, cervix, bilateral tubes Tissue Uterus SURGICAL PATHOLOGY Romelia Egan DO 8/2/2021 1241        Implants:  * No implants in log *      Drains:   Urethral Catheter Non-latex 16 fr (Active)       Findings: 69.2 gram uterus.  Ovaries left in situ    Electronically signed by Romelia Egan DO on 8/2/2021 at 1:24 PM

## 2021-08-03 ENCOUNTER — CARE COORDINATION (OUTPATIENT)
Dept: OTHER | Facility: CLINIC | Age: 46
End: 2021-08-03

## 2021-08-03 NOTE — CARE COORDINATION
[facility] Transitions Initial Follow Up Call    Call within 2 business days of discharge: Yes    Patient: Ramiro Nolan Patient : 1975   MRN: [id number]  Reason for Admission: [facility]- BSO  Discharge Date: 21 RARS: No data recorded    Last Discharge [facility]       Complaint Diagnosis Description Type Department Provider    21  Postoperative pain Admission (Discharged) [address] , DO           Was this an external facility discharge? No Discharge Facility: [place] [address] to be reviewed by the provider   Additional needs identified to be addressed with provider No  none         Method of communication with provider : chart routing    Discussed COVID-19 related testing which was not done at this time. Test results were not done. Patient informed of results, if available? N/A    Advance Care Planning:   Does patient have an Advance Directive:  not on file; education provided. Was this a readmission? No  Patient stated reason for admission: Vaginal Hysterectomy  Patients top risk factors for readmission: functional physical ability and medical condition-S/P [facility]    [address] (ACM) contacted the patientby telephone to perform post hospital discharge assessment. Verified name and  with patientas identifiers. Provided introduction to self, and explanation of the ACM role. ACM reviewed discharge instructions, medical action plan and red flags with patient who verbalized understanding. Patient given an opportunity to ask questions and does not have any further questions or concerns at this time. Were discharge instructions available to patient? Yes. Reviewed appropriate site of care based on symptoms and resources available to patient including: PCP, Specialist, Benefits related nurse triage line, Urgent care clinics and [employer]. The patient agrees to contact the PCP office for questions related to their healthcare.      Medication reconciliation was performed with patient, who verbalizes understanding of administration of home medications. Advised obtaining a 90-day supply of all daily and as-needed medications. Patient reports:    Education provided:   Provider follow up appointment compliance  Utilization of appropriate level of care: Right Care, Right Place, Right Time  Activity limitations  Reinforced Discharge instructions    Covid Risk Education    Patient has following risk factors of: no known risk factors. Education provided regarding infection prevention, and signs and symptoms of COVID-19 and when to seek medical attention with patient who verbalized understanding. Discussed exposure protocols and quarantine From CDC: Are you at higher risk for severe illness?   and given an opportunity for questions and concerns. The patient agrees to contact the COVID-19 hotline [phone] or PCP office for questions related to COVID-19. Discussed follow-up appointments. If no appointment was previously scheduled, appointment scheduling offered: N/A already scheduled   Is follow up appointment scheduled within 7 days of discharge? Yes  [facility] follow up appointment(s):   Future Appointments   Date Time Provider Corby Andersen   8/10/2021 11:15 AM Obi Dale DO [address]   8/12/2021  4:00 PM Jany Montana MD [facility]   9/23/2021  4:00 PM Sudhakar Pyle MD [address] [place] [address]   1/27/2022 10:00 AM Obi Dale DO [address]   3/4/2022  8:30 AM DO Benjamin Perez [place] [id number] Renetta Mcdonald follow up appointment(s): None    Plan for follow-up call in 5-7 days based on severity of symptoms and risk factors. Plan for next call:   Symptom management  Provider follow up appointment compliance  Utilization of appropriate level of care  Follow up with Patient Financial Assistance    ACM provided contact information for future needs.     Care Transitions 24 Hour Call    Schedule Follow Up Appointment with PCP: Completed  Do you have any ongoing symptoms?: Yes  Patient-reported symptoms: Pain (Comment: Post op pain)  Interventions for patient-reported symptoms: Other (Comment: Pt managing at home)  Do you have a copy of your discharge instructions?: Yes  Do you have all of your prescriptions and are they filled?: Yes  Have you been contacted by a [phone] [employer] Pharmacist?: No  Have you scheduled your follow up appointment?: Yes  How are you going to get to your appointment?: Car - family or friend to transport  Were you discharged with any Home Care or Post Acute Services: No  Do you have support at home?: Parent(s)  Do you feel like you have everything you need to keep you well at home?: Yes  Care Transitions Interventions    Specialty Service Referral: Completed           Follow Up  Future Appointments   Date Time Provider Corby Andersen   8/10/2021 11:15 AM Julio Cesar Martinez DO [facility]   8/12/2021  4:00 PM Precious Wilson MD [facility]   9/23/2021  4:00 PM Tanna Bergman MD [address]   1/27/2022 10:00 AM Julio Cesar Martinez DO [facility]   3/4/2022  8:30 AM Ronald Francisco, [address] [facility], [address] [place] [address]

## 2021-08-10 ENCOUNTER — OFFICE VISIT (OUTPATIENT)
Dept: OBGYN CLINIC | Age: 46
End: 2021-08-10

## 2021-08-10 VITALS
DIASTOLIC BLOOD PRESSURE: 86 MMHG | BODY MASS INDEX: 35.02 KG/M2 | HEART RATE: 76 BPM | SYSTOLIC BLOOD PRESSURE: 126 MMHG | WEIGHT: 217 LBS

## 2021-08-10 DIAGNOSIS — Z09 POSTOPERATIVE EXAMINATION: Primary | ICD-10-CM

## 2021-08-10 PROCEDURE — 99024 POSTOP FOLLOW-UP VISIT: CPT | Performed by: OBSTETRICS & GYNECOLOGY

## 2021-08-10 ASSESSMENT — ENCOUNTER SYMPTOMS
COUGH: 0
SHORTNESS OF BREATH: 0
VOICE CHANGE: 0
BACK PAIN: 0
CHEST TIGHTNESS: 0
WHEEZING: 0
BLOOD IN STOOL: 0
ABDOMINAL PAIN: 0
SORE THROAT: 0
VOMITING: 0
TROUBLE SWALLOWING: 0
COLOR CHANGE: 0
NAUSEA: 0
ABDOMINAL DISTENTION: 0
CONSTIPATION: 0

## 2021-08-10 NOTE — PROGRESS NOTES
HPI:  Jace Nolasco (: 1975) is a 48 y.o. female, Established patient, here for evaluation of the following chief complaint(s):  Post-Op Check (loose stool past 2 days. Sore )  She is here for 1 week visit following LAVH and a bilateral salpingectomy. She is doing well. Bowel function is good she has some diarrhea most likely from something that she has eaten. No dysuria no incontinence       SUBJECTIVE/OBJECTIVE:    Past Surgical History:   Procedure Laterality Date    CARPAL TUNNEL RELEASE Right 2016    DILATION AND CURETTAGE OF UTERUS N/A 2019    HYSTEROSCOPY NOVASURE ABLATION D&C performed by Obi Dale DO at [address] [place] [address] N/A 2021    LAPAROSCOPIC ASSISTED VAGINAL HYSTERECTOMY BILATERAL SALPINGECTOMY performed by Obi Dale DO at [facility] N/A     done for ITP    THYROIDECTOMY, COMPLETION Left     frozen section inconclusive    THYROIDECTOMY, PARTIAL Right     Benign nodule        Review of Systems   Constitutional: Negative for activity change, appetite change, fatigue and unexpected weight change. HENT: Negative for dental problem, ear pain, hearing loss, nosebleeds, sore throat, trouble swallowing and voice change. Eyes: Negative for visual disturbance. Respiratory: Negative for cough, chest tightness, shortness of breath and wheezing. Cardiovascular: Negative for chest pain and palpitations. Gastrointestinal: Negative for abdominal distention, abdominal pain, blood in stool, constipation, nausea and vomiting. Endocrine: Negative for cold intolerance, heat intolerance, polydipsia, polyphagia and polyuria. Genitourinary: Negative for difficulty urinating, dyspareunia, dysuria, flank pain, frequency, genital sores, hematuria, menstrual problem, pelvic pain, urgency, vaginal bleeding, vaginal discharge and vaginal pain.    Musculoskeletal: Negative for arthralgias, back pain, joint swelling and myalgias. Skin: Negative for color change and rash. Allergic/Immunologic: Negative for environmental allergies, food allergies and immunocompromised state. Neurological: Negative for dizziness, seizures, syncope, speech difficulty, weakness, numbness and headaches. Hematological: Negative for adenopathy. Does not bruise/bleed easily. Psychiatric/Behavioral: Negative for agitation, behavioral problems, confusion, decreased concentration, dysphoric mood and suicidal ideas. The patient is not nervous/anxious and is not hyperactive. Physical Exam  Abdominal:             Vitals:    08/10/21 1057   BP: 126/86   Pulse: 76   Weight: 217 lb (98.4 kg)         ASSESSMENT/PLAN:    Postop visit following [facility]    PLAN: 5 weeks for a vaginal cuff check      No follow-ups on file. On this date 8/10/2021 I have spent 30 minutes reviewing previous notes, test results and face to face with the patient discussing the diagnosis and importance of compliance with the treatment plan as well as documenting on the day of the visit. An electronic signature was used to authenticate this note. Please note this report has been partially produced using speech recognition software and may cause contain errors related to that system including grammar, punctuation and spelling as well as words and phrases that may seem inappropriate. If there are questions or concerns please feel free to contact me to clarify.

## 2021-08-12 ENCOUNTER — OFFICE VISIT (OUTPATIENT)
Dept: FAMILY MEDICINE CLINIC | Age: 46
End: 2021-08-12
Payer: COMMERCIAL

## 2021-08-12 VITALS
OXYGEN SATURATION: 97 % | HEIGHT: 66 IN | DIASTOLIC BLOOD PRESSURE: 80 MMHG | BODY MASS INDEX: 35.2 KG/M2 | WEIGHT: 219 LBS | HEART RATE: 67 BPM | SYSTOLIC BLOOD PRESSURE: 136 MMHG | TEMPERATURE: 98 F

## 2021-08-12 DIAGNOSIS — J03.91 RECURRENT TONSILLITIS: ICD-10-CM

## 2021-08-12 DIAGNOSIS — R11.0 NAUSEA: ICD-10-CM

## 2021-08-12 DIAGNOSIS — I10 ESSENTIAL HYPERTENSION: ICD-10-CM

## 2021-08-12 DIAGNOSIS — Q89.01 ASPLENIA: ICD-10-CM

## 2021-08-12 DIAGNOSIS — E03.9 HYPOTHYROIDISM, UNSPECIFIED TYPE: Primary | ICD-10-CM

## 2021-08-12 DIAGNOSIS — I48.91 ATRIAL FIBRILLATION WITH RVR (HCC): ICD-10-CM

## 2021-08-12 PROCEDURE — 90648 HIB PRP-T VACCINE 4 DOSE IM: CPT | Performed by: FAMILY MEDICINE

## 2021-08-12 PROCEDURE — 90471 IMMUNIZATION ADMIN: CPT | Performed by: FAMILY MEDICINE

## 2021-08-12 PROCEDURE — 99214 OFFICE O/P EST MOD 30 MIN: CPT | Performed by: FAMILY MEDICINE

## 2021-08-12 RX ORDER — ONDANSETRON HYDROCHLORIDE 8 MG/1
8 TABLET, FILM COATED ORAL EVERY 12 HOURS PRN
Qty: 10 TABLET | Refills: 0 | Status: SHIPPED | OUTPATIENT
Start: 2021-08-12 | End: 2021-08-12

## 2021-08-12 RX ORDER — ONDANSETRON HYDROCHLORIDE 8 MG/1
8 TABLET, FILM COATED ORAL EVERY 12 HOURS PRN
Qty: 30 TABLET | Refills: 2 | Status: SHIPPED | OUTPATIENT
Start: 2021-08-12 | End: 2022-10-31 | Stop reason: SDUPTHER

## 2021-08-12 ASSESSMENT — ENCOUNTER SYMPTOMS: SHORTNESS OF BREATH: 0

## 2021-08-12 NOTE — PROGRESS NOTES
[address] [facility], 48 y.[facility]. female presents today with:  Chief Complaint   Patient presents with    Follow[facility]Up from [facility]     ER visit in June, jaw pain, saw Dr. Jared Mullen for swollen tonsils, was told needs and H flu from  us     Other     had hystorectomy        Urinary Tract Infection     Knee Pain     Migraine     Other      Tonsillitis, recurrent  Will need removed  ENT has told her to get H flu vaccine   She is asplenic    Recent hysterectomy     Here today for 6 month follow-up on chronic problems including A. Fib, hypothyroidism, allergic rhinitis, migraines. Also c/o above. Hx of A. fib with rapid ventricular response. Her regular cardiologist is Dr. Alex Alvarenga. She sees Dr. Savage Cruz for management of her thyroid. She also has a past medical history significant for migraine headaches and allergic rhinitis. Her ObGyn is Dr. Ochoa Thurston. O.  She is taking synthroid, OCP's, zyrtec and flonase. For her migraines she uses fioricet prn. Needs FMLA updated/renewed    She is going to have to have colposcopy  Causing some stress    She does monitor bp at home     Urinary frequency    Will be moving to [place]     Review of Systems   Respiratory: Negative for shortness of breath.         Past Medical History:   Diagnosis Date    Abnormal Pap smear of cervix     Allergic rhinitis     Arthritis     hands    Asplenia     Atrial fibrillation (HCC)     Atrial fibrillation with RVR (HCC)     Dr. Onel Epps    Bilateral hand pain     CTS (carpal tunnel syndrome) 05/2016    bilateral    External hemorrhoid     Fatigue     Heart murmur     History of ITP 2004    had spenectomy    Hypertension     meds > 1 yrs    Hypothyroidism     meds > 5 yrs    Hypothyroidism     Insomnia     Irregular periods     Meniere disease     Migraine headache     Multinodular goiter     Nail fungus     Pain in right lower leg     Paroxysmal atrial fibrillation (HCC) 10/23/2020    Plantar fasciitis, left      Sumit Green     Past Surgical History:   Procedure Laterality Date    CARPAL TUNNEL RELEASE Right 07/12/2016    DILATION AND CURETTAGE OF UTERUS N/A 12/16/2019    HYSTEROSCOPY NOVASURE ABLATION D&C performed by Oscar Dueñas DO at [address] [place] [address], VAGINAL N/A 8/2/2021    LAPAROSCOPIC ASSISTED VAGINAL HYSTERECTOMY BILATERAL SALPINGECTOMY performed by Oscar Dueñas DO at [facility] N/A 2004    done for ITP    THYROIDECTOMY, COMPLETION Left 2014    frozen section inconclusive    THYROIDECTOMY, PARTIAL Right 2011    Benign nodule     Social History     Socioeconomic History    Marital status:      Spouse name: Not on file    Number of children: 0    Years of education: Not on file    Highest education level: Not on file   Occupational History    Occupation: medical records   Tobacco Use    Smoking status: Current Some Day Smoker     Packs/day: 0.25     Years: 10.00     Pack years: 2.50     Last attempt to quit: 11/17/2010     Years since quitting: 10.7    Smokeless tobacco: Never Used    Tobacco comment: social smoker   Vaping Use    Vaping Use: Never used   Substance and Sexual Activity    Alcohol use: Yes     Alcohol/week: 0.0 standard drinks     Comment: socially    Drug use: No    Sexual activity: Not on file   Other Topics Concern    Not on file   Social History Narrative    Not on file     Social Determinants of Health     Financial Resource Strain: Low Risk     Difficulty of Paying Living Expenses: Not hard at all   Food Insecurity: No Food Insecurity    Worried About Running Out of Food in the Last Year: Never true    [place] of Food in the Last Year: Never true   Transportation Needs: No Transportation Needs    Lack of Transportation (Medical): No    Lack of Transportation (Non-Medical):  No   Physical Activity:     Days of Exercise per Week:     Minutes of Exercise per Session:    Stress:     Feeling of Stress :    Social Connections:     Frequency of Communication with Friends and Family:     Frequency of Social Gatherings with Friends and Family:     Attends [employer] Services:     Active Member of Clubs or Organizations:     Attends Club or Organization Meetings:     Marital Status:    Intimate Partner Violence:     Fear of Current or Ex-Partner:     Emotionally Abused:     Physically Abused:     Sexually Abused:      Family History   Problem Relation Age of Onset    High Blood Pressure Mother     Anemia Mother     Atrial Fibrillation Mother     Heart Failure Mother     Other Mother         pacemaker    Heart Disease Mother     Sleep Apnea Mother     Vision Loss Mother         histoplasmosis    Heart Disease Father         CAD / Triple bypass    Diabetes Father     Other Father         Fuck's dystropy / corneal transplants / AAA    Cancer Paternal Aunt     Breast Cancer Paternal Aunt     Cancer Paternal Grandmother     Breast Cancer Paternal Grandmother     Sleep Apnea Sister     Alcohol Abuse Brother     Other Sister         killed by drunk      Allergies   Allergen Reactions    Penicillins Anaphylaxis    Codeine Itching, Swelling and Rash     OK with Tylenol #3 per pt    Percocet [Oxycodone-Acetaminophen] Itching, Swelling and Rash    Bee Venom Itching and Swelling    Tape [Adhesive Tape]      Skin irritation     Hydrocodone Itching, Swelling and Rash    Protonix [Pantoprazole Sodium] Swelling and Rash     Caused thrush     Current Outpatient Medications   Medication Sig Dispense Refill    ondansetron (ZOFRAN) 8 MG tablet Take 1 tablet by mouth every 12 hours as needed for Nausea or Vomiting 30 tablet 02    busPIRone (BUSPAR) 7.5 MG tablet Take 1 by mouth as needed 30 tablet 1    tiZANidine (ZANAFLEX) 4 MG tablet Take 1 tablet by mouth every 8 hours as needed (spasm) 30 tablet 5    butalbital-acetaminophen-caffeine (FIORICET, ESGIC) -40 MG per tablet Take 1 tablet by mouth every 4 hours as needed for Headaches 12 tablet 0    Bacillus Coagulans-Inulin (PROBIOTIC FORMULA PO) Take 1 capsule by mouth daily      Folic Acid (FOLATE PO) Take 1 tablet by mouth daily      Multiple Vitamin (MULTIVITAMIN PO) Take 1 tablet by mouth daily      acetaminophen (APAP EXTRA STRENGTH) 500 MG tablet Take 2 tablets by mouth every 6 hours as needed for Pain 120 tablet 0    hydrocortisone (ANUSOL-HC) 25 MG suppository Place 1 suppository rectally 2 times daily (Patient taking differently: Place 25 mg rectally 2 times daily Uses PRN) 60 suppository 0    levothyroxine (SYNTHROID) 100 MCG tablet Take 1 tablet by mouth daily 90 tablet 03    sodium chloride (ALTAMIST SPRAY) 0.65 % nasal spray 1 spray by Nasal route as needed for Congestion 1 Bottle 3    Handicap Placard MISC by Does not apply route Exp 5 years 1 each 0    rivaroxaban (XARELTO) 20 MG TABS tablet TAKE 1 TABLET BY MOUTH ONE TIME A DAY 90 tablet 3    dilTIAZem (CARDIZEM CD) 180 MG extended release capsule TAKE 1 CAPSULE BY MOUTH ONE TIME A DAY 90 capsule 3    diclofenac sodium 1 % GEL Apply 4 g topically 4 times daily 2 Tube 0    magnesium (MAGNESIUM-OXIDE) 250 MG TABS tablet Take 250 mg by mouth daily       vitamin B-12 (CYANOCOBALAMIN) 500 MCG tablet Take 500 mcg by mouth daily      zinc 50 MG CAPS Take by mouth daily       Echinacea 400 MG CAPS Take by mouth daily       Calcium Carbonate-Vit D-Min (CALCIUM 1200) 0574-0359 MG-UNIT CHEW Take by mouth daily       cetirizine (ZYRTEC) 10 MG tablet Take 10 mg by mouth daily      ferrous sulfate 325 (65 FE) MG tablet Take 65 mg by mouth daily (with breakfast)       fluticasone (FLONASE) 50 MCG/ACT nasal spray 1 spray by Nasal route daily. (Patient taking differently: 1 spray by Nasal route daily Uses PRN) 1 Bottle 06    meclizine (ANTIVERT) 12.5 MG tablet Take 12.5 mg by mouth 3 times daily as needed. No current facility-administered medications for this visit.        Objective    Vitals:    08/12/21 1557   BP: 136/80   Site: Left Upper Arm   Pulse: 67   Temp: 98 °F (36.7 °C)   SpO2: 97%   Weight: 219 lb (99.3 kg)   Height: 5' 6\" (1.676 m)       Physical Exam  Constitutional:       Appearance: She is well-developed. HENT:      Head: Normocephalic and atraumatic. Right Ear: Hearing, tympanic membrane, ear canal and external ear normal.      Left Ear: Hearing, tympanic membrane, ear canal and external ear normal.      Nose: Nose normal.      Mouth/Throat:      Pharynx: Uvula midline. Cardiovascular:      Rate and Rhythm: Normal rate and regular rhythm. Heart sounds: Normal heart sounds. No murmur heard. Pulmonary:      Effort: Pulmonary effort is normal.      Breath sounds: Normal breath sounds. No wheezing. Musculoskeletal:         General: Normal range of motion. Cervical back: Normal range of motion and neck supple. Lymphadenopathy:      Cervical: No cervical adenopathy. Skin:     General: Skin is warm and dry. Findings: No rash. No results found for this visit on 08/12/21. Assessment & Plan    Diagnosis Orders   1. Hypothyroidism, unspecified type     2. Nausea  ondansetron (ZOFRAN) 8 MG tablet    DISCONTINUED: ondansetron (ZOFRAN) 8 MG tablet   3. Atrial fibrillation with RVR ([address]     4. Essential hypertension     5. Recurrent tonsillitis  Hib PRP-T - 4 dose (age 2m-5y) IM (ACTHIB)   6. Asplenia  Hib PRP-T - 4 dose (age 2m-5y) IM (ACTHIB)     Please note, that there is no specific \"adult\" Hib vaccine and we reviewed the CDC recommendations that confirms it is ok to administer the vaccine to an asplenic adult even though it has indications for only up to age 11. I approved this administration     Her Hib titer was also ZERO    A fib managed by Dr. Talib Mcclain. Migraines stable on naprosyn and fioricet prn. She will f/u with Dr. Talib Mcclain as directed.      She also follows with Dr. David Duran paperwork    Moderate mDM    Orders Placed This Encounter   Procedures  Hib PRP-T - 4 dose (age 2m-5y) IM (ACTHIB)     Orders Placed This Encounter   Medications    DISCONTD: ondansetron (ZOFRAN) 8 MG tablet     Sig: Take 1 tablet by mouth every 12 hours as needed for Nausea or Vomiting     Dispense:  10 tablet     Refill:  0    ondansetron (ZOFRAN) 8 MG tablet     Sig: Take 1 tablet by mouth every 12 hours as needed for Nausea or Vomiting     Dispense:  30 tablet     Refill:  02     Medications Discontinued During This Encounter   Medication Reason    ondansetron (ZOFRAN) 8 MG tablet REORDER    ondansetron (ZOFRAN) 8 MG tablet      No follow-ups on file.     Tonie Rivera MD

## 2021-08-12 NOTE — PROGRESS NOTES
After obtaining consent, and per orders of Dr. Jackie Roy and Dr. Jessica Mao, injection of hib given in Left deltoid by Danitza Tejeda ([facility]). Patient instructed to remain in clinic for 20 minutes afterwards, and to report any adverse reaction to me immediately. Pt to get due to not having any in her blood.  Has HSCT

## 2021-09-14 ENCOUNTER — OFFICE VISIT (OUTPATIENT)
Dept: OBGYN CLINIC | Age: 46
End: 2021-09-14

## 2021-09-14 VITALS
BODY MASS INDEX: 35.67 KG/M2 | HEART RATE: 86 BPM | SYSTOLIC BLOOD PRESSURE: 138 MMHG | DIASTOLIC BLOOD PRESSURE: 76 MMHG | WEIGHT: 221 LBS

## 2021-09-14 DIAGNOSIS — Z09 POSTOPERATIVE EXAMINATION: Primary | ICD-10-CM

## 2021-09-14 PROCEDURE — 99024 POSTOP FOLLOW-UP VISIT: CPT | Performed by: OBSTETRICS & GYNECOLOGY

## 2021-09-14 ASSESSMENT — ENCOUNTER SYMPTOMS
CHEST TIGHTNESS: 0
SHORTNESS OF BREATH: 0
VOMITING: 0
NAUSEA: 0
TROUBLE SWALLOWING: 0
SORE THROAT: 0
BLOOD IN STOOL: 0
COUGH: 0
VOICE CHANGE: 0
BACK PAIN: 0
ABDOMINAL DISTENTION: 0
CONSTIPATION: 0
ABDOMINAL PAIN: 0
COLOR CHANGE: 0
WHEEZING: 0

## 2021-09-14 NOTE — PROGRESS NOTES
HPI:  Francisco Calderón (: 1975) is a 48 y.o. female, Established patient, here for evaluation of the following chief complaint(s):  Post-Op Check (cuff check)  Darleen Pittman is here here 6 weeks post LAVH. She has no complaints. No bleeding no discharge bowel and bladder function are normal.      SUBJECTIVE/OBJECTIVE:    Past Surgical History:   Procedure Laterality Date    CARPAL TUNNEL RELEASE Right 2016    DILATION AND CURETTAGE OF UTERUS N/A 2019    HYSTEROSCOPY NOVASURE ABLATION D&C performed by Mahamed Ledbetter DO at [address] [place] [address] N/A 2021    LAPAROSCOPIC ASSISTED VAGINAL HYSTERECTOMY BILATERAL SALPINGECTOMY performed by Mahamed Ledbetter DO at [facility] N/A     done for ITP    THYROIDECTOMY, COMPLETION Left     frozen section inconclusive    THYROIDECTOMY, PARTIAL Right     Benign nodule        Review of Systems   Constitutional: Negative for activity change, appetite change, fatigue and unexpected weight change. HENT: Negative for dental problem, ear pain, hearing loss, nosebleeds, sore throat, trouble swallowing and voice change. Eyes: Negative for visual disturbance. Respiratory: Negative for cough, chest tightness, shortness of breath and wheezing. Cardiovascular: Negative for chest pain and palpitations. Gastrointestinal: Negative for abdominal distention, abdominal pain, blood in stool, constipation, nausea and vomiting. Endocrine: Negative for cold intolerance, heat intolerance, polydipsia, polyphagia and polyuria. Genitourinary: Negative for difficulty urinating, dyspareunia, dysuria, flank pain, frequency, genital sores, hematuria, menstrual problem, pelvic pain, urgency, vaginal bleeding, vaginal discharge and vaginal pain. Musculoskeletal: Negative for arthralgias, back pain, joint swelling and myalgias. Skin: Negative for color change and rash.    Allergic/Immunologic: Negative for environmental allergies, food allergies and immunocompromised state. Neurological: Negative for dizziness, seizures, syncope, speech difficulty, weakness, numbness and headaches. Hematological: Negative for adenopathy. Does not bruise/bleed easily. Psychiatric/Behavioral: Negative for agitation, behavioral problems, confusion, decreased concentration, dysphoric mood and suicidal ideas. The patient is not nervous/anxious and is not hyperactive. Physical Exam  Vitals and nursing note reviewed. Exam conducted with a chaperone present. Constitutional:       Appearance: She is well-developed. HENT:      Head: Normocephalic and atraumatic. Eyes:      Pupils: Pupils are equal, round, and reactive to light. Neck:      Thyroid: No thyromegaly. Trachea: No tracheal deviation. Cardiovascular:      Rate and Rhythm: Normal rate and regular rhythm. Heart sounds: Normal heart sounds. Pulmonary:      Effort: Pulmonary effort is normal.      Breath sounds: Normal breath sounds. Chest:      Chest wall: No tenderness. Abdominal:      General: Bowel sounds are normal. There is no distension. Palpations: Abdomen is soft. There is no mass. Tenderness: There is no abdominal tenderness. There is no guarding or rebound. Hernia: There is no hernia in the left inguinal area. Genitourinary:     Labia:         Right: No rash, tenderness, lesion or injury. Left: No rash, tenderness, lesion or injury. Vagina: Normal. No foreign body. No vaginal discharge, erythema, tenderness or bleeding. Adnexa:         Right: No mass, tenderness or fullness. Left: No mass, tenderness or fullness. Rectum: Normal. No mass, tenderness, anal fissure, external hemorrhoid or internal hemorrhoid. Normal anal tone. Musculoskeletal:         General: Normal range of motion. Cervical back: Normal range of motion.    Lymphadenopathy:      Cervical: No cervical adenopathy. Neurological:      Mental Status: She is alert and oriented to person, place, and time. Vitals:    09/14/21 1537   BP: 138/76   Pulse: 86   Weight: 221 lb (100.2 kg)         ASSESSMENT/PLAN:    Postop exam following [facility]    PLAN: Patient to follow-up in 1 year or as needed. No restrictions including intercourse      No follow-ups on file. On this date 9/14/2021 I have spent 30 minutes reviewing previous notes, test results and face to face with the patient discussing the diagnosis and importance of compliance with the treatment plan as well as documenting on the day of the visit. An electronic signature was used to authenticate this note. Please note this report has been partially produced using speech recognition software and may cause contain errors related to that system including grammar, punctuation and spelling as well as words and phrases that may seem inappropriate. If there are questions or concerns please feel free to contact me to clarify.

## 2021-09-16 ENCOUNTER — TELEPHONE (OUTPATIENT)
Dept: FAMILY MEDICINE CLINIC | Age: 46
End: 2021-09-16

## 2021-09-16 DIAGNOSIS — Q89.01 ASPLENIA: Primary | ICD-10-CM

## 2021-09-23 ENCOUNTER — OFFICE VISIT (OUTPATIENT)
Dept: ENDOCRINOLOGY | Age: 46
End: 2021-09-23
Payer: COMMERCIAL

## 2021-09-23 VITALS
HEART RATE: 59 BPM | SYSTOLIC BLOOD PRESSURE: 124 MMHG | DIASTOLIC BLOOD PRESSURE: 79 MMHG | BODY MASS INDEX: 35.84 KG/M2 | WEIGHT: 223 LBS | HEIGHT: 66 IN

## 2021-09-23 DIAGNOSIS — R73.9 HYPERGLYCEMIA: ICD-10-CM

## 2021-09-23 DIAGNOSIS — E89.0 POSTOPERATIVE HYPOTHYROIDISM: Primary | ICD-10-CM

## 2021-09-23 PROCEDURE — 99213 OFFICE O/P EST LOW 20 MIN: CPT | Performed by: INTERNAL MEDICINE

## 2021-09-23 NOTE — PROGRESS NOTES
9/23/2021    Assessment:       Diagnosis Orders   1. Postoperative hypothyroidism           PLAN:     Orders Placed This Encounter   Procedures    TSH without Reflex     Standing Status:   Future     Standing Expiration Date:   9/23/2022    T4, Free     Standing Status:   Future     Standing Expiration Date:   9/23/2022     Continue Synthroid 100 mcg daily      Subjective:     Chief Complaint   Patient presents with    Hypothyroidism     Vitals:    09/23/21 1620   BP: 124/79   Site: Right Upper Arm   Position: Sitting   Cuff Size: Large Adult   Pulse: 59   Weight: 223 lb (101.2 kg)   Height: 5' 6\" (1.676 m)     Wt Readings from Last 3 Encounters:   09/23/21 223 lb (101.2 kg)   09/14/21 221 lb (100.2 kg)   08/12/21 219 lb (99.3 kg)     BP Readings from Last 3 Encounters:   09/23/21 124/79   09/14/21 138/76   08/12/21 136/80     : Follow-up on hypothyroidism. Patient recently had hysterectomy  Also seen ENT for frequent infections due to splenectomy  Labs show slightly higher A1c    Other  This is a chronic (Hypothyroidism) problem. The current episode started more than 1 year ago. The problem occurs intermittently. The problem has been waxing and waning. Associated symptoms include fatigue. Treatments tried: Synthroid. The treatment provided moderate relief.      Past Medical History:   Diagnosis Date    Abnormal Pap smear of cervix     Allergic rhinitis     Arthritis     hands    Asplenia     Atrial fibrillation (HCC)     Atrial fibrillation with RVR (HCC)     Dr. Shaista Zuniga    Bilateral hand pain     CTS (carpal tunnel syndrome) 05/2016    bilateral    External hemorrhoid     Fatigue     Heart murmur     History of ITP 2004    had spenectomy    Hypertension     meds > 1 yrs    Hypothyroidism     meds > 5 yrs    Hypothyroidism     Insomnia     Irregular periods     Meniere disease     Migraine headache     Multinodular goiter     Nail fungus     Pain in right lower leg     Paroxysmal atrial fibrillation ([facility] [address] 10/23/2020    Plantar fasciitis, left     Dr. Kristen Rivera     Past Surgical History:   Procedure Laterality Date    CARPAL TUNNEL RELEASE Right 07/12/2016    DILATION AND CURETTAGE OF UTERUS N/A 12/16/2019    HYSTEROSCOPY NOVASURE ABLATION D&C performed by Tapan Jerome DO at [address] [place] [address], VAGINAL N/A 8/2/2021    LAPAROSCOPIC ASSISTED VAGINAL HYSTERECTOMY BILATERAL SALPINGECTOMY performed by Tapan Jerome DO at [facility] N/A 2004    done for ITP    THYROIDECTOMY, COMPLETION Left 2014    frozen section inconclusive    THYROIDECTOMY, PARTIAL Right 2011    Benign nodule     Social History     Socioeconomic History    Marital status:      Spouse name: Not on file    Number of children: 0    Years of education: Not on file    Highest education level: Not on file   Occupational History    Occupation: medical records   Tobacco Use    Smoking status: Current Some Day Smoker     Packs/day: 0.25     Years: 10.00     Pack years: 2.50     Last attempt to quit: 11/17/2010     Years since quitting: 10.8    Smokeless tobacco: Never Used    Tobacco comment: social smoker   Vaping Use    Vaping Use: Never used   Substance and Sexual Activity    Alcohol use: Yes     Alcohol/week: 0.0 standard drinks     Comment: socially    Drug use: No    Sexual activity: Not on file   Other Topics Concern    Not on file   Social History Narrative    Not on file     Social Determinants of Health     Financial Resource Strain:     Difficulty of Paying Living Expenses:    Food Insecurity:     Worried About Running Out of Food in the Last Year:     [address] in the Last Year:    Transportation Needs:     Lack of Transportation (Medical):      Lack of Transportation (Non-Medical):    Physical Activity:     Days of Exercise per Week:     Minutes of Exercise per Session:    Stress:     Feeling of Stress :    Social Connections:     Frequency of Communication with Friends and Family:     Frequency of Social Gatherings with Friends and Family:     Attends Zoroastrian Services:     Active Member of Clubs or Organizations:     Attends Club or Organization Meetings:     Marital Status:    Intimate Partner Violence:     Fear of Current or Ex-Partner:     Emotionally Abused:     Physically Abused:     Sexually Abused:      Family History   Problem Relation Age of Onset    High Blood Pressure Mother     Anemia Mother     Atrial Fibrillation Mother     Heart Failure Mother     Other Mother         pacemaker    Heart Disease Mother     Sleep Apnea Mother     Vision Loss Mother         histoplasmosis    Heart Disease Father         CAD / Triple bypass    Diabetes Father     Other Father         Fuck's dystropy / corneal transplants / AAA    Cancer Paternal Aunt     Breast Cancer Paternal Aunt     Cancer Paternal Grandmother     Breast Cancer Paternal Grandmother     Sleep Apnea Sister     Alcohol Abuse Brother     Other Sister         killed by drunk      Allergies   Allergen Reactions    Penicillins Anaphylaxis    Codeine Itching, Swelling and Rash     OK with Tylenol #3 per pt    Percocet [Oxycodone-Acetaminophen] Itching, Swelling and Rash    Bee Venom Itching and Swelling    Tape [Adhesive Tape]      Skin irritation     Hydrocodone Itching, Swelling and Rash    Protonix [Pantoprazole Sodium] Swelling and Rash     Caused thrush       Current Outpatient Medications:     ondansetron (ZOFRAN) 8 MG tablet, Take 1 tablet by mouth every 12 hours as needed for Nausea or Vomiting, Disp: 30 tablet, Rfl: 02    busPIRone (BUSPAR) 7.5 MG tablet, Take 1 by mouth as needed, Disp: 30 tablet, Rfl: 1    tiZANidine (ZANAFLEX) 4 MG tablet, Take 1 tablet by mouth every 8 hours as needed (spasm), Disp: 30 tablet, Rfl: 5    butalbital-acetaminophen-caffeine (FIORICET, ESGIC) -40 MG per tablet, Take 1 tablet by mouth every 4 hours as needed for Headaches, Disp: 12 tablet, Rfl: 0    Bacillus Coagulans-Inulin (PROBIOTIC FORMULA PO), Take 1 capsule by mouth daily, Disp: , Rfl:     Folic Acid (FOLATE PO), Take 1 tablet by mouth daily, Disp: , Rfl:     Multiple Vitamin (MULTIVITAMIN PO), Take 1 tablet by mouth daily, Disp: , Rfl:     acetaminophen (APAP EXTRA STRENGTH) 500 MG tablet, Take 2 tablets by mouth every 6 hours as needed for Pain, Disp: 120 tablet, Rfl: 0    hydrocortisone (ANUSOL-HC) 25 MG suppository, Place 1 suppository rectally 2 times daily (Patient taking differently: Place 25 mg rectally 2 times daily Uses PRN), Disp: 60 suppository, Rfl: 0    levothyroxine (SYNTHROID) 100 MCG tablet, Take 1 tablet by mouth daily, Disp: 90 tablet, Rfl: 03    sodium chloride (ALTAMIST SPRAY) 0.65 % nasal spray, 1 spray by Nasal route as needed for Congestion, Disp: 1 Bottle, Rfl: 3    Handicap Placard MISC, by Does not apply route Exp 5 years, Disp: 1 each, Rfl: 0    rivaroxaban (XARELTO) 20 MG TABS tablet, TAKE 1 TABLET BY MOUTH ONE TIME A DAY, Disp: 90 tablet, Rfl: 3    dilTIAZem (CARDIZEM CD) 180 MG extended release capsule, TAKE 1 CAPSULE BY MOUTH ONE TIME A DAY, Disp: 90 capsule, Rfl: 3    diclofenac sodium 1 % GEL, Apply 4 g topically 4 times daily, Disp: 2 Tube, Rfl: 0    magnesium (MAGNESIUM-OXIDE) 250 MG TABS tablet, Take 250 mg by mouth daily , Disp: , Rfl:     vitamin B-12 (CYANOCOBALAMIN) 500 MCG tablet, Take 500 mcg by mouth daily, Disp: , Rfl:     zinc 50 MG CAPS, Take by mouth daily , Disp: , Rfl:     Echinacea 400 MG CAPS, Take by mouth daily , Disp: , Rfl:     Calcium Carbonate-Vit D-Min (CALCIUM 1200) 9358-4086 MG-UNIT CHEW, Take by mouth daily , Disp: , Rfl:     cetirizine (ZYRTEC) 10 MG tablet, Take 10 mg by mouth daily, Disp: , Rfl:     ferrous sulfate 325 (65 FE) MG tablet, Take 65 mg by mouth daily (with breakfast) , Disp: , Rfl:     fluticasone (FLONASE) 50 MCG/ACT nasal spray, 1 spray by Nasal route daily. (Patient taking differently: 1 spray by Nasal route daily Uses PRN), Disp: 1 Bottle, Rfl: 06    meclizine (ANTIVERT) 12.5 MG tablet, Take 12.5 mg by mouth 3 times daily as needed. , Disp: , Rfl:   Lab Results   Component Value Date     09/17/2021    K 4.3 09/17/2021    CL 98 09/17/2021    CO2 27 09/17/2021    BUN 11 09/17/2021    CREATININE 0.68 09/17/2021    GLUCOSE 96 09/17/2021    CALCIUM 9.6 09/17/2021    PROT 7.6 06/16/2021    LABALBU 4.0 06/16/2021    BILITOT <0.2 06/16/2021    ALKPHOS 97 06/16/2021    AST 16 06/16/2021    ALT 19 06/16/2021    LABGLOM >60.0 09/17/2021    GFRAA >60.0 09/17/2021    GLOB 3.6 (H) 06/16/2021     Lab Results   Component Value Date    WBC 10.3 07/07/2021    HGB 14.1 07/07/2021    HCT 42.0 07/07/2021    MCV 91.2 07/07/2021     (H) 07/07/2021     Lab Results   Component Value Date    LABA1C 6.1 (H) 09/17/2021    LABA1C 6.0 (H) 01/20/2021     Lab Results   Component Value Date    HDL 49 05/13/2019    HDL 42 06/28/2018    HDL 47 05/07/2018    LDLCALC 114 05/13/2019    LDLCALC 105 06/28/2018    LDLCALC 101 05/07/2018    CHOL 187 05/13/2019    CHOL 166 06/28/2018    CHOL 174 05/07/2018    TRIG 121 05/13/2019    TRIG 93 06/28/2018    TRIG 132 05/07/2018     No results found for: TESTM  Lab Results   Component Value Date    TSH 1.570 02/04/2019    TSH 0.939 07/27/2018    TSH 1.370 06/27/2018    TSHREFLEX 4.890 (H) 09/17/2021    TSHREFLEX 0.231 (L) 01/20/2021    TSHREFLEX 1.520 02/13/2020    T4FREE 1.17 09/17/2021    T4FREE 1.52 01/20/2021    T4FREE 1.33 02/13/2020     No results found for: TPOABS    Review of Systems   Constitutional: Positive for fatigue. Cardiovascular: Negative. Endocrine: Negative. Psychiatric/Behavioral: Positive for dysphoric mood. All other systems reviewed and are negative. Objective:   Physical Exam  Vitals reviewed. Constitutional:       Appearance: Normal appearance. She is obese. HENT:      Head: Normocephalic and atraumatic.  Hair is normal.      Right Ear: External ear normal.      Left Ear: External ear normal.      Nose: Nose normal.   Eyes:      General: No scleral icterus. Right eye: No discharge. Left eye: No discharge. Extraocular Movements: Extraocular movements intact. Conjunctiva/sclera: Conjunctivae normal.   Neck:      Trachea: Trachea normal.   Cardiovascular:      Rate and Rhythm: Normal rate. Pulmonary:      Effort: Pulmonary effort is normal.   Musculoskeletal:         General: Normal range of motion. Cervical back: Normal range of motion and neck supple. Neurological:      General: No focal deficit present. Mental Status: She is alert and oriented to person, place, and time.    Psychiatric:         Mood and Affect: Mood normal.         Behavior: Behavior normal.

## 2021-10-15 ENCOUNTER — OFFICE VISIT (OUTPATIENT)
Dept: FAMILY MEDICINE CLINIC | Age: 46
End: 2021-10-15
Payer: COMMERCIAL

## 2021-10-15 VITALS
DIASTOLIC BLOOD PRESSURE: 78 MMHG | HEART RATE: 78 BPM | HEIGHT: 66 IN | OXYGEN SATURATION: 98 % | WEIGHT: 221 LBS | SYSTOLIC BLOOD PRESSURE: 110 MMHG | BODY MASS INDEX: 35.52 KG/M2 | TEMPERATURE: 97.2 F

## 2021-10-15 DIAGNOSIS — M54.9 ACUTE UPPER BACK PAIN: Primary | ICD-10-CM

## 2021-10-15 DIAGNOSIS — M62.830 MUSCLE SPASM OF BACK: ICD-10-CM

## 2021-10-15 PROCEDURE — 99213 OFFICE O/P EST LOW 20 MIN: CPT | Performed by: NURSE PRACTITIONER

## 2021-10-15 RX ORDER — ACETAMINOPHEN AND CODEINE PHOSPHATE 300; 30 MG/1; MG/1
1 TABLET ORAL EVERY 8 HOURS PRN
Qty: 15 TABLET | Refills: 0 | Status: SHIPPED | OUTPATIENT
Start: 2021-10-15 | End: 2021-10-20

## 2021-10-15 RX ORDER — CYCLOBENZAPRINE HCL 10 MG
10 TABLET ORAL NIGHTLY PRN
Qty: 10 TABLET | Refills: 0 | Status: SHIPPED | OUTPATIENT
Start: 2021-10-15 | End: 2021-10-25

## 2021-10-15 SDOH — ECONOMIC STABILITY: FOOD INSECURITY: WITHIN THE PAST 12 MONTHS, YOU WORRIED THAT YOUR FOOD WOULD RUN OUT BEFORE YOU GOT MONEY TO BUY MORE.: NEVER TRUE

## 2021-10-15 SDOH — ECONOMIC STABILITY: FOOD INSECURITY: WITHIN THE PAST 12 MONTHS, THE FOOD YOU BOUGHT JUST DIDN'T LAST AND YOU DIDN'T HAVE MONEY TO GET MORE.: NEVER TRUE

## 2021-10-15 ASSESSMENT — SOCIAL DETERMINANTS OF HEALTH (SDOH): HOW HARD IS IT FOR YOU TO PAY FOR THE VERY BASICS LIKE FOOD, HOUSING, MEDICAL CARE, AND HEATING?: NOT HARD AT ALL

## 2021-10-15 NOTE — PATIENT INSTRUCTIONS
Patient Education        Healthy Upper Back: Exercises  Introduction  Here are some examples of exercises for your upper back. Start each exercise slowly. Ease off the exercise if you start to have pain. Your doctor or physical therapist will tell you when you can start these exercises and which ones will work best for you. How to do the exercises  Lower neck and upper back stretch    1. Stretch your arms out in front of your body. Clasp one hand on top of your other hand. 2. Gently reach out so that you feel your shoulder blades stretching away from each other. 3. Gently bend your head forward. 4. Hold for 15 to 30 seconds. 5. Repeat 2 to 4 times. Midback stretch    If you have knee pain, do not do this exercise. 1. Kneel on the floor, and sit back on your ankles. 2. Lean forward, place your hands on the floor, and stretch your arms out in front of you. Rest your head between your arms. 3. Gently push your chest toward the floor, reaching as far in front of you as possible. 4. Hold for 15 to 30 seconds. 5. Repeat 2 to 4 times. Shoulder rolls    1. Sit comfortably with your feet shoulder-width apart. You can also do this exercise while standing. 2. Roll your shoulders up, then back, and then down in a smooth, circular motion. 3. Repeat 2 to 4 times. Wall push-up    1. Stand against a wall with your feet about 12 to 24 inches back from the wall. If you feel any pain when you do this exercise, stand closer to the wall. 2. Place your hands on the wall slightly wider apart than your shoulders, and lean forward. 3. Gently lean your body toward the wall. Then push back to your starting position. Keep the motion smooth and controlled. 4. Repeat 8 to 12 times. Resisted shoulder blade squeeze    For this exercise, you will need elastic exercise material, such as surgical tubing or Thera-Band. 1. Sit or stand, holding the band in both hands in front of you.  Keep your elbows close to your sides, bent at a 90-degree angle. Your palms should face up. 2. Squeeze your shoulder blades together, and move your arms to the outside, stretching the band. Be sure to keep your elbows at your sides while you do this. 3. Relax. 4. Repeat 8 to 12 times. Resisted rows    For this exercise, you will need elastic exercise material, such as surgical tubing or Thera-Band. 1. Put the band around a solid object, such as a bedpost, at about waist level. Hold one end of the band in each hand. 2. With your elbows at your sides and bent to 90 degrees, pull the band back to move your shoulder blades toward each other. Return to the starting position. 3. Repeat 8 to 12 times. Follow-up care is a key part of your treatment and safety. Be sure to make and go to all appointments, and call your doctor if you are having problems. It's also a good idea to know your test results and keep a list of the medicines you take. Where can you learn more? Go to https://[employer].FAD ? IO. org and sign in to your [employer] account. Enter Y564 in the Bswift box to learn more about \"Healthy Upper Back: Exercises. \"     If you do not have an account, please click on the \"Sign Up Now\" link. Current as of: July 1, 2021               Content Version: 13.0  © 0764-6272 [employer]. Care instructions adapted under license by [facility] ([facility]). If you have questions about a medical condition or this instruction, always ask your healthcare professional. Teja Raid any warranty or liability for your use of this information.

## 2021-10-15 NOTE — PROGRESS NOTES
Subjective  Silvio Cooks, 48 y.o. 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  Helping             Past Medical History:   Diagnosis Date    Abnormal Pap smear of cervix     Allergic rhinitis     Arthritis     hands    Asplenia     Atrial fibrillation (HCC)     Atrial fibrillation with RVR (HCC)     Dr. Lela Field Bilateral hand pain     CTS (carpal tunnel syndrome) 05/2016    bilateral    External hemorrhoid     Fatigue     Heart murmur     History of ITP 2004    had spenectomy    Hypertension     meds > 1 yrs    Hypothyroidism     meds > 5 yrs    Hypothyroidism     Insomnia     Irregular periods     Meniere disease     Migraine headache     Multinodular goiter     Nail fungus     Pain in right lower leg     Paroxysmal atrial fibrillation (HCC) 10/23/2020    Plantar fasciitis, left     Dr. Lelia Thompson      Past Surgical History:   Procedure Laterality Date    CARPAL TUNNEL RELEASE Right 07/12/2016    DILATION AND CURETTAGE OF UTERUS N/A 12/16/2019    HYSTEROSCOPY Lelia Demetrice ABLATION D&C performed by Gaviota Hutchinson DO at [address] [place] [address] N/A 8/2/2021    LAPAROSCOPIC ASSISTED VAGINAL HYSTERECTOMY BILATERAL SALPINGECTOMY performed by Gaviota Hutchinson DO at [facility] N/A 2004    done for ITP    THYROIDECTOMY, COMPLETION Left 2014    frozen section inconclusive    THYROIDECTOMY, PARTIAL Right 2011    Benign nodule     Family History   Problem Relation Age of Onset    High Blood Pressure Mother     Anemia Mother     Atrial Fibrillation Mother     Heart Failure Mother     Other Mother         pacemaker    Heart Disease Mother     Sleep Apnea Mother     Vision Loss Mother         histoplasmosis    Heart Disease Father         CAD / Triple bypass    Diabetes Father     Other Father         Fuck's dystropy / corneal transplants / AAA    Cancer Paternal Aunt     Breast Cancer Paternal Aunt     Cancer Paternal Grandmother     Breast Cancer Paternal Grandmother     Sleep Apnea Sister     Alcohol Abuse Brother     Other Sister         killed by drunk            Review of Systems   Constitutional: Positive for activity change. Negative for chills, diaphoresis, fatigue and fever. Musculoskeletal: Positive for arthralgias and myalgias. Negative for neck pain and neck stiffness. Neurological: Negative for dizziness, weakness, light-headedness and headaches. Psychiatric/Behavioral: Positive for sleep disturbance. PMH, Surgical Hx, Family Hx, and Social Hx reviewed and updated. Objective  Vitals:    10/15/21 1408   BP: 110/78   Pulse: 78   Temp: 97.2 °F (36.2 °C)   SpO2: 98%   Weight: 221 lb (100.2 kg)   Height: 5' 6\" (1.676 m)     BP Readings from Last 3 Encounters:   10/15/21 110/78   09/23/21 124/79   09/14/21 138/76     Wt Readings from Last 3 Encounters:   10/15/21 221 lb (100.2 kg)   09/23/21 223 lb (101.2 kg)   09/14/21 221 lb (100.2 kg)           Physical Exam  Vitals reviewed. Constitutional:       General: She is not in acute distress. Appearance: Normal appearance. Eyes:      Conjunctiva/sclera: Conjunctivae normal.   Cardiovascular:      Rate and Rhythm: Normal rate. Pulses: Normal pulses. Pulmonary:      Effort: Pulmonary effort is normal.   Musculoskeletal:        Arms:       Cervical back: Normal range of motion. Skin:     General: Skin is warm and dry. Coloration: Skin is not pale. Findings: No rash.    Neurological:      General: No focal deficit present. Mental Status: She is alert and oriented to person, place, and time. Assessment & Plan    Diagnosis Orders   1. Acute upper back pain  acetaminophen-codeine (TYLENOL/CODEINE #3) 300-30 MG per tablet   2. Muscle spasm of back  cyclobenzaprine (FLEXERIL) 10 MG tablet     No orders of the defined types were placed in this encounter. Orders Placed This Encounter   Medications    acetaminophen-codeine (TYLENOL/CODEINE #3) 300-30 MG per tablet     Sig: Take 1 tablet by mouth every 8 hours as needed for Pain for up to 5 days. Intended supply: 5 days. Take lowest dose possible to manage pain     Dispense:  15 tablet     Refill:  0     Reduce doses taken as pain becomes manageable    cyclobenzaprine (FLEXERIL) 10 MG tablet     Sig: Take 1 tablet by mouth nightly as needed for Muscle spasms     Dispense:  10 tablet     Refill:  0     Return if symptoms worsen or fail to improve, for follow up with PCP. OARRS report ran and is consistent with current and past history concerning scheduled medications. Reviewed with the patient: current clinical status & medications. Pt instructed to hold Zanaflex while taking Flexeril. Side effects, adverse effects of the medications prescribed today, as well as treatment plan/rationale and result expectations have been discussed with the patient who expressed understanding. How can you care for yourself at home? Heat, ice, and medicines    · To relieve pain, use heat or ice (whichever feels better) on the affected area. Body positions and posture    · Sit or lie in positions that are most comfortable for you and that reduce pain. Close follow up to evaluate treatment results and for coordination of care. I have reviewed the patient's medical history in detail and updated the computerized patient record.       CASE Ho - NP

## 2021-10-15 NOTE — LETTER
[address]  Phone: [phone]  Fax: [phone]    CASE Shaw - NP        October 15, 2021     Patient: Rodolfo Evans   YOB: 1975   Date of Visit: 10/15/2021         To Whom it May Concern:      Reji Andrew was seen in my clinic on 10/15/2021. She may return to work on 10/18/2021. She should avoid work duties of lifting and bending for 7 days, beginning on Monday. If you have any questions or concerns, please don't hesitate to call.     Sincerely,         Vincent Tejada, APRN - NP

## 2021-10-19 DIAGNOSIS — I10 ESSENTIAL HYPERTENSION: ICD-10-CM

## 2021-10-19 DIAGNOSIS — I48.91 ATRIAL FIBRILLATION WITH RVR (HCC): ICD-10-CM

## 2021-10-19 NOTE — TELEPHONE ENCOUNTER
Please approve or deny this refill request. The order is pended. Thank you.     LOV 7/1/2021    Next Visit Date:  Future Appointments   Date Time Provider Corby Nathaly   10/26/2021  3:00 PM MLLADARIUS PAT RM 1 MLOZ PAT 10 [facility]   2/15/2022  3:30 PM Rowena James MD [facility] EM[facility]Y [facility] AT [place]   3/4/2022  8:30 AM DO Benjamin Ramírez [facility] EMERGENCY [facility] [place]   3/24/2022  3:30 PM Carrillo Scott MD [facility]

## 2021-10-20 RX ORDER — DILTIAZEM HYDROCHLORIDE 180 MG/1
CAPSULE, COATED, EXTENDED RELEASE ORAL
Qty: 90 CAPSULE | Refills: 3 | Status: SHIPPED | OUTPATIENT
Start: 2021-10-20 | End: 2022-10-13

## 2021-10-26 ENCOUNTER — HOSPITAL ENCOUNTER (OUTPATIENT)
Dept: PREADMISSION TESTING | Age: 46
Discharge: HOME | End: 2021-10-30
Payer: COMMERCIAL

## 2021-10-26 VITALS
RESPIRATION RATE: 16 BRPM | DIASTOLIC BLOOD PRESSURE: 63 MMHG | TEMPERATURE: 97.3 F | OXYGEN SATURATION: 98 % | SYSTOLIC BLOOD PRESSURE: 145 MMHG | HEIGHT: 66 IN | WEIGHT: 220 LBS | HEART RATE: 59 BPM | BODY MASS INDEX: 35.36 KG/M2

## 2021-10-26 DIAGNOSIS — J35.1 TONSILLAR HYPERTROPHY: ICD-10-CM

## 2021-10-26 DIAGNOSIS — Z86.2 HISTORY OF ITP: ICD-10-CM

## 2021-10-26 LAB
ANION GAP SERPL CALCULATED.3IONS-SCNC: 10 MEQ/L (ref 9–15)
BUN BLDV-MCNC: 10 MG/DL (ref 6–20)
CALCIUM SERPL-MCNC: 9.5 MG/DL (ref 8.5–9.9)
CHLORIDE BLD-SCNC: 107 MEQ/L (ref 95–107)
CO2: 28 MEQ/L (ref 20–31)
CREAT SERPL-MCNC: 0.81 MG/DL (ref 0.5–0.9)
GFR AFRICAN AMERICAN: >60
GFR NON-AFRICAN AMERICAN: >60
GLUCOSE BLD-MCNC: 85 MG/DL (ref 70–99)
HCT VFR BLD CALC: 40.4 % (ref 37–47)
HEMOGLOBIN: 13.1 G/DL (ref 12–16)
MCH RBC QN AUTO: 29.7 PG (ref 27–31.3)
MCHC RBC AUTO-ENTMCNC: 32.5 % (ref 33–37)
MCV RBC AUTO: 91.5 FL (ref 82–100)
PDW BLD-RTO: 14.7 % (ref 11.5–14.5)
PLATELET # BLD: 503 K/UL (ref 130–400)
POTASSIUM SERPL-SCNC: 4.5 MEQ/L (ref 3.4–4.9)
RBC # BLD: 4.42 M/UL (ref 4.2–5.4)
SODIUM BLD-SCNC: 145 MEQ/L (ref 135–144)
WBC # BLD: 11 K/UL (ref 4.8–10.8)

## 2021-10-26 PROCEDURE — 80048 BASIC METABOLIC PNL TOTAL CA: CPT

## 2021-10-26 PROCEDURE — 85027 COMPLETE CBC AUTOMATED: CPT

## 2021-10-26 RX ORDER — SODIUM CHLORIDE, SODIUM LACTATE, POTASSIUM CHLORIDE, CALCIUM CHLORIDE 600; 310; 30; 20 MG/100ML; MG/100ML; MG/100ML; MG/100ML
INJECTION, SOLUTION INTRAVENOUS CONTINUOUS
Status: CANCELLED | OUTPATIENT
Start: 2021-11-02

## 2021-10-26 RX ORDER — ACETAMINOPHEN AND CODEINE PHOSPHATE 300; 30 MG/1; MG/1
TABLET ORAL PRN
COMMUNITY
Start: 2021-08-02 | End: 2022-04-06

## 2021-10-26 RX ORDER — LIDOCAINE HYDROCHLORIDE 10 MG/ML
1 INJECTION, SOLUTION EPIDURAL; INFILTRATION; INTRACAUDAL; PERINEURAL
Status: CANCELLED | OUTPATIENT
Start: 2021-11-02 | End: 2021-11-02

## 2021-10-26 RX ORDER — TRAMADOL HYDROCHLORIDE 50 MG/1
TABLET ORAL PRN
COMMUNITY
Start: 2021-08-02

## 2021-10-26 RX ORDER — SODIUM CHLORIDE 0.9 % (FLUSH) 0.9 %
10 SYRINGE (ML) INJECTION EVERY 12 HOURS SCHEDULED
Status: CANCELLED | OUTPATIENT
Start: 2021-11-02

## 2021-10-26 RX ORDER — ACETAMINOPHEN 160 MG
TABLET,DISINTEGRATING ORAL DAILY
COMMUNITY

## 2021-10-26 RX ORDER — SODIUM CHLORIDE 0.9 % (FLUSH) 0.9 %
10 SYRINGE (ML) INJECTION PRN
Status: CANCELLED | OUTPATIENT
Start: 2021-11-02

## 2021-10-26 RX ORDER — CYCLOBENZAPRINE HCL 10 MG
10 TABLET ORAL 3 TIMES DAILY PRN
COMMUNITY
End: 2022-04-06 | Stop reason: ALTCHOICE

## 2021-10-26 RX ORDER — SODIUM CHLORIDE 9 MG/ML
25 INJECTION, SOLUTION INTRAVENOUS PRN
Status: CANCELLED | OUTPATIENT
Start: 2021-11-02

## 2021-10-26 ASSESSMENT — ENCOUNTER SYMPTOMS
SHORTNESS OF BREATH: 0
STRIDOR: 0
COUGH: 0
NAUSEA: 0
CONSTIPATION: 0
ABDOMINAL PAIN: 0
CHEST TIGHTNESS: 0
VOMITING: 0
WHEEZING: 0
EYES NEGATIVE: 1
BACK PAIN: 1
ALLERGIC/IMMUNOLOGIC NEGATIVE: 1
DIARRHEA: 0

## 2021-10-26 NOTE — H&P
Nurse Practitioner History and Physical      CHIEF COMPLAINT:  Remove tonsils    HISTORY OF PRESENT ILLNESS:      The patient is a 48 y.o. female with significant past medical history of tonsillar hypertrophy who presents for tonsillectomy.     Past Medical History:        Diagnosis Date    Abnormal Pap smear of cervix     Allergic rhinitis     Arthritis     hands    Asplenia     Atrial fibrillation (HCC)     Atrial fibrillation with RVR (HCC)     Dr. Nash Knows Bilateral hand pain     CTS (carpal tunnel syndrome) 05/2016    bilateral    External hemorrhoid     Fatigue     Heart murmur     History of ITP 2004    had spenectomy    Hypertension     meds > 1 yrs    Hypothyroidism     meds > 5 yrs    Hypothyroidism     Insomnia     Irregular periods     Meniere disease     Migraine headache     Multinodular goiter     Nail fungus     Pain in right lower leg     Paroxysmal atrial fibrillation ([address] 10/23/2020    Plantar fasciitis, left     Dr. Jerry Abdi     Past Surgical History:    Past Surgical History:   Procedure Laterality Date    CARPAL TUNNEL RELEASE Right 07/12/2016    DILATION AND CURETTAGE OF UTERUS N/A 12/16/2019    HYSTEROSCOPY NOVASURE ABLATION D&C performed by John Roach DO at [address] [place] [address] N/A 8/2/2021    LAPAROSCOPIC ASSISTED VAGINAL HYSTERECTOMY BILATERAL SALPINGECTOMY performed by John Roach DO at [facility] N/A 2004    done for ITP    THYROIDECTOMY, COMPLETION Left 2014    frozen section inconclusive    THYROIDECTOMY, PARTIAL Right 2011    Benign nodule         Medications Prior to Admission:    Current Outpatient Medications   Medication Sig Dispense Refill    dilTIAZem (CARDIZEM CD) 180 MG extended release capsule TAKE 1 CAPSULE BY MOUTH ONE TIME A DAY 90 capsule 3    rivaroxaban (XARELTO) 20 MG TABS tablet TAKE 1 TABLET BY MOUTH ONE TIME A DAY 90 tablet 3    ondansetron (ZOFRAN) 8 MG tablet Take 1 tablet by mouth every 12 hours as needed for Nausea or Vomiting 30 tablet 02    busPIRone (BUSPAR) 7.5 MG tablet Take 1 by mouth as needed 30 tablet 1    tiZANidine (ZANAFLEX) 4 MG tablet Take 1 tablet by mouth every 8 hours as needed (spasm) 30 tablet 5    butalbital-acetaminophen-caffeine (FIORICET, ESGIC) -40 MG per tablet Take 1 tablet by mouth every 4 hours as needed for Headaches 12 tablet 0    Bacillus Coagulans-Inulin (PROBIOTIC FORMULA PO) Take 1 capsule by mouth daily      Folic Acid (FOLATE PO) Take 1 tablet by mouth daily      Multiple Vitamin (MULTIVITAMIN PO) Take 1 tablet by mouth daily      acetaminophen (APAP EXTRA STRENGTH) 500 MG tablet Take 2 tablets by mouth every 6 hours as needed for Pain 120 tablet 0    hydrocortisone (ANUSOL-HC) 25 MG suppository Place 1 suppository rectally 2 times daily (Patient taking differently: Place 25 mg rectally 2 times daily Uses PRN) 60 suppository 0    levothyroxine (SYNTHROID) 100 MCG tablet Take 1 tablet by mouth daily 90 tablet 03    sodium chloride (ALTAMIST SPRAY) 0.65 % nasal spray 1 spray by Nasal route as needed for Congestion 1 Bottle 3    Handicap Placard MISC by Does not apply route Exp 5 years 1 each 0    diclofenac sodium 1 % GEL Apply 4 g topically 4 times daily 2 Tube 0    magnesium (MAGNESIUM-OXIDE) 250 MG TABS tablet Take 250 mg by mouth daily       vitamin B-12 (CYANOCOBALAMIN) 500 MCG tablet Take 500 mcg by mouth daily      zinc 50 MG CAPS Take by mouth daily       Echinacea 400 MG CAPS Take by mouth daily       Calcium Carbonate-Vit D-Min (CALCIUM 1200) 4684-8357 MG-UNIT CHEW Take by mouth daily       cetirizine (ZYRTEC) 10 MG tablet Take 10 mg by mouth daily      ferrous sulfate 325 (65 FE) MG tablet Take 65 mg by mouth daily (with breakfast)       fluticasone (FLONASE) 50 MCG/ACT nasal spray 1 spray by Nasal route daily.  (Patient taking differently: 1 spray by Nasal route daily Uses PRN) 1 Bottle 06    meclizine (ANTIVERT) 12.5 MG tablet Take 12.5 mg by mouth 3 times daily as needed. No current facility-administered medications for this encounter. Allergies:  Penicillins, Codeine, Percocet [oxycodone-acetaminophen], Bee venom, Tape [adhesive tape], Hydrocodone, and Protonix [pantoprazole sodium]    Social History:   Social History     Socioeconomic History    Marital status:      Spouse name: Not on file    Number of children: 0    Years of education: Not on file    Highest education level: Not on file   Occupational History    Occupation: medical records   Tobacco Use    Smoking status: Current Some Day Smoker     Packs/day: 0.25     Years: 10.00     Pack years: 2.50     Last attempt to quit: 11/17/2010     Years since quitting: 10.9    Smokeless tobacco: Never Used    Tobacco comment: social smoker   Vaping Use    Vaping Use: Never used   Substance and Sexual Activity    Alcohol use: Yes     Alcohol/week: 0.0 standard drinks     Comment: socially    Drug use: No    Sexual activity: Not on file   Other Topics Concern    Not on file   Social History Narrative    Not on file     Social Determinants of Health     Financial Resource Strain: Low Risk     Difficulty of Paying Living Expenses: Not hard at all   Food Insecurity: No Food Insecurity    Worried About [address] in the Last Year: Never true    [facility] in the Last Year: Never true   Transportation Needs:     Lack of Transportation (Medical):      Lack of Transportation (Non-Medical):    Physical Activity:     Days of Exercise per Week:     Minutes of Exercise per Session:    Stress:     Feeling of Stress :    Social Connections:     Frequency of Communication with Friends and Family:     Frequency of Social Gatherings with Friends and Family:     Attends [employer] Services:     Active Member of Clubs or Organizations:     Attends Club or Organization Meetings:     Marital Status:    Intimate Partner Violence:     Fear of Current or Ex-Partner:     Emotionally Abused:     Physically Abused:     Sexually Abused:        Family History:       Problem Relation Age of Onset    High Blood Pressure Mother     Anemia Mother     Atrial Fibrillation Mother     Heart Failure Mother     Other Mother         pacemaker    Heart Disease Mother     Sleep Apnea Mother     Vision Loss Mother         histoplasmosis    Heart Disease Father         CAD / Triple bypass    Diabetes Father     Other Father         Fuck's dystropy / corneal transplants / AAA    Cancer Paternal Aunt     Breast Cancer Paternal Aunt     Cancer Paternal Grandmother     Breast Cancer Paternal Grandmother     Sleep Apnea Sister     Alcohol Abuse Brother     Other Sister         killed by drunk        Review of Systems   Constitutional: Negative. Negative for chills and fever. HENT:        Deferred to Dr. Yang Wasserman. Eyes: Negative. Negative for visual disturbance. Respiratory: Negative for cough, chest tightness, shortness of breath, wheezing and stridor. Cardiovascular: Negative for chest pain and palpitations. AF episodes 2 -3 x per month   Gastrointestinal: Negative for abdominal pain, constipation, diarrhea, nausea and vomiting. Endocrine:        Hypothyroid   Genitourinary: Negative for dysuria and frequency. Musculoskeletal: Positive for back pain (low cervical & right shoulder) and neck pain. Negative for myalgias. Skin: Negative. Allergic/Immunologic: Negative. Neurological: Negative. Negative for seizures and headaches. Hematological: Negative. Psychiatric/Behavioral: Negative. Vitals:  [facility] 11/04/2019     Physical Exam  Constitutional:       Appearance: She is well-developed. She is obese. HENT:      Head:      Comments: Deferred to Dr. Yang Wasserman. Ears:      Comments: Deferred to Dr. Yang Wasserman. Nose:      Comments: Deferred to Dr. Yang Wasserman. Mouth/Throat:      Comments: Deferred to Dr. Yang Wasserman.   Eyes:

## 2021-10-26 NOTE — PROGRESS NOTES
Covid vaccine completed -- documentation on chart. EKG dated 7/1/2021 -- on [facility]. Last cardiac appointment ( Dr. Jo Gould ) dated 7/1/2021 -- on Epic. Last dose Xarelto 10/27/2021. Cardiac clearance by Dr. Jo Gould dated 10/1/2021 -- paper copy on chart.

## 2021-11-02 ENCOUNTER — ANESTHESIA (OUTPATIENT)
Dept: OPERATING ROOM | Age: 46
End: 2021-11-02
Payer: COMMERCIAL

## 2021-11-02 ENCOUNTER — HOSPITAL ENCOUNTER (OUTPATIENT)
Age: 46
Setting detail: OUTPATIENT SURGERY
Discharge: HOME | End: 2021-11-02
Attending: OTOLARYNGOLOGY | Admitting: OTOLARYNGOLOGY
Payer: COMMERCIAL

## 2021-11-02 ENCOUNTER — ANESTHESIA EVENT (OUTPATIENT)
Dept: OPERATING ROOM | Age: 46
End: 2021-11-02
Payer: COMMERCIAL

## 2021-11-02 VITALS
BODY MASS INDEX: 35.36 KG/M2 | TEMPERATURE: 96.3 F | WEIGHT: 220 LBS | RESPIRATION RATE: 12 BRPM | SYSTOLIC BLOOD PRESSURE: 153 MMHG | HEIGHT: 66 IN | HEART RATE: 68 BPM | OXYGEN SATURATION: 98 % | DIASTOLIC BLOOD PRESSURE: 79 MMHG

## 2021-11-02 VITALS — DIASTOLIC BLOOD PRESSURE: 69 MMHG | TEMPERATURE: 95 F | OXYGEN SATURATION: 100 % | SYSTOLIC BLOOD PRESSURE: 128 MMHG

## 2021-11-02 PROCEDURE — 7100000011 HC PHASE II RECOVERY - ADDTL 15 MIN: Performed by: OTOLARYNGOLOGY

## 2021-11-02 PROCEDURE — 7100000010 HC PHASE II RECOVERY - FIRST 15 MIN: Performed by: OTOLARYNGOLOGY

## 2021-11-02 PROCEDURE — 3600000012 HC SURGERY LEVEL 2 ADDTL 15MIN: Performed by: OTOLARYNGOLOGY

## 2021-11-02 PROCEDURE — 7100000001 HC PACU RECOVERY - ADDTL 15 MIN: Performed by: OTOLARYNGOLOGY

## 2021-11-02 PROCEDURE — 2500000003 HC RX 250 WO HCPCS: Performed by: NURSE ANESTHETIST, CERTIFIED REGISTERED

## 2021-11-02 PROCEDURE — 2580000003 HC RX 258: Performed by: NURSE ANESTHETIST, CERTIFIED REGISTERED

## 2021-11-02 PROCEDURE — 3600000002 HC SURGERY LEVEL 2 BASE: Performed by: OTOLARYNGOLOGY

## 2021-11-02 PROCEDURE — 2580000003 HC RX 258: Performed by: NURSE PRACTITIONER

## 2021-11-02 PROCEDURE — 88304 TISSUE EXAM BY PATHOLOGIST: CPT

## 2021-11-02 PROCEDURE — 3700000000 HC ANESTHESIA ATTENDED CARE: Performed by: OTOLARYNGOLOGY

## 2021-11-02 PROCEDURE — 2709999900 HC NON-CHARGEABLE SUPPLY: Performed by: OTOLARYNGOLOGY

## 2021-11-02 PROCEDURE — 6360000002 HC RX W HCPCS: Performed by: NURSE ANESTHETIST, CERTIFIED REGISTERED

## 2021-11-02 PROCEDURE — 7100000000 HC PACU RECOVERY - FIRST 15 MIN: Performed by: OTOLARYNGOLOGY

## 2021-11-02 PROCEDURE — 2580000003 HC RX 258: Performed by: OTOLARYNGOLOGY

## 2021-11-02 PROCEDURE — 3700000001 HC ADD 15 MINUTES (ANESTHESIA): Performed by: OTOLARYNGOLOGY

## 2021-11-02 PROCEDURE — 6360000002 HC RX W HCPCS: Performed by: STUDENT IN AN ORGANIZED HEALTH CARE EDUCATION/TRAINING PROGRAM

## 2021-11-02 RX ORDER — SODIUM CHLORIDE, SODIUM LACTATE, POTASSIUM CHLORIDE, CALCIUM CHLORIDE 600; 310; 30; 20 MG/100ML; MG/100ML; MG/100ML; MG/100ML
INJECTION, SOLUTION INTRAVENOUS CONTINUOUS
Status: DISCONTINUED | OUTPATIENT
Start: 2021-11-02 | End: 2021-11-02 | Stop reason: HOSPADM

## 2021-11-02 RX ORDER — SODIUM CHLORIDE 9 MG/ML
25 INJECTION, SOLUTION INTRAVENOUS PRN
Status: DISCONTINUED | OUTPATIENT
Start: 2021-11-02 | End: 2021-11-02 | Stop reason: HOSPADM

## 2021-11-02 RX ORDER — SODIUM CHLORIDE 0.9 % (FLUSH) 0.9 %
5-40 SYRINGE (ML) INJECTION PRN
Status: CANCELLED | OUTPATIENT
Start: 2021-11-02

## 2021-11-02 RX ORDER — ONDANSETRON 2 MG/ML
INJECTION INTRAMUSCULAR; INTRAVENOUS PRN
Status: DISCONTINUED | OUTPATIENT
Start: 2021-11-02 | End: 2021-11-02 | Stop reason: SDUPTHER

## 2021-11-02 RX ORDER — ROCURONIUM BROMIDE 10 MG/ML
INJECTION, SOLUTION INTRAVENOUS PRN
Status: DISCONTINUED | OUTPATIENT
Start: 2021-11-02 | End: 2021-11-02 | Stop reason: SDUPTHER

## 2021-11-02 RX ORDER — SODIUM CHLORIDE 0.9 % (FLUSH) 0.9 %
5-40 SYRINGE (ML) INJECTION EVERY 12 HOURS SCHEDULED
Status: CANCELLED | OUTPATIENT
Start: 2021-11-02

## 2021-11-02 RX ORDER — DEXAMETHASONE SODIUM PHOSPHATE 10 MG/ML
INJECTION INTRAMUSCULAR; INTRAVENOUS PRN
Status: DISCONTINUED | OUTPATIENT
Start: 2021-11-02 | End: 2021-11-02 | Stop reason: SDUPTHER

## 2021-11-02 RX ORDER — DIPHENHYDRAMINE HYDROCHLORIDE 50 MG/ML
12.5 INJECTION INTRAMUSCULAR; INTRAVENOUS
Status: DISCONTINUED | OUTPATIENT
Start: 2021-11-02 | End: 2021-11-02 | Stop reason: HOSPADM

## 2021-11-02 RX ORDER — SODIUM CHLORIDE 9 MG/ML
25 INJECTION, SOLUTION INTRAVENOUS PRN
Status: CANCELLED | OUTPATIENT
Start: 2021-11-02

## 2021-11-02 RX ORDER — ONDANSETRON 2 MG/ML
4 INJECTION INTRAMUSCULAR; INTRAVENOUS
Status: DISCONTINUED | OUTPATIENT
Start: 2021-11-02 | End: 2021-11-02 | Stop reason: HOSPADM

## 2021-11-02 RX ORDER — SODIUM CHLORIDE 0.9 % (FLUSH) 0.9 %
10 SYRINGE (ML) INJECTION EVERY 12 HOURS SCHEDULED
Status: DISCONTINUED | OUTPATIENT
Start: 2021-11-02 | End: 2021-11-02 | Stop reason: HOSPADM

## 2021-11-02 RX ORDER — SODIUM CHLORIDE 0.9 % (FLUSH) 0.9 %
10 SYRINGE (ML) INJECTION PRN
Status: DISCONTINUED | OUTPATIENT
Start: 2021-11-02 | End: 2021-11-02 | Stop reason: HOSPADM

## 2021-11-02 RX ORDER — METOCLOPRAMIDE HYDROCHLORIDE 5 MG/ML
10 INJECTION INTRAMUSCULAR; INTRAVENOUS
Status: DISCONTINUED | OUTPATIENT
Start: 2021-11-02 | End: 2021-11-02 | Stop reason: HOSPADM

## 2021-11-02 RX ORDER — FENTANYL CITRATE 50 UG/ML
50 INJECTION, SOLUTION INTRAMUSCULAR; INTRAVENOUS EVERY 10 MIN PRN
Status: DISCONTINUED | OUTPATIENT
Start: 2021-11-02 | End: 2021-11-02 | Stop reason: HOSPADM

## 2021-11-02 RX ORDER — PROPOFOL 10 MG/ML
INJECTION, EMULSION INTRAVENOUS PRN
Status: DISCONTINUED | OUTPATIENT
Start: 2021-11-02 | End: 2021-11-02 | Stop reason: SDUPTHER

## 2021-11-02 RX ORDER — LIDOCAINE HYDROCHLORIDE 10 MG/ML
1 INJECTION, SOLUTION EPIDURAL; INFILTRATION; INTRACAUDAL; PERINEURAL
Status: DISCONTINUED | OUTPATIENT
Start: 2021-11-02 | End: 2021-11-02 | Stop reason: HOSPADM

## 2021-11-02 RX ORDER — ESMOLOL HYDROCHLORIDE 10 MG/ML
INJECTION INTRAVENOUS PRN
Status: DISCONTINUED | OUTPATIENT
Start: 2021-11-02 | End: 2021-11-02 | Stop reason: SDUPTHER

## 2021-11-02 RX ORDER — MIDAZOLAM HYDROCHLORIDE 1 MG/ML
INJECTION INTRAMUSCULAR; INTRAVENOUS PRN
Status: DISCONTINUED | OUTPATIENT
Start: 2021-11-02 | End: 2021-11-02 | Stop reason: SDUPTHER

## 2021-11-02 RX ORDER — FENTANYL CITRATE 50 UG/ML
INJECTION, SOLUTION INTRAMUSCULAR; INTRAVENOUS PRN
Status: DISCONTINUED | OUTPATIENT
Start: 2021-11-02 | End: 2021-11-02 | Stop reason: SDUPTHER

## 2021-11-02 RX ORDER — MAGNESIUM HYDROXIDE 1200 MG/15ML
LIQUID ORAL CONTINUOUS PRN
Status: COMPLETED | OUTPATIENT
Start: 2021-11-02 | End: 2021-11-02

## 2021-11-02 RX ORDER — GLYCOPYRROLATE 0.2 MG/ML
INJECTION INTRAMUSCULAR; INTRAVENOUS PRN
Status: DISCONTINUED | OUTPATIENT
Start: 2021-11-02 | End: 2021-11-02 | Stop reason: SDUPTHER

## 2021-11-02 RX ORDER — MEPERIDINE HYDROCHLORIDE 25 MG/ML
12.5 INJECTION INTRAMUSCULAR; INTRAVENOUS; SUBCUTANEOUS EVERY 5 MIN PRN
Status: DISCONTINUED | OUTPATIENT
Start: 2021-11-02 | End: 2021-11-02 | Stop reason: HOSPADM

## 2021-11-02 RX ORDER — SODIUM CHLORIDE, SODIUM LACTATE, POTASSIUM CHLORIDE, CALCIUM CHLORIDE 600; 310; 30; 20 MG/100ML; MG/100ML; MG/100ML; MG/100ML
INJECTION, SOLUTION INTRAVENOUS CONTINUOUS PRN
Status: DISCONTINUED | OUTPATIENT
Start: 2021-11-02 | End: 2021-11-02 | Stop reason: SDUPTHER

## 2021-11-02 RX ADMIN — ROCURONIUM BROMIDE 30 MG: 10 INJECTION INTRAVENOUS at 08:47

## 2021-11-02 RX ADMIN — FENTANYL CITRATE 50 MCG: 50 INJECTION, SOLUTION INTRAMUSCULAR; INTRAVENOUS at 09:43

## 2021-11-02 RX ADMIN — FENTANYL CITRATE 50 MCG: 50 INJECTION, SOLUTION INTRAMUSCULAR; INTRAVENOUS at 09:57

## 2021-11-02 RX ADMIN — SODIUM CHLORIDE, POTASSIUM CHLORIDE, SODIUM LACTATE AND CALCIUM CHLORIDE: 600; 310; 30; 20 INJECTION, SOLUTION INTRAVENOUS at 07:37

## 2021-11-02 RX ADMIN — PROPOFOL 20 MG: 10 INJECTION, EMULSION INTRAVENOUS at 09:14

## 2021-11-02 RX ADMIN — PROPOFOL 30 MG: 10 INJECTION, EMULSION INTRAVENOUS at 09:10

## 2021-11-02 RX ADMIN — SUGAMMADEX 200 MG: 100 INJECTION, SOLUTION INTRAVENOUS at 09:24

## 2021-11-02 RX ADMIN — SODIUM CHLORIDE, POTASSIUM CHLORIDE, SODIUM LACTATE AND CALCIUM CHLORIDE: 600; 310; 30; 20 INJECTION, SOLUTION INTRAVENOUS at 09:21

## 2021-11-02 RX ADMIN — SODIUM CHLORIDE, POTASSIUM CHLORIDE, SODIUM LACTATE AND CALCIUM CHLORIDE: 600; 310; 30; 20 INJECTION, SOLUTION INTRAVENOUS at 08:43

## 2021-11-02 RX ADMIN — FENTANYL CITRATE 100 MCG: 50 INJECTION, SOLUTION INTRAMUSCULAR; INTRAVENOUS at 08:47

## 2021-11-02 RX ADMIN — ONDANSETRON 4 MG: 2 INJECTION INTRAMUSCULAR; INTRAVENOUS at 08:47

## 2021-11-02 RX ADMIN — ESMOLOL HYDROCHLORIDE 20 MG: 100 INJECTION, SOLUTION INTRAVENOUS at 09:09

## 2021-11-02 RX ADMIN — GLYCOPYRROLATE 0.2 MG: 0.2 INJECTION INTRAMUSCULAR; INTRAVENOUS at 08:47

## 2021-11-02 RX ADMIN — ESMOLOL HYDROCHLORIDE 30 MG: 100 INJECTION, SOLUTION INTRAVENOUS at 08:58

## 2021-11-02 RX ADMIN — PROPOFOL 100 MG: 10 INJECTION, EMULSION INTRAVENOUS at 09:01

## 2021-11-02 RX ADMIN — ESMOLOL HYDROCHLORIDE 30 MG: 100 INJECTION, SOLUTION INTRAVENOUS at 08:53

## 2021-11-02 RX ADMIN — DEXAMETHASONE SODIUM PHOSPHATE 10 MG: 10 INJECTION INTRAMUSCULAR; INTRAVENOUS at 09:10

## 2021-11-02 RX ADMIN — DEXAMETHASONE SODIUM PHOSPHATE 10 MG: 10 INJECTION INTRAMUSCULAR; INTRAVENOUS at 08:47

## 2021-11-02 RX ADMIN — MIDAZOLAM HYDROCHLORIDE 2 MG: 2 INJECTION, SOLUTION INTRAMUSCULAR; INTRAVENOUS at 08:41

## 2021-11-02 RX ADMIN — FENTANYL CITRATE 50 MCG: 50 INJECTION, SOLUTION INTRAMUSCULAR; INTRAVENOUS at 10:28

## 2021-11-02 RX ADMIN — PROPOFOL 200 MG: 10 INJECTION, EMULSION INTRAVENOUS at 08:47

## 2021-11-02 ASSESSMENT — PULMONARY FUNCTION TESTS
PIF_VALUE: 26
PIF_VALUE: 26
PIF_VALUE: 0
PIF_VALUE: 0
PIF_VALUE: 4
PIF_VALUE: 25
PIF_VALUE: 27
PIF_VALUE: 25
PIF_VALUE: 3
PIF_VALUE: 25
PIF_VALUE: 26
PIF_VALUE: 26
PIF_VALUE: 25
PIF_VALUE: 26
PIF_VALUE: 26
PIF_VALUE: 14
PIF_VALUE: 26
PIF_VALUE: 31
PIF_VALUE: 24
PIF_VALUE: 25
PIF_VALUE: 2
PIF_VALUE: 24
PIF_VALUE: 23
PIF_VALUE: 25
PIF_VALUE: 26
PIF_VALUE: 25
PIF_VALUE: 26
PIF_VALUE: 25
PIF_VALUE: 25
PIF_VALUE: 26
PIF_VALUE: 26
PIF_VALUE: 35
PIF_VALUE: 31
PIF_VALUE: 26
PIF_VALUE: 26
PIF_VALUE: 3
PIF_VALUE: 27
PIF_VALUE: 2
PIF_VALUE: 27
PIF_VALUE: 26
PIF_VALUE: 1
PIF_VALUE: 2
PIF_VALUE: 25
PIF_VALUE: 27
PIF_VALUE: 4
PIF_VALUE: 1
PIF_VALUE: 1
PIF_VALUE: 26

## 2021-11-02 ASSESSMENT — PAIN - FUNCTIONAL ASSESSMENT: PAIN_FUNCTIONAL_ASSESSMENT: 0-10

## 2021-11-02 ASSESSMENT — PAIN SCALES - GENERAL
PAINLEVEL_OUTOF10: 3
PAINLEVEL_OUTOF10: 10
PAINLEVEL_OUTOF10: 10
PAINLEVEL_OUTOF10: 8

## 2021-11-02 ASSESSMENT — LIFESTYLE VARIABLES: SMOKING_STATUS: 1

## 2021-11-02 ASSESSMENT — PAIN DESCRIPTION - PAIN TYPE: TYPE: SURGICAL PAIN

## 2021-11-02 ASSESSMENT — PAIN DESCRIPTION - LOCATION: LOCATION: THROAT

## 2021-11-02 NOTE — PROGRESS NOTES
Pt here for lab draw, tolerated well.    Pt sitting up and eating ice, tolerating well, stated pain 10/10, explained pain scale to pt, verbalized pain not relieved from 1st dose of Fentanyl 50mg, see MAR

## 2021-11-02 NOTE — OP NOTE
Bernadette Dozier [address] [place] [address], [address] [facility]                                OPERATIVE REPORT    PATIENT NAME: Paige Jaquez                  :        1975  MED REC NO:   [id number]                            ROOM:  ACCOUNT NO:   [de-identified]                           ADMIT DATE: 2021  PROVIDER:     Leonela Gonzales MD    DATE OF PROCEDURE:  2021    DIAGNOSIS:  Massive tonsillar hypertrophy. OPERATION PERFORMED:  Tonsillectomy. SURGEON:  Leonela Gonzales MD    ANESTHESIA:  General.    INDICATIONS:  The patient is a 40-year-old female with massive tonsillar  hypertrophy with recommendation for definitive tonsillectomy and try to  improve her airway and hopefully improve her sleep quality, etc.    OPERATIVE PROCEDURE:  The patient was taken to the operating room and  administered general anesthesia. Appropriate prep and drape and  time-out were performed in the usual manner. The patient had the McIvor  mouth gag brought into position and suspended from the Garcia stand. The  patient's left tonsil was grasped with tenaculum and retracted medially. It was incised along the tonsillar pillars and dissected from a superior  to inferior direction in the supramuscular plane, taking care to avoid  the parapharyngeal space. The entire tonsil was dissected and removed  in its entirety. The contralateral right tonsil was then removed in an  identical fashion with all bleeding controlled bilaterally with  judicious use of bipolar cautery. The throat was irrigated with saline  and there was no gross bleeding after five minutes of observation. The  patient was allowed to be extubated and taken to recovery in a stable  condition. Estimated blood loss was minimal and there were no  complications.   Postoperatively, I met with the family and reviewed  postop instructions, in particular what to do in the unlikely event of a  post-tonsillectomy bleed with followup with me in 6 weeks; sooner should  issues arise. Mikel Perez MD    D: 11/02/2021 9:39:32       T: 11/02/2021 10:16:35     [id number]  Job#: [id number]     Doc#: [id number]    CC:   Kelsea Leavitt MD

## 2021-11-02 NOTE — ANESTHESIA PRE PROCEDURE
Department of Anesthesiology  Preprocedure Note       Name:  Nicole Lipoma   Age:  48 y.o.  :  1975                                          MRN:  [id number]         Date:  2021      Surgeon: Donis Guadalupe): Hiral Rocha MD    Procedure: Procedure(s):  TONSILLECTOMY, 30 MINS    Medications prior to admission:   Prior to Admission medications    Medication Sig Start Date End Date Taking? Authorizing Provider   traMADol (ULTRAM) 50 MG tablet Take by mouth as needed. 21  Yes Historical Provider, MD   acetaminophen-codeine (TYLENOL #3) 300-30 MG per tablet Take by mouth as needed.  21  Yes Historical Provider, MD   cyclobenzaprine (FLEXERIL) 10 MG tablet Take 10 mg by mouth 3 times daily as needed for Muscle spasms   Yes Historical Provider, MD   Cholecalciferol (VITAMIN D3) 50 MCG (2000) CAPS Take by mouth daily   Yes Historical Provider, MD   dilTIAZem (CARDIZEM CD) 180 MG extended release capsule TAKE 1 CAPSULE BY MOUTH ONE TIME A DAY 10/20/21  Yes Ronald Knight, DO   rivaroxaban (XARELTO) 20 MG TABS tablet TAKE 1 TABLET BY MOUTH ONE TIME A DAY 10/20/21  Yes Ronald Knight, DO   ondansetron (ZOFRAN) 8 MG tablet Take 1 tablet by mouth every 12 hours as needed for Nausea or Vomiting 21  Yes Raza Freeman MD   busPIRone (BUSPAR) 7.5 MG tablet Take 1 by mouth as needed 21  Yes Jillian Becker, DO   tiZANidine (ZANAFLEX) 4 MG tablet Take 1 tablet by mouth every 8 hours as needed (spasm) 21  Yes Raza Freeman MD   butalbital-acetaminophen-caffeine (FIORICET, ESGIC) -21 MG per tablet Take 1 tablet by mouth every 4 hours as needed for Headaches 21  Yes Raza Freeman MD   Bacillus Coagulans-Inulin (PROBIOTIC FORMULA PO) Take 1 capsule by mouth daily   Yes Historical Provider, MD   Folic Acid (FOLATE PO) Take 1 tablet by mouth daily   Yes Historical Provider, MD   Multiple Vitamin (MULTIVITAMIN PO) Take 1 tablet by mouth daily   Yes Historical Provider, MD   acetaminophen (APAP EXTRA STRENGTH) 500 MG tablet Take 2 tablets by mouth every 6 hours as needed for Pain 6/16/21  Yes Keven Guy DO   levothyroxine (SYNTHROID) 100 MCG tablet Take 1 tablet by mouth daily 2/18/21  Yes Roxanne Still MD   sodium chloride (ALTAMIST SPRAY) 0.65 % nasal spray 1 spray by Nasal route as needed for Congestion 12/21/20  Yes Vivienne Hoff, APRN - NP   magnesium (MAGNESIUM-OXIDE) 250 MG TABS tablet Take 250 mg by mouth daily    Yes Historical Provider, MD   vitamin B-12 (CYANOCOBALAMIN) 500 MCG tablet Take 500 mcg by mouth daily   Yes Historical Provider, MD   zinc 50 MG CAPS Take by mouth daily    Yes Historical Provider, MD   Echinacea 400 MG CAPS Take by mouth daily    Yes Historical Provider, MD   Calcium Carbonate-Vit D-Min (CALCIUM 1200) 3757-8630 MG-UNIT CHEW Take by mouth daily    Yes Historical Provider, MD   cetirizine (ZYRTEC) 10 MG tablet Take 10 mg by mouth daily   Yes Historical Provider, MD   ferrous sulfate 325 (65 FE) MG tablet Take 65 mg by mouth daily (with breakfast)    Yes Historical Provider, MD   fluticasone (FLONASE) 50 MCG/ACT nasal spray 1 spray by Nasal route daily. Patient taking differently: 1 spray by Nasal route daily Uses PRN 1/27/15  Yes Anant Celinda Bamberger, MD   hydrocortisone (ANUSOL-HC) 25 MG suppository Place 1 suppository rectally 2 times daily  Patient taking differently: Place 25 mg rectally 2 times daily Uses PRN 3/29/21   Zachary Swan MD   Handicap Placard MISC by Does not apply route Exp 5 years 11/13/20   Zachary Swan MD   diclofenac sodium 1 % GEL Apply 4 g topically 4 times daily 2/12/19   Zachary Swan MD   meclizine (ANTIVERT) 12.5 MG tablet Take 12.5 mg by mouth 3 times daily as needed.     Historical Provider, MD       Current medications:    Current Facility-Administered Medications   Medication Dose Route Frequency Provider Last Rate Last Admin    0.9 % sodium chloride infusion  25 mL IntraVENous PRN Lary Pain, APRN - CNP        lactated ringers infusion   IntraVENous Continuous Sam [place], APRN -  mL/hr at 11/02/21 0737 New Bag at 11/02/21 0737    lidocaine PF 1 % injection 1 mL  1 mL IntraDERmal Once PRN Sam [place], APRN - CNP        sodium chloride flush 0.9 % injection 10 mL  10 mL IntraVENous 2 times per day Sam [place], APRN - CNP        sodium chloride flush 0.9 % injection 10 mL  10 mL IntraVENous PRN Sam [place], APRN - CNP           Allergies:     Allergies   Allergen Reactions    Penicillins Anaphylaxis    Codeine Itching, Swelling and Rash     OK with Tylenol #3 per pt    Percocet [Oxycodone-Acetaminophen] Itching, Swelling and Rash    Bee Venom Itching and Swelling    Seasonal      Sinus sx    Tape [place] Tape]      Skin irritation     Hydrocodone Itching, Swelling and Rash    Protonix [Pantoprazole Sodium] Swelling and Rash     Caused thrush       Problem List:    Patient Active Problem List   Diagnosis Code    Hypothyroidism E03.9    Obesity E66.9    Meniere disease H81.09    Irregular periods N92.6    Asplenia Q89.01    Allergic rhinitis J30.9    Migraine G43.909    Multinodular goiter E04.2    Migraine headache G43.909    Insomnia G47.00    Hypertension I10    Menorrhagia N92.0    Pelvic pain R10.2    Postoperative pain G89.18    Paroxysmal atrial fibrillation (HCC) I48.0    Tonsillar hypertrophy J35.1    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       Past Medical History:        Diagnosis Date    Abnormal Pap smear of cervix     Allergic rhinitis     Arthritis     hands    Asplenia     Atrial fibrillation (HCC)     Atrial fibrillation with RVR (HCC)     Dr. [place]    Bilateral hand pain     CTS (carpal tunnel syndrome) 05/2016    bilateral    External hemorrhoid     Fatigue     Heart murmur     History of ITP 2004    had spenectomy    Hypertension     meds > 3 yrs    Hypothyroidism     meds > 7 yrs    Hypothyroidism     Insomnia     Irregular periods     Meniere disease  Migraine headache     Multinodular goiter     Nail fungus     Pain in right lower leg     Paroxysmal atrial fibrillation (HCC) 10/23/2020    Plantar fasciitis, left     Dr. Lord Banerjee       Past Surgical History:        Procedure Laterality Date    CARPAL TUNNEL RELEASE Right 07/12/2016    DILATION AND CURETTAGE OF UTERUS N/A 12/16/2019    HYSTEROSCOPY Rodena [facility] ABLATION D&C performed by Brea Jewell DO at [facility] N/A 8/2/2021    LAPAROSCOPIC ASSISTED VAGINAL HYSTERECTOMY BILATERAL SALPINGECTOMY performed by Brea Jewell DO at [facility] N/A 2004    done for ITP    THYROIDECTOMY, COMPLETION Left 2014    frozen section inconclusive    THYROIDECTOMY, PARTIAL Right 2011    Benign nodule       Social History:    Social History     Tobacco Use    Smoking status: Current Some Day Smoker     Packs/day: 0.25     Years: 32.00     Pack years: 8.00     Start date: 10/26/1989    Smokeless tobacco: Never Used    Tobacco comment: social smoker   Substance Use Topics    Alcohol use:  Yes     Alcohol/week: 0.0 standard drinks     Comment: socially                                Ready to quit: Not Answered  Counseling given: Not Answered  Comment: social smoker      Vital Signs (Current):   Vitals:    11/02/21 0638   BP: 132/62   Pulse: 66   Resp: 20   Temp: 97.6 °F (36.4 °C)   TempSrc: Temporal   SpO2: 97%   Weight: 220 lb (99.8 kg)   Height: 5' 6\" (1.676 m)                                              BP Readings from Last 3 Encounters:   11/02/21 132/62   10/26/21 (!) 145/63   10/15/21 110/78       NPO Status: Time of last liquid consumption: 2200                        Time of last solid consumption: 2200                        Date of last liquid consumption: 11/01/21                        Date of last solid food consumption: 11/01/21    BMI:   Wt Readings from Last 3 Encounters:   11/02/21 220 lb (99.8 kg)   10/26/21 220 lb (99.8 kg)   10/15/21 221 lb (100.2 kg)     Body mass index is 35.51 kg/m². CBC:   Lab Results   Component Value Date    WBC 11.0 10/26/2021    RBC 4.42 10/26/2021    RBC 4.29 10/04/2011    HGB 13.1 10/26/2021    HCT 40.4 10/26/2021    MCV 91.5 10/26/2021    RDW 14.7 10/26/2021     10/26/2021       CMP:   Lab Results   Component Value Date     10/26/2021    K 4.5 10/26/2021    K 4.0 06/28/2018     10/26/2021    CO2 28 10/26/2021    BUN 10 10/26/2021    CREATININE 0.81 10/26/2021    GFRAA >60.0 10/26/2021    LABGLOM >60.0 10/26/2021    GLUCOSE 85 10/26/2021    GLUCOSE 106 12/19/2011    PROT 7.6 06/16/2021    CALCIUM 9.5 10/26/2021    BILITOT <0.2 06/16/2021    ALKPHOS 97 06/16/2021    AST 16 06/16/2021    ALT 19 06/16/2021       POC Tests: No results for input(s): POCGLU, POCNA, POCK, POCCL, POCBUN, POCHEMO, POCHCT in the last 72 hours. Coags: No results found for: PROTIME, INR, APTT    HCG (If Applicable): No results found for: PREGTESTUR, PREGSERUM, HCG, HCGQUANT     ABGs: No results found for: PHART, PO2ART, ZPM7KCI, UJT7HEU, BEART, L7PNFMXZ     Type & Screen (If Applicable):  No results found for: LABABO, LABRH    Drug/Infectious Status (If Applicable):  No results found for: HIV, HEPCAB    COVID-19 Screening (If Applicable): No results found for: COVID19        Anesthesia Evaluation  Patient summary reviewed and Nursing notes reviewed no history of anesthetic complications:   Airway: Mallampati: II  TM distance: >3 FB   Neck ROM: full  Mouth opening: > = 3 FB Dental: normal exam         Pulmonary:normal exam    (+) current smoker          Patient did not smoke on day of surgery.                  Cardiovascular:  Exercise tolerance: good (>4 METS),   (+) hypertension:, dysrhythmias: atrial fibrillation,       ECG reviewed               Beta Blocker:  Not on Beta Blocker      ROS comment: Sinus  Bradycardia      Neuro/Psych:   Negative Neuro/Psych ROS  (+) neuromuscular disease:, headaches:, GI/Hepatic/Renal: Neg GI/Hepatic/Renal ROS  (+) morbid obesity         ROS comment: BMI 36.   Endo/Other:    (+) hypothyroidism, blood dyscrasia: anticoagulation therapy:., .          Pt had PAT visit. Abdominal:             Vascular: negative vascular ROS. Other Findings:             Anesthesia Plan      general     ASA 3     (ETT)  Induction: intravenous. MIPS: Postoperative opioids intended and Prophylactic antiemetics administered. Anesthetic plan and risks discussed with patient. Plan discussed with CRNA.     Attending anesthesiologist reviewed and agrees with Devyn Baca DO   11/2/2021

## 2021-11-02 NOTE — ANESTHESIA POSTPROCEDURE EVALUATION
Department of Anesthesiology  Postprocedure Note    Patient: Kimberley Encarnacion  MRN: [id number]  YOB: 1975  Date of evaluation: 11/2/2021  Time:  9:40 AM     Procedure Summary     Date: 11/02/21 Room / Location: [address]    Anesthesia Start: 2907 Anesthesia Stop:     Procedure: TONSILLECTOMY (N/A ) Diagnosis: (TONSILLAR HYPERTROPHY)    Surgeons: Jayashree Cid MD Responsible Provider: Tiara Torres DO    Anesthesia Type: general ASA Status: 3          Anesthesia Type: General    Amalia Phase I: Amalia Score: 7    Amalia Phase II:      Last vitals: Reviewed and per EMR flowsheets.        Anesthesia Post Evaluation    Patient location during evaluation: bedside  Patient participation: complete - patient participated  Level of consciousness: awake and awake and alert  Pain score: 3  Airway patency: patent  Nausea & Vomiting: no nausea and no vomiting  Complications: no  Cardiovascular status: blood pressure returned to baseline and hemodynamically stable  Respiratory status: acceptable, face mask, nonlabored ventilation and spontaneous ventilation  Hydration status: euvolemic

## 2021-11-02 NOTE — FLOWSHEET NOTE
4682- Dr. Desiree Mcpherson in with patient, per Dr. Desiree Mcpherson no antibiotic needed-[facility]  Electronically signed by Dilcia Walsh RN on 11/2/2021 at 8:40 AM

## 2021-11-02 NOTE — BRIEF OP NOTE
Brief Postoperative Note      Patient: Federico Patrick  YOB: 1975  MRN: [id number]    Date of Procedure: 11/2/2021    Pre-Op Diagnosis: TONSILLAR HYPERTROPHY    Post-Op Diagnosis: Same       Procedure(s):  TONSILLECTOMY    Surgeon(s):   Leonela Gonzales MD    Assistant:  First Assistant: Inder Granados    Anesthesia: General    Estimated Blood Loss (mL): Minimal    Complications: None    Specimens:   ID Type Source Tests Collected by Time Destination   A : RIGHT AND LEFT TONSIL Tissue Tonsil SURGICAL PATHOLOGY Leonela Gonzales MD 11/2/2021 7881        Implants:  * No implants in log *      Drains: * No LDAs found *    Findings: massive tonsils    Electronically signed by Leonela Gonzales MD on 11/2/2021 at 9:33 AM

## 2021-11-22 ENCOUNTER — CARE COORDINATION (OUTPATIENT)
Dept: OTHER | Facility: CLINIC | Age: 46
End: 2021-11-22

## 2021-11-22 NOTE — CARE COORDINATION
Ambulatory Care Coordination Note  2021  CM Risk Score: 4  Charlson 10 Year Mortality Risk Score: 2%     ACC: Kevin Cardona RN     Ambulatory Care Manager [facility]) contacted the patient by telephone to follow up on progress, discuss new issues or concerns, and reinforce/ provide pt education. Verified name and  with patient as identifiers. Patient reports she has recovered well from her tosillectomy. She has completed her follow up appts and has no ongoing symptoms at this time. She is able to eat her regular diet without difficulty and is sleeping well. Patient denies any ongoing ACM needs at this time. Interventions:    ACM reviewed and updated CC protocol, SDOH, medications, goals, education and disease specific assessments. Counseling and education provided at today's visit on:   Red Flag symptoms to report  Symptom management  Specialist appointment compliance  Utilization of appropriate level of care: Right Care, Right Place, Right Time       Medication reconciliation was performed with patient, who verbalizes understanding of administration of home medications. Advised obtaining a 90-day supply of all daily and as-needed medications. Reinforced resources available to patient including:   PCP, Specialist, Benefits related nurse triage line, Urgent care clinics and [employer] Messaging. Plan:  ACM will sign off as she states she is doing ell and has no ongoing ACM needs at this time. Patient verbalized understanding and is agreeable. Care Coordination Interventions    Referral from Primary Care Provider: No  Suggested Interventions and Community Resources           Prior to Admission medications    Medication Sig Start Date End Date Taking? Authorizing Provider   traMADol (ULTRAM) 50 MG tablet Take by mouth as needed. 21   Historical Provider, MD   acetaminophen-codeine (TYLENOL #3) 300-30 MG per tablet Take by mouth as needed.  21   Historical Provider, MD cyclobenzaprine (FLEXERIL) 10 MG tablet Take 10 mg by mouth 3 times daily as needed for Muscle spasms    Historical Provider, MD   Cholecalciferol (VITAMIN D3) 50 MCG (2000 UT) CAPS Take by mouth daily    Historical Provider, MD   dilTIAZem (CARDIZEM CD) 180 MG extended release capsule TAKE 1 CAPSULE BY MOUTH ONE TIME A DAY 10/20/21   [facility], DO   rivaroxaban (XARELTO) 20 MG TABS tablet TAKE 1 TABLET BY MOUTH ONE TIME A DAY 10/20/21   [facility], DO   ondansetron (ZOFRAN) 8 MG tablet Take 1 tablet by mouth every 12 hours as needed for Nausea or Vomiting 8/12/21   Rowena James MD   busPIRone (BUSPAR) 7.5 MG tablet Take 1 by mouth as needed 7/29/21   Bonnie Reinoso, DO   tiZANidine (ZANAFLEX) 4 MG tablet Take 1 tablet by mouth every 8 hours as needed (spasm) 7/13/21   Rowena James MD   butalbital-acetaminophen-caffeine (FIORICET, ESGIC) -10 MG per tablet Take 1 tablet by mouth every 4 hours as needed for Headaches 7/13/21   Rowena James MD   Bacillus Coagulans-Inulin (PROBIOTIC FORMULA PO) Take 1 capsule by mouth daily    Historical Provider, MD   Folic Acid (FOLATE PO) Take 1 tablet by mouth daily    Historical Provider, MD   Multiple Vitamin (MULTIVITAMIN PO) Take 1 tablet by mouth daily    Historical Provider, MD   acetaminophen (APAP EXTRA STRENGTH) 500 MG tablet Take 2 tablets by mouth every 6 hours as needed for Pain 6/16/21   Lopez Guy, DO   hydrocortisone (ANUSOL-HC) 25 MG suppository Place 1 suppository rectally 2 times daily  Patient taking differently: Place 25 mg rectally 2 times daily Uses PRN 3/29/21   Rowena James MD   levothyroxine (SYNTHROID) 100 MCG tablet Take 1 tablet by mouth daily 2/18/21   Carrillo Scott MD   sodium chloride (ALTAMIST SPRAY) 0.65 % nasal spray 1 spray by Nasal route as needed for Congestion 12/21/20   CASE Waldrop - NP   Handicap Placard MISC by Does not apply route Exp 5 years 11/13/20   Rowena James MD   diclofenac sodium 1 % GEL Apply 4 g topically 4 times daily 2/12/19   Montez Yan MD   magnesium (MAGNESIUM-OXIDE) 250 MG TABS tablet Take 250 mg by mouth daily     Historical Provider, MD   vitamin B-12 (CYANOCOBALAMIN) 500 MCG tablet Take 500 mcg by mouth daily    Historical Provider, MD   zinc 50 MG CAPS Take by mouth daily     Historical Provider, MD   Echinacea 400 MG CAPS Take by mouth daily     Historical Provider, MD   Calcium Carbonate-Vit D-Min (CALCIUM 1200) 3410-0744 MG-UNIT CHEW Take by mouth daily     Historical Provider, MD   cetirizine (ZYRTEC) 10 MG tablet Take 10 mg by mouth daily    Historical Provider, MD   ferrous sulfate 325 (65 FE) MG tablet Take 65 mg by mouth daily (with breakfast)     Historical Provider, MD   fluticasone (FLONASE) 50 MCG/ACT nasal spray 1 spray by Nasal route daily. Patient taking differently: 1 spray by Nasal route daily Uses PRN 1/27/15   Desmond Uriarte MD   meclizine (ANTIVERT) 12.5 MG tablet Take 12.5 mg by mouth 3 times daily as needed.     Historical Provider, MD       Future Appointments   Date Time Provider Corby Andersen   2/15/2022  3:30 PM Montez Yan MD [facility] [place]   3/4/2022  8:30 AM DO Benjamin Oneill [facility] [place]   3/24/2022  3:30 PM Desmond Uriarte  [address]

## 2021-12-28 DIAGNOSIS — R73.9 HYPERGLYCEMIA: ICD-10-CM

## 2021-12-28 DIAGNOSIS — E89.0 POSTOPERATIVE HYPOTHYROIDISM: ICD-10-CM

## 2021-12-28 LAB
ANION GAP SERPL CALCULATED.3IONS-SCNC: 14 MEQ/L (ref 9–15)
BUN BLDV-MCNC: 18 MG/DL (ref 6–20)
CALCIUM SERPL-MCNC: 9.5 MG/DL (ref 8.5–9.9)
CHLORIDE BLD-SCNC: 100 MEQ/L (ref 95–107)
CO2: 23 MEQ/L (ref 20–31)
CREAT SERPL-MCNC: 0.66 MG/DL (ref 0.5–0.9)
GFR AFRICAN AMERICAN: >60
GFR NON-AFRICAN AMERICAN: >60
GLUCOSE BLD-MCNC: 68 MG/DL (ref 70–99)
HBA1C MFR BLD: 6 % (ref 4.8–5.9)
POTASSIUM SERPL-SCNC: 4.1 MEQ/L (ref 3.4–4.9)
SODIUM BLD-SCNC: 137 MEQ/L (ref 135–144)
T4 FREE: 0.94 NG/DL (ref 0.84–1.68)
TSH SERPL DL<=0.05 MIU/L-ACNC: 5.99 UIU/ML (ref 0.44–3.86)

## 2021-12-30 DIAGNOSIS — G43.109 MIGRAINE WITH AURA AND WITHOUT STATUS MIGRAINOSUS, NOT INTRACTABLE: ICD-10-CM

## 2022-01-03 RX ORDER — BUTALBITAL, ACETAMINOPHEN AND CAFFEINE 50; 325; 40 MG/1; MG/1; MG/1
1 TABLET ORAL EVERY 4 HOURS PRN
Qty: 12 TABLET | Refills: 0 | Status: SHIPPED | OUTPATIENT
Start: 2022-01-03 | End: 2022-08-30 | Stop reason: SDUPTHER

## 2022-01-14 RX ORDER — LEVOTHYROXINE SODIUM 0.1 MG/1
TABLET ORAL
Qty: 90 TABLET | Refills: 3 | Status: SHIPPED | OUTPATIENT
Start: 2022-01-14

## 2022-02-09 ENCOUNTER — VIRTUAL VISIT (OUTPATIENT)
Dept: FAMILY MEDICINE CLINIC | Age: 47
End: 2022-02-09
Payer: COMMERCIAL

## 2022-02-09 DIAGNOSIS — J01.40 ACUTE NON-RECURRENT PANSINUSITIS: Primary | ICD-10-CM

## 2022-02-09 PROCEDURE — 99213 OFFICE O/P EST LOW 20 MIN: CPT | Performed by: NURSE PRACTITIONER

## 2022-02-09 RX ORDER — DOXYCYCLINE HYCLATE 100 MG
100 TABLET ORAL 2 TIMES DAILY
Qty: 14 TABLET | Refills: 0 | Status: SHIPPED | OUTPATIENT
Start: 2022-02-09 | End: 2022-02-16

## 2022-02-09 ASSESSMENT — PATIENT HEALTH QUESTIONNAIRE - PHQ9
1. LITTLE INTEREST OR PLEASURE IN DOING THINGS: 0
2. FEELING DOWN, DEPRESSED OR HOPELESS: 0
SUM OF ALL RESPONSES TO PHQ QUESTIONS 1-9: 0
SUM OF ALL RESPONSES TO PHQ9 QUESTIONS 1 & 2: 0

## 2022-02-09 ASSESSMENT — ENCOUNTER SYMPTOMS
RHINORRHEA: 1
SHORTNESS OF BREATH: 0
SINUS PAIN: 1
NAUSEA: 0
CHEST TIGHTNESS: 0
SINUS PRESSURE: 1
SORE THROAT: 1
ABDOMINAL PAIN: 0
COUGH: 0
DIARRHEA: 0

## 2022-02-09 NOTE — PROGRESS NOTES
TELEHEALTH EVALUATION -- Audio/Visual (During [facility]-34 public health emergency)    -   Ralph Jack is a 48 y.o. female being evaluated by a Virtual Visit (video visit) encounter to address concerns as mentioned above. A caregiver was present when appropriate. Due to this being a TeleHealth encounter (During [facility]-58 public health emergency), evaluation of the following organ systems was limited: Vitals/Constitutional/EENT/Resp/CV/GI//MS/Neuro/Skin/Heme-Lymph-Imm. Pursuant to the emergency declaration under the [facility], [address] [place] [address] authority and the [employer] Act, this Virtual Visit was conducted with patient's (and/or legal guardian's) consent, to reduce the patient's risk of exposure to COVID-19 and provide necessary medical care. The patient (and/or legal guardian) has also been advised to contact this office for worsening conditions or problems, and seek emergency medical treatment and/or call 911 if deemed necessary. Patient was contacted and agreed to proceed with a virtual visit via Telephone Visit  The risks and benefits of converting to a virtual visit were discussed in light of the current infectious disease epidemic. Patient also understood that insurance coverage and co-pays are up to their individual insurance plans. Patient was located at their home. Provider was located at their office.      2022  Ralph Jack (:  1975) has requested an audio/video evaluation for the following concern(s):    HPI  VV audio for sinus pain/pressure and b/l ear pain  Started to feel unwell on Saturday   Symptoms worsened today & has felt warm  Otalgia, sinus pain/pressure, nasal congestion and sore throat   Corcidin cold/flu day and night & nasal saline   Humidifier at night   Lessened appetite   Hydrating well   Tmax 99.5F  Denies cough   Denies chest tightness or SOB   Denies GI abnormalities                       Review of Systems   Constitutional: Positive for activity change, appetite change, diaphoresis and fatigue. Negative for chills and fever. HENT: Positive for congestion, ear pain, postnasal drip, rhinorrhea, sinus pressure, sinus pain and sore throat. Negative for ear discharge. Respiratory: Negative for cough, chest tightness and shortness of breath. Cardiovascular: Negative for chest pain and palpitations. Gastrointestinal: Negative for abdominal pain, diarrhea and nausea. Musculoskeletal: Negative for myalgias. Skin: Negative for rash. Neurological: Positive for dizziness. Negative for light-headedness and headaches. Psychiatric/Behavioral: Negative for sleep disturbance. Prior to Visit Medications    Medication Sig Taking? Authorizing Provider   doxycycline hyclate (VIBRA-TABS) 100 MG tablet Take 1 tablet by mouth 2 times daily for 7 days Yes CASE Acosta - NP   busPIRone (BUSPAR) 7.5 MG tablet TAKE ONE TABLET ONE TIME DAILY AS NEEDED (GENERIC FOR BUSPAR) Yes Lisa Ervin,    levothyroxine (SYNTHROID) 100 MCG tablet TAKE 1 TABLET BY MOUTH ONE TIME A DAY Yes Genoveva Burk MD   butalbital-acetaminophen-caffeine (FIORICET, ESGIC) -40 MG per tablet Take 1 tablet by mouth every 4 hours as needed for Headaches Yes Cristino Weir MD   traMADol (ULTRAM) 50 MG tablet Take by mouth as needed. Yes Historical Provider, MD   acetaminophen-codeine (TYLENOL #3) 300-30 MG per tablet Take by mouth as needed.  Yes Historical Provider, MD   cyclobenzaprine (FLEXERIL) 10 MG tablet Take 10 mg by mouth 3 times daily as needed for Muscle spasms Yes Historical Provider, MD   Cholecalciferol (VITAMIN D3) 50 MCG (2000 UT) CAPS Take by mouth daily Yes Historical Provider, MD   dilTIAZem (CARDIZEM CD) 180 MG extended release capsule TAKE 1 CAPSULE BY MOUTH ONE TIME A DAY Yes Ronald Knight, DO   rivaroxaban (XARELTO) 20 MG TABS tablet TAKE 1 TABLET BY MOUTH ONE TIME A DAY Yes Ronald BLAKE Holiday, DO   ondansetron (ZOFRAN) 8 MG tablet Take 1 tablet by mouth every 12 hours as needed for Nausea or Vomiting Yes Georgi Kussmaul, MD   tiZANidine (ZANAFLEX) 4 MG tablet Take 1 tablet by mouth every 8 hours as needed (spasm) Yes Georgi Kussmaul, MD   Bacillus Coagulans-Inulin (PROBIOTIC FORMULA PO) Take 1 capsule by mouth daily Yes Historical Provider, MD   Folic Acid (FOLATE PO) Take 1 tablet by mouth daily Yes Historical Provider, MD   Multiple Vitamin (MULTIVITAMIN PO) Take 1 tablet by mouth daily Yes Historical Provider, MD   acetaminophen (APAP EXTRA STRENGTH) 500 MG tablet Take 2 tablets by mouth every 6 hours as needed for Pain Yes Beti Guy,    hydrocortisone (ANUSOL-HC) 25 MG suppository Place 1 suppository rectally 2 times daily  Patient taking differently: Place 25 mg rectally 2 times daily Uses PRN Yes Georgi Kussmaul, MD   sodium chloride (ALTAMIST SPRAY) 0.65 % nasal spray 1 spray by Nasal route as needed for Congestion Yes CASE Dominguez - SADIE   Handicap Placard MISC by Does not apply route Exp 5 years Yes Georgi Kussmaul, MD   diclofenac sodium 1 % GEL Apply 4 g topically 4 times daily Yes Georgi Kussmaul, MD   magnesium (MAGNESIUM-OXIDE) 250 MG TABS tablet Take 250 mg by mouth daily  Yes Historical Provider, MD   vitamin B-12 (CYANOCOBALAMIN) 500 MCG tablet Take 500 mcg by mouth daily Yes Historical Provider, MD   zinc 50 MG CAPS Take by mouth daily  Yes Historical Provider, MD   Echinacea 400 MG CAPS Take by mouth daily  Yes Historical Provider, MD   Calcium Carbonate-Vit D-Min (CALCIUM 1200) 4352-6079 MG-UNIT CHEW Take by mouth daily  Yes Historical Provider, MD   cetirizine (ZYRTEC) 10 MG tablet Take 10 mg by mouth daily Yes Historical Provider, MD   ferrous sulfate 325 (65 FE) MG tablet Take 65 mg by mouth daily (with breakfast)  Yes Historical Provider, MD   fluticasone (FLONASE) 50 MCG/ACT nasal spray 1 spray by Nasal route daily.   Patient taking differently: 1 spray by Nasal route daily Uses PRN Yes Sudhakar Ramos MD   meclizine (ANTIVERT) 12.5 MG tablet Take 12.5 mg by mouth 3 times daily as needed.  Yes Historical Provider, MD       Past Medical History:   Diagnosis Date    Abnormal Pap smear of cervix     Allergic rhinitis     Arthritis     hands    Asplenia     Atrial fibrillation (HCC)     Atrial fibrillation with RVR (HCC)     Dr. 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 Danny Figueroa     Past Surgical History:   Procedure Laterality Date    CARPAL TUNNEL RELEASE Right 07/12/2016    DILATION AND CURETTAGE OF UTERUS N/A 12/16/2019    HYSTEROSCOPY Katelynn Amend ABLATION D&C performed by Neal Mcwilliams DO at [facility] N/A 8/2/2021    LAPAROSCOPIC ASSISTED VAGINAL HYSTERECTOMY BILATERAL SALPINGECTOMY performed by Neal Mcwilliams DO at [facility] N/A 2004    done for ITP    THYROIDECTOMY, COMPLETION Left 2014    frozen section inconclusive    THYROIDECTOMY, PARTIAL Right 2011    Benign nodule    TONSILLECTOMY N/A 11/2/2021    TONSILLECTOMY performed by Alison Turner MD at [address] [place] [address] History     Socioeconomic History    Marital status:      Spouse name: Not on file    Number of children: 0    Years of education: Not on file    Highest education level: Not on file   Occupational History    Occupation: medical records   Tobacco Use    Smoking status: Current Some Day Smoker     Packs/day: 0.25     Years: 32.00     Pack years: 8.00     Start date: 10/26/1989    Smokeless tobacco: Never Used    Tobacco comment: social smoker   Vaping Use    Vaping Use: Never used   Substance and Sexual Activity    Alcohol use: Yes     Alcohol/week: 0.0 standard drinks     Comment: socially    Drug use: No    Sexual activity: Not on file   Other Topics Concern    Not on file   Social History Narrative    Not on file     Social Determinants of Health     Financial Resource Strain: Low Risk     Difficulty of Paying Living Expenses: Not hard at all   Food Insecurity: No Food Insecurity    Worried About [facility] in the Last Year: Never true    [facility] in the Last Year: Never true   Transportation Needs:     Lack of Transportation (Medical): Not on file    Lack of Transportation (Non-Medical):  Not on file   Physical Activity:     Days of Exercise per Week: Not on file    Minutes of Exercise per Session: Not on file   Stress:     Feeling of Stress : Not on file   Social Connections:     Frequency of Communication with Friends and Family: Not on file    Frequency of Social Gatherings with Friends and Family: Not on file    Attends [employer] Services: Not on file    Active Member of [address] [place] [address] or Organizations: Not on file    Attends Club or Organization Meetings: Not on file    Marital Status: Not on file   Intimate Partner Violence:     Fear of Current or Ex-Partner: Not on file    Emotionally Abused: Not on file    Physically Abused: Not on file    Sexually Abused: Not on file   Housing Stability:     Unable to Pay for Housing in the Last Year: Not on file    Number of [place] in the Last Year: Not on file    Unstable Housing in the Last Year: Not on file     Family History   Problem Relation Age of Onset    High Blood Pressure Mother     Anemia Mother     Atrial Fibrillation Mother     Heart Failure Mother     Other Mother         pacemaker    Heart Disease Mother     Sleep Apnea Mother     Vision Loss Mother         histoplasmosis    Heart Disease Father         CAD / Triple bypass    Diabetes Father         resolved with wt loss    Other Father         Fuck's dystropy / corneal transplants / AAA    Coronary Art Dis Father     Cancer Paternal Aunt     Breast Cancer Paternal Aunt     Cancer Paternal Grandmother     Breast Cancer Paternal Grandmother     Sleep Apnea Sister     Alcohol Abuse Brother     Other Sister         killed by drunk      Allergies   Allergen Reactions    Penicillins Anaphylaxis    Codeine Itching, Swelling and Rash     OK with Tylenol #3 per pt    Percocet [Oxycodone-Acetaminophen] Itching, Swelling and Rash    Bee Venom Itching and Swelling    Seasonal      Sinus sx    Tape [Adhesive Tape]      Skin irritation     Hydrocodone Rash    Protonix [Pantoprazole Sodium] Swelling and Rash     Caused thrush           PMH, Surgical Hx, Family Hx, and Social Hx reviewed and updated.           PHYSICAL EXAMINATION: N/A. VV Audio    Oriented and conversant   No audible distress   No cough throughout visit         Other pertinent observable physical exam findings-   Results for orders placed or performed in visit on 12/28/21   TSH without Reflex   Result Value Ref Range    TSH 5.990 (H) 0.440 - 3.860 uIU/mL   T4, Free   Result Value Ref Range    T4 Free 0.94 0.84 - 1.68 ng/dL   Basic Metabolic Panel   Result Value Ref Range    Sodium 137 135 - 144 mEq/L    Potassium 4.1 3.4 - 4.9 mEq/L    Chloride 100 95 - 107 mEq/L    CO2 23 20 - 31 mEq/L    Anion Gap 14 9 - 15 mEq/L    Glucose 68 (L) 70 - 99 mg/dL    BUN 18 6 - 20 mg/dL    CREATININE 0.66 0.50 - 0.90 mg/dL    GFR Non-African American >60.0 >60    GFR  >60.0 >60    Calcium 9.5 8.5 - 9.9 mg/dL   Hemoglobin A1C   Result Value Ref Range    Hemoglobin A1C 6.0 (H) 4.8 - 5.9 %       ASSESSMENT/PLAN:  Assessment & Plan   Lidia Carter was seen today for sinusitis, otalgia, pharyngitis, sweats, fever and other.    Diagnoses and all orders for this visit:    Acute non-recurrent pansinusitis  -     doxycycline hyclate (VIBRA-TABS) 100 MG tablet; Take 1 tablet by mouth 2 times daily for 7 days      No orders of the defined types were placed in this encounter. Orders Placed This Encounter   Medications    doxycycline hyclate (VIBRA-TABS) 100 MG tablet     Sig: Take 1 tablet by mouth 2 times daily for 7 days     Dispense:  14 tablet     Refill:  0     There are no discontinued medications. Return if symptoms worsen or fail to improve, for follow up with PCP. Reviewed with the patient: current clinical status & medications. Side effects, adverse effects of the medication prescribed today, as well as treatment plan/rationale and result expectations have been discussed with the patient who expressed understanding. How can you care for yourself at home? · Take an over-the-counter pain medicine, such as acetaminophen (Tylenol), ibuprofen (Advil, Motrin), or naproxen (Aleve). Read and follow all instructions on the label. · If the doctor prescribed antibiotics, take them as directed. Do not stop taking them just because you feel better. You need to take the full course of antibiotics. · Be careful when taking over-the-counter cold or flu medicines and Tylenol at the same time. Many of these medicines have acetaminophen, which is Tylenol. Read the labels to make sure that you are not taking more than the recommended dose. Too much acetaminophen (Tylenol) can be harmful. · Breathe warm, moist air from a steamy shower, a hot bath, or a sink filled with hot water. Avoid cold, dry air. Using a humidifier in your home may help. Follow the directions for cleaning the machine. · Use saline (saltwater) nasal washes. This can help keep your nasal passages open and wash out mucus and bacteria. You can buy saline nose drops at a grocery store or drugstore.  Or you can make your own at home by adding 1 teaspoon of salt and 1 teaspoon of baking soda to 2 cups of distilled water. If you make your own, fill a bulb syringe with the solution, insert the tip into your nostril, and squeeze gently. Florie Pleva your nose. · Put a hot, wet towel or a warm gel pack on your face 3 or 4 times a day for 5 to 10 minutes each time. When should you call for help? Call your doctor now or seek immediate medical care if:    · You have new or worse swelling or redness in your face or around your eyes. · You have a new or higher fever. Close follow up to evaluate treatment results and for coordination of care. I have reviewed the patient's medical history in detail and updated the computerized patient record. Patient identification was verified at the start of the visit: Yes  Total time spent on this encounter: 20 minutes  >50% of 20 minutes was spent spent on counseling, answering questions, instructions on meds & testing & coordinating the care based on my plan and assessment as noted. --CASE Dai - NP on 2/9/2022 at 1:59 PM    An electronic signature was used to authenticate this note.

## 2022-02-15 ENCOUNTER — OFFICE VISIT (OUTPATIENT)
Dept: FAMILY MEDICINE CLINIC | Age: 47
End: 2022-02-15
Payer: COMMERCIAL

## 2022-02-15 VITALS
HEART RATE: 59 BPM | WEIGHT: 221 LBS | TEMPERATURE: 98 F | OXYGEN SATURATION: 99 % | HEIGHT: 66 IN | DIASTOLIC BLOOD PRESSURE: 76 MMHG | BODY MASS INDEX: 35.52 KG/M2 | SYSTOLIC BLOOD PRESSURE: 132 MMHG

## 2022-02-15 DIAGNOSIS — Z23 NEED FOR PNEUMOCOCCAL VACCINE: ICD-10-CM

## 2022-02-15 DIAGNOSIS — I10 ESSENTIAL HYPERTENSION: Primary | ICD-10-CM

## 2022-02-15 DIAGNOSIS — Z23 NEED FOR MENINGITIS VACCINATION: ICD-10-CM

## 2022-02-15 DIAGNOSIS — E03.9 HYPOTHYROIDISM, UNSPECIFIED TYPE: ICD-10-CM

## 2022-02-15 DIAGNOSIS — I48.91 ATRIAL FIBRILLATION WITH RVR (HCC): ICD-10-CM

## 2022-02-15 DIAGNOSIS — Q89.01 ASPLENIA: ICD-10-CM

## 2022-02-15 PROCEDURE — 90670 PCV13 VACCINE IM: CPT | Performed by: FAMILY MEDICINE

## 2022-02-15 PROCEDURE — 90472 IMMUNIZATION ADMIN EACH ADD: CPT | Performed by: FAMILY MEDICINE

## 2022-02-15 PROCEDURE — 90471 IMMUNIZATION ADMIN: CPT | Performed by: FAMILY MEDICINE

## 2022-02-15 PROCEDURE — 90621 MENB-FHBP VACC 2/3 DOSE IM: CPT | Performed by: FAMILY MEDICINE

## 2022-02-15 PROCEDURE — 99214 OFFICE O/P EST MOD 30 MIN: CPT | Performed by: FAMILY MEDICINE

## 2022-02-15 ASSESSMENT — ENCOUNTER SYMPTOMS: SHORTNESS OF BREATH: 0

## 2022-02-15 NOTE — PROGRESS NOTES
SPLENECTOMY N/A 2004    done for ITP    THYROIDECTOMY, COMPLETION Left 2014    frozen section inconclusive    THYROIDECTOMY, PARTIAL Right 2011    Benign nodule    TONSILLECTOMY N/A 11/2/2021    TONSILLECTOMY performed by Yary Gonzalez MD at [address] [place] [address] History     Socioeconomic History    Marital status:      Spouse name: Not on file    Number of children: 0    Years of education: Not on file    Highest education level: Not on file   Occupational History    Occupation: medical records   Tobacco Use    Smoking status: Current Some Day Smoker     Packs/day: 0.25     Years: 32.00     Pack years: 8.00     Start date: 10/26/1989    Smokeless tobacco: Never Used    Tobacco comment: social smoker   Vaping Use    Vaping Use: Never used   Substance and Sexual Activity    Alcohol use: Yes     Alcohol/week: 0.0 standard drinks     Comment: socially    Drug use: No    Sexual activity: Not on file   Other Topics Concern    Not on file   Social History Narrative    Not on file     Social Determinants of Health     Financial Resource Strain: Low Risk     Difficulty of Paying Living Expenses: Not hard at all   Food Insecurity: No Food Insecurity    Worried About [facility] in the Last Year: Never true    [facility] in the Last Year: Never true   Transportation Needs:     Lack of Transportation (Medical): Not on file    Lack of Transportation (Non-Medical):  Not on file   Physical Activity:     Days of Exercise per Week: Not on file    Minutes of Exercise per Session: Not on file   Stress:     Feeling of Stress : Not on file   Social Connections:     Frequency of Communication with Friends and Family: Not on file    Frequency of Social Gatherings with Friends and Family: Not on file    Attends Mandae[employer] Services: Not on file    Active Member of Clubs or Organizations: Not on file    Attends Club or Organization Meetings: Not on file    Marital Status: Not on file   Intimate Partner Violence:     Fear of Current or Ex-Partner: Not on file    Emotionally Abused: Not on file    Physically Abused: Not on file    Sexually Abused: Not on file   Housing Stability:     Unable to Pay for Housing in the Last Year: Not on file    Number of Places Lived in the Last Year: Not on file    Unstable Housing in the Last Year: Not on file     Family History   Problem Relation Age of Onset    High Blood Pressure Mother     Anemia Mother     Atrial Fibrillation Mother     Heart Failure Mother     Other Mother         pacemaker    Heart Disease Mother     Sleep Apnea Mother     Vision Loss Mother         histoplasmosis    Heart Disease Father         CAD / Triple bypass    Diabetes Father         resolved with wt loss    Other Father         Fuck's dystropy / corneal transplants / AAA    Coronary Art Dis Father     Cancer Paternal Aunt     Breast Cancer Paternal Aunt     Cancer Paternal Grandmother     Breast Cancer Paternal Grandmother     Sleep Apnea Sister     Alcohol Abuse Brother     Other Sister         killed by drunk      Allergies   Allergen Reactions    Penicillins Anaphylaxis    Codeine Itching, Swelling and Rash     OK with Tylenol #3 per pt    Percocet [Oxycodone-Acetaminophen] Itching, Swelling and Rash    Bee Venom Itching and Swelling    Seasonal      Sinus sx    Tape [Adhesive Tape]      Skin irritation     Hydrocodone Rash    Protonix [Pantoprazole Sodium] Swelling and Rash     Caused thrush     Current Outpatient Medications   Medication Sig Dispense Refill    doxycycline hyclate (VIBRA-TABS) 100 MG tablet Take 1 tablet by mouth 2 times daily for 7 days 14 tablet 0    busPIRone (BUSPAR) 7.5 MG tablet TAKE ONE TABLET ONE TIME DAILY AS NEEDED (GENERIC FOR BUSPAR) 90 tablet 3    levothyroxine (SYNTHROID) 100 MCG tablet TAKE 1 TABLET BY MOUTH ONE TIME A DAY 90 tablet 3    butalbital-acetaminophen-caffeine (FIORICET, ESGIC) -40 MG per tablet Take 1 tablet by mouth every 4 hours as needed for Headaches 12 tablet 0    traMADol (ULTRAM) 50 MG tablet Take by mouth as needed.  acetaminophen-codeine (TYLENOL #3) 300-30 MG per tablet Take by mouth as needed.       cyclobenzaprine (FLEXERIL) 10 MG tablet Take 10 mg by mouth 3 times daily as needed for Muscle spasms      Cholecalciferol (VITAMIN D3) 50 MCG (2000 UT) CAPS Take by mouth daily      dilTIAZem (CARDIZEM CD) 180 MG extended release capsule TAKE 1 CAPSULE BY MOUTH ONE TIME A DAY 90 capsule 3    rivaroxaban (XARELTO) 20 MG TABS tablet TAKE 1 TABLET BY MOUTH ONE TIME A DAY 90 tablet 3    ondansetron (ZOFRAN) 8 MG tablet Take 1 tablet by mouth every 12 hours as needed for Nausea or Vomiting 30 tablet 02    tiZANidine (ZANAFLEX) 4 MG tablet Take 1 tablet by mouth every 8 hours as needed (spasm) 30 tablet 5    Bacillus Coagulans-Inulin (PROBIOTIC FORMULA PO) Take 1 capsule by mouth daily      Folic Acid (FOLATE PO) Take 1 tablet by mouth daily      Multiple Vitamin (MULTIVITAMIN PO) Take 1 tablet by mouth daily      acetaminophen (APAP EXTRA STRENGTH) 500 MG tablet Take 2 tablets by mouth every 6 hours as needed for Pain 120 tablet 0    hydrocortisone (ANUSOL-HC) 25 MG suppository Place 1 suppository rectally 2 times daily (Patient taking differently: Place 25 mg rectally 2 times daily Uses PRN) 60 suppository 0    sodium chloride (ALTAMIST SPRAY) 0.65 % nasal spray 1 spray by Nasal route as needed for Congestion 1 Bottle 3    Handicap Placard MISC by Does not apply route Exp 5 years 1 each 0    diclofenac sodium 1 % GEL Apply 4 g topically 4 times daily 2 Tube 0    magnesium (MAGNESIUM-OXIDE) 250 MG TABS tablet Take 250 mg by mouth daily       vitamin B-12 (CYANOCOBALAMIN) 500 MCG tablet Take 500 mcg by mouth daily      zinc 50 MG CAPS Take by mouth daily       Echinacea 400 MG CAPS Take by mouth daily       Calcium Carbonate-Vit D-Min (CALCIUM 1200) 7358-6811 MG-UNIT CHEW Take by mouth daily       cetirizine (ZYRTEC) 10 MG tablet Take 10 mg by mouth daily      ferrous sulfate 325 (65 FE) MG tablet Take 65 mg by mouth daily (with breakfast)       fluticasone (FLONASE) 50 MCG/ACT nasal spray 1 spray by Nasal route daily. (Patient taking differently: 1 spray by Nasal route daily Uses PRN) 1 Bottle 06    meclizine (ANTIVERT) 12.5 MG tablet Take 12.5 mg by mouth 3 times daily as needed. No current facility-administered medications for this visit. Objective    Vitals:    02/15/22 1532   BP: 132/76   Site: Left Upper Arm   Pulse: 59   Temp: 98 °F (36.7 °C)   SpO2: 99%   Weight: 221 lb (100.2 kg)   Height: 5' 6\" (1.676 m)       Physical Exam  Constitutional:       Appearance: She is well-developed. HENT:      Head: Normocephalic and atraumatic. Right Ear: Hearing, tympanic membrane, ear canal and external ear normal.      Left Ear: Hearing, tympanic membrane, ear canal and external ear normal.      Nose: Nose normal.      Mouth/Throat:      Pharynx: Uvula midline. Cardiovascular:      Rate and Rhythm: Normal rate and regular rhythm. Heart sounds: Normal heart sounds. No murmur heard. Pulmonary:      Effort: Pulmonary effort is normal.      Breath sounds: Normal breath sounds. No wheezing. Musculoskeletal:         General: Normal range of motion. Cervical back: Normal range of motion and neck supple. Lymphadenopathy:      Cervical: No cervical adenopathy. Skin:     General: Skin is warm and dry. Findings: No rash. No results found for this visit on 02/15/22. Assessment & Plan    Diagnosis Orders   1. Essential hypertension     2. Hypothyroidism, unspecified type     3. Asplenia     4. Atrial fibrillation with RVR ([address]     5. Need for meningitis vaccination  Meningococcal B, Recombinant (age 6y-22y) IM (Jyoti Jewell)   10.  Need for pneumococcal vaccine  PREVNAR 13 IM (Pneumococcal conjugate vaccine 13-valent)      Vaccines as ordered A fib managed by Dr. Meghana Rodríguez. Migraines stable on naprosyn and fioricet prn. She will f/u with Dr. Meghana Rodríguez as directed. She also follows with Dr. Mcdonnell Cluster paperwork will be done when needed         Orders Placed This Encounter   Procedures    Meningococcal B, Recombinant (age 6y-22y) IM (TRUMENBA)    PREVNAR 13 IM (Pneumococcal conjugate vaccine 13-valent)     No orders of the defined types were placed in this encounter. There are no discontinued medications. No follow-ups on file.     Alyce Allen MD

## 2022-02-15 NOTE — PROGRESS NOTES
After obtaining consent, and per orders of Dr. Serjio Spear, injection of meninigitis and [id number] given in Right deltoid and left by Jyoti Salazar CMA ([facility]). Patient instructed to remain in clinic for 20 minutes afterwards, and to report any adverse reaction to me immediately.

## 2022-03-04 ENCOUNTER — OFFICE VISIT (OUTPATIENT)
Dept: CARDIOLOGY CLINIC | Age: 47
End: 2022-03-04
Payer: COMMERCIAL

## 2022-03-04 VITALS
BODY MASS INDEX: 35.67 KG/M2 | HEART RATE: 64 BPM | DIASTOLIC BLOOD PRESSURE: 80 MMHG | SYSTOLIC BLOOD PRESSURE: 110 MMHG | WEIGHT: 221 LBS

## 2022-03-04 DIAGNOSIS — I48.0 PAROXYSMAL ATRIAL FIBRILLATION (HCC): Primary | ICD-10-CM

## 2022-03-04 PROCEDURE — 93000 ELECTROCARDIOGRAM COMPLETE: CPT | Performed by: INTERNAL MEDICINE

## 2022-03-04 PROCEDURE — 99214 OFFICE O/P EST MOD 30 MIN: CPT | Performed by: INTERNAL MEDICINE

## 2022-03-04 NOTE — PROGRESS NOTES
Chief Complaint   Patient presents with    Atrial Fibrillation    Hypertension        6-27-18: Patient is a 37 y.o. female who presents with a chief complaint of palpitations. Patient is followed on a regular basis by Dr. Stephanie Cotton MD. Had palpiations for the past 2 days, noted to be in new onset afibb with RVR in ER. Started on cardizem gtt and converted to NSR spontaneously. No hx of MI, CHF or arrhythmia. No hx of LHC or stress test. No excessive caffeine or ETOH. States she is sensitive and has a lot of allergies. Takes Zyrtec. No smoking. No hx of DM, HTN or HLP. On synthroid and  TSH is WNL. S/p CTA of chest without evidence of PE.    7-13-18: Patient presents for initial medical evaluation. Patient is followed on a regular basis by Dr. Delmas Brittle, MD. Did not tolerate lopressor. Diagnosed with new onset afibb and placed on NOAC. BP is elevated today at 160/88. Pt denies chest pain, dyspnea, dyspnea on exertion, change in exercise capacity, fatigue,  nausea, vomiting, diarrhea, constipation, motor weakness, insomnia, weight loss, syncope, dizziness, lightheadedness, palpitations, PND, orthopnea, or claudication. No nitro use. BP and hr are good. CAD is stable. No LE discoloration or ulcers. No LE edema. No CHF type symptoms. Lipid profile is normal. No recent hospitalization. No change in meds. S/p ECHO with EF of 55%, grade I DD.     1-18-19: states she has about one episode of afibb a month. Has underlying stress. Pt denies chest pain, dyspnea, dyspnea on exertion, change in exercise capacity, fatigue,  nausea, vomiting, diarrhea, constipation, motor weakness, insomnia, weight loss, syncope, dizziness, lightheadedness, palpitations, PND, orthopnea, or claudication. No nitro use. BP and hr are good. . No LE discoloration or ulcers. No LE edema. No CHF type symptoms. Lipid profile is normal. No recent hospitalization. No change in meds. EKG with NSR. On NOAC, no bleeding issues.    no excessive caffeine. Not able to lose much weight. 10-18-19: Pt denies chest pain, dyspnea, dyspnea on exertion, change in exercise capacity,   nausea, vomiting, diarrhea, constipation, motor weakness, insomnia, weight loss, syncope, dizziness, lightheadedness, palpitations, PND, orthopnea, or claudication. No nitro use. BP and hr are good. CAD is stable. No LE discoloration or ulcers. No LE edema. No CHF type symptoms. Lipid profile is normal. No recent hospitalization. No change in meds. Did not have ekg done today. She can feel when she goes into afib. She is fatigued and has some stress issues. On DOAC, no bleeding issues. 10-23-30: Pt denies chest pain, dyspnea, dyspnea on exertion, change in exercise capacity, fatigue,  nausea, vomiting, diarrhea, constipation, motor weakness, insomnia, weight loss, syncope, dizziness, lightheadedness, palpitations, PND, orthopnea, or claudication. No nitro use. BP and hr are good. CAD is stable. No LE discoloration or ulcers. No LE edema. No CHF type symptoms. Lipid profile is normal. No recent hospitalization. No change in meds. With history of paroxysmal atrial fibrillation on oral anticoagulation. She can feel when she goes into atrial fibrillation. Placed On anxiety medications. Weight is about the same. With normal sinus rhythm, no ischemia    2021-07-01: Patient needs preoperative clearance for hysterectomy. Patient with history of paroxysmal atrial fibrillation and is on oral anticoagulation. She consents when she developed atrial fibrillation and developed an episode last night. Pt denies chest pain, dyspnea, dyspnea on exertion, change in exercise capacity, fatigue,  nausea, vomiting, diarrhea, constipation, motor weakness, insomnia, weight loss, syncope, dizziness, lightheadedness, palpitations, PND, orthopnea, or claudication.   ekg TODAY WITH NSR, NO ISCHEMIA.     3-4-22: hx of Pafib, on DOAC. Planning to move to [place].    Pt denies chest pain, dyspnea, dyspnea on exertion, change in exercise capacity, fatigue,  nausea, vomiting, diarrhea, constipation, motor weakness, insomnia, weight loss, syncope, dizziness, lightheadedness, palpitations, PND, orthopnea, or claudication. DM is under control. Thyroid is under control. EKG with NSR, no ischemia. Patient Active Problem List   Diagnosis    Hypothyroidism    Obesity    Meniere disease    Irregular periods    Asplenia    Allergic rhinitis    Migraine    Multinodular goiter    Migraine headache    Insomnia    Hypertension    Menorrhagia    Pelvic pain    Postoperative pain    Paroxysmal atrial fibrillation (HCC)    Tonsillar hypertrophy    History of ITP       Past Surgical History:   Procedure Laterality Date    CARPAL TUNNEL RELEASE Right 07/12/2016    DILATION AND CURETTAGE OF UTERUS N/A 12/16/2019    HYSTEROSCOPY NOVASURE ABLATION D&C performed by Batsheva Santos DO at [address] [place] [address], VAGINAL N/A 8/2/2021    LAPAROSCOPIC ASSISTED VAGINAL HYSTERECTOMY BILATERAL SALPINGECTOMY performed by Batsheva Santos DO at [facility] N/A 2004    done for ITP    THYROIDECTOMY, COMPLETION Left 2014    frozen section inconclusive    THYROIDECTOMY, PARTIAL Right 2011    Benign nodule    TONSILLECTOMY N/A 11/2/2021    TONSILLECTOMY performed by Bev Portillo MD at [facility] [address] History     Socioeconomic History    Marital status:      Spouse name: Not on file    Number of children: 0    Years of education: Not on file    Highest education level: Not on file   Occupational History    Occupation: medical records   Tobacco Use    Smoking status: Current Some Day Smoker     Packs/day: 0.25     Years: 32.00     Pack years: 8.00     Start date: 10/26/1989    Smokeless tobacco: Never Used    Tobacco comment: social smoker   Vaping Use    Vaping Use: Never used   Substance and Sexual Activity    Alcohol use:  Yes     Alcohol/week: 0.0 standard drinks Comment: socially    Drug use: No    Sexual activity: Not on file   Other Topics Concern    Not on file   Social History Narrative    Not on file     Social Determinants of Health     Financial Resource Strain: Low Risk     Difficulty of Paying Living Expenses: Not hard at all   Food Insecurity: No Food Insecurity    Worried About Running Out of Food in the Last Year: Never true    [address] in the Last Year: Never true   Transportation Needs:     Lack of Transportation (Medical): Not on file    Lack of Transportation (Non-Medical):  Not on file   Physical Activity:     Days of Exercise per Week: Not on file    Minutes of Exercise per Session: Not on file   Stress:     Feeling of Stress : Not on file   Social Connections:     Frequency of Communication with Friends and Family: Not on file    Frequency of Social Gatherings with Friends and Family: Not on file    Attends Christian Services: Not on file    Active Member of [address] [place] [address] or Organizations: Not on file    Attends Club or Organization Meetings: Not on file    Marital Status: Not on file   Intimate Partner Violence:     Fear of Current or Ex-Partner: Not on file    Emotionally Abused: Not on file    Physically Abused: Not on file    Sexually Abused: Not on file   Housing Stability:     Unable to Pay for Housing in the Last Year: Not on file    Number of [place] in the Last Year: Not on file    Unstable Housing in the Last Year: Not on file       Family History   Problem Relation Age of Onset    High Blood Pressure Mother     Anemia Mother     Atrial Fibrillation Mother     Heart Failure Mother     Other Mother         pacemaker    Heart Disease Mother     Sleep Apnea Mother     Vision Loss Mother         histoplasmosis    Heart Disease Father         CAD / Triple bypass    Diabetes Father         resolved with wt loss    Other Father         Fuck's dystropy / corneal transplants / AAA    Coronary Art Dis Father    [place] Cancer Paternal Aunt     Breast Cancer Paternal Aunt     Cancer Paternal Grandmother     Breast Cancer Paternal Grandmother     Sleep Apnea Sister     Alcohol Abuse Brother     Other Sister         killed by drunk        Current Outpatient Medications   Medication Sig Dispense Refill    busPIRone (BUSPAR) 7.5 MG tablet TAKE ONE TABLET ONE TIME DAILY AS NEEDED (GENERIC FOR BUSPAR) 90 tablet 3    levothyroxine (SYNTHROID) 100 MCG tablet TAKE 1 TABLET BY MOUTH ONE TIME A DAY 90 tablet 3    butalbital-acetaminophen-caffeine (FIORICET, ESGIC) -40 MG per tablet Take 1 tablet by mouth every 4 hours as needed for Headaches 12 tablet 0    traMADol (ULTRAM) 50 MG tablet Take by mouth as needed.  acetaminophen-codeine (TYLENOL #3) 300-30 MG per tablet Take by mouth as needed.       cyclobenzaprine (FLEXERIL) 10 MG tablet Take 10 mg by mouth 3 times daily as needed for Muscle spasms      Cholecalciferol (VITAMIN D3) 50 MCG (2000 UT) CAPS Take by mouth daily      dilTIAZem (CARDIZEM CD) 180 MG extended release capsule TAKE 1 CAPSULE BY MOUTH ONE TIME A DAY 90 capsule 3    rivaroxaban (XARELTO) 20 MG TABS tablet TAKE 1 TABLET BY MOUTH ONE TIME A DAY 90 tablet 3    ondansetron (ZOFRAN) 8 MG tablet Take 1 tablet by mouth every 12 hours as needed for Nausea or Vomiting 30 tablet 02    tiZANidine (ZANAFLEX) 4 MG tablet Take 1 tablet by mouth every 8 hours as needed (spasm) 30 tablet 5    Bacillus Coagulans-Inulin (PROBIOTIC FORMULA PO) Take 1 capsule by mouth daily      Folic Acid (FOLATE PO) Take 1 tablet by mouth daily      Multiple Vitamin (MULTIVITAMIN PO) Take 1 tablet by mouth daily      acetaminophen (APAP EXTRA STRENGTH) 500 MG tablet Take 2 tablets by mouth every 6 hours as needed for Pain 120 tablet 0    hydrocortisone (ANUSOL-HC) 25 MG suppository Place 1 suppository rectally 2 times daily (Patient taking differently: Place 25 mg rectally 2 times daily Uses PRN) 60 suppository 0    Examination:  General appearance - alert, well appearing, and in no distress  Mental status - alert, oriented to person, place, and time  Neck - Neck is supple, no JVD or carotid bruits. No thyromegaly or adenopathy. Chest - clear to auscultation, no wheezes, rales or rhonchi, symmetric air entry  Heart - normal rate, regular rhythm, normal S1, S2, no murmurs, rubs, clicks or gallops  Abdomen - soft, nontender, nondistended, no masses or organomegaly  Neurological - alert, oriented, normal speech, no focal findings or movement disorder noted  Extremities - peripheral pulses normal, no pedal edema, no clubbing or cyanosis  Skin - normal coloration and turgor, no rashes, no suspicious skin lesions noted      Orders Placed This Encounter   Procedures    EKG 12 Lead       ASSESSMENT:     Diagnosis Orders   1. Pre-operative clearance  EKG 12 Lead   2. Atrial fibrillation EKG 12 Lead   3. Essential hypertension     4. Paroxysmal atrial fibrillation (HCC)     5. Class 2 obesity without serious comorbidity with body mass index (BMI) of 35.0 to 35.9 in adult, unspecified obesity type     2. Overweight. PLAN:     Patient will need to continue to follow up with you for their general medical care     As always, aggressive risk factor modification is strongly recommended. We should adhere to the JNC VIII guidelines for HTN management and the NCEP ATP III guidelines for LDL-C management. Cardiac diet is always recommended with low fat, cholesterol, calories and sodium. Continue medications at current doses. Check EKG      cardizem CD 120mg daily. Consider INESSA testing in future    Patient is to avoid any excessive caffeine, chocolate, or OTC stimulants. Patient was advised and encouraged to check blood pressure at home or at a pharmacy, maintain a logbook, and also call us back if blood pressure are above the target ranges or if it is low. Patient clearly understands and agrees to the instructions. We will need to continue to monitor muscle and liver enzymes, BUN, CR, and electrolytes.     Electronically signed by Cara Tinsley DO on 3/4/2022 at 8:39 AM

## 2022-03-31 ENCOUNTER — OFFICE VISIT (OUTPATIENT)
Dept: ENDOCRINOLOGY | Age: 47
End: 2022-03-31
Payer: COMMERCIAL

## 2022-03-31 VITALS
WEIGHT: 220 LBS | HEIGHT: 66 IN | OXYGEN SATURATION: 93 % | HEART RATE: 55 BPM | DIASTOLIC BLOOD PRESSURE: 69 MMHG | SYSTOLIC BLOOD PRESSURE: 108 MMHG | BODY MASS INDEX: 35.36 KG/M2

## 2022-03-31 DIAGNOSIS — E89.0 POSTOPERATIVE HYPOTHYROIDISM: Primary | ICD-10-CM

## 2022-03-31 DIAGNOSIS — R73.9 HYPERGLYCEMIA: ICD-10-CM

## 2022-03-31 PROCEDURE — 99213 OFFICE O/P EST LOW 20 MIN: CPT | Performed by: INTERNAL MEDICINE

## 2022-03-31 NOTE — PROGRESS NOTES
3/31/2022    Assessment:       Diagnosis Orders   1. Postoperative hypothyroidism  TSH with Reflex    T4, Free   2. Hyperglycemia  Basic Metabolic Panel    Hemoglobin A1C         PLAN:     Orders Placed This Encounter   Procedures    TSH with Reflex     Standing Status:   Future     Standing Expiration Date:   3/31/2023    T4, Free     Standing Status:   Future     Standing Expiration Date:   3/31/2023    Basic Metabolic Panel     Standing Status:   Future     Standing Expiration Date:   3/31/2023    Hemoglobin A1C     Standing Status:   Future     Standing Expiration Date:   3/31/2023     Continue levothyroxine 100 mcg daily  Repeat labs  Also get hemoglobin A1c      Subjective:     Chief Complaint   Patient presents with    Hypothyroidism     Vitals:    03/31/22 1601   BP: 108/69   Pulse: 55   SpO2: 93%   Weight: 220 lb (99.8 kg)   Height: 5' 6\" (1.676 m)     Wt Readings from Last 3 Encounters:   03/31/22 220 lb (99.8 kg)   03/04/22 221 lb (100.2 kg)   02/15/22 221 lb (100.2 kg)     BP Readings from Last 3 Encounters:   03/31/22 108/69   03/04/22 110/80   02/15/22 132/76     Follow-up on hypothyroidism history of obesity labs done recently showed hyperglycemia prediabetes hemoglobin A1c was 6    Other  This is a chronic (Hypothyroidism) problem. The current episode started more than 1 year ago. The problem occurs intermittently. The problem has been waxing and waning. Associated symptoms include fatigue. Exacerbated by: Thyroidectomy. Treatments tried: Levothyroxine. The treatment provided moderate relief. Results for [address]" (MRN [id number]) as of 3/31/2022 16:17   Ref.  Range 12/28/2021 15:03 12/28/2021 18:50   Sodium Latest Ref Range: 135 - 144 mEq/L 137    Potassium Latest Ref Range: 3.4 - 4.9 mEq/L 4.1    Chloride Latest Ref Range: 95 - 107 mEq/L 100    CO2 Latest Ref Range: 20 - 31 mEq/L 23    BUN,BUNPL Latest Ref Range: 6 - 20 mg/dL 18    Creatinine Latest Ref Range: 0.50 - 0.90 Not on file    Number of children: 0    Years of education: Not on file    Highest education level: Not on file   Occupational History    Occupation: medical records   Tobacco Use    Smoking status: Current Some Day Smoker     Packs/day: 0.25     Years: 32.00     Pack years: 8.00     Start date: 10/26/1989    Smokeless tobacco: Never Used    Tobacco comment: social smoker   Vaping Use    Vaping Use: Never used   Substance and Sexual Activity    Alcohol use: Yes     Alcohol/week: 0.0 standard drinks     Comment: socially    Drug use: No    Sexual activity: Not on file   Other Topics Concern    Not on file   Social History Narrative    Not on file     Social Determinants of Health     Financial Resource Strain: Low Risk     Difficulty of Paying Living Expenses: Not hard at all   Food Insecurity: No Food Insecurity    Worried About [address] in the Last Year: Never true    [address] in the Last Year: Never true   Transportation Needs:     Lack of Transportation (Medical): Not on file    Lack of Transportation (Non-Medical):  Not on file   Physical Activity:     Days of Exercise per Week: Not on file    Minutes of Exercise per Session: Not on file   Stress:     Feeling of Stress : Not on file   Social Connections:     Frequency of Communication with Friends and Family: Not on file    Frequency of Social Gatherings with Friends and Family: Not on file    Attends [employer] Services: Not on file    Active Member of Clubs or Organizations: Not on file    Attends Club or Organization Meetings: Not on file    Marital Status: Not on file   Intimate Partner Violence:     Fear of Current or Ex-Partner: Not on file    Emotionally Abused: Not on file    Physically Abused: Not on file    Sexually Abused: Not on file   Housing Stability:     Unable to Pay for Housing in the Last Year: Not on file    Number of [place] in the Last Year: Not on file    Unstable Housing in the Last Year: Not on file     Family History   Problem Relation Age of Onset    High Blood Pressure Mother     Anemia Mother     Atrial Fibrillation Mother     Heart Failure Mother     Other Mother         pacemaker    Heart Disease Mother     Sleep Apnea Mother     Vision Loss Mother         histoplasmosis    Heart Disease Father         CAD / Triple bypass    Diabetes Father         resolved with wt loss    Other Father         Fuck's dystropy / corneal transplants / AAA    Coronary Art Dis Father     Cancer Paternal Aunt     Breast Cancer Paternal Aunt     Cancer Paternal Grandmother     Breast Cancer Paternal Grandmother     Sleep Apnea Sister     Alcohol Abuse Brother     Other Sister         killed by drunk      Allergies   Allergen Reactions    Penicillins Anaphylaxis    Codeine Itching, Swelling and Rash     OK with Tylenol #3 per pt    Percocet [Oxycodone-Acetaminophen] Itching, Swelling and Rash    Bee Venom Itching and Swelling    Seasonal      Sinus sx    Tape [Adhesive Tape]      Skin irritation     Hydrocodone Rash    Protonix [Pantoprazole Sodium] Swelling and Rash     Caused thrush       Current Outpatient Medications:     SUMAtriptan (IMITREX) 100 MG tablet, Take 1 tablet by mouth daily as needed for Migraine, Disp: 9 tablet, Rfl: 5    busPIRone (BUSPAR) 7.5 MG tablet, TAKE ONE TABLET ONE TIME DAILY AS NEEDED (GENERIC FOR BUSPAR), Disp: 90 tablet, Rfl: 3    levothyroxine (SYNTHROID) 100 MCG tablet, TAKE 1 TABLET BY MOUTH ONE TIME A DAY, Disp: 90 tablet, Rfl: 3    butalbital-acetaminophen-caffeine (FIORICET, ESGIC) -40 MG per tablet, Take 1 tablet by mouth every 4 hours as needed for Headaches, Disp: 12 tablet, Rfl: 0    traMADol (ULTRAM) 50 MG tablet, Take by mouth as needed. , Disp: , Rfl:     acetaminophen-codeine (TYLENOL #3) 300-30 MG per tablet, Take by mouth as needed. , Disp: , Rfl:     cyclobenzaprine (FLEXERIL) 10 MG tablet, Take 10 mg by mouth 3 times daily as needed for Muscle spasms, Disp: , Rfl:     Cholecalciferol (VITAMIN D3) 50 MCG (2000 UT) CAPS, Take by mouth daily, Disp: , Rfl:     dilTIAZem (CARDIZEM CD) 180 MG extended release capsule, TAKE 1 CAPSULE BY MOUTH ONE TIME A DAY, Disp: 90 capsule, Rfl: 3    rivaroxaban (XARELTO) 20 MG TABS tablet, TAKE 1 TABLET BY MOUTH ONE TIME A DAY, Disp: 90 tablet, Rfl: 3    ondansetron (ZOFRAN) 8 MG tablet, Take 1 tablet by mouth every 12 hours as needed for Nausea or Vomiting, Disp: 30 tablet, Rfl: 02    tiZANidine (ZANAFLEX) 4 MG tablet, Take 1 tablet by mouth every 8 hours as needed (spasm), Disp: 30 tablet, Rfl: 5    Bacillus Coagulans-Inulin (PROBIOTIC FORMULA PO), Take 1 capsule by mouth daily, Disp: , Rfl:     Folic Acid (FOLATE PO), Take 1 tablet by mouth daily, Disp: , Rfl:     Multiple Vitamin (MULTIVITAMIN PO), Take 1 tablet by mouth daily, Disp: , Rfl:     acetaminophen (APAP EXTRA STRENGTH) 500 MG tablet, Take 2 tablets by mouth every 6 hours as needed for Pain, Disp: 120 tablet, Rfl: 0    hydrocortisone (ANUSOL-HC) 25 MG suppository, Place 1 suppository rectally 2 times daily (Patient taking differently: Place 25 mg rectally 2 times daily Uses PRN), Disp: 60 suppository, Rfl: 0    sodium chloride (ALTAMIST SPRAY) 0.65 % nasal spray, 1 spray by Nasal route as needed for Congestion, Disp: 1 Bottle, Rfl: 3    Handicap Placard MISC, by Does not apply route Exp 5 years, Disp: 1 each, Rfl: 0    diclofenac sodium 1 % GEL, Apply 4 g topically 4 times daily, Disp: 2 Tube, Rfl: 0    magnesium (MAGNESIUM-OXIDE) 250 MG TABS tablet, Take 250 mg by mouth daily , Disp: , Rfl:     vitamin B-12 (CYANOCOBALAMIN) 500 MCG tablet, Take 500 mcg by mouth daily, Disp: , Rfl:     zinc 50 MG CAPS, Take by mouth daily , Disp: , Rfl:     Echinacea 400 MG CAPS, Take by mouth daily , Disp: , Rfl:     Calcium Carbonate-Vit D-Min (CALCIUM 1200) 0877-2022 MG-UNIT CHEW, Take by mouth daily , Disp: , Rfl:     cetirizine (ZYRTEC) 10 MG tablet, Take 10 mg by mouth daily, Disp: , Rfl:     ferrous sulfate 325 (65 FE) MG tablet, Take 65 mg by mouth daily (with breakfast) , Disp: , Rfl:     fluticasone (FLONASE) 50 MCG/ACT nasal spray, 1 spray by Nasal route daily. (Patient taking differently: 1 spray by Nasal route daily Uses PRN), Disp: 1 Bottle, Rfl: 06    meclizine (ANTIVERT) 12.5 MG tablet, Take 12.5 mg by mouth 3 times daily as needed. , Disp: , Rfl:   Lab Results   Component Value Date     12/28/2021    K 4.1 12/28/2021     12/28/2021    CO2 23 12/28/2021    BUN 18 12/28/2021    CREATININE 0.66 12/28/2021    GLUCOSE 68 (L) 12/28/2021    CALCIUM 9.5 12/28/2021    PROT 7.6 06/16/2021    LABALBU 4.0 06/16/2021    BILITOT <0.2 06/16/2021    ALKPHOS 97 06/16/2021    AST 16 06/16/2021    ALT 19 06/16/2021    LABGLOM >60.0 12/28/2021    GFRAA >60.0 12/28/2021    GLOB 3.6 (H) 06/16/2021     Lab Results   Component Value Date    WBC 11.0 (H) 10/26/2021    HGB 13.1 10/26/2021    HCT 40.4 10/26/2021    MCV 91.5 10/26/2021     (H) 10/26/2021     Lab Results   Component Value Date    LABA1C 6.0 (H) 12/28/2021    LABA1C 6.1 (H) 09/17/2021    LABA1C 6.0 (H) 01/20/2021     Lab Results   Component Value Date    HDL 49 05/13/2019    HDL 42 06/28/2018    HDL 47 05/07/2018    LDLCALC 114 05/13/2019    LDLCALC 105 06/28/2018    LDLCALC 101 05/07/2018    CHOL 187 05/13/2019    CHOL 166 06/28/2018    CHOL 174 05/07/2018    TRIG 121 05/13/2019    TRIG 93 06/28/2018    TRIG 132 05/07/2018     No results found for: TESTM  Lab Results   Component Value Date    TSH 5.990 (H) 12/28/2021    TSH 1.570 02/04/2019    TSH 0.939 07/27/2018    TSHREFLEX 4.890 (H) 09/17/2021    TSHREFLEX 0.231 (L) 01/20/2021    TSHREFLEX 1.520 02/13/2020    T4FREE 0.94 12/28/2021    T4FREE 1.17 09/17/2021    T4FREE 1.52 01/20/2021     No results found for: TPOABS    Review of Systems   Constitutional: Positive for fatigue. Eyes: Negative.     Cardiovascular: Negative. Endocrine: Negative. Neurological: Negative. All other systems reviewed and are negative. Objective:   Physical Exam  Vitals reviewed. Constitutional:       Appearance: Normal appearance. She is obese. HENT:      Head: Normocephalic and atraumatic. Right Ear: External ear normal.      Left Ear: External ear normal.      Nose: Nose normal.   Eyes:      General: No scleral icterus. Right eye: No discharge. Left eye: No discharge. Extraocular Movements: Extraocular movements intact. Conjunctiva/sclera: Conjunctivae normal.   Cardiovascular:      Rate and Rhythm: Normal rate. Pulmonary:      Effort: Pulmonary effort is normal.   Musculoskeletal:         General: Normal range of motion. Cervical back: Normal range of motion and neck supple. Neurological:      General: No focal deficit present. Mental Status: She is alert and oriented to person, place, and time.    Psychiatric:         Mood and Affect: Mood normal.         Behavior: Behavior normal.

## 2022-04-03 ASSESSMENT — ENCOUNTER SYMPTOMS: EYES NEGATIVE: 1

## 2022-04-06 ENCOUNTER — OFFICE VISIT (OUTPATIENT)
Dept: FAMILY MEDICINE CLINIC | Age: 47
End: 2022-04-06
Payer: COMMERCIAL

## 2022-04-06 VITALS
BODY MASS INDEX: 33.9 KG/M2 | DIASTOLIC BLOOD PRESSURE: 76 MMHG | HEART RATE: 60 BPM | OXYGEN SATURATION: 95 % | TEMPERATURE: 97.4 F | WEIGHT: 216 LBS | SYSTOLIC BLOOD PRESSURE: 120 MMHG | HEIGHT: 67 IN

## 2022-04-06 DIAGNOSIS — M54.50 ACUTE BILATERAL LOW BACK PAIN WITHOUT SCIATICA: Primary | ICD-10-CM

## 2022-04-06 PROCEDURE — 99212 OFFICE O/P EST SF 10 MIN: CPT

## 2022-04-06 RX ORDER — CYCLOBENZAPRINE HCL 10 MG
10 TABLET ORAL 3 TIMES DAILY PRN
Qty: 30 TABLET | Refills: 0 | Status: SHIPPED | OUTPATIENT
Start: 2022-04-06 | End: 2022-06-07 | Stop reason: SDUPTHER

## 2022-04-06 ASSESSMENT — ENCOUNTER SYMPTOMS
BOWEL INCONTINENCE: 0
SHORTNESS OF BREATH: 0
COUGH: 0
BACK PAIN: 1
COLOR CHANGE: 0
WHEEZING: 0

## 2022-04-06 NOTE — PROGRESS NOTES
Subjective  Alona Olivas, 50 y.o. female presents today with:  Chief Complaint   Patient presents with    Back Pain     used Icy hot with lidocane, taking muscle relaxer not helping. Moved alot of stuff for a friend and then at work yesterday moving light boxes. Back Pain  This is a recurrent (Injured back several years ago. This is a flare up of an old injury. ) problem. The current episode started yesterday (Symptoms began Saturday after lifting and moving boxes. ). The problem occurs constantly. The problem is unchanged. The pain is present in the lumbar spine. The quality of the pain is described as aching. The pain does not radiate. The pain is at a severity of 10/10. The pain is severe. The pain is the same all the time. The symptoms are aggravated by sitting and bending. Stiffness is present all day. Pertinent negatives include no bladder incontinence, bowel incontinence, chest pain, fever, headaches, leg pain, numbness, paresis, paresthesias, pelvic pain, perianal numbness, tingling or weakness. She has tried muscle relaxant, ice and heat for the symptoms. The treatment provided no relief. Pt feels pain and tightness in lower back and through right hip but does not radiate down legs. Review of Systems   Constitutional: Negative for chills, fatigue and fever. Respiratory: Negative for cough, shortness of breath and wheezing. Cardiovascular: Negative for chest pain and palpitations. Gastrointestinal: Negative for bowel incontinence. Genitourinary: Negative for bladder incontinence and pelvic pain. Musculoskeletal: Positive for back pain. Negative for arthralgias, joint swelling and myalgias. Skin: Negative for color change, pallor and wound. Neurological: Negative for dizziness, tingling, weakness, light-headedness, numbness, headaches and paresthesias. PMH, Surgical Hx, Family Hx, and Social Hx reviewed and updated.       Objective  Vitals:    04/06/22 1112   BP: 120/76   Site: Right Upper Arm   Position: Sitting   Cuff Size: Medium Adult   Pulse: 60   Temp: 97.4 °F (36.3 °C)   TempSrc: Tympanic   SpO2: 95%   Weight: 216 lb (98 kg)   Height: 5' 6.5\" (1.689 m)     BP Readings from Last 3 Encounters:   04/06/22 120/76   03/31/22 108/69   03/04/22 110/80     Wt Readings from Last 3 Encounters:   04/06/22 216 lb (98 kg)   03/31/22 220 lb (99.8 kg)   03/04/22 221 lb (100.2 kg)     Physical Exam  Vitals reviewed. Constitutional:       General: She is in acute distress. Appearance: Normal appearance. HENT:      Head: Normocephalic and atraumatic. Eyes:      General: Lids are normal.   Cardiovascular:      Rate and Rhythm: Normal rate and regular rhythm. Pulmonary:      Effort: Pulmonary effort is normal. No respiratory distress. Breath sounds: Normal air entry. Musculoskeletal:      Cervical back: Normal and normal range of motion. Thoracic back: Normal.      Lumbar back: Spasms and tenderness present. No swelling, edema, signs of trauma, lacerations or bony tenderness. Normal range of motion. Back:    Skin:     General: Skin is warm and dry. Neurological:      Mental Status: She is alert and oriented to person, place, and time. Mental status is at baseline. Sensory: Sensation is intact. Coordination: Coordination is intact. Gait: Gait is intact. Psychiatric:         Mood and Affect: Mood normal.         Behavior: Behavior is cooperative. Assessment & Plan   1. Acute bilateral low back pain without sciatica  -     cyclobenzaprine (FLEXERIL) 10 MG tablet; Take 1 tablet by mouth 3 times daily as needed for Muscle spasms, Disp-30 tablet, R-0Normal  -     diclofenac sodium (VOLTAREN) 1 % GEL;  Apply 2 g topically 4 times daily as needed for Pain, Topical, 4 TIMES DAILY PRN Starting Wed 4/6/2022, Disp-2 g, R-0, Normal  -OTC medication for symptom control such as tylenol  -Heat/ice  -Icy hot PRN  -Avoid strenuous lifting or movements until symptoms improve  -Caution with muscle relaxer- can cause drowsiness  -Discussed red flags for when to go to ER     No orders of the defined types were placed in this encounter. Orders Placed This Encounter   Medications    cyclobenzaprine (FLEXERIL) 10 MG tablet     Sig: Take 1 tablet by mouth 3 times daily as needed for Muscle spasms     Dispense:  30 tablet     Refill:  0    diclofenac sodium (VOLTAREN) 1 % GEL     Sig: Apply 2 g topically 4 times daily as needed for Pain     Dispense:  2 g     Refill:  0     Return if symptoms worsen or fail to improve, for follow up with PCP. Reviewed with the patient: current clinical status,medications, activities and diet. Side effects, adverse effects of themedication prescribed today, as well as treatment plan/ rationale and result expectations have been discussed with the patient who expresses understanding and desires to proceed. Close follow up to evaluate treatment results and for coordination of care. I have reviewed the patient's medical history in detail and updated the computerized patient record.     CASE Figueroa - SADIE

## 2022-04-06 NOTE — PATIENT INSTRUCTIONS
Patient Education        Back Stretches: Exercises  Introduction  Here are some examples of exercises for stretching your back. Start eachexercise slowly. Ease off the exercise if you start to have pain. Your doctor or physical therapist will tell you when you can start theseexercises and which ones will work best for you. How to do the exercises  Overhead stretch    1. Stand comfortably with your feet shoulder-width apart. 2. Looking straight ahead, raise both arms over your head and reach toward the ceiling. Do not allow your head to tilt back. 3. Hold for 15 to 30 seconds, then lower your arms to your sides. 4. Repeat 2 to 4 times. Side stretch    1. Stand comfortably with your feet shoulder-width apart. 2. Raise one arm over your head, and then lean to the other side. 3. Slide your hand down your leg as you let the weight of your arm gently stretch your side muscles. Hold for 15 to 30 seconds. 4. Repeat 2 to 4 times on each side. Press-up    1. Lie on your stomach, supporting your body with your forearms. 2. Press your elbows down into the floor to raise your upper back. As you do this, relax your stomach muscles and allow your back to arch without using your back muscles. As your press up, do not let your hips or pelvis come off the floor. 3. Hold for 15 to 30 seconds, then relax. 4. Repeat 2 to 4 times. Relax and rest    1. Lie on your back with a rolled towel under your neck and a pillow under your knees. Extend your arms comfortably to your sides. 2. Relax and breathe normally. 3. Remain in this position for about 10 minutes. 4. If you can, do this 2 or 3 times each day. Follow-up care is a key part of your treatment and safety. Be sure to make and go to all appointments, and call your doctor if you are having problems. It's also a good idea to know your test results and keep alist of the medicines you take. Where can you learn more? Go to https://alecia.healthSellanApp. org and sign in to your Telsima account. Enter Y565 in the [employer] [place] box to learn more about \"Back Stretches: Exercises. \"     If you do not have an account, please click on the \"Sign Up Now\" link. Current as of: July 1, 2021               Content Version: 13.2  © 7980-3315 Healthwise, Incorporated. Care instructions adapted under license by [facility] ([facility]). If you have questions about a medical condition or this instruction, always ask your healthcare professional. [address] any warranty or liability for your use of this information.

## 2022-05-23 ENCOUNTER — OFFICE VISIT (OUTPATIENT)
Dept: FAMILY MEDICINE CLINIC | Age: 47
End: 2022-05-23
Payer: COMMERCIAL

## 2022-05-23 VITALS
DIASTOLIC BLOOD PRESSURE: 76 MMHG | HEART RATE: 65 BPM | BODY MASS INDEX: 34.72 KG/M2 | OXYGEN SATURATION: 94 % | SYSTOLIC BLOOD PRESSURE: 128 MMHG | HEIGHT: 66 IN | WEIGHT: 216 LBS | TEMPERATURE: 98.2 F

## 2022-05-23 DIAGNOSIS — J01.41 ACUTE RECURRENT PANSINUSITIS: Primary | ICD-10-CM

## 2022-05-23 LAB
INFLUENZA A ANTIBODY: NORMAL
INFLUENZA B ANTIBODY: NORMAL
Lab: NORMAL
PERFORMING INSTRUMENT: NORMAL
QC PASS/FAIL: NORMAL
SARS-COV-2, POC: NORMAL

## 2022-05-23 PROCEDURE — 87804 INFLUENZA ASSAY W/OPTIC: CPT | Performed by: NURSE PRACTITIONER

## 2022-05-23 PROCEDURE — 99213 OFFICE O/P EST LOW 20 MIN: CPT | Performed by: NURSE PRACTITIONER

## 2022-05-23 PROCEDURE — 87426 SARSCOV CORONAVIRUS AG IA: CPT | Performed by: NURSE PRACTITIONER

## 2022-05-23 RX ORDER — DOXYCYCLINE HYCLATE 100 MG
100 TABLET ORAL 2 TIMES DAILY
Qty: 20 TABLET | Refills: 0 | Status: SHIPPED | OUTPATIENT
Start: 2022-05-23 | End: 2022-06-02

## 2022-05-23 ASSESSMENT — ENCOUNTER SYMPTOMS
HOARSE VOICE: 0
WHEEZING: 0
SWOLLEN GLANDS: 0
CONSTIPATION: 0
CHEST TIGHTNESS: 0
VOMITING: 0
ABDOMINAL PAIN: 0
SINUS PRESSURE: 1
SHORTNESS OF BREATH: 0
SORE THROAT: 1
NAUSEA: 0
DIARRHEA: 0
ABDOMINAL DISTENTION: 0
BACK PAIN: 0
COLOR CHANGE: 0
SINUS PAIN: 1
TROUBLE SWALLOWING: 0
COUGH: 0
SINUS COMPLAINT: 1
RHINORRHEA: 0

## 2022-05-23 NOTE — PROGRESS NOTES
Subjective  Francisco Calderón, 50 y.o. female presents today with:  Chief Complaint   Patient presents with    Other     sinus congestion, ears crackling/sore, eyes watering. sx started 1 week         Sinus Problem  This is a new problem. The current episode started in the past 7 days. The problem has been gradually worsening since onset. There has been no fever. The pain is mild. Associated symptoms include congestion, headaches, sinus pressure and a sore throat. Pertinent negatives include no chills, coughing, diaphoresis, ear pain, hoarse voice, neck pain, shortness of breath, sneezing or swollen glands. Treatments tried: claritin, flonase. The treatment provided no relief. Review of Systems   Constitutional: Positive for fatigue. Negative for activity change, appetite change, chills, diaphoresis and fever. HENT: Positive for congestion, postnasal drip, sinus pressure, sinus pain and sore throat. Negative for ear pain, hoarse voice, rhinorrhea, sneezing and trouble swallowing. Eyes: Negative for visual disturbance. Respiratory: Negative for cough, chest tightness, shortness of breath and wheezing. Cardiovascular: Negative for chest pain and palpitations. Gastrointestinal: Negative for abdominal distention, abdominal pain, constipation, diarrhea, nausea and vomiting. Genitourinary: Negative for difficulty urinating, dysuria, flank pain, frequency and urgency. Musculoskeletal: Negative for arthralgias, back pain, myalgias, neck pain and neck stiffness. Skin: Negative for color change and rash. Neurological: Positive for headaches. Negative for dizziness, tremors, seizures, syncope, speech difficulty, weakness, light-headedness and numbness.        Past Medical History:   Diagnosis Date    Abnormal Pap smear of cervix     Allergic rhinitis     Arthritis     hands    Asplenia     Atrial fibrillation (HCC)     [facility]     Dr. Stone Saxena    Bilateral hand pain  CTS (carpal tunnel syndrome) 05/2016    bilateral    External hemorrhoid     Fatigue     Heart murmur     History of ITP 2004    had spenectomy    Hypertension     meds > 3 yrs    Hypothyroidism     meds > 7 yrs    Hypothyroidism     Insomnia     Irregular periods     Meniere disease     Migraine headache     Multinodular goiter     Nail fungus     Pain in right lower leg     Paroxysmal atrial fibrillation (HCC) 10/23/2020    Plantar fasciitis, left     Dr. Jordan Millsred     Past Surgical History:   Procedure Laterality Date    CARPAL TUNNEL RELEASE Right 07/12/2016    DILATION AND CURETTAGE OF UTERUS N/A 12/16/2019    HYSTEROSCOPY Judge Saxena ABLATION D&C performed by Coretta Wyman DO at [id number] [place] [address], VAGINAL N/A 8/2/2021    LAPAROSCOPIC ASSISTED VAGINAL HYSTERECTOMY BILATERAL SALPINGECTOMY performed by Coretta Wyman DO at [facility] N/A 2004    done for ITP    THYROIDECTOMY, COMPLETION Left 2014    frozen section inconclusive    THYROIDECTOMY, PARTIAL Right 2011    Benign nodule    TONSILLECTOMY N/A 11/2/2021    TONSILLECTOMY performed by Sloane Terrazas MD at [address] [place] [address] History     Socioeconomic History    Marital status:      Spouse name: Not on file    Number of children: 0    Years of education: Not on file    Highest education level: Not on file   Occupational History    Occupation: medical records   Tobacco Use    Smoking status: Current Some Day Smoker     Packs/day: 0.25     Years: 32.00     Pack years: 8.00     Start date: 10/26/1989    Smokeless tobacco: Never Used    Tobacco comment: social smoker   Vaping Use    Vaping Use: Never used   Substance and Sexual Activity    Alcohol use:  Yes     Alcohol/week: 0.0 standard drinks     Comment: socially    Drug use: No    Sexual activity: Not on file   Other Topics Concern    Not on file   Social History Narrative    Not on file     Social Determinants of Health Financial Resource Strain: Low Risk     Difficulty of Paying Living Expenses: Not hard at all   Food Insecurity: No Food Insecurity    Worried About Running Out of Food in the Last Year: Never true    [place] of Food in the Last Year: Never true   Transportation Needs:     Lack of Transportation (Medical): Not on file    Lack of Transportation (Non-Medical):  Not on file   Physical Activity:     Days of Exercise per Week: Not on file    Minutes of Exercise per Session: Not on file   Stress:     Feeling of Stress : Not on file   Social Connections:     Frequency of Communication with Friends and Family: Not on file    Frequency of Social Gatherings with Friends and Family: Not on file    Attends Orthodox Services: Not on file    Active Member of Clubs or Organizations: Not on file    Attends Club or Organization Meetings: Not on file    Marital Status: Not on file   Intimate Partner Violence:     Fear of Current or Ex-Partner: Not on file    Emotionally Abused: Not on file    Physically Abused: Not on file    Sexually Abused: Not on file   Housing Stability:     Unable to Pay for Housing in the Last Year: Not on file    Number of [place] in the Last Year: Not on file    Unstable Housing in the Last Year: Not on file     Family History   Problem Relation Age of Onset    High Blood Pressure Mother     Anemia Mother     Atrial Fibrillation Mother     Heart Failure Mother     Other Mother         pacemaker    Heart Disease Mother     Sleep Apnea Mother     Vision Loss Mother         histoplasmosis    Heart Disease Father         CAD / Triple bypass    Diabetes Father         resolved with wt loss    Other Father         Fuck's dystropy / corneal transplants / AAA    Coronary Art Dis Father     Cancer Paternal Aunt     Breast Cancer Paternal Aunt     Cancer Paternal Grandmother     Breast Cancer Paternal Grandmother     Sleep Apnea Sister     Alcohol Abuse Brother     Other Sister         killed by drunk      Allergies   Allergen Reactions    Penicillins Anaphylaxis    Codeine Itching, Swelling and Rash     OK with Tylenol #3 per pt    Percocet [Oxycodone-Acetaminophen] Itching, Swelling and Rash    Bee Venom Itching and Swelling    Seasonal      Sinus sx    Tape [Adhesive Tape]      Skin irritation     Hydrocodone Rash    Protonix [Pantoprazole Sodium] Swelling and Rash     Caused thrush     Current Outpatient Medications   Medication Sig Dispense Refill    doxycycline hyclate (VIBRA-TABS) 100 MG tablet Take 1 tablet by mouth 2 times daily for 10 days 20 tablet 0    diclofenac sodium (VOLTAREN) 1 % GEL Apply 2 g topically 4 times daily as needed for Pain 2 g 0    SUMAtriptan (IMITREX) 100 MG tablet Take 1 tablet by mouth daily as needed for Migraine 9 tablet 5    busPIRone (BUSPAR) 7.5 MG tablet TAKE ONE TABLET ONE TIME DAILY AS NEEDED (GENERIC FOR BUSPAR) 90 tablet 3    levothyroxine (SYNTHROID) 100 MCG tablet TAKE 1 TABLET BY MOUTH ONE TIME A DAY 90 tablet 3    butalbital-acetaminophen-caffeine (FIORICET, ESGIC) -40 MG per tablet Take 1 tablet by mouth every 4 hours as needed for Headaches 12 tablet 0    traMADol (ULTRAM) 50 MG tablet Take by mouth as needed.       Cholecalciferol (VITAMIN D3) 50 MCG (2000 UT) CAPS Take by mouth daily      dilTIAZem (CARDIZEM CD) 180 MG extended release capsule TAKE 1 CAPSULE BY MOUTH ONE TIME A DAY 90 capsule 3    rivaroxaban (XARELTO) 20 MG TABS tablet TAKE 1 TABLET BY MOUTH ONE TIME A DAY 90 tablet 3    ondansetron (ZOFRAN) 8 MG tablet Take 1 tablet by mouth every 12 hours as needed for Nausea or Vomiting 30 tablet 02    tiZANidine (ZANAFLEX) 4 MG tablet Take 1 tablet by mouth every 8 hours as needed (spasm) 30 tablet 5    Bacillus Coagulans-Inulin (PROBIOTIC FORMULA PO) Take 1 capsule by mouth daily      Folic Acid (FOLATE PO) Take 1 tablet by mouth daily      Multiple Vitamin (MULTIVITAMIN PO) Take 1 tablet by external ear normal. A middle ear effusion is present. Tympanic membrane is erythematous and bulging. Left Ear: Hearing, ear canal and external ear normal. A middle ear effusion is present. Tympanic membrane is bulging. Tympanic membrane is not erythematous. Eyes:      General:         Right eye: No discharge. Left eye: No discharge. Conjunctiva/sclera: Conjunctivae normal.      Pupils: Pupils are equal, round, and reactive to light. Cardiovascular:      Rate and Rhythm: Normal rate and regular rhythm. Pulses: Normal pulses. Pulmonary:      Effort: Pulmonary effort is normal.      Breath sounds: Normal breath sounds. Musculoskeletal:         General: No tenderness or signs of injury. Normal range of motion. Cervical back: Normal range of motion and neck supple. Skin:     General: Skin is warm and dry. Capillary Refill: Capillary refill takes less than 2 seconds. Neurological:      General: No focal deficit present. Mental Status: She is alert and oriented to person, place, and time. Mental status is at baseline. Cranial Nerves: No cranial nerve deficit. Sensory: No sensory deficit. Motor: No weakness. Coordination: Coordination normal.         Assessment & Plan    Diagnosis Orders   1. Acute recurrent pansinusitis  POCT Influenza A/B    POCT COVID-19, Antigen    doxycycline hyclate (VIBRA-TABS) 100 MG tablet     Orders Placed This Encounter   Procedures    POCT Influenza A/B    POCT COVID-19, Antigen     Order Specific Question:   Is this test for diagnosis or screening? Answer:   Diagnosis of ill patient     Order Specific Question:   Symptomatic for COVID-19 as defined by CDC? Answer:   Yes     Order Specific Question:   Date of Symptom Onset     Answer:   5/18/2022     Order Specific Question:   Hospitalized for COVID-19? Answer:   No     Order Specific Question:   Admitted to ICU for COVID-19?      Answer:   No     Order Specific Question:   Employed in healthcare setting? Answer:   No     Order Specific Question:   Resident in a congregate (group) care setting? Answer:   No     Order Specific Question:   Pregnant? Answer:   No     Order Specific Question:   Previously tested for COVID-19? Answer:   No     Orders Placed This Encounter   Medications    doxycycline hyclate (VIBRA-TABS) 100 MG tablet     Sig: Take 1 tablet by mouth 2 times daily for 10 days     Dispense:  20 tablet     Refill:  0     There are no discontinued medications. Return in about 1 week (around 5/30/2022), or if symptoms worsen or fail to improve, for follow up with PCP. Reviewed with the patient: current clinical status,medications, activities and diet. Side effects, adverse effects of themedication prescribed today, as well as treatment plan/ rationale and result expectations have been discussed with the patient who expresses understanding and desires to proceed. Close follow up to evaluate treatment results and for coordination of care. I have reviewed the patient's medical history in detail and updated the computerized patient record.      CASE Adams - CNP

## 2022-05-23 NOTE — PATIENT INSTRUCTIONS
Patient Education        Sinusitis: Care Instructions  Your Care Instructions     Sinusitis is an infection of the lining of the sinus cavities in your head. Sinusitis often follows a cold. It causes pain and pressure in your head andface. In most cases, sinusitis gets better on its own in 1 to 2 weeks. But some mildsymptoms may last for several weeks. Sometimes antibiotics are needed. Follow-up care is a key part of your treatment and safety. Be sure to make and go to all appointments, and call your doctor if you are having problems. It's also a good idea to know your test results and keep alist of the medicines you take. How can you care for yourself at home?  Take an over-the-counter pain medicine, such as acetaminophen (Tylenol), ibuprofen (Advil, Motrin), or naproxen (Aleve). Read and follow all instructions on the label.  If the doctor prescribed antibiotics, take them as directed. Do not stop taking them just because you feel better. You need to take the full course of antibiotics.  Be careful when taking over-the-counter cold or flu medicines and Tylenol at the same time. Many of these medicines have acetaminophen, which is Tylenol. Read the labels to make sure that you are not taking more than the recommended dose. Too much acetaminophen (Tylenol) can be harmful.  Breathe warm, moist air from a steamy shower, a hot bath, or a sink filled with hot water. Avoid cold, dry air. Using a humidifier in your home may help. Follow the directions for cleaning the machine.  Use saline (saltwater) nasal washes. This can help keep your nasal passages open and wash out mucus and bacteria. You can buy saline nose drops at a grocery store or drugstore. Or you can make your own at home by adding 1 teaspoon of salt and 1 teaspoon of baking soda to 2 cups of distilled water. If you make your own, fill a bulb syringe with the solution, insert the tip into your nostril, and squeeze gently. Blane Donate your nose.    Put a hot, wet towel or a warm gel pack on your face 3 or 4 times a day for 5 to 10 minutes each time.  Try a decongestant nasal spray like oxymetazoline (Afrin). Do not use it for more than 3 days in a row. Using it for more than 3 days can make your congestion worse. When should you call for help? Call your doctor now or seek immediate medical care if:     You have new or worse swelling or redness in your face or around your eyes.      You have a new or higher fever. Watch closely for changes in your health, and be sure to contact your doctor if:     You have new or worse facial pain.      The mucus from your nose becomes thicker (like pus) or has new blood in it.      You are not getting better as expected. Where can you learn more? Go to https://LiquidPistonpepiceweb.[email or web address] [email or web address] org and sign in to your Geoloqi account. Enter K931 in the ViaCube box to learn more about \"Sinusitis: Care Instructions. \"     If you do not have an account, please click on the \"Sign Up Now\" link. Current as of: September 8, 2021               Content Version: 13.2  © 1399-5565 Healthwise, Incorporated. Care instructions adapted under license by [facility] ([facility]). If you have questions about a medical condition or this instruction, always ask your healthcare professional. [address] any warranty or liability for your use of this information.

## 2022-06-07 ENCOUNTER — PATIENT MESSAGE (OUTPATIENT)
Dept: FAMILY MEDICINE CLINIC | Age: 47
End: 2022-06-07

## 2022-06-07 DIAGNOSIS — M54.50 ACUTE BILATERAL LOW BACK PAIN WITHOUT SCIATICA: ICD-10-CM

## 2022-06-07 RX ORDER — CYCLOBENZAPRINE HCL 10 MG
10 TABLET ORAL 3 TIMES DAILY PRN
Qty: 30 TABLET | Refills: 0 | Status: SHIPPED | OUTPATIENT
Start: 2022-06-07 | End: 2022-06-17

## 2022-06-07 NOTE — TELEPHONE ENCOUNTER
From: Dell Gomez  To: Dr. Lazaro Sadler: 6/7/2022 10:00 AM EDT  Subject: I tweaked my back again    The diclofenac sodium 1 % Gel that you prescribed for my knee awhile ago, I have been using on my lower back. It helps, but I'm running out. [facility] Also, the Tizanidine isn't touching my pain. Would you please send a script for Flexeril that the Nurse Practitioner prescribed back in April when I messed up my back then too, please? I went camping this past weekend and I must have done too much trout fishing because it's been hurting since Saturday afternoon. Have a great day.      Thank you,  Kendy Corona

## 2022-07-01 ENCOUNTER — HOSPITAL ENCOUNTER (OUTPATIENT)
Dept: WOMENS IMAGING | Age: 47
Discharge: HOME | End: 2022-07-03
Payer: COMMERCIAL

## 2022-07-01 VITALS — BODY MASS INDEX: 34.72 KG/M2 | HEIGHT: 66 IN | WEIGHT: 216 LBS

## 2022-07-01 DIAGNOSIS — Z12.31 ENCOUNTER FOR SCREENING MAMMOGRAM FOR BREAST CANCER: ICD-10-CM

## 2022-07-01 PROCEDURE — 77063 BREAST TOMOSYNTHESIS BI: CPT

## 2022-07-20 ENCOUNTER — TELEPHONE (OUTPATIENT)
Dept: FAMILY MEDICINE CLINIC | Age: 47
End: 2022-07-20

## 2022-07-20 DIAGNOSIS — M54.50 ACUTE BILATERAL LOW BACK PAIN WITHOUT SCIATICA: ICD-10-CM

## 2022-07-20 NOTE — TELEPHONE ENCOUNTER
Future Appointments    Encounter Information    Provider Department Appt Notes   8/15/2022 Daniel Phillips MD [facility] Primary Care 6 month follow up   9/6/2022 Eloina Molina, [address] Obstetrics and Gynecology [facility]   9/9/2022 Gokul Ferrer, [address] [facility] Cardiology 6 MONTHS   9/22/2022 Jeremy Fairchild MD [facility] 6 month follow up       Past Visits    Date Provider Specialty Visit Type Primary Dx   05/23/2022 Denisse Krishna, APRJOSE GUADALUPE - CNP Family Medicine Office Visit Acute recurrent pansinusitis   04/06/2022 Josue Mcwilliams APRN - NP Family Medicine Office Visit Acute bilateral low back pain without sciatica   03/31/2022 Jeremy Fairchild MD Endocrinology Office Visit Postoperative hypothyroidism   03/04/2022 Gokul Ferrer, DO Cardiology Office Visit Paroxysmal atrial fibrillation [facility])   02/15/2022 Daniel Phillips MD Family Medicine Office Visit Essential hypertension

## 2022-07-20 NOTE — TELEPHONE ENCOUNTER
Pharmacy called needing to know how many tubes of Voltaren 1% to dispense. Rx was written for 2g 4 times a day. Pharmacy has 100g tubes. They will dispense one tube and if needed will call back for a refill.

## 2022-07-22 ENCOUNTER — OFFICE VISIT (OUTPATIENT)
Dept: FAMILY MEDICINE CLINIC | Age: 47
End: 2022-07-22
Payer: COMMERCIAL

## 2022-07-22 VITALS
TEMPERATURE: 98.1 F | OXYGEN SATURATION: 96 % | HEART RATE: 84 BPM | SYSTOLIC BLOOD PRESSURE: 110 MMHG | HEIGHT: 66 IN | BODY MASS INDEX: 35.03 KG/M2 | WEIGHT: 218 LBS | DIASTOLIC BLOOD PRESSURE: 88 MMHG

## 2022-07-22 DIAGNOSIS — T14.8XXA MUSCLE STRAIN: Primary | ICD-10-CM

## 2022-07-22 DIAGNOSIS — M79.621 PAIN OF RIGHT UPPER ARM: ICD-10-CM

## 2022-07-22 PROCEDURE — 99213 OFFICE O/P EST LOW 20 MIN: CPT | Performed by: NURSE PRACTITIONER

## 2022-07-22 ASSESSMENT — ENCOUNTER SYMPTOMS
COLOR CHANGE: 0
NAUSEA: 0

## 2022-07-22 NOTE — PROGRESS NOTES
inconclusive    THYROIDECTOMY, PARTIAL Right 2011    Benign nodule    TONSILLECTOMY N/A 11/2/2021    TONSILLECTOMY performed by Griselda Hinkle MD at [address] History   Problem Relation Age of Onset    High Blood Pressure Mother     Anemia Mother     Atrial Fibrillation Mother     Heart Failure Mother     Other Mother         pacemaker    Heart Disease Mother     Sleep Apnea Mother     Vision Loss Mother         histoplasmosis    Heart Disease Father         CAD / Triple bypass    Diabetes Father         resolved with wt loss    Other Father         Fuck's dystropy / corneal transplants / AAA    Coronary Art Dis Father     Cancer Paternal Aunt     Breast Cancer Paternal Aunt     Cancer Paternal Grandmother     Breast Cancer Paternal Grandmother     Sleep Apnea Sister     Alcohol Abuse Brother     Other Sister         killed by drunk              Review of Systems   Constitutional:  Positive for activity change. Negative for appetite change, chills, diaphoresis, fatigue and fever. Gastrointestinal:  Negative for nausea. Musculoskeletal:  Positive for myalgias. Skin:  Negative for color change, rash and wound. Neurological:  Negative for dizziness, light-headedness and headaches. Psychiatric/Behavioral:  Negative for sleep disturbance. PMH, Surgical Hx, Family Hx, and Social Hx reviewed and updated. Objective  Vitals:    07/22/22 1814   BP: 110/88   Site: Left Upper Arm   Position: Sitting   Cuff Size: Medium Adult   Pulse: 84   Temp: 98.1 °F (36.7 °C)   TempSrc: Tympanic   SpO2: 96%   Weight: 218 lb (98.9 kg)   Height: 5' 6\" (1.676 m)     BP Readings from Last 3 Encounters:   07/22/22 110/88   05/23/22 128/76   04/06/22 120/76     Wt Readings from Last 3 Encounters:   07/22/22 218 lb (98.9 kg)   07/01/22 216 lb (98 kg)   05/23/22 216 lb (98 kg)           Physical Exam  Vitals reviewed. Constitutional:       General: She is not in acute distress.      Appearance: Normal appearance. She is not toxic-appearing. Eyes:      General: Lids are normal.      Conjunctiva/sclera: Conjunctivae normal.      Pupils: Pupils are equal, round, and reactive to light. Cardiovascular:      Rate and Rhythm: Normal rate. Pulses:           Radial pulses are 2+ on the right side. Pulmonary:      Effort: Pulmonary effort is normal.   Musculoskeletal:      Right shoulder: No swelling. Normal range of motion. Cervical back: Normal range of motion. No rigidity. No pain with movement. Skin:     General: Skin is warm and dry. Capillary Refill: Capillary refill takes less than 2 seconds. Comments: Stated site of discomfort marked. Area w/o erythema, bruising, rash, wound or inflammation. No palpable abnormality. Neurological:      General: No focal deficit present. Mental Status: She is alert and oriented to person, place, and time. Assessment & Plan    Diagnosis Orders   1. Muscle strain        2. Pain of right upper arm          No orders of the defined types were placed in this encounter. No orders of the defined types were placed in this encounter. Return if symptoms worsen or fail to improve, for follow up with PCP. Reviewed with the patient: current clinical status & medications. Compression wrap applied to site of discomfort. The treatment plan/rationale and result expectations have been discussed with the patient who expressed understanding. How can you care for yourself at home? Rest the strained muscle. Put ice or a cold pack on the sore muscle for 10 to 20 minutes at a time to stop swelling. Put a thin cloth between the ice pack and your skin. Prop up the sore arm or leg on a pillow when you ice it or anytime you sit or lie down during the next 3 days. Try to keep it above the level of your heart. This will help reduce swelling. Take pain medicines exactly as directed. take an over-the-counter medicine.   Do not do anything that makes the pain worse. Return to exercise gradually as you feel better. Close follow up to evaluate treatment results and for coordination of care. I have reviewed the patient's medical history in detail and updated the computerized patient record.         CASE Casillas NP

## 2022-07-29 ENCOUNTER — TELEPHONE (OUTPATIENT)
Dept: FAMILY MEDICINE CLINIC | Age: 47
End: 2022-07-29

## 2022-07-29 DIAGNOSIS — M79.601 RIGHT ARM PAIN: Primary | ICD-10-CM

## 2022-07-29 NOTE — TELEPHONE ENCOUNTER
Pt was seen in the rc by EB-on 7/22/22  for her upper right arm, pt was told if not feeling better or wanting to pursue seeing ortho to call back and we could do a referral to ortho, please advise.  Pt's [phone]

## 2022-08-02 ENCOUNTER — OFFICE VISIT (OUTPATIENT)
Dept: ORTHOPEDIC SURGERY | Age: 47
End: 2022-08-02
Payer: COMMERCIAL

## 2022-08-02 VITALS
OXYGEN SATURATION: 98 % | WEIGHT: 218 LBS | BODY MASS INDEX: 35.03 KG/M2 | HEIGHT: 66 IN | HEART RATE: 81 BPM | TEMPERATURE: 98.4 F

## 2022-08-02 DIAGNOSIS — M77.8 TENDINITIS OF RIGHT TRICEPS: Primary | ICD-10-CM

## 2022-08-02 PROCEDURE — 99213 OFFICE O/P EST LOW 20 MIN: CPT | Performed by: ORTHOPAEDIC SURGERY

## 2022-08-02 RX ORDER — TRAMADOL HYDROCHLORIDE 50 MG/1
50 TABLET ORAL EVERY 6 HOURS PRN
Qty: 20 TABLET | Refills: 0 | Status: SHIPPED | OUTPATIENT
Start: 2022-08-02 | End: 2022-08-07

## 2022-08-02 ASSESSMENT — ENCOUNTER SYMPTOMS
ABDOMINAL DISTENTION: 0
CHEST TIGHTNESS: 0

## 2022-08-02 NOTE — PROGRESS NOTES
Subjective:      Patient ID: Artie Orellana is a 50 y.o. female who presents today for:  Chief Complaint   Patient presents with    New Patient     Patient presents with right arm pain.  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 Sherwin Dominguez    Bilateral hand pain     CTS (carpal tunnel syndrome) 05/2016    bilateral    External hemorrhoid     Fatigue     Heart murmur     History of ITP 2004    had spenectomy    Hypertension     meds > 3 yrs    Hypothyroidism     meds > 7 yrs    Hypothyroidism     Insomnia     Irregular periods     Meniere disease     Migraine headache     Multinodular goiter     Nail fungus     Pain in right lower leg     Paroxysmal atrial fibrillation ([address] 10/23/2020    Plantar fasciitis, left     Dr. Stephanie Norris     Past Surgical History:   Procedure Laterality Date    CARPAL TUNNEL RELEASE Right 07/12/2016    DILATION AND CURETTAGE OF UTERUS N/A 12/16/2019    HYSTEROSCOPY [place] ABLATION D&C performed by Eva Guajardo DO at [facility] ([address] [place] [address])      HYSTERECTOMY, VAGINAL N/A 8/2/2021    LAPAROSCOPIC ASSISTED VAGINAL HYSTERECTOMY BILATERAL SALPINGECTOMY performed by Eva Guajardo DO at [facility] 2004    done for ITP    THYROIDECTOMY, COMPLETION Left 2014    frozen section inconclusive    THYROIDECTOMY, PARTIAL Right 2011    Benign nodule    TONSILLECTOMY N/A 11/2/2021 TONSILLECTOMY performed by Alber Bethea MD at [facility] History     Socioeconomic History    Marital status:      Spouse name: Not on file    Number of children: 0    Years of education: Not on file    Highest education level: Not on file   Occupational History    Occupation: medical records   Tobacco Use    Smoking status: Some Days     Packs/day: 0.25     Years: 32.00     Pack years: 8.00     Types: Cigarettes     Start date: 10/26/1989    Smokeless tobacco: Never    Tobacco comments:     social smoker   Vaping Use    Vaping Use: Never used   Substance and Sexual Activity    Alcohol use:  Yes     Alcohol/week: 0.0 standard drinks     Comment: socially    Drug use: No    Sexual activity: Not on file   Other Topics Concern    Not on file   Social History Narrative    Not on file     Social Determinants of Health     Financial Resource Strain: Low Risk     Difficulty of Paying Living Expenses: Not hard at all   Food Insecurity: No Food Insecurity    Worried About Running Out of Food in the Last Year: Never true    Ran Out of Food in the Last Year: Never true   Transportation Needs: Not on file   Physical Activity: Not on file   Stress: Not on file   Social Connections: Not on file   Intimate Partner Violence: Not on file   Housing Stability: Not on file     Family History   Problem Relation Age of Onset    High Blood Pressure Mother     Anemia Mother     Atrial Fibrillation Mother     Heart Failure Mother     Other Mother         pacemaker    Heart Disease Mother     Sleep Apnea Mother     Vision Loss Mother         histoplasmosis    Heart Disease Father         CAD / Triple bypass    Diabetes Father         resolved with wt loss    Other Father         Fuck's dystropy / corneal transplants / AAA    Coronary Art Dis Father     Cancer Paternal Aunt     Breast Cancer Paternal Aunt     Cancer Paternal Grandmother     Breast Cancer Paternal Grandmother     Sleep Apnea Sister     Alcohol Abuse Brother Other Sister         killed by drunk      Allergies   Allergen Reactions    Penicillins Anaphylaxis    Codeine Itching, Swelling and Rash     OK with Tylenol #3 per pt    Percocet [Oxycodone-Acetaminophen] Itching, Swelling and Rash    Bee Venom Itching and Swelling    Seasonal      Sinus sx    Tape [employer] Tape]      Skin irritation     Hydrocodone Rash    Protonix [Pantoprazole Sodium] Swelling and Rash     Caused thrush     Current Outpatient Medications on File Prior to Visit   Medication Sig Dispense Refill    diclofenac sodium (VOLTAREN) 1 % GEL Apply 2 g topically 4 times daily as needed for Pain 2 g 0    SUMAtriptan (IMITREX) 100 MG tablet Take 1 tablet by mouth daily as needed for Migraine 9 tablet 5    busPIRone (BUSPAR) 7.5 MG tablet TAKE ONE TABLET ONE TIME DAILY AS NEEDED (GENERIC FOR BUSPAR) 90 tablet 3    levothyroxine (SYNTHROID) 100 MCG tablet TAKE 1 TABLET BY MOUTH ONE TIME A DAY 90 tablet 3    butalbital-acetaminophen-caffeine (FIORICET, ESGIC) -40 MG per tablet Take 1 tablet by mouth every 4 hours as needed for Headaches 12 tablet 0    traMADol (ULTRAM) 50 MG tablet Take by mouth as needed.       Cholecalciferol (VITAMIN D3) 50 MCG (2000 UT) CAPS Take by mouth daily      dilTIAZem (CARDIZEM CD) 180 MG extended release capsule TAKE 1 CAPSULE BY MOUTH ONE TIME A DAY 90 capsule 3    rivaroxaban (XARELTO) 20 MG TABS tablet TAKE 1 TABLET BY MOUTH ONE TIME A DAY 90 tablet 3    ondansetron (ZOFRAN) 8 MG tablet Take 1 tablet by mouth every 12 hours as needed for Nausea or Vomiting 30 tablet 02    tiZANidine (ZANAFLEX) 4 MG tablet Take 1 tablet by mouth every 8 hours as needed (spasm) 30 tablet 5    Bacillus Coagulans-Inulin (PROBIOTIC FORMULA PO) Take 1 capsule by mouth daily      Folic Acid (FOLATE PO) Take 1 tablet by mouth daily      Multiple Vitamin (MULTIVITAMIN PO) Take 1 tablet by mouth daily      acetaminophen (APAP EXTRA STRENGTH) 500 MG tablet Take 2 tablets by mouth every 6 hours as needed for Pain 120 tablet 0    hydrocortisone (ANUSOL-HC) 25 MG suppository Place 1 suppository rectally 2 times daily (Patient taking differently: Place 25 mg rectally in the morning and 25 mg before bedtime. Uses PRN. ) 60 suppository 0    sodium chloride (ALTAMIST SPRAY) 0.65 % nasal spray 1 spray by Nasal route as needed for Congestion 1 Bottle 3    Handicap Placard MISC by Does not apply route Exp 5 years 1 each 0    magnesium (MAGNESIUM-OXIDE) 250 MG TABS tablet Take 250 mg by mouth daily       vitamin B-12 (CYANOCOBALAMIN) 500 MCG tablet Take 500 mcg by mouth daily      zinc 50 MG CAPS Take by mouth daily       Echinacea 400 MG CAPS Take by mouth daily       Calcium Carbonate-Vit D-Min (CALCIUM 1200) 9534-1403 MG-UNIT CHEW Take by mouth daily       ferrous sulfate 325 (65 FE) MG tablet Take 65 mg by mouth daily (with breakfast)       fluticasone (FLONASE) 50 MCG/ACT nasal spray 1 spray by Nasal route daily. (Patient taking differently: 1 spray by Nasal route daily Uses PRN) 1 Bottle 06    meclizine (ANTIVERT) 12.5 MG tablet Take 12.5 mg by mouth 3 times daily as needed. No current facility-administered medications on file prior to visit. Controlled Substance Monitoring:    Acute and Chronic Pain Monitoring:   RX Monitoring 10/15/2021   Periodic Controlled Substance Monitoring Assessed functional status. ;No signs of potential drug abuse or diversion identified. Review of Systems   Constitutional:  Negative for activity change and appetite change. Respiratory:  Negative for chest tightness. Cardiovascular:  Negative for chest pain. Gastrointestinal:  Negative for abdominal distention. Genitourinary:  Negative for difficulty urinating. Neurological:  Negative for headaches.      Objective:   Pulse 81   Temp 98.4 °F (36.9 °C) (Tympanic)   Ht 5' 6\" (1.676 m)   Wt 218 lb (98.9 kg)   LMP 11/04/2019   SpO2 98%   BMI 35.19 kg/m²     Physical Exam:  Right shoulder  Contours of the shoulder appear satisfactory  There is no bruising, or ecchymosis in the right shoulder on the right arm  She has no tenderness in the subacromial space over the anterior aspect of the shoulder  Discomfort is present along the posterior aspect of the arm, along the triceps  Motion in the right shoulder is as follows  Forward flexion up to 150 degrees  Extension is up to about 40  Abduction is up to 120  Apprehension sign is negative  Very mild features of impingement are noted  Discomfort is felt on resistive extension of the left elbow  Evidence of bicipital tendinitis  Motor power in the right shoulder is at 4+ by 5      Diagnostic Imaging:  X-rays were taken      Assessment:       Diagnosis Orders   1. Tendinitis of right triceps  traMADol (ULTRAM) 50 MG tablet            Plan:      Patient wishes to do exercises on her own, and a list of exercises for the right shoulder have been printed and given to her  Warm compressions could be applied around the triceps area  She occasionally takes Tylenol for her discomfort, Aleve causes her some relief however she is taking Xarelto for her atrial fibrillation  She has tolerated tramadol in the past following abdominal surgery, and have given her a prescription for this  I have instructed her to take the tramadol only as and when needed, explaining the side effects of drowsiness and constipation  Her symptoms should subside with conservative care  If the discomfort tends to persist or worsen, she may need to go to therapy  She will let me know how she is progressing in the next few months    No orders of the defined types were placed in this encounter. Orders Placed This Encounter   Medications    traMADol (ULTRAM) 50 MG tablet     Sig: Take 1 tablet by mouth every 6 hours as needed for Pain for up to 5 days. Intended supply: 5 days.  Take lowest dose possible to manage pain     Dispense:  20 tablet     Refill:  0     Reduce doses taken as pain becomes manageable Return if symptoms worsen or fail to improve.       Wilfrido Squibb, MD

## 2022-08-15 ENCOUNTER — OFFICE VISIT (OUTPATIENT)
Dept: FAMILY MEDICINE CLINIC | Age: 47
End: 2022-08-15
Payer: COMMERCIAL

## 2022-08-15 VITALS
SYSTOLIC BLOOD PRESSURE: 122 MMHG | BODY MASS INDEX: 34.23 KG/M2 | WEIGHT: 213 LBS | HEIGHT: 66 IN | DIASTOLIC BLOOD PRESSURE: 80 MMHG | TEMPERATURE: 97.9 F | OXYGEN SATURATION: 98 % | HEART RATE: 76 BPM

## 2022-08-15 DIAGNOSIS — I10 ESSENTIAL HYPERTENSION: Primary | ICD-10-CM

## 2022-08-15 DIAGNOSIS — I48.91 ATRIAL FIBRILLATION WITH RVR (HCC): ICD-10-CM

## 2022-08-15 DIAGNOSIS — Z23 NEED FOR MENINGITIS VACCINATION: ICD-10-CM

## 2022-08-15 DIAGNOSIS — E03.9 HYPOTHYROIDISM, UNSPECIFIED TYPE: ICD-10-CM

## 2022-08-15 DIAGNOSIS — Q89.01 ASPLENIA: ICD-10-CM

## 2022-08-15 PROCEDURE — 90621 MENB-FHBP VACC 2/3 DOSE IM: CPT | Performed by: FAMILY MEDICINE

## 2022-08-15 PROCEDURE — 99214 OFFICE O/P EST MOD 30 MIN: CPT | Performed by: FAMILY MEDICINE

## 2022-08-15 PROCEDURE — 90471 IMMUNIZATION ADMIN: CPT | Performed by: FAMILY MEDICINE

## 2022-08-15 ASSESSMENT — ENCOUNTER SYMPTOMS
COLOR CHANGE: 0
ABDOMINAL DISTENTION: 0
NAUSEA: 0
SHORTNESS OF BREATH: 0
CONSTIPATION: 0
BLOOD IN STOOL: 0
VOMITING: 0
SORE THROAT: 0
COUGH: 0
ABDOMINAL PAIN: 0
CHEST TIGHTNESS: 0
DIARRHEA: 0

## 2022-08-15 NOTE — PROGRESS NOTES
Subjective  Janeth Padilla, 50 y.o. female presents today with:  Chief Complaint   Patient presents with    Hypertension     6 month     Other     Needs second dose of shots        Migraine   Pertinent negatives include no abdominal pain, coughing, dizziness, fever, hearing loss, nausea, neck pain, sore throat, vomiting or weakness. Hypertension  Pertinent negatives include no chest pain, headaches, neck pain, palpitations or shortness of breath. Other  Pertinent negatives include no abdominal pain, arthralgias, chest pain, chills, coughing, fatigue, fever, headaches, myalgias, nausea, neck pain, rash, sore throat, vomiting or weakness. Here today for 6 month follow-up on chronic problems including A. Fib, hypothyroidism, allergic rhinitis, migraines. Also c/o above. Hx of A. fib with rapid ventricular response. Her regular cardiologist is Dr. Jacob Jones. She sees Dr. Celinda Bamberger for management of her thyroid. She also has a past medical history significant for migraine headaches and allergic rhinitis. Her ObGyn is Dr. Lucy Hendrix. She is taking synthroid, OCP's, zyrtec and flonase. For her migraines she uses fioricet prn. We complete FMLA for her     She does monitor bp at home     PAF hx    Wt Readings from Last 3 Encounters:   08/15/22 213 lb (96.6 kg)   08/02/22 218 lb (98.9 kg)   07/22/22 218 lb (98.9 kg)         Review of Systems   Constitutional:  Negative for chills, fatigue, fever and unexpected weight change. HENT:  Negative for hearing loss and sore throat. Eyes:  Negative for visual disturbance. Respiratory:  Negative for cough, chest tightness and shortness of breath. Cardiovascular:  Negative for chest pain, palpitations and leg swelling. Gastrointestinal:  Negative for abdominal distention, abdominal pain, blood in stool, constipation, diarrhea, nausea and vomiting.    Genitourinary:  Negative for dyspareunia, dysuria, frequency, menstrual problem, pelvic pain, vaginal bleeding and vaginal discharge. Musculoskeletal:  Negative for arthralgias, myalgias, neck pain and neck stiffness. Skin:  Negative for color change and rash. Neurological:  Negative for dizziness, syncope, weakness, light-headedness and headaches. Hematological:  Negative for adenopathy. Psychiatric/Behavioral:  Negative for dysphoric mood and sleep disturbance. The patient is not nervous/anxious.       Past Medical History:   Diagnosis Date    Abnormal Pap smear of cervix     Allergic rhinitis     Arthritis     hands    Asplenia     Atrial fibrillation ([facility])     Atrial fibrillation with RVR ([facility])     Dr. Persaud November    Bilateral hand pain     CTS (carpal tunnel syndrome) 05/2016    bilateral    External hemorrhoid     Fatigue     Heart murmur     History of ITP 2004    had spenectomy    Hypertension     meds > 3 yrs    Hypothyroidism     meds > 7 yrs    Hypothyroidism     Insomnia     Irregular periods     Meniere disease     Migraine headache     Multinodular goiter     Nail fungus     Pain in right lower leg     Paroxysmal atrial fibrillation ([address] 10/23/2020    Plantar fasciitis, left     Dr. Lord Banejree     Past Surgical History:   Procedure Laterality Date    CARPAL TUNNEL RELEASE Right 07/12/2016    DILATION AND CURETTAGE OF UTERUS N/A 12/16/2019    HYSTEROSCOPY Rodena Phoenix ABLATION D&C performed by Brea Jewell DO at [address] [facility] ([facility])      HYSTERECTOMY, VAGINAL N/A 8/2/2021    LAPAROSCOPIC ASSISTED VAGINAL HYSTERECTOMY BILATERAL SALPINGECTOMY performed by Brea Jewell DO at [address] 2004    done for ITP    THYROIDECTOMY, COMPLETION Left 2014    frozen section inconclusive    THYROIDECTOMY, PARTIAL Right 2011    Benign nodule    TONSILLECTOMY N/A 11/2/2021    TONSILLECTOMY performed by Matt Ornelas MD at [address] History     Socioeconomic History    Marital status:      Spouse name: Not on file    Number of children: 0    Years of education: Not on file    Highest education level: Not on file   Occupational History    Occupation: medical records   Tobacco Use    Smoking status: Some Days     Packs/day: 0.25     Years: 32.00     Pack years: 8.00     Types: Cigarettes     Start date: 10/26/1989    Smokeless tobacco: Never    Tobacco comments:     social smoker   Vaping Use    Vaping Use: Never used   Substance and Sexual Activity    Alcohol use:  Yes     Alcohol/week: 0.0 standard drinks     Comment: socially    Drug use: No    Sexual activity: Not on file   Other Topics Concern    Not on file   Social History Narrative    Not on file     Social Determinants of Health     Financial Resource Strain: Low Risk     Difficulty of Paying Living Expenses: Not hard at all   Food Insecurity: No Food Insecurity    Worried About Running Out of Food in the Last Year: Never true    Ran Out of Food in the Last Year: Never true   Transportation Needs: Not on file   Physical Activity: Not on file   Stress: Not on file   Social Connections: Not on file   Intimate Partner Violence: Not on file   Housing Stability: Not on file     Family History   Problem Relation Age of Onset    High Blood Pressure Mother     Anemia Mother     Atrial Fibrillation Mother     Heart Failure Mother     Other Mother         pacemaker    Heart Disease Mother     Sleep Apnea Mother     Vision Loss Mother         histoplasmosis    Heart Disease Father         CAD / Triple bypass    Diabetes Father         resolved with wt loss    Other Father         Fuck's dystropy / corneal transplants / AAA    Coronary Art Dis Father     Cancer Paternal Aunt     Breast Cancer Paternal Aunt     Cancer Paternal Grandmother     Breast Cancer Paternal Grandmother     Sleep Apnea Sister     Alcohol Abuse Brother     Other Sister         killed by drunk      Allergies   Allergen Reactions    Penicillins Anaphylaxis    Codeine Itching, Swelling and Rash     OK with Tylenol #3 per pt    Percocet [Oxycodone-Acetaminophen] Itching, Swelling and Rash    Bee Venom Itching and Swelling    Seasonal      Sinus sx    Tape Dietra [place] Tape]      Skin irritation     Hydrocodone Rash    Protonix [Pantoprazole Sodium] Swelling and Rash     Caused thrush     Current Outpatient Medications   Medication Sig Dispense Refill    diclofenac sodium (VOLTAREN) 1 % GEL Apply 2 g topically 4 times daily as needed for Pain 2 g 0    SUMAtriptan (IMITREX) 100 MG tablet Take 1 tablet by mouth daily as needed for Migraine 9 tablet 5    busPIRone (BUSPAR) 7.5 MG tablet TAKE ONE TABLET ONE TIME DAILY AS NEEDED (GENERIC FOR BUSPAR) 90 tablet 3    levothyroxine (SYNTHROID) 100 MCG tablet TAKE 1 TABLET BY MOUTH ONE TIME A DAY 90 tablet 3    butalbital-acetaminophen-caffeine (FIORICET, ESGIC) -40 MG per tablet Take 1 tablet by mouth every 4 hours as needed for Headaches 12 tablet 0    traMADol (ULTRAM) 50 MG tablet Take by mouth as needed.       Cholecalciferol (VITAMIN D3) 50 MCG (2000 UT) CAPS Take by mouth daily      dilTIAZem (CARDIZEM CD) 180 MG extended release capsule TAKE 1 CAPSULE BY MOUTH ONE TIME A DAY 90 capsule 3    rivaroxaban (XARELTO) 20 MG TABS tablet TAKE 1 TABLET BY MOUTH ONE TIME A DAY 90 tablet 3    ondansetron (ZOFRAN) 8 MG tablet Take 1 tablet by mouth every 12 hours as needed for Nausea or Vomiting 30 tablet 02    tiZANidine (ZANAFLEX) 4 MG tablet Take 1 tablet by mouth every 8 hours as needed (spasm) 30 tablet 5    Bacillus Coagulans-Inulin (PROBIOTIC FORMULA PO) Take 1 capsule by mouth daily      Folic Acid (FOLATE PO) Take 1 tablet by mouth daily      Multiple Vitamin (MULTIVITAMIN PO) Take 1 tablet by mouth daily      acetaminophen (APAP EXTRA STRENGTH) 500 MG tablet Take 2 tablets by mouth every 6 hours as needed for Pain 120 tablet 0    hydrocortisone (ANUSOL-HC) 25 MG suppository Place 1 suppository rectally 2 times daily (Patient taking differently: Place 25 mg rectally in the morning and 25 mg before bedtime. Uses PRN. ) 60 suppository 0    sodium chloride (ALTAMIST SPRAY) 0.65 % nasal spray 1 spray by Nasal route as needed for Congestion 1 Bottle 3    Handicap Placard MISC by Does not apply route Exp 5 years 1 each 0    magnesium (MAGNESIUM-OXIDE) 250 MG TABS tablet Take 250 mg by mouth daily       vitamin B-12 (CYANOCOBALAMIN) 500 MCG tablet Take 500 mcg by mouth daily      zinc 50 MG CAPS Take by mouth daily       Echinacea 400 MG CAPS Take by mouth daily       Calcium Carbonate-Vit D-Min (CALCIUM 1200) 6613-2172 MG-UNIT CHEW Take by mouth daily       ferrous sulfate 325 (65 FE) MG tablet Take 65 mg by mouth daily (with breakfast)       fluticasone (FLONASE) 50 MCG/ACT nasal spray 1 spray by Nasal route daily. (Patient taking differently: 1 spray by Nasal route daily Uses PRN) 1 Bottle 06    meclizine (ANTIVERT) 12.5 MG tablet Take 12.5 mg by mouth 3 times daily as needed. No current facility-administered medications for this visit. Objective    Vitals:    08/15/22 1530   BP: 122/80   Site: Left Upper Arm   Pulse: 76   Temp: 97.9 °F (36.6 °C)   SpO2: 98%   Weight: 213 lb (96.6 kg)   Height: 5' 6\" (1.676 m)       Physical Exam  Constitutional:       Appearance: She is well-developed. HENT:      Head: Normocephalic and atraumatic. Right Ear: Hearing, tympanic membrane, ear canal and external ear normal.      Left Ear: Hearing, tympanic membrane, ear canal and external ear normal.      Nose: Nose normal.      Mouth/Throat:      Pharynx: Uvula midline. Cardiovascular:      Rate and Rhythm: Normal rate and regular rhythm. Heart sounds: Normal heart sounds. No murmur heard. Pulmonary:      Effort: Pulmonary effort is normal.      Breath sounds: Normal breath sounds. No wheezing. Musculoskeletal:         General: Normal range of motion. Cervical back: Normal range of motion and neck supple. Lymphadenopathy:      Cervical: No cervical adenopathy.    Skin:     General: Skin is warm and dry. Findings: No rash. No results found for this visit on 08/15/22. Assessment & Plan    Diagnosis Orders   1. Essential hypertension        2. Asplenia        3. Hypothyroidism, unspecified type        4. Atrial fibrillation with RVR ([address]        5. Need for meningitis vaccination  Meningococcal Jono Steward, (age 6y-22y), IM         Vaccines as ordered     A fib managed by Dr. Jacquelyn Spatz. Migraines stable on naprosyn and fioricet prn. She will f/u with Dr. Jacquelyn Spatz as directed. She also follows with Dr. Daryle Bowie paperwork will be done when needed         Orders Placed This Encounter   Procedures    Meningococcal Jono Steward, (age 6y-22y), IM     No orders of the defined types were placed in this encounter. There are no discontinued medications. No follow-ups on file.     Jayy Lr MD

## 2022-08-15 NOTE — PROGRESS NOTES
After obtaining consent, and per orders of Dr. Km Clemente, injection of Meningitis B given in Left deltoid by Donal Banegas ([facility]). Patient instructed to remain in clinic for 20 minutes afterwards, and to report any adverse reaction to me immediately.

## 2022-08-30 DIAGNOSIS — G43.109 MIGRAINE WITH AURA AND WITHOUT STATUS MIGRAINOSUS, NOT INTRACTABLE: ICD-10-CM

## 2022-08-30 RX ORDER — BUTALBITAL, ACETAMINOPHEN AND CAFFEINE 50; 325; 40 MG/1; MG/1; MG/1
1 TABLET ORAL EVERY 4 HOURS PRN
Qty: 12 TABLET | Refills: 0 | Status: SHIPPED | OUTPATIENT
Start: 2022-08-30

## 2022-09-12 DIAGNOSIS — E89.0 POSTOPERATIVE HYPOTHYROIDISM: ICD-10-CM

## 2022-09-12 DIAGNOSIS — R73.9 HYPERGLYCEMIA: ICD-10-CM

## 2022-09-12 LAB
ANION GAP SERPL CALCULATED.3IONS-SCNC: 11 MEQ/L (ref 9–15)
BUN BLDV-MCNC: 10 MG/DL (ref 6–20)
CALCIUM SERPL-MCNC: 8.9 MG/DL (ref 8.5–9.9)
CHLORIDE BLD-SCNC: 98 MEQ/L (ref 95–107)
CO2: 27 MEQ/L (ref 20–31)
CREAT SERPL-MCNC: 0.67 MG/DL (ref 0.5–0.9)
GFR AFRICAN AMERICAN: >60
GFR NON-AFRICAN AMERICAN: >60
GLUCOSE BLD-MCNC: 93 MG/DL (ref 70–99)
POTASSIUM SERPL-SCNC: 4.2 MEQ/L (ref 3.4–4.9)
SODIUM BLD-SCNC: 136 MEQ/L (ref 135–144)
T4 FREE: 1.25 NG/DL (ref 0.84–1.68)
TSH REFLEX: 1.89 UIU/ML (ref 0.44–3.86)

## 2022-09-13 LAB — HBA1C MFR BLD: 5.9 % (ref 4.8–5.9)

## 2022-09-15 ENCOUNTER — OFFICE VISIT (OUTPATIENT)
Dept: OBGYN CLINIC | Age: 47
End: 2022-09-15
Payer: COMMERCIAL

## 2022-09-15 VITALS — WEIGHT: 213 LBS | DIASTOLIC BLOOD PRESSURE: 64 MMHG | BODY MASS INDEX: 34.38 KG/M2 | SYSTOLIC BLOOD PRESSURE: 108 MMHG

## 2022-09-15 DIAGNOSIS — Z01.419 WOMEN'S ANNUAL ROUTINE GYNECOLOGICAL EXAMINATION: ICD-10-CM

## 2022-09-15 DIAGNOSIS — K64.4 EXTERNAL HEMORRHOID: ICD-10-CM

## 2022-09-15 DIAGNOSIS — N95.1 PERIMENOPAUSE: Primary | ICD-10-CM

## 2022-09-15 PROCEDURE — 99396 PREV VISIT EST AGE 40-64: CPT | Performed by: OBSTETRICS & GYNECOLOGY

## 2022-09-15 RX ORDER — HYDROCORTISONE ACETATE 25 MG/1
25 SUPPOSITORY RECTAL 2 TIMES DAILY
Qty: 60 SUPPOSITORY | Refills: 0 | Status: SHIPPED | OUTPATIENT
Start: 2022-09-15

## 2022-09-15 ASSESSMENT — ENCOUNTER SYMPTOMS
COUGH: 0
TROUBLE SWALLOWING: 0
ABDOMINAL DISTENTION: 0
COLOR CHANGE: 0
BLOOD IN STOOL: 0
SHORTNESS OF BREATH: 0
SORE THROAT: 0
WHEEZING: 0
CONSTIPATION: 0
ABDOMINAL PAIN: 0
NAUSEA: 0
CHEST TIGHTNESS: 0
BACK PAIN: 0
VOMITING: 0
VOICE CHANGE: 0

## 2022-09-15 NOTE — PROGRESS NOTES
CHIEF COMPLAINT: Complains of of anxiety and emotional lability she is unable to determine exactly when it is happening. She has no other complaints. She did have hysterectomy with preservation of the ovaries. Chief Complaint   Patient presents with    Annual Exam     Increase in mood swings. Still has anxiety         HISTORY OF PRESENT ILLNESS:  50 y.o. female presents for her annual exam. She had no concernsor complaints today. Her menses is absent. She denies breakthrough bleeding, pelvic pain, or abnormal discharge. Bowel and bladder function is normal. Her health overallhas been good.      Past Medical History:   Diagnosis Date    Abnormal Pap smear of cervix     Allergic rhinitis     Arthritis     hands    Asplenia     Atrial fibrillation ([facility])     Atrial fibrillation with RVR ([facility])     Dr. Chan Zhang    Bilateral hand pain     CTS (carpal tunnel syndrome) 05/2016    bilateral    External hemorrhoid     Fatigue     Heart murmur     History of ITP 2004    had spenectomy    Hypertension     meds > 3 yrs    Hypothyroidism     meds > 7 yrs    Hypothyroidism     Insomnia     Irregular periods     Meniere disease     Migraine headache     Multinodular goiter     Nail fungus     Pain in right lower leg     Paroxysmal atrial fibrillation ([address] 10/23/2020    Plantar fasciitis, left     Dr. Lelia Thompson     Past Surgical History:   Procedure Laterality Date    CARPAL TUNNEL RELEASE Right 07/12/2016    DILATION AND CURETTAGE OF UTERUS N/A 12/16/2019    HYSTEROSCOPY Lelia Demetrice ABLATION D&C performed by Gaviota Hutchinson DO at [facility] ([facility])      HYSTERECTOMY, VAGINAL N/A 8/2/2021    LAPAROSCOPIC ASSISTED VAGINAL HYSTERECTOMY BILATERAL SALPINGECTOMY performed by Gaviota Hutchinson DO at [address] 2004    done for ITP    THYROIDECTOMY, COMPLETION Left 2014    frozen section inconclusive    THYROIDECTOMY, PARTIAL Right 2011    Benign nodule    TONSILLECTOMY N/A 11/2/2021    TONSILLECTOMY performed by Sakshi Bolaños MD at [address] History   Problem Relation Age of Onset    High Blood Pressure Mother     Anemia Mother     Atrial Fibrillation Mother     Heart Failure Mother     Other Mother         pacemaker    Heart Disease Mother     Sleep Apnea Mother     Vision Loss Mother         histoplasmosis    Heart Disease Father         CAD / Triple bypass    Diabetes Father         resolved with wt loss    Other Father         Fuck's dystropy / corneal transplants / AAA    Coronary Art Dis Father     Cancer Paternal Aunt     Breast Cancer Paternal Aunt     Cancer Paternal Grandmother     Breast Cancer Paternal Grandmother     Sleep Apnea Sister     Alcohol Abuse Brother     Other Sister         killed by drunk      Social History     Socioeconomic History    Marital status:      Spouse name: Not on file    Number of children: 0    Years of education: Not on file    Highest education level: Not on file   Occupational History    Occupation: medical records   Tobacco Use    Smoking status: Some Days     Packs/day: 0.25     Years: 32.00     Pack years: 8.00     Types: Cigarettes     Start date: 10/26/1989    Smokeless tobacco: Never    Tobacco comments:     social smoker   Vaping Use    Vaping Use: Never used   Substance and Sexual Activity    Alcohol use:  Yes     Alcohol/week: 0.0 standard drinks     Comment: socially    Drug use: No    Sexual activity: Not on file   Other Topics Concern    Not on file   Social History Narrative    Not on file     Social Determinants of Health     Financial Resource Strain: Low Risk     Difficulty of Paying Living Expenses: Not hard at all   Food Insecurity: No Food Insecurity    Worried About Running Out of Food in the Last Year: Never true    Ran Out of Food in the Last Year: Never true   Transportation Needs: Not on file   Physical Activity: Not on file   Stress: Not on file   Social Connections: Not on file   Intimate Partner Violence: Not on file   Housing Stability: Not on file     Allergies:  Penicillins, Codeine, Percocet [oxycodone-acetaminophen], Bee venom, Seasonal, Tape [adhesive tape], Hydrocodone, and Protonix [pantoprazole sodium]  Current Outpatient Medications on File Prior to Visit   Medication Sig Dispense Refill    butalbital-acetaminophen-caffeine (FIORICET, ESGIC) -40 MG per tablet Take 1 tablet by mouth every 4 hours as needed for Headaches 12 tablet 0    diclofenac sodium (VOLTAREN) 1 % GEL Apply 2 g topically 4 times daily as needed for Pain 2 g 0    SUMAtriptan (IMITREX) 100 MG tablet Take 1 tablet by mouth daily as needed for Migraine 9 tablet 5    busPIRone (BUSPAR) 7.5 MG tablet TAKE ONE TABLET ONE TIME DAILY AS NEEDED (GENERIC FOR BUSPAR) 90 tablet 3    levothyroxine (SYNTHROID) 100 MCG tablet TAKE 1 TABLET BY MOUTH ONE TIME A DAY 90 tablet 3    traMADol (ULTRAM) 50 MG tablet Take by mouth as needed.       Cholecalciferol (VITAMIN D3) 50 MCG (2000 UT) CAPS Take by mouth daily      dilTIAZem (CARDIZEM CD) 180 MG extended release capsule TAKE 1 CAPSULE BY MOUTH ONE TIME A DAY 90 capsule 3    rivaroxaban (XARELTO) 20 MG TABS tablet TAKE 1 TABLET BY MOUTH ONE TIME A DAY 90 tablet 3    ondansetron (ZOFRAN) 8 MG tablet Take 1 tablet by mouth every 12 hours as needed for Nausea or Vomiting 30 tablet 02    tiZANidine (ZANAFLEX) 4 MG tablet Take 1 tablet by mouth every 8 hours as needed (spasm) 30 tablet 5    Bacillus Coagulans-Inulin (PROBIOTIC FORMULA PO) Take 1 capsule by mouth daily      Folic Acid (FOLATE PO) Take 1 tablet by mouth daily      Multiple Vitamin (MULTIVITAMIN PO) Take 1 tablet by mouth daily      acetaminophen (APAP EXTRA STRENGTH) 500 MG tablet Take 2 tablets by mouth every 6 hours as needed for Pain 120 tablet 0    sodium chloride (ALTAMIST SPRAY) 0.65 % nasal spray 1 spray by Nasal route as needed for Congestion 1 Bottle 3    Handicap Placard MISC by Does not apply route Exp 5 years 1 each 0    magnesium (MAGNESIUM-OXIDE) 250 MG TABS tablet Take 250 mg by mouth daily       vitamin B-12 (CYANOCOBALAMIN) 500 MCG tablet Take 500 mcg by mouth daily      zinc 50 MG CAPS Take by mouth daily       Echinacea 400 MG CAPS Take by mouth daily       Calcium Carbonate-Vit D-Min (CALCIUM 1200) 0410-2558 MG-UNIT CHEW Take by mouth daily       ferrous sulfate 325 (65 FE) MG tablet Take 65 mg by mouth daily (with breakfast)       fluticasone (FLONASE) 50 MCG/ACT nasal spray 1 spray by Nasal route daily. (Patient taking differently: 1 spray by Nasal route daily Uses PRN) 1 Bottle 06    meclizine (ANTIVERT) 12.5 MG tablet Take 12.5 mg by mouth 3 times daily as needed. No current facility-administered medications on file prior to visit. OB History               Para        Term   0            AB        Living             SAB        IAB        Ectopic        Molar        Multiple        Live Births                  Patient's medications, allergies, past medical, surgical,social and family histories were reviewed and updated as appropriate. Review of Systems   Constitutional:  Negative for activity change, appetite change, fatigue and unexpected weight change. HENT:  Negative for dental problem, ear pain, hearing loss, nosebleeds, sore throat, trouble swallowing and voice change. Eyes:  Negative for visual disturbance. Respiratory:  Negative for cough, chest tightness, shortness of breath and wheezing. Cardiovascular:  Negative for chest pain and palpitations. Gastrointestinal:  Negative for abdominal distention, abdominal pain, blood in stool, constipation, nausea and vomiting. Endocrine: Negative for cold intolerance, heat intolerance, polydipsia, polyphagia and polyuria.    Genitourinary:  Negative for difficulty urinating, dyspareunia, dysuria, flank pain, frequency, genital sores, hematuria, menstrual problem, pelvic pain, urgency, vaginal bleeding, vaginal discharge and vaginal pain. Musculoskeletal:  Negative for arthralgias, back pain, joint swelling and myalgias. Skin:  Negative for color change and rash. Allergic/Immunologic: Negative for environmental allergies, food allergies and immunocompromised state. Neurological:  Negative for dizziness, seizures, syncope, speech difficulty, weakness, numbness and headaches. Hematological:  Negative for adenopathy. Does not bruise/bleed easily. Psychiatric/Behavioral:  Negative for agitation, behavioral problems, confusion, decreased concentration, dysphoric mood and suicidal ideas. The patient is not nervous/anxious and is not hyperactive. All other systems reviewed and are negative. PHYSICAL EXAM  /64   Wt 213 lb (96.6 kg)   LMP 11/04/2019   BMI 34.38 kg/m²     Physical Exam  Vitals and nursing note reviewed. Exam conducted with a chaperone present. Constitutional:       Appearance: She is well-developed. HENT:      Head: Normocephalic and atraumatic. Eyes:      Pupils: Pupils are equal, round, and reactive to light. Neck:      Thyroid: No thyromegaly. Trachea: No tracheal deviation. Cardiovascular:      Rate and Rhythm: Normal rate and regular rhythm. Heart sounds: Normal heart sounds. Pulmonary:      Effort: Pulmonary effort is normal.      Breath sounds: Normal breath sounds. Chest:      Chest wall: No tenderness. Abdominal:      General: Bowel sounds are normal. There is no distension. Palpations: Abdomen is soft. There is no mass. Tenderness: There is no abdominal tenderness. There is no guarding or rebound. Hernia: There is no hernia in the left inguinal area. Genitourinary:     Labia:         Right: No rash, tenderness, lesion or injury. Left: No rash, tenderness, lesion or injury. Vagina: Normal. No foreign body. No vaginal discharge, erythema, tenderness or bleeding. Adnexa:         Right: No mass, tenderness or fullness. Left: No mass, tenderness or fullness. Rectum: Normal. No mass, tenderness, anal fissure, external hemorrhoid or internal hemorrhoid. Normal anal tone. Musculoskeletal:         General: Normal range of motion. Cervical back: Normal range of motion. Lymphadenopathy:      Cervical: No cervical adenopathy. Neurological:      Mental Status: She is alert and oriented to person, place, and time. ASSESSMENT : Routine Annual Exam   wellWoman exam  Status post hysterectomy    Perimenopausal changes with PMS    PLAN: Patient will do a symptom diary over the next several months so that I can determine timing of treatment with most likely Prometrium and Ativan  Pap smear not obtained  No orders of the defined types were placed in this encounter. No orders of the defined types were placed in this encounter. Repeat Annual every 1 year. New ASCCP guidelines regarding pap and Hi Risk HPV co-testing reviewed. Cervical Cytology Evaluation begins at 24years old. If Negative Cytology, Follow-upscreening per current  ASCCP guidelines. Mammograms every 1 year. If 35 yo and last mammogram was negative. Calcium and Vitamin D dosing reviewed. Colonoscopy screening guidelines reviewed as well as onset forbone density testing. Birth control and barrier methods and recommendations discussed. STD counseling and prevention reviewed. Gardisil counseling completed for all patients 7-33 yo. Routine health maintenance per patients PCP.        Electronically signed by Kadeem Coats DO on 9/15/22

## 2022-09-15 NOTE — TELEPHONE ENCOUNTER
Future Appointments    Encounter Information    Provider Department Appt Notes   9/15/2022 Manny Kinney, [address] Obstetrics and Gynecology WWE/ RS x 1 -- Ok per [facility] do not add anymore   9/22/2022 Mojgan Iverson MD [facility] 6 month follow up   10/6/2022 Fredna Bumpers, DO [facility] Cardiology 6 MONTHS   2/16/2023 Vito Moise MD [facility] Primary Care 6 month     Past Visits    Date Provider Specialty Visit Type Primary Dx   08/15/2022 Vito Moise MD Family Medicine Office Visit Essential hypertension   08/02/2022 Stella Augustine MD Orthopedic Surgery Office Visit Tendinitis of right triceps

## 2022-09-22 ENCOUNTER — OFFICE VISIT (OUTPATIENT)
Dept: ENDOCRINOLOGY | Age: 47
End: 2022-09-22
Payer: COMMERCIAL

## 2022-09-22 VITALS
DIASTOLIC BLOOD PRESSURE: 69 MMHG | SYSTOLIC BLOOD PRESSURE: 104 MMHG | OXYGEN SATURATION: 97 % | HEART RATE: 55 BPM | WEIGHT: 216 LBS | BODY MASS INDEX: 34.72 KG/M2 | HEIGHT: 66 IN

## 2022-09-22 DIAGNOSIS — R73.9 HYPERGLYCEMIA: ICD-10-CM

## 2022-09-22 DIAGNOSIS — E89.0 POSTOPERATIVE HYPOTHYROIDISM: Primary | ICD-10-CM

## 2022-09-22 PROCEDURE — 99213 OFFICE O/P EST LOW 20 MIN: CPT | Performed by: INTERNAL MEDICINE

## 2022-09-22 ASSESSMENT — ENCOUNTER SYMPTOMS: HAIR LOSS: 0

## 2022-09-22 NOTE — PROGRESS NOTES
9/22/2022    Assessment:       Diagnosis Orders   1. Postoperative hypothyroidism        2. Hyperglycemia              PLAN:     Orders Placed This Encounter   Procedures    T4, Free     Standing Status:   Future     Standing Expiration Date:   9/22/2023    TSH with Reflex     Standing Status:   Future     Standing Expiration Date:   9/22/2023    Hemoglobin A1C     Standing Status:   Future     Standing Expiration Date:   3/26/1385    Basic Metabolic Panel     Standing Status:   Future     Standing Expiration Date:   9/22/2023     Continue levothyroxine 100 mcg daily repeat labs monitor A1c follow-up in 6 months    Subjective:     Chief Complaint   Patient presents with    Hypothyroidism    Hyperglycemia     Vitals:    09/22/22 1504   BP: 104/69   Pulse: 55   SpO2: 97%   Weight: 216 lb (98 kg)   Height: 5' 6\" (1.676 m)     Wt Readings from Last 3 Encounters:   09/22/22 216 lb (98 kg)   09/15/22 213 lb (96.6 kg)   08/15/22 213 lb (96.6 kg)     BP Readings from Last 3 Encounters:   09/22/22 104/69   09/15/22 108/64   08/15/22 122/80     Follow-up on hypothyroidism hyperglycemia history of heart disease patient is on levothyroxine 100 mcg a thyroid function test stable patient also seeing OB/GYN for possible perimenopause overall energy level stable denies any heat or cold intolerance    Hyperglycemia  This is a chronic problem. The current episode started more than 1 year ago. The problem has been unchanged. The symptoms are aggravated by eating. She has tried nothing for the symptoms. Thyroid Problem  Presents for follow-up visit. Patient reports no hair loss. The symptoms have been stable.    Past Medical History:   Diagnosis Date    Abnormal Pap smear of cervix     Allergic rhinitis     Arthritis     hands    Asplenia     Atrial fibrillation (HCC)     Atrial fibrillation with RVR (HCC)     Dr. Rosi Head    Bilateral hand pain     CTS (carpal tunnel syndrome) 05/2016    bilateral    External hemorrhoid     Fatigue Heart murmur     History of ITP 2004    had spenectomy    Hypertension     meds > 3 yrs    Hypothyroidism     meds > 7 yrs    Hypothyroidism     Insomnia     Irregular periods     Meniere disease     Migraine headache     Multinodular goiter     Nail fungus     Pain in right lower leg     Paroxysmal atrial fibrillation ([address] 10/23/2020    Plantar fasciitis, left     Dr. Oc Temple     Past Surgical History:   Procedure Laterality Date    CARPAL TUNNEL RELEASE Right 07/12/2016    DILATION AND CURETTAGE OF UTERUS N/A 12/16/2019    HYSTEROSCOPY Theadore Schillings ABLATION D&C [facility] ([address] [place] [address])      HYSTERECTOMY, VAGINAL N/A 8/2/2021    LAPAROSCOPIC ASSISTED VAGINAL HYSTERECTOMY BILATERAL SALPINGECTOMY performed by Mesha Gates DO at [place] [address] N/A 2004    done for ITP    THYROIDECTOMY, COMPLETION Left 2014    frozen section inconclusive    THYROIDECTOMY, PARTIAL Right 2011    Benign nodule    TONSILLECTOMY N/A 11/2/2021    TONSILLECTOMY performed by Cristofer Calderon MD at [facility] History     Socioeconomic History    Marital status:      Spouse name: Not on file    Number of children: 0    Years of education: Not on file    Highest education level: Not on file   Occupational History    Occupation: medical records   Tobacco Use    Smoking status: Some Days     Packs/day: 0.25     Years: 32.00     Pack years: 8.00     Types: Cigarettes     Start date: 10/26/1989    Smokeless tobacco: Never    Tobacco comments:     social smoker   Vaping Use    Vaping Use: Never used   Substance and Sexual Activity    Alcohol use:  Yes     Alcohol/week: 0.0 standard drinks     Comment: socially    Drug use: No    Sexual activity: Not on file   Other Topics Concern    Not on file   Social History Narrative    Not on file     Social Determinants of Health     Financial Resource Strain: Low Risk     Difficulty of Paying Living Expenses: Not tablet by mouth daily as needed for Migraine, Disp: 9 tablet, Rfl: 5    busPIRone (BUSPAR) 7.5 MG tablet, TAKE ONE TABLET ONE TIME DAILY AS NEEDED (GENERIC FOR BUSPAR), Disp: 90 tablet, Rfl: 3    levothyroxine (SYNTHROID) 100 MCG tablet, TAKE 1 TABLET BY MOUTH ONE TIME A DAY, Disp: 90 tablet, Rfl: 3    traMADol (ULTRAM) 50 MG tablet, Take by mouth as needed. , Disp: , Rfl:     Cholecalciferol (VITAMIN D3) 50 MCG (2000 UT) CAPS, Take by mouth daily, Disp: , Rfl:     dilTIAZem (CARDIZEM CD) 180 MG extended release capsule, TAKE 1 CAPSULE BY MOUTH ONE TIME A DAY, Disp: 90 capsule, Rfl: 3    rivaroxaban (XARELTO) 20 MG TABS tablet, TAKE 1 TABLET BY MOUTH ONE TIME A DAY, Disp: 90 tablet, Rfl: 3    ondansetron (ZOFRAN) 8 MG tablet, Take 1 tablet by mouth every 12 hours as needed for Nausea or Vomiting, Disp: 30 tablet, Rfl: 02    tiZANidine (ZANAFLEX) 4 MG tablet, Take 1 tablet by mouth every 8 hours as needed (spasm), Disp: 30 tablet, Rfl: 5    Bacillus Coagulans-Inulin (PROBIOTIC FORMULA PO), Take 1 capsule by mouth daily, Disp: , Rfl:     Folic Acid (FOLATE PO), Take 1 tablet by mouth daily, Disp: , Rfl:     Multiple Vitamin (MULTIVITAMIN PO), Take 1 tablet by mouth daily, Disp: , Rfl:     acetaminophen (APAP EXTRA STRENGTH) 500 MG tablet, Take 2 tablets by mouth every 6 hours as needed for Pain, Disp: 120 tablet, Rfl: 0    sodium chloride (ALTAMIST SPRAY) 0.65 % nasal spray, 1 spray by Nasal route as needed for Congestion, Disp: 1 Bottle, Rfl: 3    Handicap Placard MISC, by Does not apply route Exp 5 years, Disp: 1 each, Rfl: 0    magnesium (MAGNESIUM-OXIDE) 250 MG TABS tablet, Take 250 mg by mouth daily , Disp: , Rfl:     vitamin B-12 (CYANOCOBALAMIN) 500 MCG tablet, Take 500 mcg by mouth daily, Disp: , Rfl:     zinc 50 MG CAPS, Take by mouth daily , Disp: , Rfl:     Echinacea 400 MG CAPS, Take by mouth daily , Disp: , Rfl:     Calcium Carbonate-Vit D-Min (CALCIUM 1200) 6279-4879 MG-UNIT CHEW, Take by mouth daily , Disp: , Rfl:     ferrous sulfate 325 (65 FE) MG tablet, Take 65 mg by mouth daily (with breakfast) , Disp: , Rfl:     fluticasone (FLONASE) 50 MCG/ACT nasal spray, 1 spray by Nasal route daily. (Patient taking differently: 1 spray by Nasal route daily Uses PRN), Disp: 1 Bottle, Rfl: 06    meclizine (ANTIVERT) 12.5 MG tablet, Take 12.5 mg by mouth 3 times daily as needed. , Disp: , Rfl:   Lab Results   Component Value Date     09/12/2022    K 4.2 09/12/2022    CL 98 09/12/2022    CO2 27 09/12/2022    BUN 10 09/12/2022    CREATININE 0.67 09/12/2022    GLUCOSE 93 09/12/2022    CALCIUM 8.9 09/12/2022    PROT 7.6 06/16/2021    LABALBU 4.0 06/16/2021    BILITOT <0.2 06/16/2021    ALKPHOS 97 06/16/2021    AST 16 06/16/2021    ALT 19 06/16/2021    LABGLOM >60.0 09/12/2022    GFRAA >60.0 09/12/2022    GLOB 3.6 (H) 06/16/2021     Lab Results   Component Value Date    WBC 11.0 (H) 10/26/2021    HGB 13.1 10/26/2021    HCT 40.4 10/26/2021    MCV 91.5 10/26/2021     (H) 10/26/2021     Lab Results   Component Value Date    LABA1C 5.9 09/13/2022    LABA1C 6.0 (H) 12/28/2021    LABA1C 6.1 (H) 09/17/2021     Lab Results   Component Value Date    HDL 49 05/13/2019    HDL 42 06/28/2018    HDL 47 05/07/2018    LDLCALC 114 05/13/2019    LDLCALC 105 06/28/2018    LDLCALC 101 05/07/2018    CHOL 187 05/13/2019    CHOL 166 06/28/2018    CHOL 174 05/07/2018    TRIG 121 05/13/2019    TRIG 93 06/28/2018    TRIG 132 05/07/2018     No results found for: TESTM  Lab Results   Component Value Date    TSH 5.990 (H) 12/28/2021    TSH 1.570 02/04/2019    TSH 0.939 07/27/2018    TSHREFLEX 1.890 09/12/2022    TSHREFLEX 4.890 (H) 09/17/2021    TSHREFLEX 0.231 (L) 01/20/2021    T4FREE 1.25 09/12/2022    T4FREE 0.94 12/28/2021    T4FREE 1.17 09/17/2021     No results found for: TPOABS    Review of Systems   Cardiovascular: Negative. Endocrine: Negative. All other systems reviewed and are negative.     Objective:   Physical Exam  Vitals reviewed. Constitutional:       General: She is not in acute distress. Appearance: Normal appearance. She is obese. HENT:      Head: Normocephalic and atraumatic. Right Ear: External ear normal.      Left Ear: External ear normal.      Nose: Nose normal.   Eyes:      General: No scleral icterus. Right eye: No discharge. Left eye: No discharge. Extraocular Movements: Extraocular movements intact. Conjunctiva/sclera: Conjunctivae normal.   Cardiovascular:      Rate and Rhythm: Normal rate. Pulmonary:      Effort: Pulmonary effort is normal.   Musculoskeletal:         General: Normal range of motion. Cervical back: Normal range of motion and neck supple. Neurological:      General: No focal deficit present. Mental Status: She is alert and oriented to person, place, and time.    Psychiatric:         Mood and Affect: Mood normal.         Behavior: Behavior normal.

## 2022-10-06 ENCOUNTER — OFFICE VISIT (OUTPATIENT)
Dept: CARDIOLOGY CLINIC | Age: 47
End: 2022-10-06
Payer: COMMERCIAL

## 2022-10-06 VITALS
BODY MASS INDEX: 34.7 KG/M2 | HEART RATE: 51 BPM | SYSTOLIC BLOOD PRESSURE: 122 MMHG | WEIGHT: 215 LBS | DIASTOLIC BLOOD PRESSURE: 80 MMHG

## 2022-10-06 DIAGNOSIS — I48.0 PAROXYSMAL ATRIAL FIBRILLATION (HCC): Primary | ICD-10-CM

## 2022-10-06 PROCEDURE — 99213 OFFICE O/P EST LOW 20 MIN: CPT | Performed by: INTERNAL MEDICINE

## 2022-10-06 PROCEDURE — 93000 ELECTROCARDIOGRAM COMPLETE: CPT | Performed by: INTERNAL MEDICINE

## 2022-10-06 NOTE — PROGRESS NOTES
Chief Complaint   Patient presents with    Atrial Fibrillation    Hypertension    6 Month Follow-Up        6-27-18: Patient is a 37 y.o. female who presents with a chief complaint of palpitations. Patient is followed on a regular basis by Dr. Mj Avila MD. Had palpiations for the past 2 days, noted to be in new onset afibb with RVR in ER. Started on cardizem gtt and converted to NSR spontaneously. No hx of MI, CHF or arrhythmia. No hx of LHC or stress test. No excessive caffeine or ETOH. States she is sensitive and has a lot of allergies. Takes Zyrtec. No smoking. No hx of DM, HTN or HLP. On synthroid and  TSH is WNL. S/p CTA of chest without evidence of PE.    7-13-18: Patient presents for initial medical evaluation. Patient is followed on a regular basis by Dr. Jose G Antonio MD. Did not tolerate lopressor. Diagnosed with new onset afibb and placed on NOAC. BP is elevated today at 160/88. Pt denies chest pain, dyspnea, dyspnea on exertion, change in exercise capacity, fatigue,  nausea, vomiting, diarrhea, constipation, motor weakness, insomnia, weight loss, syncope, dizziness, lightheadedness, palpitations, PND, orthopnea, or claudication. No nitro use. BP and hr are good. CAD is stable. No LE discoloration or ulcers. No LE edema. No CHF type symptoms. Lipid profile is normal. No recent hospitalization. No change in meds. S/p ECHO with EF of 55%, grade I DD.     1-18-19: states she has about one episode of afibb a month. Has underlying stress. Pt denies chest pain, dyspnea, dyspnea on exertion, change in exercise capacity, fatigue,  nausea, vomiting, diarrhea, constipation, motor weakness, insomnia, weight loss, syncope, dizziness, lightheadedness, palpitations, PND, orthopnea, or claudication. No nitro use. BP and hr are good. . No LE discoloration or ulcers. No LE edema. No CHF type symptoms. Lipid profile is normal. No recent hospitalization. No change in meds. EKG with NSR. On NOAC, no bleeding issues. no excessive caffeine. Not able to lose much weight. 10-18-19: Pt denies chest pain, dyspnea, dyspnea on exertion, change in exercise capacity,   nausea, vomiting, diarrhea, constipation, motor weakness, insomnia, weight loss, syncope, dizziness, lightheadedness, palpitations, PND, orthopnea, or claudication. No nitro use. BP and hr are good. CAD is stable. No LE discoloration or ulcers. No LE edema. No CHF type symptoms. Lipid profile is normal. No recent hospitalization. No change in meds. Did not have ekg done today. She can feel when she goes into afib. She is fatigued and has some stress issues. On DOAC, no bleeding issues. 10-23-30: Pt denies chest pain, dyspnea, dyspnea on exertion, change in exercise capacity, fatigue,  nausea, vomiting, diarrhea, constipation, motor weakness, insomnia, weight loss, syncope, dizziness, lightheadedness, palpitations, PND, orthopnea, or claudication. No nitro use. BP and hr are good. CAD is stable. No LE discoloration or ulcers. No LE edema. No CHF type symptoms. Lipid profile is normal. No recent hospitalization. No change in meds. With history of paroxysmal atrial fibrillation on oral anticoagulation. She can feel when she goes into atrial fibrillation. Placed On anxiety medications. Weight is about the same. With normal sinus rhythm, no ischemia    2021-07-01: Patient needs preoperative clearance for hysterectomy. Patient with history of paroxysmal atrial fibrillation and is on oral anticoagulation. She consents when she developed atrial fibrillation and developed an episode last night. Pt denies chest pain, dyspnea, dyspnea on exertion, change in exercise capacity, fatigue,  nausea, vomiting, diarrhea, constipation, motor weakness, insomnia, weight loss, syncope, dizziness, lightheadedness, palpitations, PND, orthopnea, or claudication.   ekg TODAY WITH NSR, NO ISCHEMIA.     3-4-22: hx of Pafib, on DOAC. Planning to move to [place].    Pt denies chest pain, dyspnea, dyspnea on exertion, change in exercise capacity, fatigue,  nausea, vomiting, diarrhea, constipation, motor weakness, insomnia, weight loss, syncope, dizziness, lightheadedness, palpitations, PND, orthopnea, or claudication. DM is under control. Thyroid is under control. EKG with NSR, no ischemia. 10-6-22: did not move to [place]. Pt denies chest pain, dyspnea, dyspnea on exertion, change in exercise capacity, fatigue,  nausea, vomiting, diarrhea, constipation, motor weakness, insomnia, weight loss, syncope, dizziness, lightheadedness, palpitations, PND, orthopnea, or claudication. Hx of Pfib. On DOAC. Bp is good. EKG with SB, no ischemia.      Patient Active Problem List   Diagnosis    Hypothyroidism    Obesity    Meniere disease    Irregular periods    Asplenia    Allergic rhinitis    Migraine    Multinodular goiter    Migraine headache    Insomnia    Hypertension    Menorrhagia    Pelvic pain    Postoperative pain    Paroxysmal atrial fibrillation (HCC)    Tonsillar hypertrophy    History of ITP       Past Surgical History:   Procedure Laterality Date    CARPAL TUNNEL RELEASE Right 07/12/2016    DILATION AND CURETTAGE OF UTERUS N/A 12/16/2019    HYSTEROSCOPY NOVASURE ABLATION D&C performed by Liza Irizarry DO at [facility] (CERVIX STATUS UNKNOWN)      HYSTERECTOMY, VAGINAL N/A 8/2/2021    LAPAROSCOPIC ASSISTED VAGINAL HYSTERECTOMY BILATERAL SALPINGECTOMY performed by Liza Irizarry DO at [facility] 2004    done for ITP    THYROIDECTOMY, COMPLETION Left 2014    frozen section inconclusive    THYROIDECTOMY, PARTIAL Right 2011    Benign nodule    TONSILLECTOMY N/A 11/2/2021    TONSILLECTOMY performed by Ace Garner MD at [facility] History     Socioeconomic History    Marital status:     Number of children: 0   Occupational History    Occupation: medical records   Tobacco Use    Smoking status: Some Days     Packs/day: 0.25     Years: 32.00     Pack years: 8.00     Types: Cigarettes     Start date: 10/26/1989    Smokeless tobacco: Never    Tobacco comments:     social smoker   Vaping Use    Vaping Use: Never used   Substance and Sexual Activity    Alcohol use:  Yes     Alcohol/week: 0.0 standard drinks     Comment: socially    Drug use: No     Social Determinants of Health     Financial Resource Strain: Low Risk     Difficulty of Paying Living Expenses: Not hard at all   Food Insecurity: No Food Insecurity    Worried About Running Out of Food in the Last Year: Never true    Ran Out of Food in the Last Year: Never true       Family History   Problem Relation Age of Onset    High Blood Pressure Mother     Anemia Mother     Atrial Fibrillation Mother     Heart Failure Mother     Other Mother         pacemaker    Heart Disease Mother     Sleep Apnea Mother     Vision Loss Mother         histoplasmosis    Heart Disease Father         CAD / Triple bypass    Diabetes Father         resolved with wt loss    Other Father         Fuck's dystropy / corneal transplants / AAA    Coronary Art Dis Father     Cancer Paternal Aunt     Breast Cancer Paternal Aunt     Cancer Paternal Grandmother     Breast Cancer Paternal Grandmother     Sleep Apnea Sister     Alcohol Abuse Brother     Other Sister         killed by drunk        Current Outpatient Medications   Medication Sig Dispense Refill    hydrocortisone (ANUSOL-HC) 25 MG suppository Place 1 suppository rectally 2 times daily 60 suppository 0    butalbital-acetaminophen-caffeine (FIORICET, ESGIC) -40 MG per tablet Take 1 tablet by mouth every 4 hours as needed for Headaches 12 tablet 0    diclofenac sodium (VOLTAREN) 1 % GEL Apply 2 g topically 4 times daily as needed for Pain 2 g 0    SUMAtriptan (IMITREX) 100 MG tablet Take 1 tablet by mouth daily as needed for Migraine 9 tablet 5    busPIRone (BUSPAR) 7.5 MG tablet TAKE ONE TABLET ONE TIME DAILY AS NEEDED (GENERIC FOR BUSPAR) 90 tablet 3    levothyroxine (SYNTHROID) 100 MCG tablet TAKE 1 TABLET BY MOUTH ONE TIME A DAY 90 tablet 3    traMADol (ULTRAM) 50 MG tablet Take by mouth as needed. Cholecalciferol (VITAMIN D3) 50 MCG (2000 UT) CAPS Take by mouth daily      dilTIAZem (CARDIZEM CD) 180 MG extended release capsule TAKE 1 CAPSULE BY MOUTH ONE TIME A DAY 90 capsule 3    rivaroxaban (XARELTO) 20 MG TABS tablet TAKE 1 TABLET BY MOUTH ONE TIME A DAY 90 tablet 3    ondansetron (ZOFRAN) 8 MG tablet Take 1 tablet by mouth every 12 hours as needed for Nausea or Vomiting 30 tablet 02    tiZANidine (ZANAFLEX) 4 MG tablet Take 1 tablet by mouth every 8 hours as needed (spasm) 30 tablet 5    Bacillus Coagulans-Inulin (PROBIOTIC FORMULA PO) Take 1 capsule by mouth daily      Folic Acid (FOLATE PO) Take 1 tablet by mouth daily      Multiple Vitamin (MULTIVITAMIN PO) Take 1 tablet by mouth daily      acetaminophen (APAP EXTRA STRENGTH) 500 MG tablet Take 2 tablets by mouth every 6 hours as needed for Pain 120 tablet 0    sodium chloride (ALTAMIST SPRAY) 0.65 % nasal spray 1 spray by Nasal route as needed for Congestion 1 Bottle 3    Handicap Placard MISC by Does not apply route Exp 5 years 1 each 0    magnesium (MAGNESIUM-OXIDE) 250 MG TABS tablet Take 250 mg by mouth daily       vitamin B-12 (CYANOCOBALAMIN) 500 MCG tablet Take 500 mcg by mouth daily      zinc 50 MG CAPS Take by mouth daily       Echinacea 400 MG CAPS Take by mouth daily       Calcium Carbonate-Vit D-Min (CALCIUM 1200) 4064-4123 MG-UNIT CHEW Take by mouth daily       ferrous sulfate 325 (65 FE) MG tablet Take 65 mg by mouth daily (with breakfast)       fluticasone (FLONASE) 50 MCG/ACT nasal spray 1 spray by Nasal route daily. (Patient taking differently: 1 spray by Nasal route daily Uses PRN) 1 Bottle 06    meclizine (ANTIVERT) 12.5 MG tablet Take 12.5 mg by mouth 3 times daily as needed. No current facility-administered medications for this visit.        Penicillins, Codeine, Percocet [oxycodone-acetaminophen], Bee venom, Seasonal, Tape [adhesive tape], Hydrocodone, and Protonix [pantoprazole sodium]    Review of Systems:  General ROS: negative  Psychological ROS: negative  Hematological and Lymphatic ROS: No history of blood clots or bleeding disorder. Respiratory ROS: no cough, shortness of breath, or wheezing  Cardiovascular ROS: no chest pain or dyspnea on exertion  Gastrointestinal ROS: no abdominal pain, change in bowel habits, or black or bloody stools  Genito-Urinary ROS: no dysuria, trouble voiding, or hematuria  Musculoskeletal ROS: negative  Neurological ROS: no TIA or stroke symptoms  Dermatological ROS: negative    VITALS:  Blood pressure 122/80, pulse 51, weight 215 lb (97.5 kg), last menstrual period 11/04/2019, not currently breastfeeding. Body mass index is 34.7 kg/m². Physical Examination:  General appearance - alert, well appearing, and in no distress  Mental status - alert, oriented to person, place, and time  Neck - Neck is supple, no JVD or carotid bruits. No thyromegaly or adenopathy. Chest - clear to auscultation, no wheezes, rales or rhonchi, symmetric air entry  Heart - normal rate, regular rhythm, normal S1, S2, no murmurs, rubs, clicks or gallops  Abdomen - soft, nontender, nondistended, no masses or organomegaly  Neurological - alert, oriented, normal speech, no focal findings or movement disorder noted  Extremities - peripheral pulses normal, no pedal edema, no clubbing or cyanosis  Skin - normal coloration and turgor, no rashes, no suspicious skin lesions noted      Orders Placed This Encounter   Procedures    EKG 12 Lead       ASSESSMENT:     Diagnosis Orders   1. Pre-operative clearance  EKG 12 Lead   2. Atrial fibrillation EKG 12 Lead   3. Essential hypertension     4.  Paroxysmal atrial fibrillation (HCC)     5. Class 2 obesity without serious comorbidity with body mass index (BMI) of 35.0 to 35.9 in adult, unspecified obesity type 2.      Overweight. PLAN:       As always, aggressive risk factor modification is strongly recommended. We should adhere to the JNC VIII guidelines for HTN management and the NCEP ATP III guidelines for LDL-C management. Cardiac diet is always recommended with low fat, cholesterol, calories and sodium. Continue medications at current doses. Check EKG      cardizem CD 120mg daily. Consider INESSA testing in future    Patient is to avoid any excessive caffeine, chocolate, or OTC stimulants. Patient was advised and encouraged to check blood pressure at home or at a pharmacy, maintain a logbook, and also call us back if blood pressure are above the target ranges or if it is low. Patient clearly understands and agrees to the instructions. We will need to continue to monitor muscle and liver enzymes, BUN, CR, and electrolytes.     Electronically signed by Adriana Villafana DO on 10/6/2022 at 3:27 PM

## 2022-10-07 DIAGNOSIS — I10 ESSENTIAL HYPERTENSION: ICD-10-CM

## 2022-10-07 DIAGNOSIS — I48.91 ATRIAL FIBRILLATION WITH RVR (HCC): ICD-10-CM

## 2022-10-10 NOTE — TELEPHONE ENCOUNTER
Requesting medication refill. Please approve or deny this request.    Rx requested:  Requested Prescriptions     Pending Prescriptions Disp Refills    rivaroxaban (XARELTO) 20 MG TABS tablet [Pharmacy Med Name: Anca Mckenzieen 20MG TABS] 90 tablet 3     Sig: TAKE 1 TABLET BY MOUTH ONE TIME A DAY    dilTIAZem (CARDIZEM CD) 180 MG extended release capsule [Pharmacy Med Name: DILTIAZEM HCL ER COATED  CP24] 90 capsule 3     Sig: TAKE 1 CAPSULE BY MOUTH ONE TIME A DAY         Last Office Visit:   10/6/2022      Next Visit Date:  Future Appointments   Date Time Provider Corby Andersen   12/15/2022  3:45 PM Albina Whitley DO [facility] [address] [place] [address]   2/16/2023  4:00 PM Helen Montelongo MD [facility]   3/23/2023  3:30 PM Yasmani Guaman MD [facility]   10/5/2023  3:45 PM Ronald Lazo DO [facility]               Last refill 10/20/21. Please approve or deny.

## 2022-10-12 DIAGNOSIS — I48.91 ATRIAL FIBRILLATION WITH RVR (HCC): ICD-10-CM

## 2022-10-12 DIAGNOSIS — I10 ESSENTIAL HYPERTENSION: ICD-10-CM

## 2022-10-13 RX ORDER — DILTIAZEM HYDROCHLORIDE 180 MG/1
CAPSULE, COATED, EXTENDED RELEASE ORAL
Qty: 90 CAPSULE | Refills: 3 | Status: SHIPPED | OUTPATIENT
Start: 2022-10-13

## 2022-10-13 NOTE — TELEPHONE ENCOUNTER
Please approve or deny this refill request. The order is pended. Thank you.     LOV 10/6/2022    Next Visit Date:  Future Appointments   Date Time Provider Corby Andersen   12/15/2022  3:45 PM [place] [address] [address]   2/16/2023  4:00 PM Cr Vieira MD [facility] EM[facility]GENCY [facility]   3/23/2023  3:30 PM Lian Holman MD [address] [place] [address]   10/5/2023  3:45 PM Ronald Park [facility]

## 2022-10-19 RX ORDER — DILTIAZEM HYDROCHLORIDE 180 MG/1
180 CAPSULE, COATED, EXTENDED RELEASE ORAL DAILY
Qty: 90 CAPSULE | Refills: 3 | Status: SHIPPED | OUTPATIENT
Start: 2022-10-19

## 2022-10-31 DIAGNOSIS — R11.0 NAUSEA: ICD-10-CM

## 2022-11-01 RX ORDER — ONDANSETRON HYDROCHLORIDE 8 MG/1
8 TABLET, FILM COATED ORAL EVERY 12 HOURS PRN
Qty: 30 TABLET | Refills: 2 | Status: SHIPPED | OUTPATIENT
Start: 2022-11-01

## 2022-11-01 NOTE — TELEPHONE ENCOUNTER
Future Appointments    Encounter Information    Provider Department Appt Notes   12/15/2022 Roderick Mcneil, [address] Obstetrics and Gynecology 3 month FU   2/16/2023 Rome العلي MD [facility] Primary Care 6 month   3/23/2023 Brian Cyr MD [facility] 6 month follow up   10/5/2023 Blanche Lowery, [facility] Cardiology 1 year f/u     Past Visits    Date Provider Specialty Visit Type Primary Dx   10/06/2022 Blanche Lowery DO Cardiology Office Visit Paroxysmal atrial fibrillation ([facility] [address]   09/22/2022 Brian Cyr MD Endocrinology Office Visit Postoperative hypothyroidism   09/15/2022 Roderick Mcneil DO Obstetrics and Gynecology Office Visit Perimenopause   08/15/2022 Rome العلي MD Family Medicine Office Visit Essential hypertension

## 2022-11-21 ENCOUNTER — OFFICE VISIT (OUTPATIENT)
Dept: FAMILY MEDICINE CLINIC | Age: 47
End: 2022-11-21
Payer: COMMERCIAL

## 2022-11-21 VITALS — WEIGHT: 215 LBS | BODY MASS INDEX: 34.7 KG/M2

## 2022-11-21 DIAGNOSIS — K21.9 GASTROESOPHAGEAL REFLUX DISEASE WITHOUT ESOPHAGITIS: Primary | ICD-10-CM

## 2022-11-21 PROCEDURE — 99213 OFFICE O/P EST LOW 20 MIN: CPT | Performed by: PHYSICIAN ASSISTANT

## 2022-11-21 RX ORDER — OMEPRAZOLE 40 MG/1
40 CAPSULE, DELAYED RELEASE ORAL
Qty: 30 CAPSULE | Refills: 0 | Status: SHIPPED | OUTPATIENT
Start: 2022-11-21

## 2022-11-21 RX ORDER — OMEPRAZOLE 20 MG/1
20 TABLET, DELAYED RELEASE ORAL DAILY
Qty: 30 TABLET | Refills: 3 | Status: SHIPPED | OUTPATIENT
Start: 2022-11-21 | End: 2022-11-21 | Stop reason: DRUGHIGH

## 2022-11-21 SDOH — ECONOMIC STABILITY: FOOD INSECURITY: WITHIN THE PAST 12 MONTHS, THE FOOD YOU BOUGHT JUST DIDN'T LAST AND YOU DIDN'T HAVE MONEY TO GET MORE.: NEVER TRUE

## 2022-11-21 SDOH — ECONOMIC STABILITY: FOOD INSECURITY: WITHIN THE PAST 12 MONTHS, YOU WORRIED THAT YOUR FOOD WOULD RUN OUT BEFORE YOU GOT MONEY TO BUY MORE.: NEVER TRUE

## 2022-11-21 ASSESSMENT — ENCOUNTER SYMPTOMS
SINUS PRESSURE: 1
SWOLLEN GLANDS: 0
DIARRHEA: 0
SORE THROAT: 1
COUGH: 0
SINUS COMPLAINT: 1
CHEST TIGHTNESS: 0
SINUS PAIN: 0
NAUSEA: 0
ABDOMINAL PAIN: 0
SHORTNESS OF BREATH: 0
HOARSE VOICE: 0
BACK PAIN: 0
VOMITING: 0

## 2022-11-21 ASSESSMENT — PATIENT HEALTH QUESTIONNAIRE - PHQ9
8. MOVING OR SPEAKING SO SLOWLY THAT OTHER PEOPLE COULD HAVE NOTICED. OR THE OPPOSITE, BEING SO FIGETY OR RESTLESS THAT YOU HAVE BEEN MOVING AROUND A LOT MORE THAN USUAL: 0
SUM OF ALL RESPONSES TO PHQ QUESTIONS 1-9: 0
6. FEELING BAD ABOUT YOURSELF - OR THAT YOU ARE A FAILURE OR HAVE LET YOURSELF OR YOUR FAMILY DOWN: 0
SUM OF ALL RESPONSES TO PHQ QUESTIONS 1-9: 0
4. FEELING TIRED OR HAVING LITTLE ENERGY: 0
SUM OF ALL RESPONSES TO PHQ QUESTIONS 1-9: 0
2. FEELING DOWN, DEPRESSED OR HOPELESS: 0
3. TROUBLE FALLING OR STAYING ASLEEP: 0
1. LITTLE INTEREST OR PLEASURE IN DOING THINGS: 0
10. IF YOU CHECKED OFF ANY PROBLEMS, HOW DIFFICULT HAVE THESE PROBLEMS MADE IT FOR YOU TO DO YOUR WORK, TAKE CARE OF THINGS AT HOME, OR GET ALONG WITH OTHER PEOPLE: 0
SUM OF ALL RESPONSES TO PHQ QUESTIONS 1-9: 0
SUM OF ALL RESPONSES TO PHQ9 QUESTIONS 1 & 2: 0
7. TROUBLE CONCENTRATING ON THINGS, SUCH AS READING THE NEWSPAPER OR WATCHING TELEVISION: 0
5. POOR APPETITE OR OVEREATING: 0
9. THOUGHTS THAT YOU WOULD BE BETTER OFF DEAD, OR OF HURTING YOURSELF: 0

## 2022-11-21 ASSESSMENT — VISUAL ACUITY: OU: 1

## 2022-11-21 ASSESSMENT — SOCIAL DETERMINANTS OF HEALTH (SDOH): HOW HARD IS IT FOR YOU TO PAY FOR THE VERY BASICS LIKE FOOD, HOUSING, MEDICAL CARE, AND HEATING?: NOT HARD AT ALL

## 2022-11-21 NOTE — PROGRESS NOTES
[address] Encounter  CHIEF COMPLAINT       Chief Complaint   Patient presents with    Sinus Problem     Sinus drainage since Saturday, some sinus pressure     Nasal Congestion     Since saturday       HISTORY OF PRESENT ILLNESS   Juan Mcclellan is a 50 y.o. female who presents with:  Sinus Problem  This is a recurrent problem. The current episode started in the past 7 days. The problem is unchanged. Associated symptoms include congestion, sinus pressure and a sore throat. Pertinent negatives include no chills, coughing, diaphoresis, ear pain, headaches, hoarse voice, neck pain, shortness of breath, sneezing or swollen glands. Past treatments include acetaminophen. REVIEW OF SYSTEMS     Review of Systems   Constitutional:  Negative for activity change, appetite change, chills, diaphoresis and fever. HENT:  Positive for congestion, sinus pressure and sore throat. Negative for drooling, ear pain, hoarse voice, sinus pain and sneezing. Eyes:  Negative for visual disturbance. Respiratory:  Negative for cough, chest tightness and shortness of breath. Cardiovascular:  Negative for chest pain. Gastrointestinal:  Negative for abdominal pain, diarrhea, nausea and vomiting. Endocrine: Negative for cold intolerance. Genitourinary:  Negative for dysuria, flank pain, frequency and hematuria. Musculoskeletal:  Negative for arthralgias, back pain and neck pain. Skin:  Negative for rash. Allergic/Immunologic: Negative for food allergies. Neurological:  Negative for weakness, light-headedness, numbness and headaches. Hematological:  Does not bruise/bleed easily.    PAST MEDICAL HISTORY         Diagnosis Date    Abnormal Pap smear of cervix     Allergic rhinitis     Arthritis     hands    Asplenia     Atrial fibrillation (HCC)     Atrial fibrillation with RVR ([facility])     Dr. Liana Ventura    Bilateral hand pain     CTS (carpal tunnel syndrome) 05/2016    bilateral External hemorrhoid     Fatigue     Heart murmur     History of ITP 2004    had spenectomy    Hypertension     meds > 3 yrs    Hypothyroidism     meds > 7 yrs    Hypothyroidism     Insomnia     Irregular periods     Meniere disease     Migraine headache     Multinodular goiter     Nail fungus     Pain in right lower leg     Paroxysmal atrial fibrillation ([facility] [address] 10/23/2020    Plantar fasciitis, left     Dr. Falguni Olsen     Patient  has a past surgical history that includes laparoscopic splenectomy (N/A, 2004); Thyroidectomy, partial (Right, 2011); Thyroidectomy, Completion (Left, 2014); Carpal tunnel release (Right, 07/12/2016); Dilation and curettage of uterus (N/A, 12/16/2019); Hysterectomy, vaginal (N/A, 8/2/2021); Tonsillectomy (N/A, 11/2/2021); and Hysterectomy.   CURRENT MEDICATIONS       Previous Medications    ACETAMINOPHEN (APAP EXTRA STRENGTH) 500 MG TABLET    Take 2 tablets by mouth every 6 hours as needed for Pain    BACILLUS COAGULANS-INULIN (PROBIOTIC FORMULA PO)    Take 1 capsule by mouth daily    BUSPIRONE (BUSPAR) 7.5 MG TABLET    TAKE ONE TABLET ONE TIME DAILY AS NEEDED (GENERIC FOR BUSPAR)    BUTALBITAL-ACETAMINOPHEN-CAFFEINE (FIORICET, ESGIC) -40 MG PER TABLET    Take 1 tablet by mouth every 4 hours as needed for Headaches    CALCIUM CARBONATE-VIT D-MIN (CALCIUM 1200) 0930-1297 MG-UNIT CHEW    Take by mouth daily     CHOLECALCIFEROL (VITAMIN D3) 50 MCG (2000 UT) CAPS    Take by mouth daily    DICLOFENAC SODIUM (VOLTAREN) 1 % GEL    Apply 2 g topically 4 times daily as needed for Pain    DILTIAZEM (CARDIZEM CD) 180 MG EXTENDED RELEASE CAPSULE    TAKE 1 CAPSULE BY MOUTH ONE TIME A DAY    DILTIAZEM (CARDIZEM CD) 180 MG EXTENDED RELEASE CAPSULE    Take 1 capsule by mouth daily    ECHINACEA 400 MG CAPS    Take by mouth daily     FERROUS SULFATE 325 (65 FE) MG TABLET    Take 65 mg by mouth daily (with breakfast)     FLUTICASONE (FLONASE) 50 MCG/ACT NASAL SPRAY    1 spray by Nasal route daily. FOLIC ACID (FOLATE PO)    Take 1 tablet by mouth daily    HANDICAP PLACARD MISC    by Does not apply route Exp 5 years    HYDROCORTISONE (ANUSOL-HC) 25 MG SUPPOSITORY    Place 1 suppository rectally 2 times daily    LEVOTHYROXINE (SYNTHROID) 100 MCG TABLET    TAKE 1 TABLET BY MOUTH ONE TIME A DAY    MAGNESIUM (MAGNESIUM-OXIDE) 250 MG TABS TABLET    Take 250 mg by mouth daily     MECLIZINE (ANTIVERT) 12.5 MG TABLET    Take 12.5 mg by mouth 3 times daily as needed. MULTIPLE VITAMIN (MULTIVITAMIN PO)    Take 1 tablet by mouth daily    ONDANSETRON (ZOFRAN) 8 MG TABLET    Take 1 tablet by mouth every 12 hours as needed for Nausea or Vomiting    RIVAROXABAN (XARELTO) 20 MG TABS TABLET    TAKE 1 TABLET BY MOUTH ONE TIME A DAY    RIVAROXABAN (XARELTO) 20 MG TABS TABLET    TAKE 1 TABLET BY MOUTH ONE TIME A DAY    SODIUM CHLORIDE (ALTAMIST SPRAY) 0.65 % NASAL SPRAY    1 spray by Nasal route as needed for Congestion    SUMATRIPTAN (IMITREX) 100 MG TABLET    Take 1 tablet by mouth daily as needed for Migraine    TIZANIDINE (ZANAFLEX) 4 MG TABLET    Take 1 tablet by mouth every 8 hours as needed (spasm)    TRAMADOL (ULTRAM) 50 MG TABLET    Take by mouth as needed. VITAMIN B-12 (CYANOCOBALAMIN) 500 MCG TABLET    Take 500 mcg by mouth daily    ZINC 50 MG CAPS    Take by mouth daily      ALLERGIES     Patient is is allergic to penicillins, codeine, percocet [oxycodone-acetaminophen], bee venom, seasonal, tape [adhesive tape], hydrocodone, and protonix [pantoprazole sodium]. FAMILY HISTORY     Patient'sfamily history includes Alcohol Abuse in her brother; Anemia in her mother; Atrial Fibrillation in her mother; Breast Cancer in her paternal aunt and paternal grandmother; Cancer in her paternal aunt and paternal grandmother; Coronary Art Dis in her father; Diabetes in her father; Heart Disease in her father and mother; Heart Failure in her mother; High Blood Pressure in her mother;  Other in her father, mother, and sister; Sleep Apnea in her mother and sister; Vision Loss in her mother. SOCIAL HISTORY     Patient  reports that she has been smoking cigarettes. She started smoking about 33 years ago. She has a 8.00 pack-year smoking history. She has never used smokeless tobacco. She reports current alcohol use. She reports that she does not use drugs. PHYSICAL EXAM     VITALS   ,  ,  ,  ,    Physical Exam  Vitals and nursing note reviewed. Constitutional:       General: She is awake. She is not in acute distress. Appearance: Normal appearance. She is well-developed. She is not ill-appearing, toxic-appearing or diaphoretic. HENT:      Head: Normocephalic and atraumatic. Right Ear: Hearing, tympanic membrane, ear canal and external ear normal. Tympanic membrane is not bulging. Left Ear: Hearing, tympanic membrane, ear canal and external ear normal. Tympanic membrane is not bulging. Nose: Congestion present. Right Turbinates: Swollen. Right Sinus: Maxillary sinus tenderness present. Left Sinus: Maxillary sinus tenderness present. Mouth/Throat:      Lips: Pink. Mouth: Mucous membranes are moist.      Comments: Tongue green      Eyes:      General: Lids are normal. Vision grossly intact. Gaze aligned appropriately. Conjunctiva/sclera: Conjunctivae normal.      Pupils: Pupils are equal, round, and reactive to light. Cardiovascular:      Rate and Rhythm: Normal rate and regular rhythm. Pulses: Normal pulses. Heart sounds: Normal heart sounds, S1 normal and S2 normal.   Pulmonary:      Effort: Pulmonary effort is normal.      Breath sounds: Normal breath sounds and air entry. Musculoskeletal:      Cervical back: Normal range of motion. Skin:     General: Skin is warm. Capillary Refill: Capillary refill takes less than 2 seconds. Neurological:      Mental Status: She is alert and oriented to person, place, and time. Gait: Gait is intact.    Psychiatric: Attention and Perception: Attention normal.         Mood and Affect: Mood normal.         Speech: Speech normal.         Behavior: Behavior normal. Behavior is cooperative. READY CARE COURSE   Labs:  No results found for this visit on 11/21/22. IMAGING:  No orders to display     Scheduled Meds:  Continuous Infusions:  PRN Meds:. PROCEDURES:  FINAL IMPRESSION      1. Gastroesophageal reflux disease without esophagitis      DISPOSITION/PLAN     Patient's symptoms consistent with silent reflux. Not having sinus drainage or change in sputum production. Recommend omeprazole 40 mg once daily. Went over possible s/e of the medications. Patient would like to proceed with therapy. Discussed signs and symptoms which require immediate follow-up in ED/call to 911. Patient verbalized understanding. On this date 11/21/2022 I have spent 20 minutes reviewing previous notes, test results and face to face with the patient discussing the diagnosis and importance of compliance with the treatment plan as well as documenting on the day of the visit. PATIENT REFERRED TO:  No follow-ups on file. DISCHARGE MEDICATIONS:  New Prescriptions    OMEPRAZOLE (PRILOSEC) 40 MG DELAYED RELEASE CAPSULE    Take 1 capsule by mouth every morning (before breakfast)     Cannot display discharge medications since this is not an admission.        Willow Ndiayema

## 2022-12-15 ENCOUNTER — OFFICE VISIT (OUTPATIENT)
Dept: OBGYN CLINIC | Age: 47
End: 2022-12-15
Payer: COMMERCIAL

## 2022-12-15 VITALS
DIASTOLIC BLOOD PRESSURE: 84 MMHG | HEART RATE: 73 BPM | BODY MASS INDEX: 34.06 KG/M2 | WEIGHT: 211 LBS | SYSTOLIC BLOOD PRESSURE: 136 MMHG

## 2022-12-15 DIAGNOSIS — N95.1 PERIMENOPAUSE: Primary | ICD-10-CM

## 2022-12-15 PROCEDURE — 3078F DIAST BP <80 MM HG: CPT | Performed by: OBSTETRICS & GYNECOLOGY

## 2022-12-15 PROCEDURE — 99213 OFFICE O/P EST LOW 20 MIN: CPT | Performed by: OBSTETRICS & GYNECOLOGY

## 2022-12-15 PROCEDURE — 3074F SYST BP LT 130 MM HG: CPT | Performed by: OBSTETRICS & GYNECOLOGY

## 2022-12-15 RX ORDER — PROGESTERONE 200 MG/1
200 CAPSULE ORAL NIGHTLY
Qty: 30 CAPSULE | Refills: 3 | Status: SHIPPED | OUTPATIENT
Start: 2022-12-15 | End: 2022-12-15 | Stop reason: SDUPTHER

## 2022-12-15 RX ORDER — PROGESTERONE 200 MG/1
200 CAPSULE ORAL NIGHTLY
Qty: 30 CAPSULE | Refills: 3 | Status: SHIPPED | OUTPATIENT
Start: 2022-12-15

## 2022-12-15 ASSESSMENT — ENCOUNTER SYMPTOMS
COLOR CHANGE: 0
ABDOMINAL DISTENTION: 0
BACK PAIN: 0
SORE THROAT: 0
NAUSEA: 0
VOICE CHANGE: 0
VOMITING: 0
BLOOD IN STOOL: 0
CHEST TIGHTNESS: 0
TROUBLE SWALLOWING: 0
SHORTNESS OF BREATH: 0
COUGH: 0
CONSTIPATION: 0
ABDOMINAL PAIN: 0
WHEEZING: 0

## 2022-12-15 NOTE — PROGRESS NOTES
HPI:  Tasha Schroeder (: 1975) is a 50 y.o. female, Established patient, here for evaluation of the following chief complaint(s):  Follow-up (Mood swings, has journal. Extreme jags of emotion)  Patient is still experiencing emotional lability. After about adding estrogen and progesterone and she wants to avoid estrogen at this point. SUBJECTIVE/OBJECTIVE:    Past Surgical History:   Procedure Laterality Date    CARPAL TUNNEL RELEASE Right 2016    DILATION AND CURETTAGE OF UTERUS N/A 2019    HYSTEROSCOPY NOVASURE ABLATION D&C performed by Jenise Hubbard DO at [address] ([facility])      HYSTERECTOMY, VAGINAL N/A 2021    LAPAROSCOPIC ASSISTED VAGINAL HYSTERECTOMY BILATERAL SALPINGECTOMY performed by Jenise Hubbard DO at [address]     done for ITP    THYROIDECTOMY, COMPLETION Left     frozen section inconclusive    THYROIDECTOMY, PARTIAL Right     Benign nodule    TONSILLECTOMY N/A 2021    TONSILLECTOMY performed by Lilly Segura MD at [facility]        Review of Systems   Constitutional:  Negative for activity change, appetite change, fatigue and unexpected weight change. HENT:  Negative for dental problem, ear pain, hearing loss, nosebleeds, sore throat, trouble swallowing and voice change. Eyes:  Negative for visual disturbance. Respiratory:  Negative for cough, chest tightness, shortness of breath and wheezing. Cardiovascular:  Negative for chest pain and palpitations. Gastrointestinal:  Negative for abdominal distention, abdominal pain, blood in stool, constipation, nausea and vomiting. Endocrine: Negative for cold intolerance, heat intolerance, polydipsia, polyphagia and polyuria. Genitourinary:  Negative for difficulty urinating, dyspareunia, dysuria, flank pain, frequency, genital sores, hematuria, menstrual problem, pelvic pain, urgency, vaginal bleeding, vaginal discharge and vaginal pain. Musculoskeletal:  Negative for arthralgias, back pain, joint swelling and myalgias. Skin:  Negative for color change and rash. Allergic/Immunologic: Negative for environmental allergies, food allergies and immunocompromised state. Neurological:  Negative for dizziness, seizures, syncope, speech difficulty, weakness, numbness and headaches. Hematological:  Negative for adenopathy. Does not bruise/bleed easily. Psychiatric/Behavioral:  Negative for agitation, behavioral problems, confusion, decreased concentration, dysphoric mood and suicidal ideas. The patient is not nervous/anxious and is not hyperactive. Physical Exam  Vitals and nursing note reviewed. Constitutional:       Appearance: Normal appearance. Neurological:      Mental Status: She is alert. Vitals:    12/15/22 1541   BP: 136/84   Pulse: 73   Weight: 211 lb (95.7 kg)         ASSESSMENT/PLAN:    Perimenopausal hormonal changes  PMS    PLAN: prometrium 200 mg nightly. Patient to follow-up in 2 months      No follow-ups on file. On this date 12/15/2022 I have spent 20 minutes reviewing previous notes, test results and face to face with the patient discussing the diagnosis and importance of compliance with the treatment plan as well as documenting on the day of the visit. An electronic signature was used to authenticate this note. Please note this report has been partially produced using speech recognition software and may cause contain errors related to that system including grammar, punctuation and spelling as well as words and phrases that may seem inappropriate. If there are questions or concerns please feel free to contact me to clarify.

## 2022-12-30 ENCOUNTER — NURSE TRIAGE (OUTPATIENT)
Dept: OTHER | Facility: CLINIC | Age: 47
End: 2022-12-30

## 2022-12-30 ENCOUNTER — APPOINTMENT (OUTPATIENT)
Dept: GENERAL RADIOLOGY | Age: 47
End: 2022-12-30
Payer: COMMERCIAL

## 2022-12-30 ENCOUNTER — HOSPITAL ENCOUNTER (EMERGENCY)
Age: 47
Discharge: HOME HEALTH | End: 2022-12-30
Payer: COMMERCIAL

## 2022-12-30 VITALS
BODY MASS INDEX: 33.11 KG/M2 | WEIGHT: 206 LBS | HEIGHT: 66 IN | DIASTOLIC BLOOD PRESSURE: 76 MMHG | OXYGEN SATURATION: 96 % | HEART RATE: 67 BPM | TEMPERATURE: 97.6 F | RESPIRATION RATE: 18 BRPM | SYSTOLIC BLOOD PRESSURE: 105 MMHG

## 2022-12-30 DIAGNOSIS — M25.512 LEFT SHOULDER PAIN, UNSPECIFIED CHRONICITY: Primary | ICD-10-CM

## 2022-12-30 PROCEDURE — 73030 X-RAY EXAM OF SHOULDER: CPT

## 2022-12-30 PROCEDURE — 99283 EMERGENCY DEPT VISIT LOW MDM: CPT

## 2022-12-30 RX ORDER — TRAMADOL HYDROCHLORIDE 50 MG/1
50 TABLET ORAL EVERY 4 HOURS PRN
Qty: 18 TABLET | Refills: 0 | Status: SHIPPED | OUTPATIENT
Start: 2022-12-30 | End: 2023-01-04

## 2022-12-30 RX ORDER — LIDOCAINE 50 MG/G
1 PATCH TOPICAL DAILY
Qty: 10 PATCH | Refills: 0 | Status: SHIPPED | OUTPATIENT
Start: 2022-12-30 | End: 2023-01-09

## 2022-12-30 ASSESSMENT — PAIN DESCRIPTION - FREQUENCY: FREQUENCY: CONTINUOUS

## 2022-12-30 ASSESSMENT — ENCOUNTER SYMPTOMS
VOMITING: 0
SHORTNESS OF BREATH: 0
ABDOMINAL PAIN: 0
NAUSEA: 0
WHEEZING: 0
PHOTOPHOBIA: 0
COUGH: 0

## 2022-12-30 ASSESSMENT — PAIN SCALES - GENERAL: PAINLEVEL_OUTOF10: 10

## 2022-12-30 ASSESSMENT — PAIN - FUNCTIONAL ASSESSMENT
PAIN_FUNCTIONAL_ASSESSMENT: 0-10
PAIN_FUNCTIONAL_ASSESSMENT: NONE - DENIES PAIN

## 2022-12-30 ASSESSMENT — PAIN DESCRIPTION - ONSET: ONSET: AWAKENED FROM SLEEP

## 2022-12-30 ASSESSMENT — PAIN DESCRIPTION - PAIN TYPE: TYPE: ACUTE PAIN

## 2022-12-30 ASSESSMENT — PAIN DESCRIPTION - LOCATION: LOCATION: SHOULDER

## 2022-12-30 ASSESSMENT — PAIN DESCRIPTION - DESCRIPTORS: DESCRIPTORS: STABBING

## 2022-12-30 ASSESSMENT — PAIN DESCRIPTION - ORIENTATION: ORIENTATION: LEFT

## 2022-12-30 NOTE — TELEPHONE ENCOUNTER
Location of patient: [place]    Subjective: Caller states \"I shoveled last week and then got shoulder pain\"     Current Symptoms: Strained her L shoulder and chest muscles last week while shoveling snow. Went to sleep on night and it popped and felt better. Stabbing pain now in her L shoulder that woke her up thru the night. Pain is much worse with movement. No swelling. Onset: several days ago; worsening    Associated Symptoms: reduced activity, increased wakefulness    Pain Severity: 10/10; stabbing; constant, severe    Temperature: denies fever     What has been tried: Aleve, muscle relaxer, heat and ice. LMP: NA Pregnant: NA    Recommended disposition: Go to ED/[facility] Now (Or to Office with PCP Approval)    Care advice provided, patient verbalizes understanding; denies any other questions or concerns; instructed to call back for any new or worsening symptoms. Patient/caller agrees to proceed to   Emergency Department. She is unable to reach her PCP and [facility] does not have X-ray capability. Advised that ER is the best option at this time. Attention Provider: Thank you for allowing me to participate in the care of your patient. The patient was connected to triage in response to symptoms provided. Please do not respond through this encounter as the response is not directed to a shared pool.     Reason for Disposition   Can't move injured shoulder at all    Protocols used: Shoulder Injury-ADULT-[place]

## 2022-12-30 NOTE — ED PROVIDER NOTES
[address] [facility] ED  EMERGENCY DEPARTMENT ENCOUNTER      Pt Name: Miroslava Mart  MRN: [id number]  [place] 1975  Date of evaluation: 12/30/2022  Provider: Raymond Blanton Dr       Chief Complaint   Patient presents with    Shoulder Pain     Worse upon awakening today, ongoing since shoveling snow last week         HISTORY OF PRESENT ILLNESS   (Location/Symptom, Timing/Onset, Context/Setting, Quality, Duration, Modifying Factors, Severity)  Note limiting factors. Miroslava Mart is a 50 y.o. female who per chart review has a past medical history of A. fib on Xarelto hypertension hypothyroidism obesity migraine presents to the emergency department for evaluation of left shoulder pain. Pt states she shoved snow on Christmas Marleni and noticed a sharp pain in her upper back/L shoulder afterwards. On christmas night, she rolled over in bed and felt a popping sensation which completely relieved the pain. She felt well enough even to go bowling all week without any difficulty. She states last night she rolled over in bed and again felt the L shoulder pain. She reports chronic issues in the L shoulder since falling on it 5 years ago. She has tried aleve, ice, heat without relief. Worsens with movement, palpation. No recent falls. Denies swelling color change numbness tingling weakness chest pain sob current neck or back pain dizziness cough leg swelling hemoptysis. HPI    Nursing Notes were reviewed. REVIEW OF SYSTEMS    (2-9 systems for level 4, 10 or more for level 5)     Review of Systems   Constitutional:  Negative for chills and fever. HENT:  Negative for congestion. Eyes:  Negative for photophobia. Respiratory:  Negative for cough, shortness of breath and wheezing. Cardiovascular:  Negative for chest pain and palpitations. Gastrointestinal:  Negative for abdominal pain, nausea and vomiting. Genitourinary:  Negative for dysuria, frequency and hematuria. Musculoskeletal:  Positive for arthralgias. Negative for myalgias. Allergic/Immunologic: Negative for immunocompromised state. Neurological:  Negative for dizziness, weakness and headaches. All other systems reviewed and are negative. Except as noted above the remainder of the review of systems was reviewed and negative.        PAST MEDICAL HISTORY     Past Medical History:   Diagnosis Date    Abnormal Pap smear of cervix     Allergic rhinitis     Arthritis     hands    Asplenia     Atrial fibrillation (HCC)     Atrial fibrillation with RVR (HCC)     Dr. 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 June Werner       Past Surgical History:   Procedure Laterality Date    CARPAL TUNNEL RELEASE Right 07/12/2016    DILATION AND CURETTAGE OF UTERUS N/A 12/16/2019    HYSTEROSCOPY NOVASURE ABLATION D&C performed by Alyssa Wei DO at [address] [facility] ([facility]      HYSTERECTOMY, VAGINAL N/A 8/2/2021    LAPAROSCOPIC ASSISTED VAGINAL HYSTERECTOMY BILATERAL SALPINGECTOMY performed by Alyssa Wei DO at [address] 2004    done for ITP    THYROIDECTOMY, COMPLETION Left 2014    frozen section inconclusive    THYROIDECTOMY, PARTIAL Right 2011    Benign nodule    TONSILLECTOMY N/A 11/2/2021    TONSILLECTOMY performed by Adolfo Prader, MD at [address] [facility]       Discharge Medication List as of 12/30/2022 10:34 AM        CONTINUE these medications which have NOT CHANGED    Details progesterone (PROMETRIUM) 200 MG CAPS capsule Take 1 capsule by mouth nightly, Disp-30 capsule, R-3Normal      omeprazole (PRILOSEC) 40 MG delayed release capsule Take 1 capsule by mouth every morning (before breakfast), Disp-30 capsule, R-0Normal      ondansetron (ZOFRAN) 8 MG tablet Take 1 tablet by mouth every 12 hours as needed for Nausea or Vomiting, Disp-30 tablet, R-02Normal      !! dilTIAZem (CARDIZEM CD) 180 MG extended release capsule Take 1 capsule by mouth daily, Disp-90 capsule, R-3Normal      !! rivaroxaban (XARELTO) 20 MG TABS tablet TAKE 1 TABLET BY MOUTH ONE TIME A DAY, Disp-90 tablet, R-3Normal      !! rivaroxaban (XARELTO) 20 MG TABS tablet TAKE 1 TABLET BY MOUTH ONE TIME A DAY, Disp-90 tablet, R-3Normal      !! dilTIAZem (CARDIZEM CD) 180 MG extended release capsule TAKE 1 CAPSULE BY MOUTH ONE TIME A DAY, Disp-90 capsule, R-3Normal      hydrocortisone (ANUSOL-HC) 25 MG suppository Place 1 suppository rectally 2 times daily, Disp-60 suppository, R-0Normal      butalbital-acetaminophen-caffeine (FIORICET, ESGIC) -40 MG per tablet Take 1 tablet by mouth every 4 hours as needed for Headaches, Disp-12 tablet, R-0Normal      diclofenac sodium (VOLTAREN) 1 % GEL Apply 2 g topically 4 times daily as needed for Pain, Topical, 4 TIMES DAILY PRN Starting Wed 7/20/2022, Disp-2 g, R-0, Normal      SUMAtriptan (IMITREX) 100 MG tablet Take 1 tablet by mouth daily as needed for Migraine, Disp-9 tablet, R-5Normal      busPIRone (BUSPAR) 7.5 MG tablet TAKE ONE TABLET ONE TIME DAILY AS NEEDED (GENERIC FOR BUSPAR), Disp-90 tablet, R-3Normal      levothyroxine (SYNTHROID) 100 MCG tablet TAKE 1 TABLET BY MOUTH ONE TIME A DAY, Disp-90 tablet, R-3Normal      !! traMADol (ULTRAM) 50 MG tablet Take by mouth as needed. Historical Med      Cholecalciferol (VITAMIN D3) 50 MCG (2000 UT) CAPS Take by mouth dailyHistorical Med      tiZANidine (ZANAFLEX) 4 MG tablet Take 1 tablet by mouth every 8 hours as needed (spasm), Disp-30 tablet, R-5Normal      Bacillus Coagulans-Inulin (PROBIOTIC FORMULA PO) Take 1 capsule by mouth dailyHistorical Med      Folic Acid (FOLATE PO) Take 1 tablet by mouth dailyHistorical Med      Multiple Vitamin (MULTIVITAMIN PO) Take 1 tablet by mouth dailyHistorical Med      acetaminophen (APAP EXTRA STRENGTH) 500 MG tablet Take 2 tablets by mouth every 6 hours as needed for Pain, Disp-120 tablet, R-0Print      sodium chloride (ALTAMIST SPRAY) 0.65 % nasal spray 1 spray by Nasal route as needed for Congestion, Disp-1 Bottle, R-3Normal      Handicap Placard [facility] Starting Fri 11/13/2020, Disp-1 each,R-0, PrintExp 5 years       magnesium (MAGNESIUM-OXIDE) 250 MG TABS tablet Take 250 mg by mouth daily Historical Med      vitamin B-12 (CYANOCOBALAMIN) 500 MCG tablet Take 500 mcg by mouth dailyHistorical Med      zinc 50 MG CAPS Take by mouth daily Historical Med      Echinacea 400 MG CAPS Take by mouth daily Historical Med      Calcium Carbonate-Vit D-Min (CALCIUM 1200) 8479-2549 MG-UNIT CHEW Take by mouth daily Historical Med      ferrous sulfate 325 (65 FE) MG tablet Take 65 mg by mouth daily (with breakfast) Historical Med      fluticasone (FLONASE) 50 MCG/ACT nasal spray 1 spray by Nasal route daily. , Disp-1 Bottle, R-06      meclizine (ANTIVERT) 12.5 MG tablet Take 12.5 mg by mouth 3 times daily as needed. !! - Potential duplicate medications found. Please discuss with provider.           ALLERGIES     Penicillins, Codeine, Percocet [oxycodone-acetaminophen], Bee venom, Seasonal, Tape [adhesive tape], Hydrocodone, and Protonix [pantoprazole sodium]    FAMILY HISTORY       Family History   Problem Relation Age of Onset    High Blood Pressure Mother     Anemia Mother     Atrial Fibrillation Mother     Heart Failure Mother     Other Mother         pacemaker    Heart Disease Mother     Sleep Apnea Mother     Vision Loss Mother         histoplasmosis    Heart Disease Father         CAD / Triple bypass Diabetes Father         resolved with wt loss    Other Father         Fuck's dystropy / corneal transplants / AAA    Coronary Art Dis Father     Cancer Paternal Aunt     Breast Cancer Paternal Aunt     Cancer Paternal Grandmother     Breast Cancer Paternal Grandmother     Sleep Apnea Sister     Alcohol Abuse Brother     Other Sister         killed by drunk           SOCIAL HISTORY       Social History     Socioeconomic History    Marital status:      Spouse name: None    Number of children: 0    Years of education: None    Highest education level: None   Occupational History    Occupation: medical records   Tobacco Use    Smoking status: Some Days     Packs/day: 0.25     Years: 32.00     Pack years: 8.00     Types: Cigarettes     Start date: 10/26/1989    Smokeless tobacco: Never    Tobacco comments:     social smoker   Vaping Use    Vaping Use: Never used   Substance and Sexual Activity    Alcohol use: Yes     Alcohol/week: 0.0 standard drinks     Comment: socially    Drug use: No     Social Determinants of Health     Financial Resource Strain: Low Risk     Difficulty of Paying Living Expenses: Not hard at all   Food Insecurity: No Food Insecurity    Worried About [employer] in the Last Year: Never true    Ran Out of Food in the Last Year: Never true   Transportation Needs: No Transportation Needs    Lack of Transportation (Medical): No    Lack of Transportation (Non-Medical):  No       SCREENINGS         [place] Coma Scale  Eye Opening: Spontaneous  Best Verbal Response: Oriented  Best Motor Response: Obeys commands  [place] Coma Scale Score: 15                     CIWA Assessment  BP: 105/76  Heart Rate: 67                 PHYSICAL EXAM    (up to 7 for level 4, 8 or more for level 5)     ED Triage Vitals   BP Temp Temp Source Heart Rate Resp SpO2 Height Weight   12/30/22 0914 12/30/22 0912 12/30/22 0912 12/30/22 0912 12/30/22 0912 12/30/22 0912 12/30/22 0912 12/30/22 0912   105/76 97.6 °F (36.4 °C) Oral 67 18 96 % 5' 6\" (1.676 m) 206 lb (93.4 kg)       Physical Exam  Constitutional:       General: She is not in acute distress. Appearance: She is well-developed. She is not ill-appearing, toxic-appearing or diaphoretic. HENT:      Head: Normocephalic and atraumatic. Right Ear: Tympanic membrane, ear canal and external ear normal.      Left Ear: Tympanic membrane, ear canal and external ear normal.      Nose: Nose normal.      Mouth/Throat:      Mouth: Mucous membranes are moist.   Eyes:      Pupils: Pupils are equal, round, and reactive to light. Cardiovascular:      Rate and Rhythm: Normal rate and regular rhythm. Heart sounds: No murmur heard. No friction rub. No gallop. Pulmonary:      Effort: Pulmonary effort is normal.      Breath sounds: Normal breath sounds. No wheezing, rhonchi or rales. Abdominal:      General: Bowel sounds are normal. There is no distension. Palpations: Abdomen is soft. Tenderness: There is no abdominal tenderness. There is no guarding or rebound. Musculoskeletal:         General: No swelling. Right shoulder: Tenderness present. No swelling, deformity, effusion, laceration, bony tenderness or crepitus. Decreased range of motion (90 degrees active and passive with pain). Normal strength. Normal pulse. Cervical back: Normal and normal range of motion. Thoracic back: Normal.      Lumbar back: Normal.      Comments: LUE neurovascularly intact. ROM L shoulder limited to about 90 degrees active and passive 2/2 to pain. No obvious deformity or injury. No swelling, color change. Skin:     General: Skin is warm and dry. Capillary Refill: Capillary refill takes less than 2 seconds. Neurological:      General: No focal deficit present. Mental Status: She is alert and oriented to person, place, and time.        DIAGNOSTIC RESULTS     EKG: All EKG's are interpreted by the Emergency Department Physician who either signs or Co-signs this chart in the absence of a cardiologist.        RADIOLOGY:   Non-plain film images such as CT, Ultrasound and MRI are read by the radiologist. Plain radiographic images are visualized and preliminarily interpreted by the emergency physician with the below findings:        Interpretation per the Radiologist below, if available at the time of this note:    XR SHOULDER LEFT (MIN 2 VIEWS)   Final Result   No acute fracture               ED BEDSIDE ULTRASOUND:   Performed by ED Physician - none    LABS:  Labs Reviewed - No data to display    All other labs were within normal range or not returned as of this dictation. EMERGENCY DEPARTMENT COURSE and DIFFERENTIAL DIAGNOSIS/MDM:   Vitals:    Vitals:    12/30/22 0912 12/30/22 0914   BP:  105/76   Pulse: 67    Resp: 18    Temp: 97.6 °F (36.4 °C)    TempSrc: Oral    SpO2: 96%    Weight: 206 lb (93.4 kg)    Height: 5' 6\" (1.676 m)            MDM    Patient is a 40-year-old female presents the ED for evaluation of left shoulder pain. She is afebrile and hemodynamically stable. She declines pain control in the ED as she drove to the emergency department. We will not administer IM Toradol as she has a history of A. fib on Xarelto. X-ray of the left shoulder shows no acute abnormality. No fracture or dislocation. I suspect rotator cuff pathology. Patient was given arm sling. She is nontoxic-appearing with stable vital stable for discharge. Given prescription for topical lidocaine patches and short course of pain control with tramadol. Patient has multiple allergies to pain medications, states she tolerates tramadol well. Patient has seen Dr. Ana Luisa Saarh of orthopedics in the past for carpal tunnel surgery. She was encouraged to contact his office within the next day or 2 to make a follow-up appointment. Return to the ED for worsening symptoms given warning signs for which she should return. Patient understands agrees to plan.       REASSESSMENT CRITICAL CARE TIME   Total Critical Care time was 0 minutes, excluding separately reportable procedures. There was a high probability of clinically significant/life threatening deterioration in the patient's condition which required my urgent intervention. CONSULTS:  None    PROCEDURES:  Unless otherwise noted below, none     Procedures        FINAL IMPRESSION      1. Left shoulder pain, unspecified chronicity          DISPOSITION/PLAN   DISPOSITION Decision To Discharge 12/30/2022 10:42:21 AM      PATIENT REFERRED TO:  Daryl Sanon MD  [address] [facility]  [phone]    Schedule an appointment as soon as possible for a visit   As needed, If symptoms worsen    [facility] ED  [address] [facility]  [phone]  Go to   As needed, If symptoms worsen    Charlean Schilder, MD  [address] [phone]    Schedule an appointment as soon as possible for a visit in 1 day        DISCHARGE MEDICATIONS:  Discharge Medication List as of 12/30/2022 10:34 AM        START taking these medications    Details   !! traMADol (ULTRAM) 50 MG tablet Take 1 tablet by mouth every 4 hours as needed for Pain for up to 5 days. Intended supply: 3 days. Take lowest dose possible to manage pain Max Daily Amount: 300 mg, Disp-18 tablet, R-0Normal      lidocaine (LIDODERM) 5 % Place 1 patch onto the skin daily for 10 days 12 hours on, 12 hours off., Disp-10 patch, R-0Normal       !! - Potential duplicate medications found. Please discuss with provider. Controlled Substances Monitoring:     RX Monitoring 10/15/2021   Periodic Controlled Substance Monitoring Assessed functional status. ;No signs of potential drug abuse or diversion identified.        (Please note that portions of this note were completed with a voice recognition program.  Efforts were made to edit the dictations but occasionally words are mis-transcribed.)    [employer] InsuranceCARLITA (electronically signed)             Brianna Paul PA-C  12/30/22 1101

## 2022-12-30 NOTE — ED TRIAGE NOTES
Pt to ed from home via triage with c/o left should pain  Pt reports onset of pain about a week ago after shoveling snow  Pt states today pain is worse  Pt reports history of back pain from shoveling, resolved  On arrival pt skin WDI, respirations even and unlabored   Pt calm and cooperative, alert and oriented. No s/s of acute distress noted.

## 2023-01-05 ENCOUNTER — OFFICE VISIT (OUTPATIENT)
Dept: CARDIOLOGY CLINIC | Age: 48
End: 2023-01-05
Payer: COMMERCIAL

## 2023-01-05 VITALS
HEART RATE: 56 BPM | BODY MASS INDEX: 33.99 KG/M2 | SYSTOLIC BLOOD PRESSURE: 104 MMHG | DIASTOLIC BLOOD PRESSURE: 70 MMHG | WEIGHT: 210.6 LBS

## 2023-01-05 DIAGNOSIS — I48.91 ATRIAL FIBRILLATION WITH RVR (HCC): Primary | ICD-10-CM

## 2023-01-05 PROCEDURE — 3074F SYST BP LT 130 MM HG: CPT | Performed by: INTERNAL MEDICINE

## 2023-01-05 PROCEDURE — 93000 ELECTROCARDIOGRAM COMPLETE: CPT | Performed by: INTERNAL MEDICINE

## 2023-01-05 PROCEDURE — 3078F DIAST BP <80 MM HG: CPT | Performed by: INTERNAL MEDICINE

## 2023-01-05 PROCEDURE — 99213 OFFICE O/P EST LOW 20 MIN: CPT | Performed by: INTERNAL MEDICINE

## 2023-01-05 RX ORDER — FLECAINIDE ACETATE 100 MG/1
100 TABLET ORAL PRN
Qty: 10 TABLET | Refills: 0 | Status: SHIPPED | OUTPATIENT
Start: 2023-01-05

## 2023-01-05 NOTE — PROGRESS NOTES
Chief Complaint   Patient presents with    Atrial Fibrillation    Other     Afib last between 12/31/22 to 01/3/23 pt states she is worried it has never last that long. 6-27-18: Patient is a 37 y.o. female who presents with a chief complaint of palpitations. Patient is followed on a regular basis by Dr. Stefany Valencia MD. Had palpiations for the past 2 days, noted to be in new onset afibb with RVR in ER. Started on cardizem gtt and converted to NSR spontaneously. No hx of MI, CHF or arrhythmia. No hx of LHC or stress test. No excessive caffeine or ETOH. States she is sensitive and has a lot of allergies. Takes Zyrtec. No smoking. No hx of DM, HTN or HLP. On synthroid and  TSH is WNL. S/p CTA of chest without evidence of PE.    7-13-18: Patient presents for initial medical evaluation. Patient is followed on a regular basis by Dr. Pepper Eldridge MD. Did not tolerate lopressor. Diagnosed with new onset afibb and placed on NOAC. BP is elevated today at 160/88. Pt denies chest pain, dyspnea, dyspnea on exertion, change in exercise capacity, fatigue,  nausea, vomiting, diarrhea, constipation, motor weakness, insomnia, weight loss, syncope, dizziness, lightheadedness, palpitations, PND, orthopnea, or claudication. No nitro use. BP and hr are good. CAD is stable. No LE discoloration or ulcers. No LE edema. No CHF type symptoms. Lipid profile is normal. No recent hospitalization. No change in meds. S/p ECHO with EF of 55%, grade I DD.     1-18-19: states she has about one episode of afibb a month. Has underlying stress. Pt denies chest pain, dyspnea, dyspnea on exertion, change in exercise capacity, fatigue,  nausea, vomiting, diarrhea, constipation, motor weakness, insomnia, weight loss, syncope, dizziness, lightheadedness, palpitations, PND, orthopnea, or claudication. No nitro use. BP and hr are good. . No LE discoloration or ulcers. No LE edema. No CHF type symptoms.  Lipid profile is normal. No recent hospitalization. No change in meds. EKG with NSR. On NOAC, no bleeding issues. no excessive caffeine. Not able to lose much weight. 10-18-19: Pt denies chest pain, dyspnea, dyspnea on exertion, change in exercise capacity,   nausea, vomiting, diarrhea, constipation, motor weakness, insomnia, weight loss, syncope, dizziness, lightheadedness, palpitations, PND, orthopnea, or claudication. No nitro use. BP and hr are good. CAD is stable. No LE discoloration or ulcers. No LE edema. No CHF type symptoms. Lipid profile is normal. No recent hospitalization. No change in meds. Did not have ekg done today. She can feel when she goes into afib. She is fatigued and has some stress issues. On DOAC, no bleeding issues. 10-23-30: Pt denies chest pain, dyspnea, dyspnea on exertion, change in exercise capacity, fatigue,  nausea, vomiting, diarrhea, constipation, motor weakness, insomnia, weight loss, syncope, dizziness, lightheadedness, palpitations, PND, orthopnea, or claudication. No nitro use. BP and hr are good. CAD is stable. No LE discoloration or ulcers. No LE edema. No CHF type symptoms. Lipid profile is normal. No recent hospitalization. No change in meds. With history of paroxysmal atrial fibrillation on oral anticoagulation. She can feel when she goes into atrial fibrillation. Placed On anxiety medications. Weight is about the same. With normal sinus rhythm, no ischemia    2021-07-01: Patient needs preoperative clearance for hysterectomy. Patient with history of paroxysmal atrial fibrillation and is on oral anticoagulation. She consents when she developed atrial fibrillation and developed an episode last night. Pt denies chest pain, dyspnea, dyspnea on exertion, change in exercise capacity, fatigue,  nausea, vomiting, diarrhea, constipation, motor weakness, insomnia, weight loss, syncope, dizziness, lightheadedness, palpitations, PND, orthopnea, or claudication.   ekg TODAY WITH NSR, NO ISCHEMIA. 3-4-22: hx of Pafib, on DOAC. Planning to move to [place]. Pt denies chest pain, dyspnea, dyspnea on exertion, change in exercise capacity, fatigue,  nausea, vomiting, diarrhea, constipation, motor weakness, insomnia, weight loss, syncope, dizziness, lightheadedness, palpitations, PND, orthopnea, or claudication. DM is under control. Thyroid is under control. EKG with NSR, no ischemia. 10-6-22: did not move to [place]. Pt denies chest pain, dyspnea, dyspnea on exertion, change in exercise capacity, fatigue,  nausea, vomiting, diarrhea, constipation, motor weakness, insomnia, weight loss, syncope, dizziness, lightheadedness, palpitations, PND, orthopnea, or claudication. Hx of Pfib. On DOAC. Bp is good. EKG with SB, no ischemia. 1-5-22: hx of pafibb. On doac. She can sense afib. Was placed on lopressor 25mg BID Pt denies chest pain, dyspnea, dyspnea on exertion, change in exercise capacity, fatigue,  nausea, vomiting, diarrhea, constipation, motor weakness, insomnia, weight loss, syncope, dizziness, lightheadedness, palpitations, PND, orthopnea, or claudication. She is smoking. EKG with NSR no ischemia.          Patient Active Problem List   Diagnosis    Hypothyroidism    Obesity    Meniere disease    Irregular periods    Asplenia    Allergic rhinitis    Migraine    Multinodular goiter    Migraine headache    Insomnia    Hypertension    Menorrhagia    Pelvic pain    Postoperative pain    Paroxysmal atrial fibrillation (HCC)    Tonsillar hypertrophy    History of ITP       Past Surgical History:   Procedure Laterality Date    CARPAL TUNNEL RELEASE Right 07/12/2016    DILATION AND CURETTAGE OF UTERUS N/A 12/16/2019    HYSTEROSCOPY NOVASURE ABLATION D&C performed by Em Klein DO at [address] [facility] (CERVIX STATUS UNKNOWN)      HYSTERECTOMY, VAGINAL N/A 8/2/2021    LAPAROSCOPIC ASSISTED VAGINAL HYSTERECTOMY BILATERAL SALPINGECTOMY performed by Em Klein DO at [facility] LAPAROSCOPIC SPLENECTOMY N/A 2004    done for ITP    THYROIDECTOMY, COMPLETION Left 2014    frozen section inconclusive    THYROIDECTOMY, PARTIAL Right 2011    Benign nodule    TONSILLECTOMY N/A 11/2/2021    TONSILLECTOMY performed by Radha Joseph MD at [facility] [address] History     Socioeconomic History    Marital status:     Number of children: 0   Occupational History    Occupation: medical records   Tobacco Use    Smoking status: Some Days     Packs/day: 0.25     Years: 32.00     Pack years: 8.00     Types: Cigarettes     Start date: 10/26/1989    Smokeless tobacco: Never    Tobacco comments:     social smoker   Vaping Use    Vaping Use: Never used   Substance and Sexual Activity    Alcohol use:  Yes     Alcohol/week: 0.0 standard drinks     Comment: socially    Drug use: No     Social Determinants of Health     Financial Resource Strain: Low Risk     Difficulty of Paying Living Expenses: Not hard at all   Food Insecurity: No Food Insecurity    Worried About Running Out of Food in the Last Year: Never true    Ran Out of Food in the Last Year: Never true   Transportation Needs: No Transportation Needs    Lack of Transportation (Medical): No    Lack of Transportation (Non-Medical): No       Family History   Problem Relation Age of Onset    High Blood Pressure Mother     Anemia Mother     Atrial Fibrillation Mother     Heart Failure Mother     Other Mother         pacemaker    Heart Disease Mother     Sleep Apnea Mother     Vision Loss Mother         histoplasmosis    Heart Disease Father         CAD / Triple bypass    Diabetes Father         resolved with wt loss    Other Father         Fuck's dystropy / corneal transplants / AAA    Coronary Art Dis Father     Cancer Paternal Aunt     Breast Cancer Paternal Aunt     Cancer Paternal Grandmother     Breast Cancer Paternal Grandmother     Sleep Apnea Sister     Alcohol Abuse Brother     Other Sister         killed by drunk        Current Outpatient Medications   Medication Sig Dispense Refill    metoprolol tartrate (LOPRESSOR) 25 MG tablet Take 1 tablet by mouth 2 times daily 180 tablet 5    lidocaine (LIDODERM) 5 % Place 1 patch onto the skin daily for 10 days 12 hours on, 12 hours off. 10 patch 0    progesterone (PROMETRIUM) 200 MG CAPS capsule Take 1 capsule by mouth nightly 30 capsule 3    omeprazole (PRILOSEC) 40 MG delayed release capsule Take 1 capsule by mouth every morning (before breakfast) 30 capsule 0    ondansetron (ZOFRAN) 8 MG tablet Take 1 tablet by mouth every 12 hours as needed for Nausea or Vomiting 30 tablet 02    dilTIAZem (CARDIZEM CD) 180 MG extended release capsule Take 1 capsule by mouth daily 90 capsule 3    rivaroxaban (XARELTO) 20 MG TABS tablet TAKE 1 TABLET BY MOUTH ONE TIME A DAY 90 tablet 3    rivaroxaban (XARELTO) 20 MG TABS tablet TAKE 1 TABLET BY MOUTH ONE TIME A DAY 90 tablet 3    dilTIAZem (CARDIZEM CD) 180 MG extended release capsule TAKE 1 CAPSULE BY MOUTH ONE TIME A DAY 90 capsule 3    hydrocortisone (ANUSOL-HC) 25 MG suppository Place 1 suppository rectally 2 times daily 60 suppository 0    butalbital-acetaminophen-caffeine (FIORICET, ESGIC) -40 MG per tablet Take 1 tablet by mouth every 4 hours as needed for Headaches 12 tablet 0    diclofenac sodium (VOLTAREN) 1 % GEL Apply 2 g topically 4 times daily as needed for Pain 2 g 0    SUMAtriptan (IMITREX) 100 MG tablet Take 1 tablet by mouth daily as needed for Migraine 9 tablet 5    busPIRone (BUSPAR) 7.5 MG tablet TAKE ONE TABLET ONE TIME DAILY AS NEEDED (GENERIC FOR BUSPAR) 90 tablet 3    levothyroxine (SYNTHROID) 100 MCG tablet TAKE 1 TABLET BY MOUTH ONE TIME A DAY 90 tablet 3    traMADol (ULTRAM) 50 MG tablet Take by mouth as needed.       Cholecalciferol (VITAMIN D3) 50 MCG (2000 UT) CAPS Take by mouth daily      tiZANidine (ZANAFLEX) 4 MG tablet Take 1 tablet by mouth every 8 hours as needed (spasm) 30 tablet 5    Bacillus Coagulans-Inulin (PROBIOTIC FORMULA PO) Take 1 capsule by mouth daily      Folic Acid (FOLATE PO) Take 1 tablet by mouth daily      Multiple Vitamin (MULTIVITAMIN PO) Take 1 tablet by mouth daily      acetaminophen (APAP EXTRA STRENGTH) 500 MG tablet Take 2 tablets by mouth every 6 hours as needed for Pain 120 tablet 0    sodium chloride (ALTAMIST SPRAY) 0.65 % nasal spray 1 spray by Nasal route as needed for Congestion 1 Bottle 3    Handicap Placard MISC by Does not apply route Exp 5 years 1 each 0    magnesium (MAGNESIUM-OXIDE) 250 MG TABS tablet Take 250 mg by mouth daily       vitamin B-12 (CYANOCOBALAMIN) 500 MCG tablet Take 500 mcg by mouth daily      zinc 50 MG CAPS Take by mouth daily       Echinacea 400 MG CAPS Take by mouth daily       Calcium Carbonate-Vit D-Min (CALCIUM 1200) 2511-1432 MG-UNIT CHEW Take by mouth daily       ferrous sulfate 325 (65 FE) MG tablet Take 65 mg by mouth daily (with breakfast)       fluticasone (FLONASE) 50 MCG/ACT nasal spray 1 spray by Nasal route daily. 1 Bottle 06    meclizine (ANTIVERT) 12.5 MG tablet Take 12.5 mg by mouth 3 times daily as needed. No current facility-administered medications for this visit. Penicillins, Codeine, Percocet [oxycodone-acetaminophen], Bee venom, Seasonal, Tape [adhesive tape], Hydrocodone, and Protonix [pantoprazole sodium]    Review of Systems:  General ROS: negative  Psychological ROS: negative  Hematological and Lymphatic ROS: No history of blood clots or bleeding disorder.    Respiratory ROS: no cough, shortness of breath, or wheezing  Cardiovascular ROS: no chest pain or dyspnea on exertion  Gastrointestinal ROS: no abdominal pain, change in bowel habits, or black or bloody stools  Genito-Urinary ROS: no dysuria, trouble voiding, or hematuria  Musculoskeletal ROS: negative  Neurological ROS: no TIA or stroke symptoms  Dermatological ROS: negative    VITALS:  Blood pressure 104/70, pulse 56, weight 210 lb 9.6 oz (95.5 kg), last menstrual period 11/04/2019, not currently breastfeeding. Body mass index is 33.99 kg/m². Physical Examination:  General appearance - alert, well appearing, and in no distress  Mental status - alert, oriented to person, place, and time  Neck - Neck is supple, no JVD or carotid bruits. No thyromegaly or adenopathy. Chest - clear to auscultation, no wheezes, rales or rhonchi, symmetric air entry  Heart - normal rate, regular rhythm, normal S1, S2, no murmurs, rubs, clicks or gallops  Abdomen - soft, nontender, nondistended, no masses or organomegaly  Neurological - alert, oriented, normal speech, no focal findings or movement disorder noted  Extremities - peripheral pulses normal, no pedal edema, no clubbing or cyanosis  Skin - normal coloration and turgor, no rashes, no suspicious skin lesions noted      Orders Placed This Encounter   Procedures    EKG 12 lead       ASSESSMENT:     Diagnosis Orders   1. Pre-operative clearance  EKG 12 Lead   2. Atrial fibrillation EKG 12 Lead   3. Essential hypertension     4. Paroxysmal atrial fibrillation (HCC)     5. Class 2 obesity without serious comorbidity with body mass index (BMI) of 35.0 to 35.9 in adult, unspecified obesity type     2. Overweight. PLAN:       As always, aggressive risk factor modification is strongly recommended. We should adhere to the JNC VIII guidelines for HTN management and the NCEP ATP III guidelines for LDL-C management. Cardiac diet is always recommended with low fat, cholesterol, calories and sodium. Continue medications at current doses. Check EKG      Cont with cardizem     Stop lorpessor for now      Will give pill in a pocket flecainide 100mg PRN     Consider INESSA testing in future    Patient is to avoid any excessive caffeine, chocolate, or OTC stimulants. Patient was advised and encouraged to check blood pressure at home or at a pharmacy, maintain a logbook, and also call us back if blood pressure are above the target ranges or if it is low. Patient clearly understands and agrees to the instructions. We will need to continue to monitor muscle and liver enzymes, BUN, CR, and electrolytes.     Electronically signed by DO Rosy on 1/5/2023 at 3:46 PM

## 2023-01-06 ENCOUNTER — OFFICE VISIT (OUTPATIENT)
Dept: FAMILY MEDICINE CLINIC | Age: 48
End: 2023-01-06
Payer: COMMERCIAL

## 2023-01-06 VITALS
BODY MASS INDEX: 33.75 KG/M2 | HEART RATE: 67 BPM | TEMPERATURE: 96.8 F | WEIGHT: 210 LBS | OXYGEN SATURATION: 98 % | HEIGHT: 66 IN

## 2023-01-06 DIAGNOSIS — R05.9 COUGH, UNSPECIFIED TYPE: ICD-10-CM

## 2023-01-06 DIAGNOSIS — J01.90 ACUTE BACTERIAL SINUSITIS: Primary | ICD-10-CM

## 2023-01-06 DIAGNOSIS — B96.89 ACUTE BACTERIAL SINUSITIS: Primary | ICD-10-CM

## 2023-01-06 LAB
INFLUENZA A ANTIBODY: NORMAL
INFLUENZA B ANTIBODY: NORMAL

## 2023-01-06 PROCEDURE — 87804 INFLUENZA ASSAY W/OPTIC: CPT | Performed by: NURSE PRACTITIONER

## 2023-01-06 PROCEDURE — 99213 OFFICE O/P EST LOW 20 MIN: CPT | Performed by: NURSE PRACTITIONER

## 2023-01-06 RX ORDER — DOXYCYCLINE HYCLATE 100 MG
100 TABLET ORAL 2 TIMES DAILY
Qty: 14 TABLET | Refills: 0 | Status: SHIPPED | OUTPATIENT
Start: 2023-01-06 | End: 2023-01-13

## 2023-01-06 ASSESSMENT — ENCOUNTER SYMPTOMS
COUGH: 1
SINUS PRESSURE: 1
SHORTNESS OF BREATH: 0
VOMITING: 0
EYE REDNESS: 0
CHEST TIGHTNESS: 0
CONSTIPATION: 0
HOARSE VOICE: 1
APNEA: 0
DIARRHEA: 0
TROUBLE SWALLOWING: 0
EYE ITCHING: 0
NAUSEA: 0
RHINORRHEA: 1
SINUS PAIN: 1
SORE THROAT: 0
WHEEZING: 0
ABDOMINAL DISTENTION: 0

## 2023-01-06 NOTE — PROGRESS NOTES
Subjective:      Patient ID: Rodolfo Evans is a 50 y.o. female who presents today for:  Chief Complaint   Patient presents with    Sinusitis     Ear pain, slight cough, home covid test neg , start 1/2   Pt reports she is vaccinated and she took an at home covid test and it was neg. Pt agreed to taking a flu test which came back Neg today and pt made aware. Pt reports having sinus issues. Sinusitis  This is a new problem. The current episode started in the past 7 days. The problem has been gradually worsening since onset. There has been no fever. Associated symptoms include congestion, coughing, headaches, a hoarse voice and sinus pressure. Pertinent negatives include no chills, ear pain, neck pain, shortness of breath or sore throat.      Past Medical History:   Diagnosis Date    Abnormal Pap smear of cervix     Allergic rhinitis     Arthritis     hands    Asplenia     Atrial fibrillation (HCC)     Atrial fibrillation with RVR (HCC)     Dr. 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 Raffi Parks     Past Surgical History:   Procedure Laterality Date    CARPAL TUNNEL RELEASE Right 07/12/2016    DILATION AND CURETTAGE OF UTERUS N/A 12/16/2019    HYSTEROSCOPY NOVASURE ABLATION D&C [address]ami[address] [facility]ancia DO [address] ([address] [place] [address])      HYSTERECTOMY, VAGINAL N/A 8/2/2021    LAPAROSCOPIC ASSISTED VAGINAL HYSTERECTOMY BILATERAL SALPINGECTOMY performed by Ramiro Mancia DO at [facility] 2004    done for ITP THYROIDECTOMY, COMPLETION Left 2014    frozen section inconclusive    THYROIDECTOMY, PARTIAL Right 2011    Benign nodule    TONSILLECTOMY N/A 11/2/2021    TONSILLECTOMY performed by Tin Cardona MD at [address] [place] [address] History     Socioeconomic History    Marital status:      Spouse name: Not on file    Number of children: 0    Years of education: Not on file    Highest education level: Not on file   Occupational History    Occupation: medical records   Tobacco Use    Smoking status: Some Days     Packs/day: 0.25     Years: 32.00     Pack years: 8.00     Types: Cigarettes     Start date: 10/26/1989    Smokeless tobacco: Never    Tobacco comments:     social smoker   Vaping Use    Vaping Use: Never used   Substance and Sexual Activity    Alcohol use: Yes     Alcohol/week: 0.0 standard drinks     Comment: socially    Drug use: No    Sexual activity: Not on file   Other Topics Concern    Not on file   Social History Narrative    Not on file     Social Determinants of Health     Financial Resource Strain: Low Risk     Difficulty of Paying Living Expenses: Not hard at all   Food Insecurity: No Food Insecurity    Worried About Running Out of Food in the Last Year: Never true    [address] in the Last Year: Never true   Transportation Needs: No Transportation Needs    Lack of Transportation (Medical): No    Lack of Transportation (Non-Medical):  No   Physical Activity: Not on file   Stress: Not on file   Social Connections: Not on file   Intimate Partner Violence: Not on file   Housing Stability: Not on file     Family History   Problem Relation Age of Onset    High Blood Pressure Mother     Anemia Mother     Atrial Fibrillation Mother     Heart Failure Mother     Other Mother         pacemaker    Heart Disease Mother     Sleep Apnea Mother     Vision Loss Mother         histoplasmosis    Heart Disease Father         CAD / Triple bypass    Diabetes Father         resolved with wt loss    Other Father Fuck's dystropy / corneal transplants / AAA    Coronary Art Dis Father     Cancer Paternal Aunt     Breast Cancer Paternal Aunt     Cancer Paternal Grandmother     Breast Cancer Paternal Grandmother     Sleep Apnea Sister     Alcohol Abuse Brother     Other Sister         killed by drunk      Allergies   Allergen Reactions    Penicillins Anaphylaxis    Codeine Itching, Swelling and Rash     OK with Tylenol #3 per pt    Percocet [Oxycodone-Acetaminophen] Itching, Swelling and Rash    Bee Venom Itching and Swelling    Seasonal      Sinus sx    Tape Ximena Constant Tape]      Skin irritation     Hydrocodone Rash    Protonix [Pantoprazole Sodium] Swelling and Rash     Caused thrush         Review of Systems   Constitutional:  Negative for activity change, appetite change, chills, fatigue and fever. HENT:  Positive for congestion, hoarse voice, postnasal drip, rhinorrhea, sinus pressure and sinus pain. Negative for drooling, ear pain, hearing loss, sore throat and trouble swallowing. Eyes:  Negative for redness, itching and visual disturbance. Respiratory:  Positive for cough. Negative for apnea, chest tightness, shortness of breath and wheezing. Cardiovascular:  Negative for chest pain and palpitations. Gastrointestinal:  Negative for abdominal distention, constipation, diarrhea, nausea and vomiting. Endocrine: Negative for heat intolerance. Genitourinary:  Negative for difficulty urinating, flank pain, genital sores and urgency. Musculoskeletal:  Negative for gait problem, myalgias, neck pain and neck stiffness. Skin:  Negative for rash. Neurological:  Positive for headaches. Negative for tremors, seizures and facial asymmetry. Hematological:  Negative for adenopathy. Psychiatric/Behavioral:  Negative for behavioral problems, confusion and suicidal ideas. The patient is not hyperactive. All other systems reviewed and are negative.     Objective:   Pulse 67   Temp 96.8 °F (36 °C)   Ht 5' 6\" (1.676 m)   Wt 210 lb (95.3 kg)   LMP 11/04/2019   SpO2 98%   BMI 33.89 kg/m²     Physical Exam  Vitals and nursing note reviewed. Constitutional:       General: She is awake. She is not in acute distress. Appearance: Normal appearance. She is well-developed and well-groomed. She is obese. She is ill-appearing. She is not toxic-appearing or diaphoretic. HENT:      Head: Normocephalic and atraumatic. Right Ear: Hearing and tympanic membrane normal. No decreased hearing noted. No tenderness. No middle ear effusion. Tympanic membrane is not injected, retracted or bulging. Left Ear: Hearing and tympanic membrane normal. No decreased hearing noted. No tenderness. No middle ear effusion. Tympanic membrane is not injected, retracted or bulging. Nose: Mucosal edema, congestion and rhinorrhea present. Right Turbinates: Swollen. Left Turbinates: Swollen. Right Sinus: Maxillary sinus tenderness present. Left Sinus: Maxillary sinus tenderness present. Mouth/Throat:      Mouth: Mucous membranes are moist.      Pharynx: Oropharynx is clear. Posterior oropharyngeal erythema present. No oropharyngeal exudate. Eyes:      Extraocular Movements: Extraocular movements intact. Conjunctiva/sclera: Conjunctivae normal.      Pupils: Pupils are equal, round, and reactive to light. Cardiovascular:      Rate and Rhythm: Normal rate and regular rhythm. Pulses: Normal pulses. Heart sounds: Normal heart sounds. No murmur heard. Pulmonary:      Effort: Pulmonary effort is normal. No tachypnea, bradypnea, accessory muscle usage or respiratory distress. Breath sounds: Normal breath sounds and air entry. No decreased air movement or transmitted upper airway sounds. No decreased breath sounds, wheezing or rhonchi. Comments: Lungs are clear on exam.   Chest:      Chest wall: No tenderness. Abdominal:      General: Abdomen is flat.  Bowel sounds are normal. Palpations: Abdomen is soft. Tenderness: There is no abdominal tenderness. There is no left CVA tenderness, guarding or rebound. Musculoskeletal:         General: No deformity or signs of injury. Normal range of motion. Cervical back: Normal range of motion and neck supple. Lymphadenopathy:      Cervical: No cervical adenopathy. Skin:     General: Skin is warm and dry. Capillary Refill: Capillary refill takes less than 2 seconds. Findings: No bruising or erythema. Neurological:      General: No focal deficit present. Mental Status: She is alert and oriented to person, place, and time. Mental status is at baseline. Motor: No weakness. Coordination: Coordination normal.   Psychiatric:         Attention and Perception: Attention and perception normal.         Mood and Affect: Mood and affect normal.         Speech: Speech normal.         Behavior: Behavior normal. Behavior is cooperative. Thought Content: Thought content normal.         Judgment: Judgment normal.       Assessment:       Diagnosis Orders   1. Acute bacterial sinusitis  doxycycline hyclate (VIBRA-TABS) 100 MG tablet    POCT Influenza A/B      2. Cough, unspecified type  doxycycline hyclate (VIBRA-TABS) 100 MG tablet    POCT Influenza A/B            Plan:      Orders Placed This Encounter   Procedures    POCT Influenza A/B       Orders Placed This Encounter   Medications    doxycycline hyclate (VIBRA-TABS) 100 MG tablet     Sig: Take 1 tablet by mouth 2 times daily for 7 days     Dispense:  14 tablet     Refill:  0     Pt completed flu test which was neg. She declines a covid test as she reports she took one at home which was neg. Pt declines distress and aware to increase fluids, rest and to use the flonase she reports she has at home. Pt left the [facility] today in stable condition. Antibiotic Instructions: Complete the full course of antibiotics as ordered.  Take each dose with a small snack or meal to lessen potential GI upset. To prevent antibiotic resistance, please take medication as ordered and for the full duration even if you start to feel better. Consider intake of yogurt or probiotic during antibiotic use and for a few days after to help reduce the risk of developing a secondary infection. Separate the yogurt and antibiotic by at least 1 hour. Avoid alcohol while taking antibiotics. Discussed signs and symptoms which require immediate follow-up in ED/call to 911. Patient verbalized understanding. Return if symptoms worsen or fail to improve. Reviewed with the patient: current clinical status, medications, activities and diet. Side effects, adverse effects of the medication prescribed today, as well as treatment plan and result expectations have been discussed with the patient who expresses understanding and desires to proceed. Close follow up to evaluate treatment results and for coordination of care. I have reviewed the patient's medical history in detail and updated the computerized patient record.       Dominga Schofield, CASE - CNP

## 2023-01-09 RX ORDER — LEVOTHYROXINE SODIUM 0.1 MG/1
TABLET ORAL
Qty: 90 TABLET | Refills: 3 | Status: SHIPPED | OUTPATIENT
Start: 2023-01-09

## 2023-01-17 ENCOUNTER — OFFICE VISIT (OUTPATIENT)
Dept: FAMILY MEDICINE CLINIC | Age: 48
End: 2023-01-17
Payer: COMMERCIAL

## 2023-01-17 VITALS
TEMPERATURE: 96.9 F | WEIGHT: 203 LBS | HEART RATE: 76 BPM | OXYGEN SATURATION: 96 % | DIASTOLIC BLOOD PRESSURE: 80 MMHG | SYSTOLIC BLOOD PRESSURE: 140 MMHG | BODY MASS INDEX: 32.77 KG/M2

## 2023-01-17 DIAGNOSIS — I48.0 PAROXYSMAL ATRIAL FIBRILLATION (HCC): ICD-10-CM

## 2023-01-17 DIAGNOSIS — R09.1 PLEURISY: Primary | ICD-10-CM

## 2023-01-17 DIAGNOSIS — R09.1 PLEURISY: ICD-10-CM

## 2023-01-17 DIAGNOSIS — M75.42 IMPINGEMENT SYNDROME OF LEFT SHOULDER: ICD-10-CM

## 2023-01-17 LAB
ALBUMIN SERPL-MCNC: 3.7 G/DL (ref 3.5–4.6)
ALP BLD-CCNC: 125 U/L (ref 40–130)
ALT SERPL-CCNC: 16 U/L (ref 0–33)
ANION GAP SERPL CALCULATED.3IONS-SCNC: 17 MEQ/L (ref 9–15)
AST SERPL-CCNC: 19 U/L (ref 0–35)
BASOPHILS ABSOLUTE: 0 K/UL (ref 0–0.2)
BASOPHILS RELATIVE PERCENT: 0.6 %
BILIRUB SERPL-MCNC: 0.4 MG/DL (ref 0.2–0.7)
BUN BLDV-MCNC: 11 MG/DL (ref 6–20)
BURR CELLS: ABNORMAL
CALCIUM SERPL-MCNC: 9.3 MG/DL (ref 8.5–9.9)
CHLORIDE BLD-SCNC: 103 MEQ/L (ref 95–107)
CO2: 20 MEQ/L (ref 20–31)
CREAT SERPL-MCNC: 0.73 MG/DL (ref 0.5–0.9)
EOSINOPHILS ABSOLUTE: 0.3 K/UL (ref 0–0.7)
EOSINOPHILS RELATIVE PERCENT: 2 %
GFR SERPL CREATININE-BSD FRML MDRD: >60 ML/MIN/{1.73_M2}
GLOBULIN: 3.8 G/DL (ref 2.3–3.5)
GLUCOSE BLD-MCNC: 122 MG/DL (ref 70–99)
HCT VFR BLD CALC: 44.3 % (ref 37–47)
HEMOGLOBIN: 14.6 G/DL (ref 12–16)
LYMPHOCYTES ABSOLUTE: 3.7 K/UL (ref 1–4.8)
LYMPHOCYTES RELATIVE PERCENT: 28 %
MCH RBC QN AUTO: 30.3 PG (ref 27–31.3)
MCHC RBC AUTO-ENTMCNC: 33 % (ref 33–37)
MCV RBC AUTO: 92 FL (ref 79.4–94.8)
MONOCYTES ABSOLUTE: 0.8 K/UL (ref 0.2–0.8)
MONOCYTES RELATIVE PERCENT: 5.7 %
NEUTROPHILS ABSOLUTE: 8.6 K/UL (ref 1.4–6.5)
NEUTROPHILS RELATIVE PERCENT: 65 %
PDW BLD-RTO: 14.5 % (ref 11.5–14.5)
PLATELET # BLD: 451 K/UL (ref 130–400)
PLATELET SLIDE REVIEW: ABNORMAL
POTASSIUM SERPL-SCNC: 4.9 MEQ/L (ref 3.4–4.9)
RBC # BLD: 4.81 M/UL (ref 4.2–5.4)
SCHISTOCYTES: ABNORMAL
SODIUM BLD-SCNC: 140 MEQ/L (ref 135–144)
TOTAL PROTEIN: 7.5 G/DL (ref 6.3–8)
WBC # BLD: 13.3 K/UL (ref 4.8–10.8)

## 2023-01-17 PROCEDURE — 3079F DIAST BP 80-89 MM HG: CPT | Performed by: FAMILY MEDICINE

## 2023-01-17 PROCEDURE — 3077F SYST BP >= 140 MM HG: CPT | Performed by: FAMILY MEDICINE

## 2023-01-17 PROCEDURE — 99214 OFFICE O/P EST MOD 30 MIN: CPT | Performed by: FAMILY MEDICINE

## 2023-01-17 RX ORDER — LEVOFLOXACIN 500 MG/1
500 TABLET, FILM COATED ORAL DAILY
Qty: 7 TABLET | Refills: 0 | Status: SHIPPED | OUTPATIENT
Start: 2023-01-17 | End: 2023-01-24

## 2023-01-17 RX ORDER — ALBUTEROL SULFATE 90 UG/1
2 AEROSOL, METERED RESPIRATORY (INHALATION) 4 TIMES DAILY PRN
Qty: 18 G | Refills: 0 | Status: SHIPPED | OUTPATIENT
Start: 2023-01-17

## 2023-01-17 RX ORDER — LIDOCAINE 4 G/G
1 PATCH TOPICAL DAILY
Qty: 30 PATCH | Refills: 0 | Status: SHIPPED | OUTPATIENT
Start: 2023-01-17 | End: 2023-02-16

## 2023-01-17 ASSESSMENT — PATIENT HEALTH QUESTIONNAIRE - PHQ9
SUM OF ALL RESPONSES TO PHQ QUESTIONS 1-9: 0
SUM OF ALL RESPONSES TO PHQ QUESTIONS 1-9: 0
SUM OF ALL RESPONSES TO PHQ9 QUESTIONS 1 & 2: 0
SUM OF ALL RESPONSES TO PHQ QUESTIONS 1-9: 0
2. FEELING DOWN, DEPRESSED OR HOPELESS: 0
SUM OF ALL RESPONSES TO PHQ QUESTIONS 1-9: 0
1. LITTLE INTEREST OR PLEASURE IN DOING THINGS: 0

## 2023-01-17 NOTE — PROGRESS NOTES
Milind Rosado (:  1975) is a 50 y.o. female,Established patient, here for evaluation of the following chief complaint(s):  Pain (Pain in lower back, and chest. Has been tested numerous times for Covid and flu, all negative at the urgent care. Pt states in A Fib, and having difficulty breathing. 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 Maritza Vigil   Has been in contact with his office  Takes Xarelto, hasn't missed any doses  No leg pain, edema, hemoptysis, prolonged car rides or plane rides     Left shoulder pain    Right handed  Has been bothering her for a while   Seeing Ortho   Referred to PT, hasn't started yet  Pain with overhead reaching     Review of Symptoms: As per HPI.      Past Medical History:   Diagnosis Date    Abnormal Pap smear of cervix     Allergic rhinitis     Arthritis     hands    Asplenia     Atrial fibrillation (HCC)     Atrial fibrillation with RVR (HCC)     Dr. Maritza Vigil    Bilateral hand pain     CTS (carpal tunnel syndrome) 2016    bilateral    External hemorrhoid     Fatigue     Heart murmur     History of ITP     had spenectomy    Hypertension     meds > 3 yrs    Hypothyroidism     meds > 7 yrs    Hypothyroidism     Insomnia     Irregular periods     Meniere disease     Migraine headache     Multinodular goiter     Nail fungus     Pain in right lower leg     Paroxysmal atrial fibrillation ([facility] [address] 10/23/2020 Plantar fasciitis, left     Dr. Fidel Barnes     Past Surgical History:   Procedure Laterality Date    CARPAL TUNNEL RELEASE Right 07/12/2016    DILATION AND CURETTAGE OF UTERUS N/A 12/16/2019    HYSTEROSCOPY NOVASURE ABLATION D&C performed by Yvette Asher DO at [address] [place] [address] (CERVIX STATUS UNKNOWN)      HYSTERECTOMY, VAGINAL N/A 8/2/2021    LAPAROSCOPIC ASSISTED VAGINAL HYSTERECTOMY BILATERAL SALPINGECTOMY performed by Yvette Asher DO at [address] N/A 2004    done for ITP    THYROIDECTOMY, COMPLETION Left 2014    frozen section inconclusive    THYROIDECTOMY, PARTIAL Right 2011    Benign nodule    TONSILLECTOMY N/A 11/2/2021    TONSILLECTOMY performed by Sherin Hickman MD at [address] History   Problem Relation Age of Onset    High Blood Pressure Mother     Anemia Mother     Atrial Fibrillation Mother     Heart Failure Mother     Other Mother         pacemaker    Heart Disease Mother     Sleep Apnea Mother     Vision Loss Mother         histoplasmosis    Heart Disease Father         CAD / Triple bypass    Diabetes Father         resolved with wt loss    Other Father         Fuck's dystropy / corneal transplants / AAA    Coronary Art Dis Father     Cancer Paternal Aunt     Breast Cancer Paternal Aunt     Cancer Paternal Grandmother     Breast Cancer Paternal Grandmother     Sleep Apnea Sister     Alcohol Abuse Brother     Other Sister         killed by drunk      Social History     Socioeconomic History    Marital status:      Spouse name: Not on file    Number of children: 0    Years of education: Not on file    Highest education level: Not on file   Occupational History    Occupation: medical records   Tobacco Use    Smoking status: Some Days     Packs/day: 0.25     Years: 32.00     Pack years: 8.00     Types: Cigarettes     Start date: 10/26/1989    Smokeless tobacco: Never    Tobacco comments:     social smoker   Vaping Use    Vaping Use: Never used Substance and Sexual Activity    Alcohol use: Yes     Alcohol/week: 0.0 standard drinks     Comment: socially    Drug use: No    Sexual activity: Not on file   Other Topics Concern    Not on file   Social History Narrative    Not on file     Social Determinants of Health     Financial Resource Strain: Low Risk     Difficulty of Paying Living Expenses: Not hard at all   Food Insecurity: No Food Insecurity    Worried About Running Out of Food in the Last Year: Never true    [address] in the Last Year: Never true   Transportation Needs: No Transportation Needs    Lack of Transportation (Medical): No    Lack of Transportation (Non-Medical): No   Physical Activity: Not on file   Stress: Not on file   Social Connections: Not on file   Intimate Partner Violence: Not on file   Housing Stability: Not on file     Penicillins, Codeine, Percocet [oxycodone-acetaminophen], Bee venom, Seasonal, Tape [adhesive tape], Hydrocodone, and Protonix [pantoprazole sodium]  Prior to Admission medications    Medication Sig Start Date End Date Taking?  Authorizing Provider   levoFLOXacin (LEVAQUIN) 500 MG tablet Take 1 tablet by mouth daily for 7 days 1/17/23 1/24/23 Yes Dion Atwood, DO   albuterol sulfate HFA (VENTOLIN HFA) 108 (90 Base) MCG/ACT inhaler Inhale 2 puffs into the lungs 4 times daily as needed for Wheezing 1/17/23  Yes Rainell Saint, DO   lidocaine 4 % external patch Place 1 patch onto the skin daily 1/17/23 2/16/23 Yes Rainell Saint, DO   levothyroxine (SYNTHROID) 100 MCG tablet TAKE ONE TABLET ONE TIME DAILY 1/9/23  Yes Edilma Cabrera MD   progesterone (PROMETRIUM) 200 MG CAPS capsule Take 1 capsule by mouth nightly 12/15/22  Yes Karen Ervin DO   omeprazole (PRILOSEC) 40 MG delayed release capsule Take 1 capsule by mouth every morning (before breakfast) 11/21/22  Yes GRETCHEN Alcala   ondansetron (ZOFRAN) 8 MG tablet Take 1 tablet by mouth every 12 hours as needed for Nausea or Vomiting 11/1/22  Yes Dyan Lesches, MD   dilTIAZem (CARDIZEM CD) 180 MG extended release capsule Take 1 capsule by mouth daily 10/19/22  Yes Ronald BLAKE Holiday, DO   rivaroxaban (XARELTO) 20 MG TABS tablet TAKE 1 TABLET BY MOUTH ONE TIME A DAY 10/19/22  Yes Ronald BLAKE Holiday, DO   rivaroxaban (XARELTO) 20 MG TABS tablet TAKE 1 TABLET BY MOUTH ONE TIME A DAY 10/13/22  Yes CASE Wang CNP   dilTIAZem (CARDIZEM CD) 180 MG extended release capsule TAKE 1 CAPSULE BY MOUTH ONE TIME A DAY 10/13/22  Yes CASE Wang CNP   hydrocortisone (ANUSOL-HC) 25 MG suppository Place 1 suppository rectally 2 times daily 9/15/22  Yes Shakir Hernandez MD   butalbital-acetaminophen-caffeine (FIORICET, ESGIC) -40 MG per tablet Take 1 tablet by mouth every 4 hours as needed for Headaches 8/30/22  Yes Shakir Hernandez MD   diclofenac sodium (VOLTAREN) 1 % GEL Apply 2 g topically 4 times daily as needed for Pain 7/20/22  Yes Shakir Hernandez MD   SUMAtriptan (IMITREX) 100 MG tablet Take 1 tablet by mouth daily as needed for Migraine 3/9/22  Yes Shakir Hernandez MD   busPIRone (BUSPAR) 7.5 MG tablet TAKE ONE TABLET ONE TIME DAILY AS NEEDED (GENERIC FOR BUSPAR) 1/18/22  Yes Rey Ervin,    traMADol (ULTRAM) 50 MG tablet Take by mouth as needed. 8/2/21  Yes Historical Provider, MD   Cholecalciferol (VITAMIN D3) 50 MCG (2000 UT) CAPS Take by mouth daily   Yes Historical Provider, MD   tiZANidine (ZANAFLEX) 4 MG tablet Take 1 tablet by mouth every 8 hours as needed (spasm) 7/13/21  Yes Shakir Hernandez MD   Bacillus Coagulans-Inulin (PROBIOTIC FORMULA PO) Take 1 capsule by mouth daily   Yes Historical Provider, MD   Folic Acid (FOLATE PO) Take 1 tablet by mouth daily   Yes Historical Provider, MD   Multiple Vitamin (MULTIVITAMIN PO) Take 1 tablet by mouth daily   Yes Historical Provider, MD   acetaminophen (APAP EXTRA STRENGTH) 500 MG tablet Take 2 tablets by mouth every 6 hours as needed for Pain 6/16/21  Yes Keven Guy, DO   sodium chloride (ALTAMIST SPRAY) 0.65 % nasal  spray 1 spray by Nasal route as needed for Congestion 12/21/20  Yes CASE Nieves - SADIE   Handicap Placard MISC by Does not apply route Exp 5 years 11/13/20  Yes Jennefer Meckel, MD   magnesium (MAGNESIUM-OXIDE) 250 MG TABS tablet Take 250 mg by mouth daily    Yes Historical Provider, MD   vitamin B-12 (CYANOCOBALAMIN) 500 MCG tablet Take 500 mcg by mouth daily   Yes Historical Provider, MD   zinc 50 MG CAPS Take by mouth daily    Yes Historical Provider, MD   Echinacea 400 MG CAPS Take by mouth daily    Yes Historical Provider, MD   Calcium Carbonate-Vit D-Min (CALCIUM 1200) 9973-5617 MG-UNIT CHEW Take by mouth daily    Yes Historical Provider, MD   ferrous sulfate 325 (65 FE) MG tablet Take 65 mg by mouth daily (with breakfast)    Yes Historical Provider, MD   fluticasone (FLONASE) 50 MCG/ACT nasal spray 1 spray by Nasal route daily. 1/27/15  Yes Jonah Nesbitt MD   meclizine (ANTIVERT) 12.5 MG tablet Take 12.5 mg by mouth 3 times daily as needed. Yes Historical Provider, MD   flecainide (TAMBOCOR) 100 MG tablet Take 1 tablet by mouth as needed (PRN afib.)  Patient not taking: Reported on 1/17/2023 1/5/23   Ronald Knight DO       OBJECTIVE     BP (!) 140/80   Pulse 76   Temp 96.9 °F (36.1 °C) (Tympanic)   Wt 203 lb (92.1 kg)   LMP 11/04/2019   SpO2 96%   BMI 32.77 kg/m²     General: alert and oriented, NAD  Head: normocephalic, atraumatic  Ears: TMs visualized bilaterally without perforation or erythema  Nose: Erythematous nasal mucosa with purulent discharge  Throat: Postnasal drip appreciated tonsils not present  Cardiovascular: Irregular rate and rhythm no murmurs  Respiratory: Clear to auscultation bilaterally; no wheezing, rhonchi, or crackles  Musculoskeletal: Active range of motion with abduction and flexion grossly intact on the right however limited to 130 degrees on left, passive range of motion in these planes grossly intact.   Positive Neer sign on left  Neurological: no focal neurological deficits  Extremities: No edema erythema cord tenderness and negative Homans' sign bilaterally  Psychological: tearful during interview, affect: overwhelmed       ASSESSMENT/PLAN      1. Pleurisy  Acute. Differential diagnosis includes pneumonia, bronchitis, PE, A. fib  Patient's Wells score for PE is 0 putting her at low risk, she is not missed any doses of Xarelto  Given her history of splenectomy she is increased risk for infection with capsulated organisms  Will obtain chest x-ray to evaluate for pneumonia  Will obtain CBC with differential and CMP  Will prescribe albuterol inhaler to help with symptoms  Advised using Tylenol for pain instead of ibuprofen given anticoagulation  Will give course of levofloxacin  -     XR CHEST STANDARD (2 VW); Future  -     CBC with Auto Differential; Future  -     Comprehensive Metabolic Panel; Future  2. Impingement syndrome of left shoulder  Uncontrolled. Currently being managed by orthopedics  Has been referred to physical therapy. Okay for her to attend when she starts feeling better from her infection  Patient requesting refill on lidocaine patches. -     lidocaine 4 % external patch; Place 1 patch onto the skin daily, TransDERmal, DAILY Starting Tue 1/17/2023, Until Thu 2/16/2023, For 30 days, Disp-30 patch, R-0, Normal     3. Paroxysmal atrial fibrillation  Acute on chronic  Patient is hemodynamically stable and rate controlled at this point  Continue management with cardiology  On flecainide and diltiazem  Continue anticoagulation with Xarelto        Return in about 3 weeks (around 2/7/2023). An electronic signature was used to authenticate this note.     --Margie Martinez,

## 2023-01-23 ENCOUNTER — HOSPITAL ENCOUNTER (OUTPATIENT)
Dept: PHYSICAL THERAPY | Age: 48
Setting detail: THERAPIES SERIES
Discharge: HOME | End: 2023-01-23
Payer: COMMERCIAL

## 2023-01-23 PROCEDURE — 97161 PT EVAL LOW COMPLEX 20 MIN: CPT

## 2023-01-23 PROCEDURE — 97110 THERAPEUTIC EXERCISES: CPT

## 2023-01-23 ASSESSMENT — PAIN DESCRIPTION - PAIN TYPE: TYPE: ACUTE PAIN

## 2023-01-23 ASSESSMENT — PAIN - FUNCTIONAL ASSESSMENT: PAIN_FUNCTIONAL_ASSESSMENT: PREVENTS OR INTERFERES WITH MANY ACTIVE NOT PASSIVE ACTIVITIES

## 2023-01-23 ASSESSMENT — PAIN DESCRIPTION - LOCATION: LOCATION: SHOULDER

## 2023-01-23 ASSESSMENT — PAIN SCALES - GENERAL: PAINLEVEL_OUTOF10: 1

## 2023-01-23 ASSESSMENT — PAIN DESCRIPTION - FREQUENCY: FREQUENCY: INTERMITTENT

## 2023-01-23 ASSESSMENT — PAIN DESCRIPTION - ORIENTATION: ORIENTATION: LEFT;ANTERIOR;OUTER

## 2023-01-23 ASSESSMENT — PAIN DESCRIPTION - DESCRIPTORS: DESCRIPTORS: STABBING

## 2023-01-23 NOTE — PROGRESS NOTES
[facility]) Physical Therapy-  [facility]   PHYSICAL THERAPY EVALUATION      Physical Therapy: Initial Evaluation    Patient: Leigh Sims (89 y.o.     female)   Examination Date:   Plan of Care Certification Period: 2023 to    Progress Note Counter:    :  1975 ;    Confirmed: Yes MRN: [id number]  CSN: [id number]   Insurance: Payor: [employer] / Plan: [employer] [address] / Product Type: *No Product type* /   Insurance ID: [id number] - ([employer]) Secondary Insurance (if applicable):    Referring Physician: Fide Wade MD     PCP: Clovis Petersen MD Visits to Date/Visits Approved:     No Show/Cancelled Appts: 0      Medical Diagnosis: Other specified joint disorders, unspecified shoulder [M25.819]    Treatment Diagnosis: shoulder pain, Lt UE weakness     PERTINENT MEDICAL HISTORY           Medical History: Chart Reviewed: Yes   Past Medical History:   Diagnosis Date    Abnormal Pap smear of cervix     Allergic rhinitis     Arthritis     hands    Asplenia     Atrial fibrillation (HCC)     Atrial fibrillation with RVR ([facility] [address]     Dr. 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 Marlena Jackson     Surgical History:   Past Surgical History:   Procedure Laterality Date    CARPAL TUNNEL RELEASE Right 2016    DILATION AND CURETTAGE OF UTERUS N/A 2019    HYSTEROSCOPY NOVASURE ABLATION D&C performed by Heidi Arora DO at [address] ([address] [place] [address])      HYSTERECTOMY, VAGINAL N/A 8/2/2021    LAPAROSCOPIC ASSISTED VAGINAL HYSTERECTOMY BILATERAL SALPINGECTOMY performed by Yang Chamberlain DO at [facility] N/A 2004    done for ITP    THYROIDECTOMY, COMPLETION Left 2014    frozen section inconclusive    THYROIDECTOMY, PARTIAL Right 2011    Benign nodule    TONSILLECTOMY N/A 11/2/2021    TONSILLECTOMY performed by Belinda Whitlock MD at [facility]       Medications:   Current Outpatient Medications:     levoFLOXacin (LEVAQUIN) 500 MG tablet, Take 1 tablet by mouth daily for 7 days, Disp: 7 tablet, Rfl: 0    albuterol sulfate HFA (VENTOLIN HFA) 108 (90 Base) MCG/ACT inhaler, Inhale 2 puffs into the lungs 4 times daily as needed for Wheezing, Disp: 18 g, Rfl: 0    lidocaine 4 % external patch, Place 1 patch onto the skin daily, Disp: 30 patch, Rfl: 0    levothyroxine (SYNTHROID) 100 MCG tablet, TAKE ONE TABLET ONE TIME DAILY, Disp: 90 tablet, Rfl: 3    flecainide (TAMBOCOR) 100 MG tablet, Take 1 tablet by mouth as needed (PRN afib.) (Patient not taking: Reported on 1/17/2023), Disp: 10 tablet, Rfl: 0    progesterone (PROMETRIUM) 200 MG CAPS capsule, Take 1 capsule by mouth nightly, Disp: 30 capsule, Rfl: 3    omeprazole (PRILOSEC) 40 MG delayed release capsule, Take 1 capsule by mouth every morning (before breakfast), Disp: 30 capsule, Rfl: 0    ondansetron (ZOFRAN) 8 MG tablet, Take 1 tablet by mouth every 12 hours as needed for Nausea or Vomiting, Disp: 30 tablet, Rfl: 02    dilTIAZem (CARDIZEM CD) 180 MG extended release capsule, Take 1 capsule by mouth daily, Disp: 90 capsule, Rfl: 3    rivaroxaban (XARELTO) 20 MG TABS tablet, TAKE 1 TABLET BY MOUTH ONE TIME A DAY, Disp: 90 tablet, Rfl: 3    rivaroxaban (XARELTO) 20 MG TABS tablet, TAKE 1 TABLET BY MOUTH ONE TIME A DAY, Disp: 90 tablet, Rfl: 3    dilTIAZem (CARDIZEM CD) 180 MG extended release capsule, TAKE 1 CAPSULE BY MOUTH ONE TIME A DAY, Disp: 90 capsule, Rfl: 3    hydrocortisone (ANUSOL-HC) 25 MG suppository, Place 1 suppository rectally 2 times daily, Disp: 60 suppository, Rfl: 0    butalbital-acetaminophen-caffeine (FIORICET, ESGIC) -40 MG per tablet, Take 1 tablet by mouth every 4 hours as needed for Headaches, Disp: 12 tablet, Rfl: 0    diclofenac sodium (VOLTAREN) 1 % GEL, Apply 2 g topically 4 times daily as needed for Pain, Disp: 2 g, Rfl: 0    SUMAtriptan (IMITREX) 100 MG tablet, Take 1 tablet by mouth daily as needed for Migraine, Disp: 9 tablet, Rfl: 5    busPIRone (BUSPAR) 7.5 MG tablet, TAKE ONE TABLET ONE TIME DAILY AS NEEDED (GENERIC FOR BUSPAR), Disp: 90 tablet, Rfl: 3    traMADol (ULTRAM) 50 MG tablet, Take by mouth as needed. , Disp: , Rfl:     Cholecalciferol (VITAMIN D3) 50 MCG (2000 UT) CAPS, Take by mouth daily, Disp: , Rfl:     tiZANidine (ZANAFLEX) 4 MG tablet, Take 1 tablet by mouth every 8 hours as needed (spasm), Disp: 30 tablet, Rfl: 5    Bacillus Coagulans-Inulin (PROBIOTIC FORMULA PO), Take 1 capsule by mouth daily, Disp: , Rfl:     Folic Acid (FOLATE PO), Take 1 tablet by mouth daily, Disp: , Rfl:     Multiple Vitamin (MULTIVITAMIN PO), Take 1 tablet by mouth daily, Disp: , Rfl:     acetaminophen (APAP EXTRA STRENGTH) 500 MG tablet, Take 2 tablets by mouth every 6 hours as needed for Pain, Disp: 120 tablet, Rfl: 0    sodium chloride (ALTAMIST SPRAY) 0.65 % nasal spray, 1 spray by Nasal route as needed for Congestion, Disp: 1 Bottle, Rfl: 3    Handicap Placard MISC, by Does not apply route Exp 5 years, Disp: 1 each, Rfl: 0    magnesium (MAGNESIUM-OXIDE) 250 MG TABS tablet, Take 250 mg by mouth daily , Disp: , Rfl:     vitamin B-12 (CYANOCOBALAMIN) 500 MCG tablet, Take 500 mcg by mouth daily, Disp: , Rfl:     zinc 50 MG CAPS, Take by mouth daily , Disp: , Rfl:     Echinacea 400 MG CAPS, Take by mouth daily , Disp: , Rfl:     Calcium Carbonate-Vit D-Min (CALCIUM 1200) 2571-1500 MG-UNIT CHEW, Take by mouth daily , Disp: , Rfl:     ferrous sulfate 325 (65 FE) MG tablet, Take 65 mg by mouth daily (with breakfast) , Disp: , Rfl:     fluticasone (FLONASE) 50 MCG/ACT nasal spray, 1 spray by Nasal route daily. , Disp: 1 Bottle, Rfl: 06    meclizine (ANTIVERT) 12.5 MG tablet, Take 12.5 mg by mouth 3 times daily as needed. , Disp: , Rfl:   Allergies: Penicillins, Codeine, Percocet [oxycodone-acetaminophen], Bee venom, Seasonal, Tape [adhesive tape], Hydrocodone, and Protonix [pantoprazole sodium]      SUBJECTIVE EXAMINATION     History obtained from[de-identified] Chart Review, Patient,      Family/Caregiver Present: No    Subjective History: Onset Date: 12/24/22  Subjective: Pt reports initial onset of Lt shoulder pain 6-7 years ago after fall. No fracture at that time, but strained lt shoulder. Re-injured 12/24/22 after shoveling snow. Pt reports increased pain with reaching overhead, horizontal abduction, extension with IR. Pt reports some relief with ice and tramadol, lidocaine patches. Pt states some discomfort with bowling, despite using Rt UE to bowl. Saw orthopedist and diagnosed with shoulder impingement. Pt also c/o some weakness in Lt UE. Denies numbness and tingling. Pt denies radicular pain, mostly localized anterior and lateral shoulder. Pt has cardiac problems and was cleared by cardio at ER. Pt states she has high pain threshold. Plans to get injection in Lt shoulder to improve tolerance. Pt also recently had pneumonia. Wears sling at times for comfort. Additional Pertinent Hx (if applicable):  Afib, HTN, CTS, OA   Prior diagnostic testing[de-identified] X-ray      Learning/Language: Learning  Does the patient/guardian have any barriers to learning?: No barriers  Will there be a co-learner?: No  What is the preferred language of the patient/guardian?: English  Is an  required?: No  How does the patient/guardian prefer to learn new concepts?: Listening, Reading, Demonstration     Pain Screening    Pain Screening  Patient Currently in Pain: Yes  Pain Assessment: 0-10  Pain Level: 1  Best Pain Level: 0  Worst Pain Level: 10  Pain Type: Acute pain  Pain Location: Shoulder  Pain Orientation: Left, Anterior, Outer  Pain Descriptors: Stabbing  Pain Frequency: Intermittent  Functional Pain Assessment: Prevents or interferes with many active not passive activities  Aggravating factors: Lifting, Reaching (difficulty finding a comfortable sleeping position)  Pain Management/Relieving Factors: Ice    Functional Status    Social History:    Social History  Lives With: Parent  Type of Home: House  Home Layout: One level    Occupation/Interests:   Occupation: Full time employment  Type of Occupation: Medical Records  Job Duties: Sedentary, Prolonged sitting, Repetitive or prolonged grasping    Prior Level of Function:     Independent        Current Level of Function:          ADL Assistance: Independent  Homemaking Assistance:  (some difficulty with lifting laundry baskets)  Homemaking Responsibilities: Yes  Ambulation Assistance: Independent  Transfer Assistance: Independent  Active : Yes  Mode of Transportation: Car    Dominant Hand: : Right (writes Lt handed)    OBJECTIVE EXAMINATION     Restrictions:          NA    Review of Systems:  Vision: Impaired  Visual Deficits: Wears glasses  Hearing: Within functional limits  Overall Orientation Status: Within Functional Limits  Patient affect[de-identified] Normal  Follows Commands: Within Functional Limits    Observations:   General Observations  Description: moderate exaggerated spinal curvatures with thoracic hyperkyphosis and increased lumbar lordosis, no sig lateral asymmetry, noted moderate rounded shoulders, fwd head, scapular protraction    Palpation:   Left Shoulder Palpation: Patient reports tenderness with palpation Lt anterior shoulder, AC joint, proximal biceps, supraspinatus; no pain infraspinatus, subscap, delt, triceps; no sig pain periscapular mm, UT    Left AROM  Right AROM         AROM LUE (degrees)  L Shoulder Flexion (0-180): 160  L Shoulder ABduction (0-180): 180 supine  L Shoulder Int Rotation  (0-70): 70 supine, reach T11  L Shoulder Ext Rotation  (0-90): 90 supine, reach T3    AROM RUE (degrees)  R Shoulder Int Rotation  (0-70): 70 supine, T10  R Shoulder Ext Rotation (0-90): 90 supine, reach T5        Left Strength  Right Strength         Strength LUE  L Shoulder Flexion: 4-/5  L Shoulder Extension: 4-/5  L Shoulder ABduction: 4+/5  L Shoulder Internal Rotation: 4+/5  L Shoulder External Rotation: 4-/5  L Elbow Flexion: 5/5  L Elbow Extension: 5/5  L Wrist Flexion: 4+/5  L Wrist Extension: 4+/5  L Middle Trapezius: 4-/5  L Lower Trapezius: 3+/5    Strength RUE  R Shoulder Flexion: 4/5  R Shoulder Extension: 4/5  R Shoulder ABduction: 4+/5  R Shoulder Internal Rotation: 4+/5  R Shoulder External Rotation: 4+/5  R Elbow Flexion: 5/5  R Elbow Extension: 5/5  R Wrist Flexion: 4+/5  R Wrist Extension: 4+/5  R Rhomboids: 4/5  R Lower Trapezius: 4/5     Cervical Assessment        Cervical: flex 57, ext 42, 48 bilateral        Special Tests:   Impingement Tests  Neer Test: L (-), R (-)  Hawkins Darryl Test: L (+), R (-)  Lift-Off Test: L (-), R (-)  Drop Arm: L (-), R (-)  Empty Can: L (+), R (-)    Outcomes Score:  Exam: musculoskeletal, pain and sensory systems involved impacting strength, ADLs, sleep; UEFI: 63/80     Treatment:    Exercises:   Exercises  Exercise 1: posture exs x10  Exercise 2: seated cane flex  Exercise 3: * posture Tband  Exercise 4: * UBE  Exercise 5: * IR towel stretch  Exercise 6: * shoulder isometrics  Exercise 7: * add shoulder Tband  Exercise 8: * prone scap  Exercise 9: * supine shoulder ABC  Exercise 20: HEP: posture exs, seated shoulder flex  Treatment Reasoning  Limitations addressed:  Mobility, Strength, Flexibility, Activity tolerance, Posture, Pain modulation  Modalities:  Modalities:  (* estim Lt shoulder PRN)     Manual:  Manual Therapy  Joint Mobilization: * Gr I, II post/ inferior glides  Manual Traction: * shoulder distraction  Soft Tissue Mobilizaton: * anterior shoulder, UT  Other: * consider KT mechanical correction Lt shoulder for improved posture, \"I\" inhibition Lt UT  Treatment Reasoning  Limitations addressed: Joint motion, Tissue extensibility, Painful spasm  *Indicates exercise,modality, or manual techniques to be initiated when appropriate       ASSESSMENT     Impression: Assessment: Pt presents with increased Lt shoulder pain with overhead reaching, reaching out of base of support and increased activity. Pt with complex medical history. Reports cleared by cardiac for Lt UE symptoms. (+) Zavala test, relief with disraction and posterior glide. Pt with nearly full ROM except flexion. Noted excess soft tissue anterior left shoulder with palpation compared to right. Movable and not painful. Visible with upright posture. Will monitor. Pt would benefit from skilled PT to address shoulder deficits and return to previous activity without pain. Body Structures, Functions, Activity Limitations Requiring Skilled Therapeutic Intervention: Decreased ROM, Decreased ADL status, Decreased strength, Increased pain, Decreased posture    Statement of Medical Necessity: Physical Therapy is both indicated and medically necessary as outlined in the POC to increase the likelihood of meeting the functionally related goals stated below. Patient's Activity Tolerance: Patient tolerated evaluation without incident      Patient's rehabilitation potential/prognosis is considered to be:       Factors which may impact rehabilitation potential include: None     Patient Education: Goals, PT Role, Plan of Care, Evaluative findings, Home Exercise Program, Disease Specific Education, Anatomy of condition      GOALS   Patient Goal(s): Patient Goals : learn the right exercises and things to do to help my shoulder heal and be stronger    Short Term Goals Completed by 2 wks Goal Status   Independent with HEP to promote home management of symptoms New   Report </= 5/10 worst pain with reaching and daily activities New     Long Term Goals Completed by 6 wks Goal Status   Improve Lt UE strength = to right to return to bowling without modifications or increased pain New   Improve Lt shoulder flexion = Rt to improve ability to reach overhead during dressing and ADLs New   UEFI >/= 72/80 to demonstrate improved overall activity tolerance          TREATMENT PLAN       Requires PT Follow-Up: Yes    Treatment may include any combination of the following: Strengthening, ADL/Self-care training, Manual, Home exercise program, Safety education & training, Patient/Caregiver education & training, Modalities, Dry needling     Frequency / Duration:  Patient to be seen 1-2 times per week for 5-6 weeks  Plan Comment:               Eval Complexity:   Decision Making: Low Complexity  History: Personal Factors and/or Comorbidities Impacting POC: Low  History: personal factors: job duties; contributing PMH: Afib, HTN, CTS, OA  Examination of body system(s) including body structures and functions, activity limitations, and/or participation restrictions: Low  Exam: musculoskeletal, pain and sensory systems involved impacting strength, ADLs, sleep; UEFI: 63/80  Clinical Presentation: Low    POST-PAIN     Pain Rating (0-10 pain scale):   1/10  Location and pain description same as pre-treatment unless indicated. Action: [x] NA  [] Call Physician  [] Perform HEP  [] Meds as prescribed    Evaluation and patient rights have been reviewed and patient agrees with plan of care.   Yes  [x]  No  []   Explain:     Jonatan Fall Risk Assessment  Risk Factor Scale  Score   History of Falls [] Yes  [x] No 25  0 0   Secondary Diagnosis [] Yes  [x] No 15  0 0   Ambulatory Aid [] Furniture  [] Crutches/cane/walker  [x] None/bedrest/wheelchair/nurse 30  15  0 0   IV/Heparin Lock [] Yes  [x] No 20  0 0   Gait/Transferring [] Impaired  [x] Weak  [] Normal/bedrest/immobile 20  10  0 0   Mental Status [] Forgets limitations  [x] Oriented to own ability 15  0 0      Total:0     Based on the Assessment score: check the appropriate box.   [x]  No intervention needed   Low =   Score of 0-24  []  Use standard prevention interventions Moderate =  Score of 24-44   [] Discuss fall prevention strategies   [] Indicate moderate falls risk on eval  []  Use high risk prevention interventions High = Score of 45 and higher   [] Discuss fall prevention strategies   [] Provide supervision during treatment time      Minutes:  PT Individual Minutes  Time In: 1532  Time Out: 1630  Minutes: 58  Timed Code Treatment Minutes: 9 Minutes  Procedure Minutes: 49 eval      Timed Activity Minutes Units   Ther Ex 9 1     Electronically signed by Narendra Dubose PT on 1/23/23 at 5:03 PM EST

## 2023-02-02 ENCOUNTER — HOSPITAL ENCOUNTER (OUTPATIENT)
Dept: PHYSICAL THERAPY | Age: 48
Setting detail: THERAPIES SERIES
Discharge: HOME | End: 2023-02-02
Payer: COMMERCIAL

## 2023-02-02 PROCEDURE — 97140 MANUAL THERAPY 1/> REGIONS: CPT

## 2023-02-02 PROCEDURE — 97110 THERAPEUTIC EXERCISES: CPT

## 2023-02-02 NOTE — PROGRESS NOTES
[address] [facility]  Outpatient Physical Therapy    Treatment Note        Date: 2023  Patient: Dann Chen  : 1975   Confirmed: Yes  MRN: [id number]  Referring Provider: Radha Real MD   Secondary Referring Provider (If applicable):     Medical Diagnosis: Other specified joint disorders, unspecified shoulder [M25.819]    Treatment Diagnosis: shoulder pain, Lt UE weakness    Visit Information:  Insurance: Payor: Rebeca Rivera / Plan: [employer] [facility] [address] / Product Type: *No Product type* /   PT Visit Information  Onset Date: 22  PT Insurance Information: Iqra Mullins  Total # of Visits Approved: 30  Total # of Visits to Date: 2  No Show: 0  Canceled Appointment: 0  Progress Note Counter:     Subjective Information:  Subjective: Patient reports receiving Steroid and Litocaine injection in Lt shoulder on Monday, feels it has helped. Improved ROM, was able to bowl yesterday without difficulty. HEP Compliance:  [x] Good [] Fair [] Poor [] Reports not doing due to:    Pain Screening  Patient Currently in Pain: No    Treatment:  Exercises:  Exercises  Exercise 1: posture exs x10  Exercise 2: Seated cane ex's 5'' x10 ea  Exercise 4: UBE level 1, 2.5 forward/2. 5retro  Exercise 5: IR towel stretch 10sec x5  Exercise 9: Supine shoulder ABC x1  Exercise 10: UT str 3/30sec Bruce  Exercise 11: Serratus punches x10  Exercise 20: HEP: UT str, IR str with towel       Manual:   Manual Therapy  Manual Traction: Gentle shoulder distraction  Soft Tissue Mobilizaton: STM UT 8' total  Treatment Reasoning  Limitations addressed: Joint motion, Tissue extensibility, Painful spasm        *Indicates exercise, modality, or manual techniques to be initiated when appropriate            Assessment:    Body Structures, Functions, Activity Limitations Requiring Skilled Therapeutic Intervention: Decreased ROM, Decreased ADL status, Decreased strength, Increased pain, Decreased posture  Assessment: Initiated UT stretching to improve flexibility and decrease tightness. Able to progress wand exercises to standing with Abd and ER with good tolerance. Patient with improved IR ROM by observation after completing IR stretching. VCs to improve posture throughout session, observed with rounded shoulders throughout. Treatment Diagnosis: shoulder pain, Lt UE weakness             Post-Pain Assessment:       Pain Rating (0-10 pain scale):   0/10   Location and pain description same as pre-treatment unless indicated. Action: [] NA   [] Perform HEP  [] Meds as prescribed  [] Modalities as prescribed   [] Call Physician     GOALS   Patient Goal(s): Patient Goals : learn the right exercises and things to do to help my shoulder heal and be stronger    Short Term Goals Completed by 2 wks Goal Status   STG 1 Independent with HEP to promote home management of symptoms In progress   STG 2 Report </= 5/10 worst pain with reaching and daily activities In progress     Long Term Goals Completed by 6 wks Goal Status   LTG 1 Improve Lt UE strength = to right to return to bowling without modifications or increased pain In progress   LTG 2 Improve Lt shoulder flexion = Rt to improve ability to reach overhead during dressing and ADLs In progress   LTG 3 UEFI >/= 72/80 to demonstrate improved overall activity tolerance         Plan:  Frequency/Duration:  Plan  Plan Frequency: 1-2  Plan weeks: 5-6  Current Treatment Recommendations: Strengthening, ADL/Self-care training, Manual, Home exercise program, Safety education & training, Patient/Caregiver education & training, Modalities, Dry needling  Modalities: Heat/Cold, E-stim - unattended, Ultrasound  Pt to continue current HEP. See objective section for any therapeutic exercise changes, additions or modifications this date.     Therapy Time:      PT Individual Minutes  Time In: 1528  Time Out: 1611  Minutes: 43  Timed Code Treatment Minutes: 43 Minutes  Procedure Minutes:0  Timed Activity Minutes Units   Ther Ex 35 2   Manual  8 1     Electronically signed by Jayshree Mortensen PTA on 2/2/23 at 12:47 PM EST

## 2023-02-09 ENCOUNTER — HOSPITAL ENCOUNTER (OUTPATIENT)
Dept: PHYSICAL THERAPY | Age: 48
Setting detail: THERAPIES SERIES
Discharge: HOME | End: 2023-02-09
Payer: COMMERCIAL

## 2023-02-09 PROCEDURE — 97110 THERAPEUTIC EXERCISES: CPT

## 2023-02-09 NOTE — PROGRESS NOTES
[address] [facility]  Outpatient Physical Therapy    Treatment Note        Date: 2023  Patient: Holly Brice  : 1975   Confirmed: Yes  MRN: [id number]  Referring Provider: Brii Ferraro MD   Secondary Referring Provider (If applicable):     Medical Diagnosis: Other specified joint disorders, unspecified shoulder [M25.819]    Treatment Diagnosis: shoulder pain, Lt UE weakness    Visit Information:  Insurance: Payor: Luis Fernando Madsen / Plan: [employer] [address] / Product Type: *No Product type* /   PT Visit Information  Onset Date: 22  PT Insurance Information: [employer]  Total # of Visits Approved: 30  Total # of Visits to Date: 3  No Show: 0  Canceled Appointment: 0  Progress Note Counter: 3/12    Subjective Information:  Subjective: after steroid shot- only pain with moving \"weirdly\"; \"I bowled last night and did well\"  HEP Compliance:  [x] Good [] Fair [] Poor [] Reports not doing due to:    Pain Screening  Patient Currently in Pain: Denies    Treatment:  Exercises:  Exercises  Exercise 1: posture exs x10  Exercise 2: cane ex's 5'' x10 ea- flexion, ER, abd, ext, IR 2-way  Exercise 4: UBE level 1, 2.5 forward/2. 5retro  Exercise 5: IR towel stretch 10sec x5  Exercise 7: shoulder flex, abd RTB, D2 flex/ext X 10 ea- soreness with abd  Exercise 8: prone extension/ T 2-way  Exercise 9: Supine shoulder ABC x1  Exercise 10: UT/LS str 3/30sec Bruce  Exercise 11: Serratus punches x10  Exercise 12: scapular retraction X 10  Exercise 13: row/lats RTB x 10 ea  Exercise 20: HEP: continue with current  *Indicates exercise, modality, or manual techniques to be initiated when appropriate    Objective Measures:   Strength: [x] NT  [] MMT completed:    ROM: [x] NT  [] ROM measurements:    Assessment:    Body Structures, Functions, Activity Limitations Requiring Skilled Therapeutic Intervention: Decreased ROM, Decreased ADL status, Decreased strength, Increased pain, Decreased posture  Assessment: Pt to session without pain. ABle to safely progress strength with TB this date without pain. Continued PT required to progress strength and posture for return to PLOF without pain with all ADLs, IADLs and work tasks. Treatment Diagnosis: shoulder pain, Lt UE weakness  Therapy Prognosis: Good     Post-Pain Assessment:       Pain Rating (0-10 pain scale):  \"soreness\"  Location and pain description same as pre-treatment unless indicated. Action: [x] NA   [] Perform HEP  [] Meds as prescribed  [] Modalities as prescribed   [] Call Physician     GOALS   Patient Goal(s): Patient Goals : learn the right exercises and things to do to help my shoulder heal and be stronger    Short Term Goals Completed by 2 wks Goal Status   STG 1 Independent with HEP to promote home management of symptoms In progress   STG 2 Report </= 5/10 worst pain with reaching and daily activities In progress     Long Term Goals Completed by 6 wks Goal Status   LTG 1 Improve Lt UE strength = to right to return to bowling without modifications or increased pain In progress   LTG 2 Improve Lt shoulder flexion = Rt to improve ability to reach overhead during dressing and ADLs In progress   LTG 3 UEFI >/= 72/80 to demonstrate improved overall activity tolerance       Plan:  Frequency/Duration:  Plan  Plan Frequency: 1-2  Plan weeks: 5-6  Current Treatment Recommendations: Strengthening, ADL/Self-care training, Manual, Home exercise program, Safety education & training, Patient/Caregiver education & training, Modalities, Dry needling  Modalities: Heat/Cold, E-stim - unattended, Ultrasound  Pt to continue current HEP. See objective section for any therapeutic exercise changes, additions or modifications this date.     Therapy Time:   PT Individual Minutes  Time In: 3551  Time Out: 1640  Minutes: 38  Timed Code Treatment Minutes: 38 Minutes  Procedure Minutes: 0 min    Timed Activity Minutes Units   Ther Ex 38 3 Electronically signed by Moisés Bautista PT on 2/9/23 at 4:50 PM EST

## 2023-02-14 ENCOUNTER — HOSPITAL ENCOUNTER (OUTPATIENT)
Dept: PHYSICAL THERAPY | Age: 48
Setting detail: THERAPIES SERIES
Discharge: HOME | End: 2023-02-14
Payer: COMMERCIAL

## 2023-02-14 PROCEDURE — 97110 THERAPEUTIC EXERCISES: CPT

## 2023-02-14 ASSESSMENT — PAIN DESCRIPTION - LOCATION: LOCATION: NECK

## 2023-02-14 ASSESSMENT — PAIN DESCRIPTION - ORIENTATION: ORIENTATION: LEFT;DISTAL

## 2023-02-14 NOTE — PROGRESS NOTES
[address] [facility]  Outpatient Physical Therapy    Treatment Note        Date: 2023  Patient: Leo Bolanos  : 1975   Confirmed: Yes  MRN: [id number]  Referring Provider: Nori Russell MD   Secondary Referring Provider (If applicable):     Medical Diagnosis: Other specified joint disorders, unspecified shoulder [M25.819]    Treatment Diagnosis: shoulder pain, Lt UE weakness    Visit Information:  Insurance: Payor: Kimberley Weiss / Plan: [employer] [facility] [address] / Product Type: *No Product type* /   PT Visit Information  Onset Date: 22  PT Insurance Information: Eleanora Bathe  Total # of Visits to Date: 4  No Show: 0  Canceled Appointment: 0  Progress Note Counter:     Subjective Information:  Subjective: Pt reports pulling muscle in Lt UT with unknown cause. HEP Compliance:  [x] Good [] Fair [] Poor [] Reports not doing due to:    Pain Screening  Patient Currently in Pain: Yes  Pain Assessment: 0-10  Pain Location: Neck  Pain Orientation: Left, Distal    Treatment:  Exercises:  Exercises  Exercise 1: posture exs x10  Exercise 2: cane ex's 5'' x10 ea- flexion, ER, abd, ext, IR 2-way  Exercise 4: UBE level 1, 2.5 forward/2. 5retro  Exercise 5: IR towel stretch 10sec x5  Exercise 7: shoulder flex, abd RTB, D2 flex/ext X 10  Exercise 8: prone extension/ T 2-way x10  Exercise 10: UT/LS str 3/30sec Bruce  Exercise 11: Serratus punches x10  Exercise 12: scapular retraction X 10  Exercise 13: row/lats RTB x 10 ea  Exercise 20: HEP: continue with current       Manual:   Manual Therapy  Soft Tissue Mobilizaton: STM UT  Treatment Reasoning  Limitations addressed: Joint motion, Tissue extensibility, Painful spasm      Assessment:    Body Structures, Functions, Activity Limitations Requiring Skilled Therapeutic Intervention: Decreased ROM, Decreased ADL status, Decreased strength, Increased pain, Decreased posture  Assessment: Continued with current exercise program to increase strength. HEP updated to promote home mangement of symptoms and improve strength. Cues for posture and technique with exercises to avoid substitution at times. c/o discomfort with D2 flexion with TB, resistance modified to reduce discomfort. Treatment concluded with STM to Lt UT to decrease c/o muscle soreness. Treatment Diagnosis: shoulder pain, Lt UE weakness  Therapy Prognosis: Good          Post-Pain Assessment:       Pain Rating (0-10 pain scale):   0/10   Location and pain description same as pre-treatment unless indicated. Action: [] NA   [x] Perform HEP  [] Meds as prescribed  [] Modalities as prescribed   [] Call Physician     GOALS   Patient Goal(s): Patient Goals : learn the right exercises and things to do to help my shoulder heal and be stronger    Short Term Goals Completed by 2 wks Goal Status   STG 1 Independent with HEP to promote home management of symptoms In progress   STG 2 Report </= 5/10 worst pain with reaching and daily activities In progress       Long Term Goals Completed by 6 wks Goal Status   LTG 1 Improve Lt UE strength = to right to return to bowling without modifications or increased pain In progress   LTG 2 Improve Lt shoulder flexion = Rt to improve ability to reach overhead during dressing and ADLs In progress   LTG 3 UEFI >/= 72/80 to demonstrate improved overall activity tolerance              Plan:  Frequency/Duration:  Plan  Plan Frequency: 1-2  Plan weeks: 5-6  Current Treatment Recommendations: Strengthening, ADL/Self-care training, Manual, Home exercise program, Safety education & training, Patient/Caregiver education & training, Modalities, Dry needling  Modalities: Heat/Cold, E-stim - unattended, Ultrasound  Pt to continue current HEP. See objective section for any therapeutic exercise changes, additions or modifications this date.     Therapy Time:      PT Individual Minutes  Time In: 1600  Time Out: 1559  Minutes: 47  Timed Code Treatment Minutes: 47 Minutes  Procedure Minutes:0  Timed Activity Minutes Units   Ther Ex 42 3   Manual  5 0     Electronically signed by Howard Maher PTA on 2/14/23 at 5:00 PM EST

## 2023-02-15 SDOH — ECONOMIC STABILITY: HOUSING INSECURITY
IN THE LAST 12 MONTHS, WAS THERE A TIME WHEN YOU DID NOT HAVE A STEADY PLACE TO SLEEP OR SLEPT IN A SHELTER (INCLUDING NOW)?: NO

## 2023-02-15 SDOH — ECONOMIC STABILITY: FOOD INSECURITY: WITHIN THE PAST 12 MONTHS, THE FOOD YOU BOUGHT JUST DIDN'T LAST AND YOU DIDN'T HAVE MONEY TO GET MORE.: NEVER TRUE

## 2023-02-15 SDOH — ECONOMIC STABILITY: FOOD INSECURITY: WITHIN THE PAST 12 MONTHS, YOU WORRIED THAT YOUR FOOD WOULD RUN OUT BEFORE YOU GOT MONEY TO BUY MORE.: NEVER TRUE

## 2023-02-15 SDOH — ECONOMIC STABILITY: INCOME INSECURITY: HOW HARD IS IT FOR YOU TO PAY FOR THE VERY BASICS LIKE FOOD, HOUSING, MEDICAL CARE, AND HEATING?: NOT HARD AT ALL

## 2023-02-16 ENCOUNTER — OFFICE VISIT (OUTPATIENT)
Dept: FAMILY MEDICINE CLINIC | Age: 48
End: 2023-02-16
Payer: COMMERCIAL

## 2023-02-16 VITALS
BODY MASS INDEX: 31.67 KG/M2 | HEIGHT: 68 IN | DIASTOLIC BLOOD PRESSURE: 84 MMHG | SYSTOLIC BLOOD PRESSURE: 132 MMHG | HEART RATE: 73 BPM | WEIGHT: 209 LBS | TEMPERATURE: 98.3 F | OXYGEN SATURATION: 99 %

## 2023-02-16 DIAGNOSIS — K21.9 GASTROESOPHAGEAL REFLUX DISEASE WITHOUT ESOPHAGITIS: ICD-10-CM

## 2023-02-16 DIAGNOSIS — E03.9 HYPOTHYROIDISM, UNSPECIFIED TYPE: Primary | ICD-10-CM

## 2023-02-16 DIAGNOSIS — I48.0 PAROXYSMAL ATRIAL FIBRILLATION (HCC): ICD-10-CM

## 2023-02-16 DIAGNOSIS — Q89.01 ASPLENIA: ICD-10-CM

## 2023-02-16 DIAGNOSIS — Z12.11 SCREEN FOR COLON CANCER: ICD-10-CM

## 2023-02-16 DIAGNOSIS — I48.91 ATRIAL FIBRILLATION WITH RVR (HCC): ICD-10-CM

## 2023-02-16 PROCEDURE — 99214 OFFICE O/P EST MOD 30 MIN: CPT | Performed by: FAMILY MEDICINE

## 2023-02-16 PROCEDURE — 3075F SYST BP GE 130 - 139MM HG: CPT | Performed by: FAMILY MEDICINE

## 2023-02-16 PROCEDURE — 3079F DIAST BP 80-89 MM HG: CPT | Performed by: FAMILY MEDICINE

## 2023-02-16 ASSESSMENT — ENCOUNTER SYMPTOMS
CONSTIPATION: 0
COLOR CHANGE: 0
NAUSEA: 0
BLOOD IN STOOL: 0
SHORTNESS OF BREATH: 0
ABDOMINAL PAIN: 0
CHEST TIGHTNESS: 0
COUGH: 0
ABDOMINAL DISTENTION: 0
DIARRHEA: 0
SORE THROAT: 0
VOMITING: 0

## 2023-02-16 NOTE — PROGRESS NOTES
Subjective  Artie Orellana, 50 y.o. female presents today with:  Chief Complaint   Patient presents with    Hypertension     6 month, a-fib has progressed now lasting more days  FMLA needs extended for longer period of time        Migraine   Pertinent negatives include no abdominal pain, coughing, dizziness, fever, hearing loss, nausea, neck pain, sore throat, vomiting or weakness. Hypertension  Pertinent negatives include no chest pain, headaches, neck pain, palpitations or shortness of breath. Other  Pertinent negatives include no abdominal pain, arthralgias, chest pain, chills, coughing, fatigue, fever, headaches, myalgias, nausea, neck pain, rash, sore throat, vomiting or weakness. Here today for 6 month follow-up on chronic problems including A. Fib, hypothyroidism, allergic rhinitis, migraines. Also c/o above. Hx of A. fib with rapid ventricular response. Her regular cardiologist is Dr. Wesley Overton. She sees Dr. Heidi Zhong for management of her thyroid. She also has a past medical history significant for migraine headaches and allergic rhinitis. Her ObGyn is Dr. Silvano Callahan. She is taking synthroid, OCP's, zyrtec and flonase. For her migraines she uses fioricet prn. We complete FMLA for her     She does monitor bp at home     PAF hx  Has episodes more frequently and will last longer   Flecanide periodically     Had left shoulder injury from shoveling snow   Saw ortho  PT  Cortisone shot with ortho     Wt Readings from Last 3 Encounters:   02/16/23 209 lb (94.8 kg)   01/17/23 203 lb (92.1 kg)   01/06/23 210 lb (95.3 kg)         Review of Systems   Constitutional:  Negative for chills, fatigue, fever and unexpected weight change. HENT:  Negative for hearing loss and sore throat. Eyes:  Negative for visual disturbance. Respiratory:  Negative for cough, chest tightness and shortness of breath. Cardiovascular:  Negative for chest pain, palpitations and leg swelling.    Gastrointestinal:  Negative for abdominal distention, abdominal pain, blood in stool, constipation, diarrhea, nausea and vomiting. Genitourinary:  Negative for dyspareunia, dysuria, frequency, menstrual problem, pelvic pain, vaginal bleeding and vaginal discharge. Musculoskeletal:  Negative for arthralgias, myalgias, neck pain and neck stiffness. Skin:  Negative for color change and rash. Neurological:  Negative for dizziness, syncope, weakness, light-headedness and headaches. Hematological:  Negative for adenopathy. Psychiatric/Behavioral:  Negative for dysphoric mood and sleep disturbance. The patient is not nervous/anxious.       Past Medical History:   Diagnosis Date    Abnormal Pap smear of cervix     Allergic rhinitis     Arthritis     hands    Asplenia     Atrial fibrillation (HCC)     Atrial fibrillation with RVR (HCC)     Dr. 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 Sania Gutierrez     Past Surgical History:   Procedure Laterality Date    CARPAL TUNNEL RELEASE Right 07/12/2016    DILATION AND CURETTAGE OF UTERUS N/A 12/16/2019    HYSTEROSCOPY [place] D&C performed by Erika Cosby DO at [address] [facility] ([facility])      HYSTERECTOMY, VAGINAL N/A 8/2/2021    LAPAROSCOPIC ASSISTED VAGINAL HYSTERECTOMY BILATERAL SALPINGECTOMY performed by Erika Cosby DO at [facility] 2004    done for ITP    THYROIDECTOMY, COMPLETION Left 2014    frozen section inconclusive    THYROIDECTOMY, PARTIAL Right 2011    Benign nodule TONSILLECTOMY N/A 11/2/2021    [facility]     Socioeconomic History    Marital status:      Spouse name: Not on file    Number of children: 0    Years of education: Not on file    Highest education level: Not on file   Occupational History    Occupation: medical records   Tobacco Use    Smoking status: Some Days     Packs/day: 0.25     Years: 32.00     Pack years: 8.00     Types: Cigarettes     Start date: 10/26/1989    Smokeless tobacco: Never    Tobacco comments:     social smoker   Vaping Use    Vaping Use: Never used   Substance and Sexual Activity    Alcohol use: Yes     Alcohol/week: 0.0 standard drinks     Comment: socially    Drug use: No    Sexual activity: Not on file   Other Topics Concern    Not on file   Social History Narrative    Not on file     Social Determinants of Health     Financial Resource Strain: Low Risk     Difficulty of Paying Living Expenses: Not hard at all   Food Insecurity: No Food Insecurity    Worried About Running Out of Food in the Last Year: Never true    [address] in the Last Year: Never true   Transportation Needs: No Transportation Needs    Lack of Transportation (Medical): No    Lack of Transportation (Non-Medical):  No   Physical Activity: Not on file   Stress: Not on file   Social Connections: Not on file   Intimate Partner Violence: Not on file   Housing Stability: Unknown    Unable to Pay for Housing in the Last Year: Not on file    Number of Places Lived in the Last Year: Not on file    Unstable Housing in the Last Year: No     Family History   Problem Relation Age of Onset    High Blood Pressure Mother     Anemia Mother     Atrial Fibrillation Mother     Heart Failure Mother     Other Mother         pacemaker    Heart Disease Mother     Sleep Apnea Mother     Vision Loss Mother         histoplasmosis    Heart Disease Father         CAD / Triple bypass    Diabetes Father         resolved with wt loss    Other Father         Fuck's dystropy / corneal transplants / AAA    Coronary Art Dis Father     Cancer Paternal Aunt     Breast Cancer Paternal Aunt     Cancer Paternal Grandmother     Breast Cancer Paternal Grandmother     Sleep Apnea Sister     Alcohol Abuse Brother     Other Sister         killed by drunk      Allergies   Allergen Reactions    Penicillins Anaphylaxis    Codeine Itching, Swelling and Rash     OK with Tylenol #3 per pt    Percocet [Oxycodone-Acetaminophen] Itching, Swelling and Rash    Bee Venom Itching and Swelling    Seasonal      Sinus sx    Tape [Adhesive Tape]      Skin irritation     Hydrocodone Rash    Protonix [Pantoprazole Sodium] Swelling and Rash     Caused thrush     Current Outpatient Medications   Medication Sig Dispense Refill    Cyclobenzaprine HCl (FLEXERIL PO) Take by mouth      albuterol sulfate HFA (VENTOLIN HFA) 108 (90 Base) MCG/ACT inhaler Inhale 2 puffs into the lungs 4 times daily as needed for Wheezing 18 g 0    lidocaine 4 % external patch Place 1 patch onto the skin daily 30 patch 0    levothyroxine (SYNTHROID) 100 MCG tablet TAKE ONE TABLET ONE TIME DAILY 90 tablet 3    flecainide (TAMBOCOR) 100 MG tablet Take 1 tablet by mouth as needed (PRN afib.) 10 tablet 0    progesterone (PROMETRIUM) 200 MG CAPS capsule Take 1 capsule by mouth nightly 30 capsule 3    omeprazole (PRILOSEC) 40 MG delayed release capsule Take 1 capsule by mouth every morning (before breakfast) 30 capsule 0    ondansetron (ZOFRAN) 8 MG tablet Take 1 tablet by mouth every 12 hours as needed for Nausea or Vomiting 30 tablet 02    rivaroxaban (XARELTO) 20 MG TABS tablet TAKE 1 TABLET BY MOUTH ONE TIME A DAY 90 tablet 3    dilTIAZem (CARDIZEM CD) 180 MG extended release capsule TAKE 1 CAPSULE BY MOUTH ONE TIME A DAY 90 capsule 3    butalbital-acetaminophen-caffeine (FIORICET, ESGIC) -40 MG per tablet Take 1 tablet by mouth every 4 hours as needed for Headaches 12 tablet 0    diclofenac sodium (VOLTAREN) 1 % GEL Apply 2 g topically 4 times daily as needed for Pain 2 g 0    busPIRone (BUSPAR) 7.5 MG tablet TAKE ONE TABLET ONE TIME DAILY AS NEEDED (GENERIC FOR BUSPAR) 90 tablet 3    traMADol (ULTRAM) 50 MG tablet Take by mouth as needed. Cholecalciferol (VITAMIN D3) 50 MCG (2000 UT) CAPS Take by mouth daily      Bacillus Coagulans-Inulin (PROBIOTIC FORMULA PO) Take 1 capsule by mouth daily      Folic Acid (FOLATE PO) Take 1 tablet by mouth daily      Multiple Vitamin (MULTIVITAMIN PO) Take 1 tablet by mouth daily      acetaminophen (APAP EXTRA STRENGTH) 500 MG tablet Take 2 tablets by mouth every 6 hours as needed for Pain 120 tablet 0    sodium chloride (ALTAMIST SPRAY) 0.65 % nasal spray 1 spray by Nasal route as needed for Congestion 1 Bottle 3    Handicap Placard MISC by Does not apply route Exp 5 years 1 each 0    magnesium (MAGNESIUM-OXIDE) 250 MG TABS tablet Take 250 mg by mouth daily       vitamin B-12 (CYANOCOBALAMIN) 500 MCG tablet Take 500 mcg by mouth daily      zinc 50 MG CAPS Take by mouth daily       Echinacea 400 MG CAPS Take by mouth daily       Calcium Carbonate-Vit D-Min (CALCIUM 1200) 3629-5253 MG-UNIT CHEW Take by mouth daily       ferrous sulfate 325 (65 FE) MG tablet Take 65 mg by mouth daily (with breakfast)       fluticasone (FLONASE) 50 MCG/ACT nasal spray 1 spray by Nasal route daily. 1 Bottle 06     No current facility-administered medications for this visit. Objective    Vitals:    02/16/23 1550   BP: 132/84   Site: Left Upper Arm   Pulse: 73   Temp: 98.3 °F (36.8 °C)   SpO2: 99%   Weight: 209 lb (94.8 kg)   Height: 5' 8\" (1.727 m)       Physical Exam  Constitutional:       Appearance: She is well-developed. HENT:      Head: Normocephalic and atraumatic.       Right Ear: Hearing, tympanic membrane, ear canal and external ear normal.      Left Ear: Hearing, tympanic membrane, ear canal and external ear normal.      Nose: Nose normal.      Mouth/Throat: Pharynx: Uvula midline. Cardiovascular:      Rate and Rhythm: Normal rate and regular rhythm. Heart sounds: Normal heart sounds. No murmur heard. Pulmonary:      Effort: Pulmonary effort is normal.      Breath sounds: Normal breath sounds. No wheezing. Musculoskeletal:         General: Normal range of motion. Cervical back: Normal range of motion and neck supple. Lymphadenopathy:      Cervical: No cervical adenopathy. Skin:     General: Skin is warm and dry. Findings: No rash. No results found for this visit on 02/16/23. Assessment & Plan    Diagnosis Orders   1. Hypothyroidism, unspecified type        2. Screen for colon cancer  Law Ragland MD, Gastroenterology, [place]      3. Paroxysmal atrial fibrillation (HCC)        4. Gastroesophageal reflux disease without esophagitis        5. Asplenia        6. Atrial fibrillation with RVR (HCC)          Extend FMLA intermittent days off to 2-5 days per episode     A fib managed by Dr. Olu Rome. Migraines stable on naprosyn and fioricet prn. She will f/u with Dr. Olu Rome as directed. She also follows with Dr. Minal Hayks  Will actually have me continue to manage hypothyroid     FMLA paperwork will be done     Wt Readings from Last 3 Encounters:   02/16/23 209 lb (94.8 kg)   01/17/23 203 lb (92.1 kg)   01/06/23 210 lb (95.3 kg)           Orders Placed This Encounter   Procedures    Law Ragland MD, Gastroenterology, [facility]     Referral Priority:   Routine     Referral Type:   Eval and Treat     Referral Reason:   Specialty Services Required     Referred to Provider:   Anson Shah MD     Requested Specialty:   Gastroenterology     Number of Visits Requested:   1       No orders of the defined types were placed in this encounter.     Medications Discontinued During This Encounter   Medication Reason    SUMAtriptan (IMITREX) 100 MG tablet LIST CLEANUP    tiZANidine (ZANAFLEX) 4 MG tablet Therapy completed    rivaroxaban (XARELTO) 20 MG TABS tablet DUPLICATE    meclizine (ANTIVERT) 12.5 MG tablet Therapy completed    hydrocortisone (ANUSOL-HC) 25 MG suppository Therapy completed    dilTIAZem (CARDIZEM CD) 180 MG extended release capsule DUPLICATE     No follow-ups on file.     Cj Treviño MD

## 2023-02-21 ENCOUNTER — OFFICE VISIT (OUTPATIENT)
Dept: FAMILY MEDICINE CLINIC | Age: 48
End: 2023-02-21
Payer: COMMERCIAL

## 2023-02-21 VITALS
OXYGEN SATURATION: 97 % | SYSTOLIC BLOOD PRESSURE: 116 MMHG | WEIGHT: 210 LBS | DIASTOLIC BLOOD PRESSURE: 82 MMHG | HEIGHT: 68 IN | BODY MASS INDEX: 31.83 KG/M2 | HEART RATE: 76 BPM

## 2023-02-21 DIAGNOSIS — H66.003 NON-RECURRENT ACUTE SUPPURATIVE OTITIS MEDIA OF BOTH EARS WITHOUT SPONTANEOUS RUPTURE OF TYMPANIC MEMBRANES: Primary | ICD-10-CM

## 2023-02-21 PROCEDURE — 99213 OFFICE O/P EST LOW 20 MIN: CPT | Performed by: NURSE PRACTITIONER

## 2023-02-21 PROCEDURE — 3074F SYST BP LT 130 MM HG: CPT | Performed by: NURSE PRACTITIONER

## 2023-02-21 PROCEDURE — 3078F DIAST BP <80 MM HG: CPT | Performed by: NURSE PRACTITIONER

## 2023-02-21 RX ORDER — MOXIFLOXACIN HYDROCHLORIDE 400 MG/1
400 TABLET ORAL DAILY
Qty: 10 TABLET | Refills: 0 | Status: ON HOLD | OUTPATIENT
Start: 2023-02-21 | End: 2023-02-24

## 2023-02-22 ENCOUNTER — TELEPHONE (OUTPATIENT)
Dept: ENDOSCOPY | Age: 48
End: 2023-02-22

## 2023-02-22 ENCOUNTER — HOSPITAL ENCOUNTER (OUTPATIENT)
Dept: PHYSICAL THERAPY | Age: 48
Setting detail: THERAPIES SERIES
Discharge: HOME | End: 2023-02-22
Payer: COMMERCIAL

## 2023-02-22 NOTE — TELEPHONE ENCOUNTER
Good Morning, your patient Kyle Jacobs is in the process of being scheduled for a colonoscopy, we need to know how long she will be able to hold Xarelto prior to having this procedure done. Thank you.

## 2023-02-22 NOTE — PROGRESS NOTES
Therapy                            Cancellation/No-show Note    Date: 2023  Patient: Guera Nolan (52 y.o. female)  : 1975  MRN:  [id number]  Referring Physician: Nicolasa Rios MD    Medical Diagnosis: Other specified joint disorders, unspecified shoulder [M25.819]      Visit Information:  Insurance: Payor: [facility] / Plan: [facility] [address] / Product Type: *No Product type* /   Visits to Date: 4   No Show/Cancelled Appts: 0 / 1      For today's appointment patient:  [x]  Cancelled  []  Rescheduled appointment  []  No-show   []  Called pt to remind of next appointment     Reason given by patient:  [x]  Patient ill  []  Conflicting appointment  []  No transportation    []  Conflict with work  []  No reason given  []  Other:      [x] Pt has future appointments scheduled, no follow up needed  [] Pt requests to be on hold. Reason:   If > 2 weeks please discuss with therapist.  [] Therapist to call pt for follow up     Comments:   Pt arriving to appt w/ reports of being sick w/ multiple infections (ear, sinus etc) Pt wearing mask w/ reports of still dealing w/ cough/congestion. Pt wishing to cancel appt today and R/S. She will call once she feels better.      Signature: Electronically signed by Lauryn Kahn PTA on 23 at 4:28 PM EST

## 2023-02-23 ENCOUNTER — OFFICE VISIT (OUTPATIENT)
Dept: OBGYN CLINIC | Age: 48
End: 2023-02-23
Payer: COMMERCIAL

## 2023-02-23 VITALS
SYSTOLIC BLOOD PRESSURE: 102 MMHG | DIASTOLIC BLOOD PRESSURE: 52 MMHG | WEIGHT: 205 LBS | BODY MASS INDEX: 31.17 KG/M2 | HEART RATE: 93 BPM

## 2023-02-23 DIAGNOSIS — N95.1 PERIMENOPAUSE: Primary | ICD-10-CM

## 2023-02-23 PROCEDURE — 3078F DIAST BP <80 MM HG: CPT | Performed by: OBSTETRICS & GYNECOLOGY

## 2023-02-23 PROCEDURE — 3074F SYST BP LT 130 MM HG: CPT | Performed by: OBSTETRICS & GYNECOLOGY

## 2023-02-23 PROCEDURE — 99213 OFFICE O/P EST LOW 20 MIN: CPT | Performed by: OBSTETRICS & GYNECOLOGY

## 2023-02-23 RX ORDER — PROGESTERONE 200 MG/1
200 CAPSULE ORAL NIGHTLY
Qty: 90 CAPSULE | Refills: 3 | Status: SHIPPED | OUTPATIENT
Start: 2023-02-23

## 2023-02-23 ASSESSMENT — ENCOUNTER SYMPTOMS
EYE ITCHING: 0
SINUS PAIN: 0
CONSTIPATION: 0
ABDOMINAL PAIN: 0
EYE PAIN: 0
COUGH: 0
VOMITING: 0
COUGH: 0
TROUBLE SWALLOWING: 0
BLOOD IN STOOL: 0
NAUSEA: 0
SINUS PRESSURE: 0
BACK PAIN: 0
SHORTNESS OF BREATH: 0
ABDOMINAL DISTENTION: 0
WHEEZING: 0
SORE THROAT: 0
VOICE CHANGE: 0
PHOTOPHOBIA: 0
EYE DISCHARGE: 0
CHEST TIGHTNESS: 0
VOMITING: 0
SHORTNESS OF BREATH: 0
NAUSEA: 0
DIARRHEA: 0
TROUBLE SWALLOWING: 0
EYE REDNESS: 0
WHEEZING: 0
SORE THROAT: 0
CHEST TIGHTNESS: 0
CONSTIPATION: 0
ABDOMINAL PAIN: 0
COLOR CHANGE: 0
RHINORRHEA: 1

## 2023-02-23 ASSESSMENT — VISUAL ACUITY: OU: 1

## 2023-02-23 NOTE — PROGRESS NOTES
Subjective:      Patient ID: Nkechi Gray is a 50 y.o. female who present today with:      Chief Complaint   Patient presents with    Otalgia     Ear pain, start 2/19       Otalgia   There is pain in both ears. This is a new problem. The current episode started in the past 7 days. The problem occurs constantly. The problem has been unchanged. There has been no fever. The pain is at a severity of 7/10. The pain is severe. Associated symptoms include hearing loss and rhinorrhea. Pertinent negatives include no abdominal pain, coughing, diarrhea, ear discharge, headaches, neck pain, rash, sore throat or vomiting. She has tried nothing for the symptoms. The treatment provided no relief. Her past medical history is significant for a chronic ear infection. There is no history of hearing loss or a tympanostomy tube.        Past Medical History:   Diagnosis Date    Abnormal Pap smear of cervix     Allergic rhinitis     Arthritis     hands    Asplenia     Atrial fibrillation (HCC)     Atrial fibrillation with RVR ([facility])     Dr. 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 Russell Oliveira     Patient Active Problem List    Diagnosis Date Noted    Tonsillar hypertrophy 10/26/2021    Paroxysmal atrial fibrillation ([address] 10/23/2020    Pelvic pain     Postoperative pain     Menorrhagia 12/02/2019    Insomnia     Hypertension     Migraine headache     Multinodular goiter     Meniere disease     Irregular periods     Asplenia     Allergic rhinitis     Migraine     Obesity 05/09/2013 Hypothyroidism 02/20/2012    History of ITP 2004     Past Surgical History:   Procedure Laterality Date    CARPAL TUNNEL RELEASE Right 07/12/2016    DILATION AND CURETTAGE OF UTERUS N/A 12/16/2019    HYSTEROSCOPY NOVASURE ABLATION D&C performed by Suha Pineda DO at [address] ([address] [place] [address])      HYSTERECTOMY, VAGINAL N/A 8/2/2021    LAPAROSCOPIC ASSISTED VAGINAL HYSTERECTOMY BILATERAL SALPINGECTOMY performed by Suha Pineda DO at [place] [address] N/A 2004    done for ITP    THYROIDECTOMY, COMPLETION Left 2014    frozen section inconclusive    THYROIDECTOMY, PARTIAL Right 2011    Benign nodule    TONSILLECTOMY N/A 11/2/2021    TONSILLECTOMY performed by Wilfrid Fermin MD at [address] [place] [address] History     Socioeconomic History    Marital status:      Spouse name: None    Number of children: 0    Years of education: None    Highest education level: None   Occupational History    Occupation: medical records   Tobacco Use    Smoking status: Some Days     Packs/day: 0.25     Years: 32.00     Pack years: 8.00     Types: Cigarettes     Start date: 10/26/1989    Smokeless tobacco: Never    Tobacco comments:     social smoker   Vaping Use    Vaping Use: Never used   Substance and Sexual Activity    Alcohol use:  Yes     Alcohol/week: 0.0 standard drinks     Comment: socially    Drug use: No     Social Determinants of Health     Financial Resource Strain: Low Risk     Difficulty of Paying Living Expenses: Not hard at all   Food Insecurity: No Food Insecurity    Worried About [facility] in the Last Year: Never true    Ran Out of Food in the Last Year: Never true   Transportation Needs: No Transportation Needs    Lack of Transportation (Medical): No    Lack of Transportation (Non-Medical): No   Housing Stability: Unknown    Unstable Housing in the Last Year: No     Current Outpatient Medications on File Prior to Visit   Medication Sig Dispense Refill Cyclobenzaprine HCl (FLEXERIL PO) Take by mouth      albuterol sulfate HFA (VENTOLIN HFA) 108 (90 Base) MCG/ACT inhaler Inhale 2 puffs into the lungs 4 times daily as needed for Wheezing 18 g 0    levothyroxine (SYNTHROID) 100 MCG tablet TAKE ONE TABLET ONE TIME DAILY 90 tablet 3    flecainide (TAMBOCOR) 100 MG tablet Take 1 tablet by mouth as needed (PRN afib.) 10 tablet 0    progesterone (PROMETRIUM) 200 MG CAPS capsule Take 1 capsule by mouth nightly 30 capsule 3    omeprazole (PRILOSEC) 40 MG delayed release capsule Take 1 capsule by mouth every morning (before breakfast) 30 capsule 0    ondansetron (ZOFRAN) 8 MG tablet Take 1 tablet by mouth every 12 hours as needed for Nausea or Vomiting 30 tablet 02    rivaroxaban (XARELTO) 20 MG TABS tablet TAKE 1 TABLET BY MOUTH ONE TIME A DAY 90 tablet 3    dilTIAZem (CARDIZEM CD) 180 MG extended release capsule TAKE 1 CAPSULE BY MOUTH ONE TIME A DAY 90 capsule 3    butalbital-acetaminophen-caffeine (FIORICET, ESGIC) -40 MG per tablet Take 1 tablet by mouth every 4 hours as needed for Headaches 12 tablet 0    diclofenac sodium (VOLTAREN) 1 % GEL Apply 2 g topically 4 times daily as needed for Pain 2 g 0    busPIRone (BUSPAR) 7.5 MG tablet TAKE ONE TABLET ONE TIME DAILY AS NEEDED (GENERIC FOR BUSPAR) 90 tablet 3    traMADol (ULTRAM) 50 MG tablet Take by mouth as needed.       Cholecalciferol (VITAMIN D3) 50 MCG (2000 UT) CAPS Take by mouth daily      Bacillus Coagulans-Inulin (PROBIOTIC FORMULA PO) Take 1 capsule by mouth daily      Folic Acid (FOLATE PO) Take 1 tablet by mouth daily      Multiple Vitamin (MULTIVITAMIN PO) Take 1 tablet by mouth daily      acetaminophen (APAP EXTRA STRENGTH) 500 MG tablet Take 2 tablets by mouth every 6 hours as needed for Pain 120 tablet 0    sodium chloride (ALTAMIST SPRAY) 0.65 % nasal spray 1 spray by Nasal route as needed for Congestion 1 Bottle 3    Handicap Placard MISC by Does not apply route Exp 5 years 1 each 0    magnesium (MAGNESIUM-OXIDE) 250 MG TABS tablet Take 250 mg by mouth daily       vitamin B-12 (CYANOCOBALAMIN) 500 MCG tablet Take 500 mcg by mouth daily      zinc 50 MG CAPS Take by mouth daily       Echinacea 400 MG CAPS Take by mouth daily       Calcium Carbonate-Vit D-Min (CALCIUM 1200) 5222-8466 MG-UNIT CHEW Take by mouth daily       ferrous sulfate 325 (65 FE) MG tablet Take 65 mg by mouth daily (with breakfast)       fluticasone (FLONASE) 50 MCG/ACT nasal spray 1 spray by Nasal route daily. 1 Bottle 06     No current facility-administered medications on file prior to visit. Allergies   Allergen Reactions    Penicillins Anaphylaxis    Codeine Itching, Swelling and Rash     OK with Tylenol #3 per pt    Percocet [Oxycodone-Acetaminophen] Itching, Swelling and Rash    Bee Venom Itching and Swelling    Seasonal      Sinus sx    Tape Wilber Farrah Tape]      Skin irritation     Hydrocodone Rash    Protonix [Pantoprazole Sodium] Swelling and Rash     Caused thrush           Review of Systems   Constitutional:  Positive for activity change. Negative for appetite change, chills, diaphoresis, fatigue and fever. HENT:  Positive for congestion, ear pain, hearing loss and rhinorrhea. Negative for ear discharge, sinus pressure, sinus pain, sore throat and trouble swallowing. Eyes:  Negative for photophobia, pain, discharge, redness and itching. Respiratory:  Negative for cough, chest tightness, shortness of breath and wheezing. Cardiovascular:  Negative for chest pain. Gastrointestinal:  Negative for abdominal pain, constipation, diarrhea, nausea and vomiting. Genitourinary:  Negative for dysuria, flank pain, frequency, hematuria and urgency. Musculoskeletal:  Negative for arthralgias, myalgias and neck pain. Skin:  Negative for rash. Neurological:  Negative for seizures, syncope and headaches.      Objective:      Vitals:    02/21/23 1214   BP: 116/82   Pulse: 76   SpO2: 97%   Weight: 210 lb (95.3 kg)   Height: 5' 8\" (1.727 m)         Physical Exam  Constitutional:       General: She is not in acute distress. Appearance: Normal appearance. She is not ill-appearing. HENT:      Head: Normocephalic and atraumatic. Right Ear: Hearing, ear canal and external ear normal. Tympanic membrane is injected, erythematous and bulging. Left Ear: Hearing, ear canal and external ear normal. Tympanic membrane is injected, erythematous and bulging. Nose: Congestion and rhinorrhea present. Rhinorrhea is clear. Right Sinus: No maxillary sinus tenderness or frontal sinus tenderness. Left Sinus: No maxillary sinus tenderness or frontal sinus tenderness. Mouth/Throat:      Lips: Pink. Mouth: Mucous membranes are moist.      Pharynx: Oropharynx is clear. Uvula midline. Tonsils: No tonsillar exudate. Eyes:      General: Lids are normal. Vision grossly intact. Conjunctiva/sclera: Conjunctivae normal.      Pupils: Pupils are equal, round, and reactive to light. Cardiovascular:      Rate and Rhythm: Normal rate and regular rhythm. Heart sounds: Normal heart sounds. Pulmonary:      Effort: Pulmonary effort is normal.      Breath sounds: Normal breath sounds and air entry. Lymphadenopathy:      Head:      Right side of head: No submandibular or tonsillar adenopathy. Left side of head: No submandibular or tonsillar adenopathy. Cervical: Cervical adenopathy present. Skin:     General: Skin is warm and dry. Findings: No rash. Neurological:      Mental Status: She is alert. Assessment & Plan:   Baylee Morocho was seen today for otalgia. Diagnoses and all orders for this visit:    Non-recurrent acute suppurative otitis media of both ears without spontaneous rupture of tympanic membranes  -     moxifloxacin (AVELOX) 400 MG tablet;  Take 1 tablet by mouth daily for 10 days    Side effects, adverse effects of the medication prescribed today, as well as treatment plan/ rationale and result expectations have been discussed with the patient who expresses understanding and desires to proceed. Close follow up to evaluate treatment results and for coordination of care. I have reviewed the patient's medical history in detail and updated the computerized patient record. As always, patient is advised that if symptoms worsen in any way they will proceed to the nearest emergency room.      Follow up in 48-72 hours if symptoms persist or worsen and as needed    Vane Kingston, APRN - CNP

## 2023-02-24 ENCOUNTER — APPOINTMENT (OUTPATIENT)
Dept: GENERAL RADIOLOGY | Age: 48
DRG: 203 | End: 2023-02-24
Payer: COMMERCIAL

## 2023-02-24 ENCOUNTER — HOSPITAL ENCOUNTER (INPATIENT)
Age: 48
LOS: 1 days | Discharge: HOME | DRG: 203 | End: 2023-02-25
Attending: STUDENT IN AN ORGANIZED HEALTH CARE EDUCATION/TRAINING PROGRAM | Admitting: INTERNAL MEDICINE
Payer: COMMERCIAL

## 2023-02-24 ENCOUNTER — NURSE TRIAGE (OUTPATIENT)
Dept: OTHER | Facility: CLINIC | Age: 48
End: 2023-02-24

## 2023-02-24 DIAGNOSIS — J18.9 PNEUMONIA OF RIGHT LUNG DUE TO INFECTIOUS ORGANISM, UNSPECIFIED PART OF LUNG: Primary | ICD-10-CM

## 2023-02-24 DIAGNOSIS — R06.02 SHORTNESS OF BREATH: ICD-10-CM

## 2023-02-24 LAB
ADENOVIRUS BY PCR: NOT DETECTED
ALBUMIN SERPL-MCNC: 3.6 G/DL (ref 3.5–4.6)
ALP BLD-CCNC: 107 U/L (ref 40–130)
ALT SERPL-CCNC: 9 U/L (ref 0–33)
ANION GAP SERPL CALCULATED.3IONS-SCNC: 13 MEQ/L (ref 9–15)
AST SERPL-CCNC: 13 U/L (ref 0–35)
BASOPHILS ABSOLUTE: 0.1 K/UL (ref 0–0.2)
BASOPHILS RELATIVE PERCENT: 0.6 %
BILIRUB SERPL-MCNC: 0.3 MG/DL (ref 0.2–0.7)
BORDETELLA PARAPERTUSSIS BY PCR: NOT DETECTED
BORDETELLA PERTUSSIS BY PCR: NOT DETECTED
BUN BLDV-MCNC: 8 MG/DL (ref 6–20)
CALCIUM SERPL-MCNC: 8.7 MG/DL (ref 8.5–9.9)
CHLAMYDOPHILIA PNEUMONIAE BY PCR: NOT DETECTED
CHLORIDE BLD-SCNC: 103 MEQ/L (ref 95–107)
CO2: 25 MEQ/L (ref 20–31)
CORONAVIRUS 229E BY PCR: NOT DETECTED
CORONAVIRUS HKU1 BY PCR: NOT DETECTED
CORONAVIRUS NL63 BY PCR: NOT DETECTED
CORONAVIRUS OC43 BY PCR: NOT DETECTED
CREAT SERPL-MCNC: 0.77 MG/DL (ref 0.5–0.9)
EKG ATRIAL RATE: 59 BPM
EKG P AXIS: 61 DEGREES
EKG P-R INTERVAL: 144 MS
EKG Q-T INTERVAL: 404 MS
EKG QRS DURATION: 78 MS
EKG QTC CALCULATION (BAZETT): 399 MS
EKG R AXIS: 44 DEGREES
EKG T AXIS: 48 DEGREES
EKG VENTRICULAR RATE: 59 BPM
EOSINOPHILS ABSOLUTE: 0 K/UL (ref 0–0.7)
EOSINOPHILS RELATIVE PERCENT: 0 %
GFR SERPL CREATININE-BSD FRML MDRD: >60 ML/MIN/{1.73_M2}
GLOBULIN: 3.1 G/DL (ref 2.3–3.5)
GLUCOSE BLD-MCNC: 93 MG/DL (ref 70–99)
HCT VFR BLD CALC: 40.2 % (ref 37–47)
HEMOGLOBIN: 13.2 G/DL (ref 12–16)
HUMAN METAPNEUMOVIRUS BY PCR: DETECTED
HUMAN RHINOVIRUS/ENTEROVIRUS BY PCR: NOT DETECTED
INFLUENZA A BY PCR: NEGATIVE
INFLUENZA A BY PCR: NOT DETECTED
INFLUENZA B BY PCR: NEGATIVE
INFLUENZA B BY PCR: NOT DETECTED
LACTIC ACID: 1 MMOL/L (ref 0.5–2.2)
LYMPHOCYTES ABSOLUTE: 2.3 K/UL (ref 1–4.8)
LYMPHOCYTES RELATIVE PERCENT: 20.7 %
MAGNESIUM: 2 MG/DL (ref 1.7–2.4)
MCH RBC QN AUTO: 29.9 PG (ref 27–31.3)
MCHC RBC AUTO-ENTMCNC: 32.8 % (ref 33–37)
MCV RBC AUTO: 91 FL (ref 79.4–94.8)
MONOCYTES ABSOLUTE: 0.7 K/UL (ref 0.2–0.8)
MONOCYTES RELATIVE PERCENT: 6.4 %
MYCOPLASMA PNEUMONIAE BY PCR: NOT DETECTED
NEUTROPHILS ABSOLUTE: 8.1 K/UL (ref 1.4–6.5)
NEUTROPHILS RELATIVE PERCENT: 72.3 %
PARAINFLUENZA VIRUS 1 BY PCR: NOT DETECTED
PARAINFLUENZA VIRUS 2 BY PCR: NOT DETECTED
PARAINFLUENZA VIRUS 3 BY PCR: NOT DETECTED
PARAINFLUENZA VIRUS 4 BY PCR: NOT DETECTED
PDW BLD-RTO: 14.9 % (ref 11.5–14.5)
PLATELET # BLD: 398 K/UL (ref 130–400)
POTASSIUM SERPL-SCNC: 4.2 MEQ/L (ref 3.4–4.9)
PRO-BNP: 136 PG/ML
PROCALCITONIN: 0.09 NG/ML (ref 0–0.15)
RBC # BLD: 4.41 M/UL (ref 4.2–5.4)
RESPIRATORY SYNCYTIAL VIRUS BY PCR: NOT DETECTED
SARS-COV-2, NAAT: NOT DETECTED
SARS-COV-2, PCR: NOT DETECTED
SODIUM BLD-SCNC: 141 MEQ/L (ref 135–144)
TOTAL PROTEIN: 6.7 G/DL (ref 6.3–8)
TROPONIN: <0.01 NG/ML (ref 0–0.01)
WBC # BLD: 11.2 K/UL (ref 4.8–10.8)

## 2023-02-24 PROCEDURE — 93010 ELECTROCARDIOGRAM REPORT: CPT | Performed by: INTERNAL MEDICINE

## 2023-02-24 PROCEDURE — 93005 ELECTROCARDIOGRAM TRACING: CPT | Performed by: STUDENT IN AN ORGANIZED HEALTH CARE EDUCATION/TRAINING PROGRAM

## 2023-02-24 PROCEDURE — 2580000003 HC RX 258: Performed by: STUDENT IN AN ORGANIZED HEALTH CARE EDUCATION/TRAINING PROGRAM

## 2023-02-24 PROCEDURE — 87635 SARS-COV-2 COVID-19 AMP PRB: CPT

## 2023-02-24 PROCEDURE — 87070 CULTURE OTHR SPECIMN AEROBIC: CPT

## 2023-02-24 PROCEDURE — 0202U NFCT DS 22 TRGT SARS-COV-2: CPT

## 2023-02-24 PROCEDURE — 6370000000 HC RX 637 (ALT 250 FOR IP): Performed by: INTERNAL MEDICINE

## 2023-02-24 PROCEDURE — 84145 PROCALCITONIN (PCT): CPT

## 2023-02-24 PROCEDURE — 2580000003 HC RX 258: Performed by: INTERNAL MEDICINE

## 2023-02-24 PROCEDURE — 99285 EMERGENCY DEPT VISIT HI MDM: CPT

## 2023-02-24 PROCEDURE — 36415 COLL VENOUS BLD VENIPUNCTURE: CPT

## 2023-02-24 PROCEDURE — 94664 DEMO&/EVAL PT USE INHALER: CPT

## 2023-02-24 PROCEDURE — 80053 COMPREHEN METABOLIC PANEL: CPT

## 2023-02-24 PROCEDURE — 83735 ASSAY OF MAGNESIUM: CPT

## 2023-02-24 PROCEDURE — 6360000002 HC RX W HCPCS: Performed by: STUDENT IN AN ORGANIZED HEALTH CARE EDUCATION/TRAINING PROGRAM

## 2023-02-24 PROCEDURE — 6370000000 HC RX 637 (ALT 250 FOR IP): Performed by: STUDENT IN AN ORGANIZED HEALTH CARE EDUCATION/TRAINING PROGRAM

## 2023-02-24 PROCEDURE — 96361 HYDRATE IV INFUSION ADD-ON: CPT

## 2023-02-24 PROCEDURE — 84484 ASSAY OF TROPONIN QUANT: CPT

## 2023-02-24 PROCEDURE — 85025 COMPLETE CBC W/AUTO DIFF WBC: CPT

## 2023-02-24 PROCEDURE — 83880 ASSAY OF NATRIURETIC PEPTIDE: CPT

## 2023-02-24 PROCEDURE — 2060000000 HC ICU INTERMEDIATE R&B

## 2023-02-24 PROCEDURE — 96374 THER/PROPH/DIAG INJ IV PUSH: CPT

## 2023-02-24 PROCEDURE — 83605 ASSAY OF LACTIC ACID: CPT

## 2023-02-24 PROCEDURE — 71045 X-RAY EXAM CHEST 1 VIEW: CPT

## 2023-02-24 PROCEDURE — 87502 INFLUENZA DNA AMP PROBE: CPT

## 2023-02-24 RX ORDER — ALBUTEROL SULFATE 90 UG/1
2 AEROSOL, METERED RESPIRATORY (INHALATION) 4 TIMES DAILY PRN
Status: DISCONTINUED | OUTPATIENT
Start: 2023-02-24 | End: 2023-02-25 | Stop reason: HOSPADM

## 2023-02-24 RX ORDER — BUSPIRONE HYDROCHLORIDE 5 MG/1
7.5 TABLET ORAL NIGHTLY
Status: DISCONTINUED | OUTPATIENT
Start: 2023-02-24 | End: 2023-02-25 | Stop reason: HOSPADM

## 2023-02-24 RX ORDER — LEVOTHYROXINE SODIUM 0.1 MG/1
100 TABLET ORAL DAILY
Status: DISCONTINUED | OUTPATIENT
Start: 2023-02-25 | End: 2023-02-25 | Stop reason: HOSPADM

## 2023-02-24 RX ORDER — 0.9 % SODIUM CHLORIDE 0.9 %
1000 INTRAVENOUS SOLUTION INTRAVENOUS ONCE
Status: COMPLETED | OUTPATIENT
Start: 2023-02-24 | End: 2023-02-24

## 2023-02-24 RX ORDER — ONDANSETRON 2 MG/ML
4 INJECTION INTRAMUSCULAR; INTRAVENOUS EVERY 6 HOURS PRN
Status: DISCONTINUED | OUTPATIENT
Start: 2023-02-24 | End: 2023-02-25 | Stop reason: HOSPADM

## 2023-02-24 RX ORDER — GUAIFENESIN/DEXTROMETHORPHAN 100-10MG/5
5 SYRUP ORAL ONCE
Status: COMPLETED | OUTPATIENT
Start: 2023-02-24 | End: 2023-02-24

## 2023-02-24 RX ORDER — POLYETHYLENE GLYCOL 3350 17 G/17G
17 POWDER, FOR SOLUTION ORAL DAILY PRN
Status: DISCONTINUED | OUTPATIENT
Start: 2023-02-24 | End: 2023-02-25 | Stop reason: HOSPADM

## 2023-02-24 RX ORDER — LEVOFLOXACIN 5 MG/ML
750 INJECTION, SOLUTION INTRAVENOUS ONCE
Status: COMPLETED | OUTPATIENT
Start: 2023-02-24 | End: 2023-02-24

## 2023-02-24 RX ORDER — DEXAMETHASONE SODIUM PHOSPHATE 4 MG/ML
10 INJECTION, SOLUTION INTRA-ARTICULAR; INTRALESIONAL; INTRAMUSCULAR; INTRAVENOUS; SOFT TISSUE ONCE
Status: COMPLETED | OUTPATIENT
Start: 2023-02-24 | End: 2023-02-24

## 2023-02-24 RX ORDER — SODIUM CHLORIDE 9 MG/ML
INJECTION, SOLUTION INTRAVENOUS PRN
Status: DISCONTINUED | OUTPATIENT
Start: 2023-02-24 | End: 2023-02-25 | Stop reason: HOSPADM

## 2023-02-24 RX ORDER — ONDANSETRON 4 MG/1
4 TABLET, ORALLY DISINTEGRATING ORAL EVERY 8 HOURS PRN
Status: DISCONTINUED | OUTPATIENT
Start: 2023-02-24 | End: 2023-02-25 | Stop reason: HOSPADM

## 2023-02-24 RX ORDER — SODIUM CHLORIDE 0.9 % (FLUSH) 0.9 %
5-40 SYRINGE (ML) INJECTION EVERY 12 HOURS SCHEDULED
Status: DISCONTINUED | OUTPATIENT
Start: 2023-02-24 | End: 2023-02-25 | Stop reason: HOSPADM

## 2023-02-24 RX ORDER — ACETAMINOPHEN 500 MG
1000 TABLET ORAL ONCE
Status: COMPLETED | OUTPATIENT
Start: 2023-02-24 | End: 2023-02-24

## 2023-02-24 RX ORDER — SODIUM CHLORIDE 0.9 % (FLUSH) 0.9 %
5-40 SYRINGE (ML) INJECTION PRN
Status: DISCONTINUED | OUTPATIENT
Start: 2023-02-24 | End: 2023-02-25 | Stop reason: HOSPADM

## 2023-02-24 RX ORDER — ACETAMINOPHEN 325 MG/1
650 TABLET ORAL EVERY 6 HOURS PRN
Status: DISCONTINUED | OUTPATIENT
Start: 2023-02-24 | End: 2023-02-25 | Stop reason: HOSPADM

## 2023-02-24 RX ORDER — LEVOFLOXACIN 5 MG/ML
750 INJECTION, SOLUTION INTRAVENOUS EVERY 24 HOURS
Status: DISCONTINUED | OUTPATIENT
Start: 2023-02-25 | End: 2023-02-24

## 2023-02-24 RX ORDER — ACETAMINOPHEN 650 MG/1
650 SUPPOSITORY RECTAL EVERY 6 HOURS PRN
Status: DISCONTINUED | OUTPATIENT
Start: 2023-02-24 | End: 2023-02-25 | Stop reason: HOSPADM

## 2023-02-24 RX ADMIN — RIVAROXABAN 20 MG: 20 TABLET, FILM COATED ORAL at 18:15

## 2023-02-24 RX ADMIN — AZITHROMYCIN MONOHYDRATE 500 MG: 500 INJECTION, POWDER, LYOPHILIZED, FOR SOLUTION INTRAVENOUS at 15:53

## 2023-02-24 RX ADMIN — GUAIFENESIN AND DEXTROMETHORPHAN 5 ML: 100; 10 SYRUP ORAL at 12:20

## 2023-02-24 RX ADMIN — SODIUM CHLORIDE, PRESERVATIVE FREE 10 ML: 5 INJECTION INTRAVENOUS at 21:39

## 2023-02-24 RX ADMIN — LEVOFLOXACIN 750 MG: 5 INJECTION, SOLUTION INTRAVENOUS at 13:22

## 2023-02-24 RX ADMIN — ACETAMINOPHEN 1000 MG: 500 TABLET ORAL at 10:45

## 2023-02-24 RX ADMIN — DEXAMETHASONE SODIUM PHOSPHATE 10 MG: 4 INJECTION, SOLUTION INTRAMUSCULAR; INTRAVENOUS at 12:13

## 2023-02-24 RX ADMIN — SODIUM CHLORIDE 1000 ML: 9 INJECTION, SOLUTION INTRAVENOUS at 13:32

## 2023-02-24 RX ADMIN — BUSPIRONE HYDROCHLORIDE 7.5 MG: 5 TABLET ORAL at 21:40

## 2023-02-24 RX ADMIN — SODIUM CHLORIDE 1000 ML: 9 INJECTION, SOLUTION INTRAVENOUS at 10:45

## 2023-02-24 ASSESSMENT — ENCOUNTER SYMPTOMS
EYE PAIN: 0
CONSTIPATION: 0
SHORTNESS OF BREATH: 1
COUGH: 1
VOMITING: 0
CHEST TIGHTNESS: 1
RHINORRHEA: 0
SORE THROAT: 0
NAUSEA: 1
DIARRHEA: 1
BACK PAIN: 0
ABDOMINAL PAIN: 0

## 2023-02-24 ASSESSMENT — PAIN DESCRIPTION - LOCATION
LOCATION: CHEST

## 2023-02-24 ASSESSMENT — PAIN - FUNCTIONAL ASSESSMENT
PAIN_FUNCTIONAL_ASSESSMENT: NONE - DENIES PAIN
PAIN_FUNCTIONAL_ASSESSMENT: NONE - DENIES PAIN
PAIN_FUNCTIONAL_ASSESSMENT: 0-10

## 2023-02-24 ASSESSMENT — LIFESTYLE VARIABLES
HOW MANY STANDARD DRINKS CONTAINING ALCOHOL DO YOU HAVE ON A TYPICAL DAY: 3 OR 4
HOW OFTEN DO YOU HAVE A DRINK CONTAINING ALCOHOL: 2-4 TIMES A MONTH
HOW OFTEN DO YOU HAVE A DRINK CONTAINING ALCOHOL: MONTHLY OR LESS

## 2023-02-24 ASSESSMENT — PAIN DESCRIPTION - DESCRIPTORS: DESCRIPTORS: ACHING

## 2023-02-24 ASSESSMENT — PAIN DESCRIPTION - ORIENTATION: ORIENTATION: ANTERIOR

## 2023-02-24 ASSESSMENT — PAIN SCALES - GENERAL: PAINLEVEL_OUTOF10: 2

## 2023-02-24 ASSESSMENT — PAIN DESCRIPTION - PAIN TYPE: TYPE: ACUTE PAIN

## 2023-02-24 NOTE — H&P
[facility]  MEDICINE    HISTORY AND PHYSICAL EXAM    PATIENT NAME:  Jose Shaikh    MRN:  [id number]  SERVICE DATE:  2/24/2023   SERVICE TIME:  1:12 PM    Primary Care Physician: Cherise Mittal MD     SUBJECTIVE  CHIEF COMPLAINT: Dyspnea and productive cough    HPI:  Jose Shaikh is a 50 y.o., , female who presents with complaint of dyspnea and productive cough. PMH significant for  has a past medical history of Abnormal Pap smear of cervix, Allergic rhinitis, Arthritis, Asplenia, Atrial fibrillation (HCC), Atrial fibrillation with RVR (HCC), Bilateral hand pain, CTS (carpal tunnel syndrome), External hemorrhoid, Fatigue, Heart murmur, History of ITP, Hypertension, Hypothyroidism, Hypothyroidism, Insomnia, Irregular periods, Meniere disease, Migraine headache, Multinodular goiter, Nail fungus, Pain in right lower leg, Paroxysmal atrial fibrillation ([address] and Plantar fasciitis, left. .      25-year-old female with dyspnea and productive cough. Was sick sometime last month, then got better, was seen in urgent care 3 days ago for which she was given moxifloxacin for presumed community-acquired pneumonia. Symptoms continue to worsen. Dyspnea, cough productive of green and yellow intermittently colored sputum. No overt fever. Presented to ED, no evidence of hypoxia. Borderline leukocytosis. Patient with history of asplenia status post history of ITP, putting her at higher risk for atypical and encapsulated organisms. Chest x-ray in ED concerning for atypical pneumonia R >L diffuse groundglass opacities in lung fields. Infectious disease service consulted in ED, recommended admission and broader evaluation in setting of patient's risk factors. Hospitalist service consulted for admission. On examination, patient found to be resting in bed no acute distress. Family present at bedside.   In the interim between initial evaluation in the ED and presentation to medical floor, procalcitonin came back non-elevated, lactate normal, and respiratory pathogen panel with COVID-19 positive only for human metapneumovirus. Remainder of panel including bacterial markers and COVID-19 all negative. In setting of this new information, further antibiotic therapy deferred at this point. We will monitor patient overnight and repeat infectious and metabolic markers in the morning, with plan for possible discharge next day if no worsening symptoms and no other concerns by ID service. Patient is a full code.          PAST MEDICAL HISTORY:    Past Medical History:   Diagnosis Date    Abnormal Pap smear of cervix     Allergic rhinitis     Arthritis     hands    Asplenia     Atrial fibrillation (HCC)     Atrial fibrillation with RVR (HCC)     Dr. Sujit Roberto    Bilateral hand pain     CTS (carpal tunnel syndrome) 05/2016    bilateral    External hemorrhoid     Fatigue     Heart murmur     History of ITP 2004    had spenectomy    Hypertension     meds > 3 yrs    Hypothyroidism     meds > 7 yrs    Hypothyroidism     Insomnia     Irregular periods     Meniere disease     Migraine headache     Multinodular goiter     Nail fungus     Pain in right lower leg     Paroxysmal atrial fibrillation ([address] 10/23/2020    Plantar fasciitis, left     Dr. Ce Morrison HISTORY:    Past Surgical History:   Procedure Laterality Date    CARPAL TUNNEL RELEASE Right 07/12/2016    DILATION AND CURETTAGE OF UTERUS N/A 12/16/2019    HYSTEROSCOPY NOVASURE ABLATION D&C performed by Everardo Turcios DO at [facility] ([facility])      HYSTERECTOMY, VAGINAL N/A 8/2/2021    LAPAROSCOPIC ASSISTED VAGINAL HYSTERECTOMY BILATERAL SALPINGECTOMY performed by Everardo Turcios DO at [facility] 2004    done for ITP    THYROIDECTOMY, COMPLETION Left 2014    frozen section inconclusive    THYROIDECTOMY, PARTIAL Right 2011    Benign nodule    TONSILLECTOMY N/A 11/2/2021    TONSILLECTOMY performed by Leda Severino MD at [address] [facility] HISTORY:    Family History   Problem Relation Age of Onset    High Blood Pressure Mother     Anemia Mother     Atrial Fibrillation Mother     Heart Failure Mother     Other Mother         pacemaker    Heart Disease Mother     Sleep Apnea Mother     Vision Loss Mother         histoplasmosis    Heart Disease Father         CAD / Triple bypass    Diabetes Father         resolved with wt loss    Other Father         Fuck's dystropy / corneal transplants / AAA    Coronary Art Dis Father     Cancer Paternal Aunt     Breast Cancer Paternal Aunt     Cancer Paternal Grandmother     Breast Cancer Paternal Grandmother     Sleep Apnea Sister     Alcohol Abuse Brother     Other Sister         killed by drunk      SOCIAL HISTORY:    Social History     Socioeconomic History    Marital status:      Spouse name: Not on file    Number of children: 0    Years of education: Not on file    Highest education level: Not on file   Occupational History    Occupation: medical records   Tobacco Use    Smoking status: Every Day     Packs/day: 0.10     Years: 32.00     Pack years: 3.20     Types: Cigarettes     Start date: 10/26/1989    Smokeless tobacco: Never    Tobacco comments:     social smoker   Vaping Use    Vaping Use: Never used   Substance and Sexual Activity    Alcohol use: Yes     Comment: occas    Drug use: No    Sexual activity: Not on file   Other Topics Concern    Not on file   Social History Narrative    Not on file     Social Determinants of Health     Financial Resource Strain: Low Risk     Difficulty of Paying Living Expenses: Not hard at all   Food Insecurity: No Food Insecurity    Worried About Running Out of Food in the Last Year: Never true    [address] in the Last Year: Never true   Transportation Needs: No Transportation Needs    Lack of Transportation (Medical): No    Lack of Transportation (Non-Medical):  No   Physical Activity: Not on file Stress: Not on file   Social Connections: Not on file   Intimate Partner Violence: Not on file   Housing Stability: Unknown    Unable to Pay for Housing in the Last Year: Not on file    Number of [place] in the Last Year: Not on file    Unstable Housing in the Last Year: No     MEDICATIONS:   Prior to Admission medications    Medication Sig Start Date End Date Taking?  Authorizing Provider   progesterone (PROMETRIUM) 200 MG CAPS capsule Take 1 capsule by mouth nightly 2/23/23   Braden Kim,    moxifloxacin (AVELOX) 400 MG tablet Take 1 tablet by mouth daily for 10 days 2/21/23 3/3/23  CASE Hernandez CNP   Cyclobenzaprine HCl (FLEXERIL PO) Take by mouth    Historical Provider, MD   albuterol sulfate HFA (VENTOLIN HFA) 108 (90 Base) MCG/ACT inhaler Inhale 2 puffs into the lungs 4 times daily as needed for Wheezing 1/17/23   Colleen Perry,    levothyroxine (SYNTHROID) 100 MCG tablet TAKE ONE TABLET ONE TIME DAILY 1/9/23   Levi Cogan, MD   flecainide (TAMBOCOR) 100 MG tablet Take 1 tablet by mouth as needed (PRN afib.) 1/5/23   Ronald Knight,    omeprazole (PRILOSEC) 40 MG delayed release capsule Take 1 capsule by mouth every morning (before breakfast) 11/21/22   GRETCHEN Osorio   ondansetron (ZOFRAN) 8 MG tablet Take 1 tablet by mouth every 12 hours as needed for Nausea or Vomiting 11/1/22   Ryan Peres MD   rivaroxaban (XARELTO) 20 MG TABS tablet TAKE 1 TABLET BY MOUTH ONE TIME A DAY 10/13/22   Orvil CASE Freitas CNP   dilTIAZem (CARDIZEM CD) 180 MG extended release capsule TAKE 1 CAPSULE BY MOUTH ONE TIME A DAY 10/13/22   CASE Ortez CNP   butalbital-acetaminophen-caffeine (FIORICET, ESGIC) -40 MG per tablet Take 1 tablet by mouth every 4 hours as needed for Headaches 8/30/22   Ryan Peres MD   diclofenac sodium (VOLTAREN) 1 % GEL Apply 2 g topically 4 times daily as needed for Pain 7/20/22   Ryan Peres MD   busPIRone (BUSPAR) 7.5 MG tablet TAKE ONE TABLET ONE TIME DAILY AS NEEDED (GENERIC FOR BUSPAR) 1/18/22   Braden [place],    traMADol (ULTRAM) 50 MG tablet Take by mouth as needed. 8/2/21   Historical Provider, MD   Cholecalciferol (VITAMIN D3) 50 MCG (2000 UT) CAPS Take by mouth daily    Historical Provider, MD   Bacillus Coagulans-Inulin (PROBIOTIC FORMULA PO) Take 1 capsule by mouth daily    Historical Provider, MD   Folic Acid (FOLATE PO) Take 1 tablet by mouth daily    Historical Provider, MD   Multiple Vitamin (MULTIVITAMIN PO) Take 1 tablet by mouth daily    Historical Provider, MD   acetaminophen (APAP EXTRA STRENGTH) 500 MG tablet Take 2 tablets by mouth every 6 hours as needed for Pain 6/16/21   Miguel Ángel Guy,    sodium chloride (ALTAMIST SPRAY) 0.65 % nasal spray 1 spray by Nasal route as needed for Congestion 12/21/20   CASE Morales - SADIE   Handicap Placard MISC by Does not apply route Exp 5 years 11/13/20   Ryan Peres MD   magnesium (MAGNESIUM-OXIDE) 250 MG TABS tablet Take 250 mg by mouth daily     Historical Provider, MD   vitamin B-12 (CYANOCOBALAMIN) 500 MCG tablet Take 500 mcg by mouth daily    Historical Provider, MD   zinc 50 MG CAPS Take by mouth daily     Historical Provider, MD   Echinacea 400 MG CAPS Take by mouth daily     Historical Provider, MD   Calcium Carbonate-Vit D-Min (CALCIUM 1200) 8177-8101 MG-UNIT CHEW Take by mouth daily     Historical Provider, MD   ferrous sulfate 325 (65 FE) MG tablet Take 65 mg by mouth daily (with breakfast)     Historical Provider, MD   fluticasone (FLONASE) 50 MCG/ACT nasal spray 1 spray by Nasal route daily. 1/27/15   Levi Cogan, MD       ALLERGIES: Penicillins, Codeine, Percocet [oxycodone-acetaminophen], Bee venom, Seasonal, Tape [adhesive tape], Hydrocodone, and Protonix [pantoprazole sodium]    REVIEW OF SYSTEM:   A full 12 point review of systems was completed, and was unremarkable except as specified above.       OBJECTIVE    BP (!) 96/52   Pulse 58   Temp 97 °F (36.1 °C) (Oral)   Resp 16   Ht 5' 6\" (1.676 m)   Wt 202 lb (91.6 kg)   LMP 11/04/2019   SpO2 96%   BMI 32.60 kg/m²     PHYSICAL EXAM:   Constitutional: Mildly obese adult female sitting in bed no acute distress. Head: NCAT  Eyes: PERRLA, EOMI  ENT: Surgical absence of tonsils and uvula. Posterior oropharynx with no remarkable findings, including no erythema, bleeding, exudate, swelling, or cobblestoning  Neck: Trachea midline, phonation normal.  Prominent moderately tense nontender submental symmetric lymphadenopathy. Cardiovascular: RRR, no tachycardia. Baseline + 2/6 systolic murmur appreciated best at RUSB. .  Warm well perfused peripherally. No significant pretibial edema. Pulmonary: Normal rate and effort of respiration on room air. Faint diffuse fine Velcro type crackles, R >L bilateral lung fields. No coughing during exam.  Abdomen: Mildly obese, soft, nontense, nondistended. Bowel sounds appreciated. Nontender to palpation. Neurologic: Alert, grossly oriented. Nonfocal.  Psychiatric: Pleasant and cooperative. DATA:     Diagnostic tests reviewed for today's visit:    Most recent labs and imaging results reviewed.      LABS:    Recent Results (from the past 24 hour(s))   EKG 12 Lead    Collection Time: 02/24/23  9:43 AM   Result Value Ref Range    Ventricular Rate 59 BPM    Atrial Rate 59 BPM    P-R Interval 144 ms    QRS Duration 78 ms    Q-T Interval 404 ms    QTc Calculation (Bazett) 399 ms    P Axis 61 degrees    R Axis 44 degrees    T Axis 48 degrees   Comprehensive Metabolic Panel    Collection Time: 02/24/23 10:30 AM   Result Value Ref Range    Sodium 141 135 - 144 mEq/L    Potassium 4.2 3.4 - 4.9 mEq/L    Chloride 103 95 - 107 mEq/L    CO2 25 20 - 31 mEq/L    Anion Gap 13 9 - 15 mEq/L    Glucose 93 70 - 99 mg/dL    BUN 8 6 - 20 mg/dL    Creatinine 0.77 0.50 - 0.90 mg/dL    Est, Glom Filt Rate >60.0 >60    Calcium 8.7 8.5 - 9.9 mg/dL    Total Protein 6.7 6.3 - 8.0 g/dL    Albumin 3.6 3.5 - 4.6 g/dL    Total Bilirubin 0.3 0.2 - 0.7 mg/dL    Alkaline Phosphatase 107 40 - 130 U/L    ALT 9 0 - 33 U/L    AST 13 0 - 35 U/L    Globulin 3.1 2.3 - 3.5 g/dL   Magnesium    Collection Time: 02/24/23 10:30 AM   Result Value Ref Range    Magnesium 2.0 1.7 - 2.4 mg/dL   CBC with Auto Differential    Collection Time: 02/24/23 10:30 AM   Result Value Ref Range    WBC 11.2 (H) 4.8 - 10.8 K/uL    RBC 4.41 4.20 - 5.40 M/uL    Hemoglobin 13.2 12.0 - 16.0 g/dL    Hematocrit 40.2 37.0 - 47.0 %    MCV 91.0 79.4 - 94.8 fL    MCH 29.9 27.0 - 31.3 pg    MCHC 32.8 (L) 33.0 - 37.0 %    RDW 14.9 (H) 11.5 - 14.5 %    Platelets 407 052 - 461 K/uL    Neutrophils % 72.3 %    Lymphocytes % 20.7 %    Monocytes % 6.4 %    Eosinophils % 0.0 %    Basophils % 0.6 %    Neutrophils Absolute 8.1 (H) 1.4 - 6.5 K/uL    Lymphocytes Absolute 2.3 1.0 - 4.8 K/uL    Monocytes Absolute 0.7 0.2 - 0.8 K/uL    Eosinophils Absolute 0.0 0.0 - 0.7 K/uL    Basophils Absolute 0.1 0.0 - 0.2 K/uL   Troponin    Collection Time: 02/24/23 10:30 AM   Result Value Ref Range    Troponin <0.010 0.000 - 0.010 ng/mL   Brain Natriuretic Peptide    Collection Time: 02/24/23 10:30 AM   Result Value Ref Range    Pro- pg/mL   COVID-19, Rapid    Collection Time: 02/24/23 10:46 AM    Specimen: Nasopharyngeal Swab   Result Value Ref Range    SARS-CoV-2, NAAT Not Detected Not Detected   Rapid influenza A/B antigens    Collection Time: 02/24/23 10:46 AM    Specimen: Nasopharyngeal   Result Value Ref Range    Influenza A by PCR Negative     Influenza B by PCR Negative        IMAGING:      XR CHEST PORTABLE  Result Date: 2/24/2023  EXAMINATION: ONE XRAY VIEW OF THE CHEST 2/24/2023 10:28 am COMPARISON: 01/17/2023 HISTORY: ORDERING SYSTEM PROVIDED HISTORY: SOB, cough TECHNOLOGIST PROVIDED HISTORY: Reason for exam:->SOB, cough Is the patient pregnant?->No What reading provider will be dictating this exam?->CRC FINDINGS: Single AP upright portable chest demonstrates bilateral ground-glass infiltrates suspicious for COVID pneumonia. There are no focal segmental areas of pneumonia. The there is no evidence of pleural effusion and no pneumothorax. The cardiac silhouette appears unremarkable. Prominent ground-glass infiltrates suspicious for COVID pneumonia. ASSESSMENT AND PLAN    Acute Problems:  Atypical pneumonia, failed OP therapy, determined to be human metapneumovirus etiology  Hx aspelenia s/p ITP  PAF on outpatient Xarelto    Plan:   Inpatient with Tele  Defer further antibiotics at present, monitor overnight and repeat infectious markers in the morning to rule out any bacterial component of current pneumonia of primary/sole human metapneumovirus etiology  ID consulted in ED, will follow  Continue home Xarelto  Continue/hold other home medications as ordered after medication history completed.       Dean Cam,   DATE: February 24, 2023  TIME: 1:12 PM

## 2023-02-24 NOTE — TELEPHONE ENCOUNTER
Location of patient: [place]    Current Symptoms: shortness of breath, productive cough with green sputum, sinus drainage, sore ears, wheeze    Speaking in complete sentences, speech is clear    States was seen at [facility] on Tuesday, placed on Avelox    States heart was 126 last night and has A flutter    Onset: 10 days ago;       Pain Severity: 0/10; Temperature: 98.3 oral    Denies - chest pain    Recommended disposition: Go to ED Now    Care advice provided, patient verbalizes understanding; denies any other questions or concerns; instructed to call back for any new or worsening symptoms. Patient/caller agrees to proceed to the Emergency Department    Attention Provider: Thank you for allowing me to participate in the care of your patient. The patient was connected to triage in response to symptoms provided. Please do not respond through this encounter as the response is not directed to a shared pool.     Reason for Disposition   MODERATE difficulty breathing (e.g., speaks in phrases, SOB even at rest, pulse 100-120) of new-onset or worse than normal    Protocols used: Breathing Difficulty-ADULT-[place]

## 2023-02-24 NOTE — ED NOTES
Blood tubes, rapid covid and rapid flu sent to lab     [facility], RN  02/24/23 [id number]

## 2023-02-24 NOTE — ED NOTES
Transport here to take patient to floor. Patient stable at time of transport. VSS. Belongings taken with patient on cart.       Josue Ponce RN  02/24/23 [id number]

## 2023-02-24 NOTE — CARE COORDINATION
02/24/23    From:HOME W PARENTS INDEPENDENT WORKS FT    Admit:PNA     PMH:HTN, HLP, HYPOTHYROID,MULTINODULAR GOITER A-FIB    Anticipated Discharge Disposition:HOME    Patient Mobility or PT/OT ordered:N/A INDEPENDENT    Consults: ID    Clinical: SB--RA  SYSTOLIC BP 36'Z  CXR= GROUND GLASS/ B/L    Barriers to Discharge:  ID NEEDS TO SEE  IV--ZITH/LEVAQUIN   NEEDS HOME MED'S RECONCILED    Assessments: CMI DONE

## 2023-02-24 NOTE — CARE COORDINATION
Case Management Assessment  Initial Evaluation    Date/Time of Evaluation: 2/24/2023 3:37 PM  Assessment Completed by: Rosario Marquis RN    If patient is discharged prior to next notation, then this note serves as note for discharge by case management. Patient Name: Gaynelle Ahumada                   YOB: 1975  Diagnosis: Atypical pneumonia [J18.9]                   Date / Time: 2/24/2023  9:35 AM    Patient Admission Status: Inpatient   Readmission Risk (Low < 19, Mod (19-27), High > 27): No data recorded  Current PCP: Hannah Miller MD  PCP verified by CM? Yes    Chart Reviewed: Yes      History Provided by: Patient  Patient Orientation: Alert and Oriented, Person, Place, Situation, Self    Patient Cognition: Alert    Hospitalization in the last 30 days (Readmission):  No        Advance Directives:      Code Status: Full Code   Patient's Primary Decision Maker is: Legal Next of Kin      Discharge Planning:    Patient lives with: Parent Type of Home:    Primary Care Giver: Self  Patient Support Systems include: Parent       Current services prior to admission: None            Current DME:  NONE            Type of Home Care services:  None    ADLS  Prior functional level: Independent in ADLs/IADLs  Current functional level: Independent in ADLs/IADLs      Family can provide assistance at DC: Yes  Would you like Case Management to discuss the discharge plan with any other family members/significant others, and if so, who? Plans to Return to Present Housing: Yes  Other Identified Issues/Barriers to RETURNING to current housing: N/A  Potential Assistance needed at discharge:  Other (Comment) (RESP/ NEBULIZER)            Potential DME:    Patient expects to discharge to: [address] [place] [address] for transportation at discharge:      Financial    Payor: Michelle Lemon 150 / Plan: Janel Walton [address] / Product Type: *No Product type* /     Does insurance require precert for SNF: Yes    Potential assistance Purchasing Medications: No  Meds-to-Beds request:        [facility] [address] [place] [id number] [address] [address]  Phone: [phone] Fax: [address] [id number] - [address], [address] [facility]  [address]  [address] [place] [address]  Phone: [phone] Fax: [phone]          Notes:    Factors facilitating achievement of predicted outcomes: Motivated, Cooperative, and Pleasant    Barriers to discharge: IV ANTIBIOTICS  ID CONSULT    Additional Case Management Notes: PLAN HOME W PARENTS MONITOR FOR RESP NEEDS        The Plan for Transition of Care is related to the following treatment goals of Atypical pneumonia [H05.5]    IF APPLICABLE: The Patient and/or patient representative Lenard Kilpatrick and her family were provided with a choice of provider and agrees with the discharge plan. Freedom of choice list with basic dialogue that supports the patient's individualized plan of care/goals and shares the quality data associated with the providers was provided to: Patient   Patient Representative Name:   N/A    The Patient and/or Patient Representative Agree with the Discharge Plan?  Yes    Hermila Beth RN  Case Management Department

## 2023-02-24 NOTE — ED PROVIDER NOTES
[facility] ED  eMERGENCY dEPARTMENT eNCOUnter      Pt Name: Matt Shaikh  MRN: [id number]  [place] 1975  Date of evaluation: 2/24/2023  Provider: Charlie Sher MD      HISTORY OF PRESENT ILLNESS      Chief Complaint   Patient presents with    Shortness of Breath     SOB x2 days. With productive cough, yellow and green sputum. Was diagnosed with pneumonia a month ago. The history is provided by the Patient. Matt Shaikh is a 50 y.o. female with a PMH clinically significant for Obesity, pAfib on xarelto, HTN, Hypothyroidism, S/p Splenectomy presenting to the ED via EMS c/o increasing cough productive of green sputum, shortness of breath, generalized fatigue, sinus congestion, ear pain bilaterally, decreased appetite, nausea and vomiting. States she does have chest pain when she coughs, but only when she coughs. No pain at rest.  No pain on deep inspiration. No hemoptysis. Also denies any associated abdominal pain, urinary symptoms, BLE edema/pain. Has not been eating much due to the lack of appetite and nausea. States she did have diarrhea, but only after starting the antibiotics. Was seen at urgent care center 3 days ago and was placed on moxifloxacin for possible sinus infection. States that the cough has only worsened however in both production and frequency. Denies any known history of CAD, PVD/DVT. Is on Xarelto and has been taking it regularly. States she has been going in and out of Afib which can be normal for her. Also denies any history of heart failure. States they have otherwise been feeling well. Taking all medications as indicated. Per Chart Review: PMH as noted above obtained via outpatient chart review. Most recent ECHO in 2018 showing LVEF of 55%. Possible diastolic dysfunction. REVIEW OF SYSTEMS       Review of Systems   Constitutional:  Positive for activity change, appetite change and fatigue. Negative for chills and fever.    HENT: Positive for congestion and ear pain. Negative for rhinorrhea and sore throat. Eyes:  Negative for pain and visual disturbance. Respiratory:  Positive for cough, chest tightness and shortness of breath. Cardiovascular:  Positive for chest pain and palpitations. Negative for leg swelling. Gastrointestinal:  Positive for diarrhea and nausea. Negative for abdominal pain, constipation and vomiting. Genitourinary:  Negative for difficulty urinating and dysuria. Musculoskeletal:  Negative for back pain and neck pain. Skin:  Negative for rash. Neurological:  Negative for weakness, numbness and headaches.      PAST MEDICAL HISTORY     Past Medical History:   Diagnosis Date    Abnormal Pap smear of cervix     Allergic rhinitis     Arthritis     hands    Asplenia     Atrial fibrillation (HCC)     Atrial fibrillation with RVR (HCC)     Dr. Rupal Hutchins    Bilateral hand pain     CTS (carpal tunnel syndrome) 05/2016    bilateral    External hemorrhoid     Fatigue     Heart murmur     History of ITP 2004    had spenectomy    Hypertension     meds > 3 yrs    Hypothyroidism     meds > 7 yrs    Hypothyroidism     Insomnia     Irregular periods     Meniere disease     Migraine headache     Multinodular goiter     Nail fungus     Pain in right lower leg     Paroxysmal atrial fibrillation ([address] 10/23/2020    Plantar fasciitis, left     Dr. Javy Mcgarry       Past Surgical History:   Procedure Laterality Date    CARPAL TUNNEL RELEASE Right 07/12/2016    DILATION AND CURETTAGE OF UTERUS N/A 12/16/2019    HYSTEROSCOPY NOVASURE ABLATION D&C performed by Farhad Becerra DO at [facility] ([facility])      HYSTERECTOMY, VAGINAL N/A 8/2/2021    LAPAROSCOPIC ASSISTED VAGINAL HYSTERECTOMY BILATERAL SALPINGECTOMY performed by Farhad Becerra DO at [address] 2004    done for ITP    THYROIDECTOMY, COMPLETION Left 2014    frozen section inconclusive THYROIDECTOMY, PARTIAL Right 2011    Benign nodule    TONSILLECTOMY N/A 11/2/2021    TONSILLECTOMY performed by David Llanes MD at [address] [facility] [address] HISTORY       Family History   Problem Relation Age of Onset    High Blood Pressure Mother     Anemia Mother     Atrial Fibrillation Mother     Heart Failure Mother     Other Mother         pacemaker    Heart Disease Mother     Sleep Apnea Mother     Vision Loss Mother         histoplasmosis    Heart Disease Father         CAD / Triple bypass    Diabetes Father         resolved with wt loss    Other Father         Fuck's dystropy / corneal transplants / AAA    Coronary Art Dis Father     Cancer Paternal Aunt     Breast Cancer Paternal Aunt     Cancer Paternal Grandmother     Breast Cancer Paternal Grandmother     Sleep Apnea Sister     Alcohol Abuse Brother     Other Sister         killed by drunk        SOCIAL HISTORY       Social History     Socioeconomic History    Marital status:      Spouse name: None    Number of children: 0    Years of education: None    Highest education level: None   Occupational History    Occupation: medical records   Tobacco Use    Smoking status: Every Day     Packs/day: 0.10     Years: 32.00     Pack years: 3.20     Types: Cigarettes     Start date: 10/26/1989    Smokeless tobacco: Never    Tobacco comments:     social smoker   Vaping Use    Vaping Use: Never used   Substance and Sexual Activity    Alcohol use: Yes     Comment: occas    Drug use: No     Social Determinants of Health     Financial Resource Strain: Low Risk     Difficulty of Paying Living Expenses: Not hard at all   Food Insecurity: No Food Insecurity    Worried About Running Out of Food in the Last Year: Never true    [address] in the Last Year: Never true   Transportation Needs: No Transportation Needs    Lack of Transportation (Medical): No    Lack of Transportation (Non-Medical): No   Housing Stability: Unknown    Unstable Housing in the Last Year: No       CURRENT MEDICATIONS       Previous Medications    ACETAMINOPHEN (APAP EXTRA STRENGTH) 500 MG TABLET    Take 2 tablets by mouth every 6 hours as needed for Pain    ALBUTEROL SULFATE HFA (VENTOLIN HFA) 108 (90 BASE) MCG/ACT INHALER    Inhale 2 puffs into the lungs 4 times daily as needed for Wheezing    BACILLUS COAGULANS-INULIN (PROBIOTIC FORMULA PO)    Take 1 capsule by mouth daily    BUSPIRONE (BUSPAR) 7.5 MG TABLET    TAKE ONE TABLET ONE TIME DAILY AS NEEDED (GENERIC FOR BUSPAR)    BUTALBITAL-ACETAMINOPHEN-CAFFEINE (FIORICET, ESGIC) -40 MG PER TABLET    Take 1 tablet by mouth every 4 hours as needed for Headaches    CALCIUM CARBONATE-VIT D-MIN (CALCIUM 1200) 7843-3086 MG-UNIT CHEW    Take by mouth daily     CHOLECALCIFEROL (VITAMIN D3) 50 MCG (2000 UT) CAPS    Take by mouth daily    CYCLOBENZAPRINE HCL (FLEXERIL PO)    Take by mouth    DICLOFENAC SODIUM (VOLTAREN) 1 % GEL    Apply 2 g topically 4 times daily as needed for Pain    DILTIAZEM (CARDIZEM CD) 180 MG EXTENDED RELEASE CAPSULE    TAKE 1 CAPSULE BY MOUTH ONE TIME A DAY    ECHINACEA 400 MG CAPS    Take by mouth daily     FERROUS SULFATE 325 (65 FE) MG TABLET    Take 65 mg by mouth daily (with breakfast)     FLECAINIDE (TAMBOCOR) 100 MG TABLET    Take 1 tablet by mouth as needed (PRN afib.)    FLUTICASONE (FLONASE) 50 MCG/ACT NASAL SPRAY    1 spray by Nasal route daily.     FOLIC ACID (FOLATE PO)    Take 1 tablet by mouth daily    HANDICAP PLACARD MISC    by Does not apply route Exp 5 years    LEVOTHYROXINE (SYNTHROID) 100 MCG TABLET    TAKE ONE TABLET ONE TIME DAILY    MAGNESIUM (MAGNESIUM-OXIDE) 250 MG TABS TABLET    Take 250 mg by mouth daily     MOXIFLOXACIN (AVELOX) 400 MG TABLET    Take 1 tablet by mouth daily for 10 days    MULTIPLE VITAMIN (MULTIVITAMIN PO)    Take 1 tablet by mouth daily    OMEPRAZOLE (PRILOSEC) 40 MG DELAYED RELEASE CAPSULE    Take 1 capsule by mouth every morning (before breakfast)    ONDANSETRON (ZOFRAN) 8 MG TABLET Take 1 tablet by mouth every 12 hours as needed for Nausea or Vomiting    PROGESTERONE (PROMETRIUM) 200 MG CAPS CAPSULE    Take 1 capsule by mouth nightly    RIVAROXABAN (XARELTO) 20 MG TABS TABLET    TAKE 1 TABLET BY MOUTH ONE TIME A DAY    SODIUM CHLORIDE (ALTAMIST SPRAY) 0.65 % NASAL SPRAY    1 spray by Nasal route as needed for Congestion    TRAMADOL (ULTRAM) 50 MG TABLET    Take by mouth as needed. VITAMIN B-12 (CYANOCOBALAMIN) 500 MCG TABLET    Take 500 mcg by mouth daily    ZINC 50 MG CAPS    Take by mouth daily        ALLERGIES     Penicillins, Codeine, Percocet [oxycodone-acetaminophen], Bee venom, Seasonal, Tape [adhesive tape], Hydrocodone, and Protonix [pantoprazole sodium]      PHYSICAL EXAM       ED Triage Vitals [02/24/23 0932]   BP Temp Temp Source Heart Rate Resp SpO2 Height Weight   104/65 (!) 96.7 °F (35.9 °C) Temporal 69 18 95 % 5' 6\" (1.676 m) 202 lb (91.6 kg)       Physical Exam  Vitals and nursing note reviewed. Constitutional:       General: She is not in acute distress. HENT:      Head: Normocephalic and atraumatic. Mouth/Throat:      Mouth: Mucous membranes are dry. Pharynx: Oropharynx is clear. Eyes:      Extraocular Movements: Extraocular movements intact. Pupils: Pupils are equal, round, and reactive to light. Cardiovascular:      Rate and Rhythm: Normal rate and regular rhythm. Pulses: Normal pulses. Heart sounds: Normal heart sounds. Pulmonary:      Effort: Pulmonary effort is normal. No tachypnea, accessory muscle usage or respiratory distress. Breath sounds: Examination of the right-upper field reveals rales. Examination of the right-lower field reveals rales. Rales present. Chest:      Chest wall: Tenderness (mild, reproducible over sternum.) present. Abdominal:      General: There is no distension. Palpations: Abdomen is soft. Tenderness: There is no abdominal tenderness. There is no guarding or rebound.    Musculoskeletal: General: No tenderness. Cervical back: Normal range of motion and neck supple. Right lower leg: No edema. Left lower leg: No edema. Skin:     General: Skin is warm and dry. Capillary Refill: Capillary refill takes less than 2 seconds. Neurological:      General: No focal deficit present. Mental Status: She is alert and oriented to person, place, and time. Psychiatric:         Mood and Affect: Mood normal.         Behavior: Behavior normal.       MDM:   Chart Reviewed: PMH and additional information as noted in HPI obtained from chart review    Vitals:    Vitals:    02/24/23 0932 02/24/23 1017 02/24/23 1209   BP: 104/65 88/60 (!) 96/52   Pulse: 69 69 58   Resp: 18 18 16   Temp: (!) 96.7 °F (35.9 °C) 97 °F (36.1 °C)    TempSrc: Temporal Oral Oral   SpO2: 95% 95% 96%   Weight: 202 lb (91.6 kg)     Height: 5' 6\" (1.676 m)         PROCEDURES:  Unless otherwise noted below, none  Procedures    LABS:  Labs Reviewed   CBC WITH AUTO DIFFERENTIAL - Abnormal; Notable for the following components:       Result Value    WBC 11.2 (*)     MCHC 32.8 (*)     RDW 14.9 (*)     Neutrophils Absolute 8.1 (*)     All other components within normal limits   COVID-19, RAPID   RAPID INFLUENZA A/B ANTIGENS   RESPIRATORY PANEL, MOLECULAR, WITH COVID-19   CULTURE, RESPIRATORY   COMPREHENSIVE METABOLIC PANEL   MAGNESIUM   TROPONIN   BRAIN NATRIURETIC PEPTIDE   LACTIC ACID   PROCALCITONIN       XR CHEST PORTABLE   Final Result   Prominent ground-glass infiltrates suspicious for COVID pneumonia. ED Course as of 02/24/23 1302   Fri Feb 24, 2023   1058 EKG 12 Lead  Sinus bradycardia, rate of 59 bpm.  Normal axis, normal intervals. No gross acute ST-T wave abnormalities. [NA]   1136 SARS-CoV-2, NAAT: Not Detected [NA]   1137 XR CHEST PORTABLE  Nonspecific interstitial opacities increased in the right-sided lung fields. Consistent with atypical pneumonia pattern.  [NA]   1152 Influenza A by PCR: Negative [NA]   1152 Influenza B by PCR: Negative [NA]      ED Course User Index  [NA] Henok Montalvo MD       50 y.o. female with a PMH clinically significant for Obesity, pAfib on xarelto, HTN, Hypothyroidism, S/p Splenectomy presenting to the ED via EMS c/o increasing cough productive of green sputum, shortness of breath, generalized fatigue, sinus congestion, ear pain bilaterally, decreased appetite, nausea and vomiting. Upon initial evaluation, Pt fatigued appearing, but otherwise Afebrile, HDS and in NAD. PE as noted above. Labs, , EKG,, and Imaging interpreted by myself and as noted above. Given findings, clinical presentation most likely consistent w/ atypical right-sided pneumonia with interstitial opacities throughout the right-sided lung fields. Not hypoxemic in the ED. Low suspicion for PE/DVT given patient already on Xarelto and without pain with deep inspiration, no hemoptysis and not tachycardic. Patient appearing to fail outpatient moxifloxacin. Given concern for immunocompromise status with patient status post splenectomy, discussed with infectious disease, Dr. Marcelle Harris, who recommended sputum cultures, procalcitonin, initiation on ceftriaxone and azithromycin and admission for further evaluation management given failure of outpatient antibiotics. Will therefore be admitted at this time. Low suspicion for acute decompensated heart failure. Although recommendations for ceftriaxone, patient does have history of anaphylaxis to penicillins. Therefore initiated on levofloxacin in the ED. Can be adjusted as an inpatient. This was discussed with internal medicine. Given patient's repeat blood pressure mildly low while she was resting comfortably in the bedside, will add lactic acid to evaluate further for possible sepsis. Also communicated with internal medicine physician.    Pt was administered   Medications   0.9 % sodium chloride bolus (has no administration in time range)   azithromycin (ZITHROMAX) 500 mg in 250 mL addavial (has no administration in time range)   levoFLOXacin (LEVAQUIN) 750 MG/150ML infusion 750 mg (has no administration in time range)   acetaminophen (TYLENOL) tablet 1,000 mg (1,000 mg Oral Given 2/24/23 1045)   0.9 % sodium chloride bolus (1,000 mLs IntraVENous New Bag 2/24/23 1045)   guaiFENesin-dextromethorphan (ROBITUSSIN DM) 100-10 MG/5ML syrup 5 mL (5 mLs Oral Given 2/24/23 1220)   dexamethasone (DECADRON) injection 10 mg (10 mg IntraVENous Given 2/24/23 1213)       Plan: Admit to IM for further evaluation and management. Report given to Dr. Potter. and Patient understanding and amenable to the POC.    CRITICAL CARE TIME   Total CriticalCare time was 0 minutes, excluding separately reportable procedures.  There was a high probability of clinically significant/life threatening deterioration in the patient's condition which required my urgent intervention.        FINAL IMPRESSION      1. Pneumonia of right lung due to infectious organism, unspecified part of lung    2. Shortness of breath          DISPOSITION/PLAN   DISPOSITION Admitted 02/24/2023 12:59:45 PM      Current Discharge Medication List           MD Gilson Hunt MD  02/24/23 4889

## 2023-02-24 NOTE — LETTER
[address] [facility] [address] [address]  Phone: [phone]    Dr. Ursula Wang        February 25, 2023     Patient: Alva Fernandes   YOB: 1975   Date of Visit: 2/24/2023       To Whom It May Concern: It is my medical opinion that Yamilet Viveros may return to work on Tuesday, 2/28/2023. .    If you have any questions or concerns, please don't hesitate to call.     Sincerely,        Dr. Ursula Wang D.O.

## 2023-02-25 ENCOUNTER — TELEPHONE (OUTPATIENT)
Dept: INFECTIOUS DISEASES | Age: 48
End: 2023-02-25

## 2023-02-25 VITALS
WEIGHT: 202 LBS | SYSTOLIC BLOOD PRESSURE: 123 MMHG | BODY MASS INDEX: 32.47 KG/M2 | RESPIRATION RATE: 16 BRPM | OXYGEN SATURATION: 92 % | DIASTOLIC BLOOD PRESSURE: 64 MMHG | HEART RATE: 61 BPM | HEIGHT: 66 IN | TEMPERATURE: 97.7 F

## 2023-02-25 LAB
ANION GAP SERPL CALCULATED.3IONS-SCNC: 14 MEQ/L (ref 9–15)
BASOPHILS ABSOLUTE: 0 K/UL (ref 0–0.2)
BASOPHILS RELATIVE PERCENT: 0.2 %
BUN BLDV-MCNC: 8 MG/DL (ref 6–20)
CALCIUM SERPL-MCNC: 9.3 MG/DL (ref 8.5–9.9)
CHLORIDE BLD-SCNC: 107 MEQ/L (ref 95–107)
CO2: 24 MEQ/L (ref 20–31)
CREAT SERPL-MCNC: 0.61 MG/DL (ref 0.5–0.9)
EKG ATRIAL RATE: 53 BPM
EKG P AXIS: 50 DEGREES
EKG P-R INTERVAL: 144 MS
EKG Q-T INTERVAL: 430 MS
EKG QRS DURATION: 86 MS
EKG QTC CALCULATION (BAZETT): 403 MS
EKG R AXIS: 25 DEGREES
EKG T AXIS: 30 DEGREES
EKG VENTRICULAR RATE: 53 BPM
EOSINOPHILS ABSOLUTE: 0 K/UL (ref 0–0.7)
EOSINOPHILS RELATIVE PERCENT: 0 %
GFR SERPL CREATININE-BSD FRML MDRD: >60 ML/MIN/{1.73_M2}
GLUCOSE BLD-MCNC: 122 MG/DL (ref 70–99)
HCT VFR BLD CALC: 39.4 % (ref 37–47)
HEMOGLOBIN: 13.4 G/DL (ref 12–16)
LACTIC ACID: 1.3 MMOL/L (ref 0.5–2.2)
LYMPHOCYTES ABSOLUTE: 1.6 K/UL (ref 1–4.8)
LYMPHOCYTES RELATIVE PERCENT: 21.8 %
MCH RBC QN AUTO: 31.3 PG (ref 27–31.3)
MCHC RBC AUTO-ENTMCNC: 34 % (ref 33–37)
MCV RBC AUTO: 92.2 FL (ref 79.4–94.8)
MONOCYTES ABSOLUTE: 0.3 K/UL (ref 0.2–0.8)
MONOCYTES RELATIVE PERCENT: 4.3 %
NEUTROPHILS ABSOLUTE: 5.5 K/UL (ref 1.4–6.5)
NEUTROPHILS RELATIVE PERCENT: 73.7 %
PDW BLD-RTO: 15 % (ref 11.5–14.5)
PLATELET # BLD: 388 K/UL (ref 130–400)
POTASSIUM REFLEX MAGNESIUM: 4 MEQ/L (ref 3.4–4.9)
PROCALCITONIN: 0.06 NG/ML (ref 0–0.15)
RBC # BLD: 4.28 M/UL (ref 4.2–5.4)
SODIUM BLD-SCNC: 145 MEQ/L (ref 135–144)
WBC # BLD: 7.5 K/UL (ref 4.8–10.8)

## 2023-02-25 PROCEDURE — 83605 ASSAY OF LACTIC ACID: CPT

## 2023-02-25 PROCEDURE — 6360000002 HC RX W HCPCS: Performed by: INTERNAL MEDICINE

## 2023-02-25 PROCEDURE — 84145 PROCALCITONIN (PCT): CPT

## 2023-02-25 PROCEDURE — 6370000000 HC RX 637 (ALT 250 FOR IP): Performed by: INTERNAL MEDICINE

## 2023-02-25 PROCEDURE — 36415 COLL VENOUS BLD VENIPUNCTURE: CPT

## 2023-02-25 PROCEDURE — 85025 COMPLETE CBC W/AUTO DIFF WBC: CPT

## 2023-02-25 PROCEDURE — 2580000003 HC RX 258: Performed by: INTERNAL MEDICINE

## 2023-02-25 PROCEDURE — 80048 BASIC METABOLIC PNL TOTAL CA: CPT

## 2023-02-25 RX ADMIN — LEVOTHYROXINE SODIUM 100 MCG: 100 TABLET ORAL at 05:54

## 2023-02-25 RX ADMIN — SODIUM CHLORIDE, PRESERVATIVE FREE 10 ML: 5 INJECTION INTRAVENOUS at 08:49

## 2023-02-25 RX ADMIN — ONDANSETRON 4 MG: 2 INJECTION INTRAMUSCULAR; INTRAVENOUS at 11:24

## 2023-02-25 RX ADMIN — RIVAROXABAN 20 MG: 20 TABLET, FILM COATED ORAL at 08:49

## 2023-02-25 ASSESSMENT — PAIN SCALES - GENERAL
PAINLEVEL_OUTOF10: 0
PAINLEVEL_OUTOF10: 0

## 2023-02-25 NOTE — FLOWSHEET NOTE
Patient asking for home cardizem. Dr. Patricia Page message. Dr. Cinda Jensen Nurse to repond. Holding home cardizem for now due to BP and pulse rate.

## 2023-02-25 NOTE — PLAN OF CARE
Problem: Discharge Planning  Goal: Discharge to home or other facility with appropriate resources  2/25/2023 1412 by Merlin Walsh RN  Outcome: Progressing  2/25/2023 0423 by Humberto Shepard RN  Outcome: Progressing  Flowsheets (Taken 2/24/2023 2000 by Dave Delgado RN)  Discharge to home or other facility with appropriate resources: Identify barriers to discharge with patient and caregiver     Problem: Pain  Goal: Verbalizes/displays adequate comfort level or baseline comfort level  2/25/2023 1412 by Merlin Walsh RN  Outcome: Progressing  2/25/2023 0423 by Humberto Shepard RN  Outcome: Progressing     Problem: ABCDS Injury Assessment  Goal: Absence of physical injury  2/25/2023 1412 by Merlin Walsh RN  Outcome: Progressing  2/25/2023 0423 by Humberto Shepard RN  Outcome: Progressing

## 2023-02-25 NOTE — PROGRESS NOTES
Pt discharged home, IV removed, discharge teaching complete, pt expressed understanding of medications and follow up apts. Pt walked to main entrance per her request with  waiting.

## 2023-02-25 NOTE — FLOWSHEET NOTE
1700  Pt arrived to [facility] from ER via cart. Pt ambulatory to bed with steady gait. Pt oriented to room, call light and environment. Vitals obtained and stable. Pt has no c/o chest pain or SOB, resp unlabored, no distress noted. Family at bedside. [address]  Dr Layla Harris in to see patient. 1800 Admission assessment complete. Pt eating dinner. No requests at this time.

## 2023-02-25 NOTE — TELEPHONE ENCOUNTER
Called by ER regarding consult yesterday - patient with URI and failure of moxifloxacin outpatient. Asplenic hx. Orders given for procal, cxr, respiratory viral panel, and abx.   Her Human Metapneumovirus is positive. Procal not significantly elevated. Likely viral etiology. CXR reviewed.   Spoke to hospitalist. She will be discharged prior to my being able to see her as a consult.   No abx on discharge.     I have sent a message to my office for follow up this week   She does follow with Dr Cheri CORREA.  She has had Doxycycline, Moxi, Levaquin and Azithro in the past month in varying doses/ courses.     Jessica Seymour, DO

## 2023-02-25 NOTE — DISCHARGE INSTR - COC
Continuity of Care Form    Patient Name: Cabrera Cason   :  1975  MRN:  [id number]    Admit date:  2023  Discharge date:  ***    Code Status Order: Full Code   Advance Directives:     Admitting Physician:  Ayush Aguiar DO  PCP: Lieutenant Jamar MD    Discharging Nurse: [facility] Unit/Room#: T165/T967-85  Discharging Unit Phone Number: ***    Emergency Contact:   Extended Emergency Contact Information  Primary Emergency Contact: Farrah Tran [address] Phone: [phone]  Work Phone: [phone]  Mobile Phone: [phone]  Relation: Parent  Secondary Emergency Contact: [address] [facility] Phone: [phone]  Mobile Phone: [phone]  Relation: Parent    Past Surgical History:  Past Surgical History:   Procedure Laterality Date    CARPAL TUNNEL RELEASE Right 2016    DILATION AND CURETTAGE OF UTERUS N/A 2019    HYSTEROSCOPY NOVASURE ABLATION D&C performed by Lauri Gordon DO at [facility] [facility] ([address] [place] [address])      HYSTERECTOMY, VAGINAL N/A 2021    LAPAROSCOPIC ASSISTED VAGINAL HYSTERECTOMY BILATERAL SALPINGECTOMY performed by Lauri Gordon DO at [address]     done for ITP    THYROIDECTOMY, COMPLETION Left     frozen section inconclusive    THYROIDECTOMY, PARTIAL Right     Benign nodule    TONSILLECTOMY N/A 2021    TONSILLECTOMY performed by Agustín Farnsworth MD at [facility]       Immunization History:   Immunization History   Administered Date(s) Administered    COVID-19, MODERNA BLUE border, Primary or Immunocompromised, (age 12y+), IM, 100 mcg/0.5mL 2021, 2021, 2022    COVID-19, MODERNA Bivalent BOOSTER, (age 12y+), IM, 50 mcg/0.5 mL 2022    COVID-19, MODERNA Booster BLUE border, (age 18y+), IM, 50mcg/0.25mL 10/29/2021    HIB PRP-T (ActHIB, Hiberix) 2021    Influenza Vaccine, unspecified formulation 2016    Influenza Virus Vaccine 10/05/2017, 10/04/2018, 10/18/2018, 10/23/2019, 10/13/2020, 10/12/2021, 10/12/2021, 10/10/2022    Influenza Whole 10/27/2015    Meningococcal B, Recombinant Michelle Heft) 02/15/2022, 08/15/2022    Meningococcal MCV4P (Menactra) 08/12/2019, 10/23/2019    Meningococcal MPSV4 (Menomune) 07/12/2005    Pneumococcal Conjugate 13-valent (Geovanna Rim) 02/15/2022    Pneumococcal Polysaccharide ([id number]) 10/09/2009, 10/09/2014, 08/12/2019    Tdap (Boostrix, Adacel) 11/17/2015       Active Problems:  Patient Active Problem List   Diagnosis Code    Hypothyroidism E03.9    Obesity E66.9    Meniere disease H81.09    Irregular periods N92.6    Asplenia Q89.01    Allergic rhinitis J30.9    Migraine G43.909    Multinodular goiter E04.2    Migraine headache G43.909    Insomnia G47.00    Hypertension I10    Menorrhagia N92.0    Pelvic pain R10.2    Postoperative pain G89.18    Paroxysmal atrial fibrillation (HCC) I48.0    Tonsillar hypertrophy J35.1    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    Atypical pneumonia J18.9       Isolation/Infection:   Isolation            Contact          Patient Infection Status       Infection Onset Added Last Indicated Last Indicated By Review Planned Expiration Resolved Resolved By    Human Metapneumovirus 02/24/23 02/24/23 02/24/23 Respiratory Panel, Molecular, with COVID-19 (Restricted: peds pts or suitable admitted adults) 03/03/23 03/06/23      Resolved    COVID-19 (Rule Out) 02/24/23 02/24/23 02/24/23 Respiratory Panel, Molecular, with COVID-19 (Restricted: peds pts or suitable admitted adults) (Ordered)   02/24/23 Rule-Out Test Resulted    COVID-19 (Rule Out) 02/24/23 02/24/23 02/24/23 COVID-19, Rapid (Ordered)   02/24/23 Rule-Out Test Resulted            Nurse Assessment:  Last Vital Signs: /64   Pulse 61   Temp 97.7 °F (36.5 °C) (Oral)   Resp 16   Ht 5' 6\" (1.676 m)   Wt 202 lb (91.6 kg)   LMP 11/04/2019   SpO2 92%   BMI 32.60 kg/m²     Last documented pain score (0-10 scale): Pain Level: 0  Last Weight:    Wt Readings from Last 1 Encounters:   23 202 lb (91.6 kg)     Mental Status:  {IP PT MENTAL STATUS:}    IV Access:  { CHEN IV ACCESS:089394108}    Nursing Mobility/ADLs:  Walking   {CHP DME RYDT:817767610}  Transfer  {CHP DME RTLM:906562586}  Bathing  {CHP DME HDFI:983327254}  Dressing  {CHP DME LOWW:295305191}  Toileting  {CHP DME BMMB:498933939}  Feeding  {CHP DME KOIW:635488355}  Med Admin  {CHP DME FFDE:189433595}  Med Delivery   { CHEN MED Delivery:880140124}    Wound Care Documentation and Therapy:  Incision 21 Abdomen (Active)   Number of days: 572       Incision 21 Vagina (Active)   Number of days: 572       Incision 21 Mouth (Active)   Number of days: 480        Elimination:  Continence: Bowel: {YES / QZ:79643}  Bladder: {YES / YK:64294}  Urinary Catheter: {Urinary Catheter:316508912}   Colostomy/Ileostomy/Ileal Conduit: {YES / ZR:98077}       Date of Last BM: ***    Intake/Output Summary (Last 24 hours) at 2023 1358  Last data filed at 2023 1724  Gross per 24 hour   Intake 240 ml   Output --   Net 240 ml     I/O last 3 completed shifts:   In: 240 [P.O.:240]  Out: -     Safety Concerns:     [address] Safety Concerns:463786179}    Impairments/Disabilities:      [address] Impairments/Disabilities:095297485}    Nutrition Therapy:  Current Nutrition Therapy:   [address] Diet List:921353212}    Routes of Feeding: {P DME Other Feedings:825566007}  Liquids: {Slp liquid thickness:93374}  Daily Fluid Restriction: {CHP DME Yes amt example:300350646}  Last Modified Barium Swallow with Video (Video Swallowing Test): {Done Not Done TXDD:841235782}    Treatments at the Time of Hospital Discharge:   Respiratory Treatments: ***  Oxygen Therapy:  {Therapy; copd oxygen:08430}  Ventilator:    { CC Vent UCEI:617722676}    Rehab Therapies: {THERAPEUTIC INTERVENTION:0149919279}  Weight Bearing Status/Restrictions: [address] Weight Bearin}  Other Medical Equipment (for information only, NOT a DME order):  {EQUIPMENT:228060632}  Other Treatments: ***    Patient's personal belongings (please select all that are sent with patient):  {CHP DME Belongings:934398405}    RN SIGNATURE:  {Esignature:452653743}    CASE MANAGEMENT/SOCIAL WORK SECTION    Inpatient Status Date: ***    Readmission Risk Assessment Score:  Readmission Risk              Risk of Unplanned Readmission:  11           Discharging to Facility/ Agency   Name:   Address:  Phone:  Fax:    Dialysis Facility (if applicable)   Name:  Address:  Dialysis Schedule:  Phone:  Fax:    / signature: {Esignature:609402744}    PHYSICIAN SECTION    Prognosis: {Prognosis:4821212698}    Condition at Discharge: [address] [place] [address] Patient Condition:567784828}    Rehab Potential (if transferring to Rehab): {Prognosis:0517680383}    Recommended Labs or Other Treatments After Discharge: ***    Physician Certification: I certify the above information and transfer of Tasha Schroeder  is necessary for the continuing treatment of the diagnosis listed and that she requires {Admit to Appropriate Level of Care:93638} for {GREATER/LESS:807546512} 30 days.      Update Admission H&P: {CHP DME Changes in LAXQC:828073533}    PHYSICIAN SIGNATURE:  {Esignature:645472679}

## 2023-02-25 NOTE — DISCHARGE INSTR - DIET

## 2023-02-25 NOTE — DISCHARGE SUMMARY
Discharge Summary    Date: 2/25/2023  Patient Name: Tasha Schroeder    YOB: 1975     Age: 50 y.o. Admit Date: 2/24/2023  Discharge Date: 2/25/2023  Discharge Condition: Good    Admission Diagnosis  Shortness of breath [R06.02]; Atypical pneumonia [J18.9]; Pneumonia of right lung due to infectious organism, unspecified part of lung [J18.9]      Discharge Diagnosis  Principal Problem:    Atypical pneumonia  Resolved Problems:    * No resolved hospital problems. 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 She has had symptoms intermittently for the preceding several weeks, increasing approximately 1 week ago for which she was seen at outpatient urgent care and placed on a lety quinolone, but with symptoms continuing to worsen over the next several days until she presented to the ED for evaluation. She received empiric broad-spectrum antibiotics in the ED, as well as sputum culture, and respiratory pathogen panel with COVID-19 via PCR test was collected. Hospitalist service was consulted for admission. She had no significant hypoxia in the ED but did have subjective dyspnea and intermittent cough productive of yellow-green sputum. After admission to the medical floor, respiratory pathogen panel did result positive for human metapneumovirus and negative for all other tested respiratory pathogen's. Patient additionally had negative procalcitonin, arguing against acute bacterial infection, including atypicals or encapsulated organisms. Patient was monitored overnight, did not demonstrate any hypoxia. Infectious disease service was consulted.   Patient demonstrated improvement in isolated mild leukocytosis present on admission and had repeat negative procalcitonin the next morning. Infectious disease service reviewed available information and did feel that this was most consistent with viral bronchitis, and that patient could reasonably be discharged with outpatient follow-up given lack of hypoxia and improvement in laboratory measurements. Patient was deemed sufficiently improved and appropriate for discharge home with outpatient follow-up as of 2/25/2023. Consultants:  [facility] CONSULT TO INFECTIOUS DISEASES    Surgeries/procedures Performed:      Treatments:    Respiratory Therapy        Discharge Plan/Disposition:  Home    Hospital/Incidental Findings Requiring Follow Up:    Patient Instructions:    Diet:    Activity:Activity as Tolerated  For number of days (if applicable): Other Instructions:    Provider Follow-Up:   No follow-ups on file.      Significant Diagnostic Studies:    Recent Labs:  Admission on 02/24/2023, Discharged on 02/25/2023  Ventricular Rate                              Date: 02/24/2023  Value: 59          Ref range: BPM                Status: Final  Atrial Rate                                   Date: 02/24/2023  Value: 59          Ref range: BPM                Status: Final  P-R Interval                                  Date: 02/24/2023  Value: 144         Ref range: ms                 Status: Final  QRS Duration                                  Date: 02/24/2023  Value: 78          Ref range: ms                 Status: Final  Q-T Interval                                  Date: 02/24/2023  Value: 404         Ref range: ms                 Status: Final  QTc Calculation (Bazett)                      Date: 02/24/2023  Value: 399         Ref range: ms                 Status: Final  P Axis                                        Date: 02/24/2023  Value: 61          Ref range: degrees            Status: Final  R Axis                                        Date: 02/24/2023  Value: 44          Ref range: degrees            Status: Final  T Axis Date: 02/24/2023  Value: 48          Ref range: degrees            Status: Final  Sodium                                        Date: 02/24/2023  Value: 141         Ref range: 135 - 144 mEq/L    Status: Final  Potassium                                     Date: 02/24/2023  Value: 4.2         Ref range: 3.4 - 4.9 mEq/L    Status: Final  Chloride                                      Date: 02/24/2023  Value: 103         Ref range: 95 - 107 mEq/L     Status: Final  CO2                                           Date: 02/24/2023  Value: 25          Ref range: 20 - 31 mEq/L      Status: Final  Anion Gap                                     Date: 02/24/2023  Value: 13          Ref range: 9 - 15 mEq/L       Status: Final  Glucose                                       Date: 02/24/2023  Value: 93          Ref range: 70 - 99 mg/dL      Status: Final  BUN                                           Date: 02/24/2023  Value: 8           Ref range: 6 - 20 mg/dL       Status: Final  Creatinine                                    Date: 02/24/2023  Value: 0.77        Ref range: 0.50 - 0.90 mg/dL  Status: Final  Est, Glom Filt Rate                           Date: 02/24/2023  Value: >60.0       Ref range: >60                Status: Final                Comment: Pediatric calculator link  [facility].at. [email or web address]_[email or web address]  Effective Oct 3, 2022  These results are not intended for use in patients  <25years of age. eGFR results are calculated without  a race factor using the 2021 CKD-EPI equation. Careful  clinical correlation is recommended, particularly when  comparing to results calculated using previous equations. The CKD-EPI equation is less accurate in patients with  extremes of muscle mass, extra-renal metabolism of  creatinine, excessive creatinine ingestion, or following  therapy that affects renal tubular secretion.     Calcium                                       Date: 02/24/2023  Value: 8.7         Ref range: 8.5 - 9.9 mg/dL    Status: Final  Total Protein                                 Date: 02/24/2023  Value: 6.7         Ref range: 6.3 - 8.0 g/dL     Status: Final  Albumin                                       Date: 02/24/2023  Value: 3.6         Ref range: 3.5 - 4.6 g/dL     Status: Final  Total Bilirubin                               Date: 02/24/2023  Value: 0.3         Ref range: 0.2 - 0.7 mg/dL    Status: Final  Alkaline Phosphatase                          Date: 02/24/2023  Value: 107         Ref range: 40 - 130 U/L       Status: Final  ALT                                           Date: 02/24/2023  Value: 9           Ref range: 0 - 33 U/L         Status: Final  AST                                           Date: 02/24/2023  Value: 13          Ref range: 0 - 35 U/L         Status: Final  Globulin                                      Date: 02/24/2023  Value: 3.1         Ref range: 2.3 - 3.5 g/dL     Status: Final  Magnesium                                     Date: 02/24/2023  Value: 2.0         Ref range: 1.7 - 2.4 mg/dL    Status: Final  WBC                                           Date: 02/24/2023  Value: 11.2 (A)    Ref range: 4.8 - 10.8 K/uL    Status: Final  RBC                                           Date: 02/24/2023  Value: 4.41        Ref range: 4.20 - 5.40 M/uL   Status: Final  Hemoglobin                                    Date: 02/24/2023  Value: 13.2        Ref range: 12.0 - 16.0 g/dL   Status: Final  Hematocrit                                    Date: 02/24/2023  Value: 40.2        Ref range: 37.0 - 47.0 %      Status: Final  MCV                                           Date: 02/24/2023  Value: 91.0        Ref range: 79.4 - 94.8 fL     Status: Final  MCH                                           Date: 02/24/2023  Value: 29.9        Ref range: 27.0 - 31.3 pg     Status: Final  MCHC                                          Date: 02/24/2023  Value: 32.8 (A) Ref range: 33.0 - 37.0 %      Status: Final  RDW                                           Date: 02/24/2023  Value: 14.9 (A)    Ref range: 11.5 - 14.5 %      Status: Final  Platelets                                     Date: 02/24/2023  Value: 398         Ref range: 130 - 400 K/uL     Status: Final  Neutrophils %                                 Date: 02/24/2023  Value: 72.3        Ref range: %                  Status: Final  Lymphocytes %                                 Date: 02/24/2023  Value: 20.7        Ref range: %                  Status: Final  Monocytes %                                   Date: 02/24/2023  Value: 6.4         Ref range: %                  Status: Final  Eosinophils %                                 Date: 02/24/2023  Value: 0.0         Ref range: %                  Status: Final  Basophils %                                   Date: 02/24/2023  Value: 0.6         Ref range: %                  Status: Final  Neutrophils Absolute                          Date: 02/24/2023  Value: 8.1 (A)     Ref range: 1.4 - 6.5 K/uL     Status: Final  Lymphocytes Absolute                          Date: 02/24/2023  Value: 2.3         Ref range: 1.0 - 4.8 K/uL     Status: Final  Monocytes Absolute                            Date: 02/24/2023  Value: 0.7         Ref range: 0.2 - 0.8 K/uL     Status: Final  Eosinophils Absolute                          Date: 02/24/2023  Value: 0.0         Ref range: 0.0 - 0.7 K/uL     Status: Final  Basophils Absolute                            Date: 02/24/2023  Value: 0.1         Ref range: 0.0 - 0.2 K/uL     Status: Final  Troponin                                      Date: 02/24/2023  Value: <0.010      Ref range: 0.000 - 0.010 ng*  Status: Final                Comment: Methodology by Troponin T.  Pro-BNP                                       Date: 02/24/2023  Value: 136         Ref range: pg/mL              Status: Final                Comment: NT-pro BNP ACUTE Interpretive Guidelines:        Age        Cutoff for Heart Failure     Less than 50 yrs   450 pg/mL     50-75 yrs           900 pg/mL     Greater than 75 yrs  1800 pg/mL    NT-pro BNP NON-ACUTE Interpretive Guidelines:      Age                 Reference Range    Less than 74 yrs   0-125 pg/mL    Greater than 74 yrs   0-450 pg/mL    Other possible causes of an elevated NT-proBNP include:  cardiac ischemia, acute coronary syndrome, COPD, pneumonia,  atrial fibrillation, pulmonary emboli, pulmonary hypertension,  pericarditis    Reference:  Lorice Cheadle., et al. NT-proBNP testing for diagnosis and  short-term prognosis in acute destabilized HF: an international  pooled analysis of 1256 patients.  Heart Journal.  1030;99:[id number]      SARS-CoV-2, NAAT                              Date: 02/24/2023  Value: Not Detected                     Ref range: Not Detected       Status: Final                Comment: Rapid NAAT:   Negative results should be treated as presumptive and,  if inconsistent with clinical signs and symptoms or necessary for  patient management, should be tested with an alternative molecular  assay. Negative results do not preclude SARS-CoV-2 infection and  should not be used as the sole basis for patient management decisions. This test has been authorized by the FDA under an Emergency Use  Authorization (EUA) for use by authorized laboratories.     Fact sheet for Healthcare Providers:  http://www.linda.kathy/  Fact sheet for Patients: http://www.javier-kristian.kathy/    METHODOLOGY: Isothermal Nucleic Acid Amplification    Influenza A by PCR                            Date: 02/24/2023  Value: Negative      Status: Final  Influenza B by PCR                            Date: 02/24/2023  Value: Negative      Status: Final  Adenovirus by PCR                             Date: 02/24/2023  Value: Not Detected                     Ref range: Not Detected       Status: Final  Bordetella parapertussis by PCR               Date: 02/24/2023  Value: Not Detected                     Ref range: Not Detected       Status: Final  Bordetella pertussis by PCR                   Date: 02/24/2023  Value: Not Detected                     Ref range: Not Detected       Status: Final  Chlamydophilia pneumoniae by PCR              Date: 02/24/2023  Value: Not Detected                     Ref range: Not Detected       Status: Final  Coronavirus 229E by PCR                       Date: 02/24/2023  Value: Not Detected                     Ref range: Not Detected       Status: Final  Coronavirus HKU1 by PCR                       Date: 02/24/2023  Value: Not Detected                     Ref range: Not Detected       Status: Final  Coronavirus NL63 by PCR                       Date: 02/24/2023  Value: Not Detected                     Ref range: Not Detected       Status: Final  Coronavirus OC43 by PCR                       Date: 02/24/2023  Value: Not Detected                     Ref range: Not Detected       Status: Final  SARS-CoV-2, PCR                               Date: 02/24/2023  Value: Not Detected                     Ref range: Not Detected       Status: Final  Human Metapneumovirus by PCR                  Date: 02/24/2023  Value: DETECTED (A) Ref range: Not Detected       Status: Final  Human Rhinovirus/Enterovirus by PCR           Date: 02/24/2023  Value: Not Detected                     Ref range: Not Detected       Status: Final  Influenza A by PCR                            Date: 02/24/2023  Value: Not Detected                     Ref range: Not Detected       Status: Final  Influenza B by PCR                            Date: 02/24/2023  Value: Not Detected                     Ref range: Not Detected       Status: Final  Mycoplasma pneumoniae by PCR                  Date: 02/24/2023  Value: Not Detected                     Ref range: Not Detected       Status: Final  Parainfluenza Virus 1 by PCR Date: 02/24/2023  Value: Not Detected                     Ref range: Not Detected       Status: Final  Parainfluenza Virus 2 by PCR                  Date: 02/24/2023  Value: Not Detected                     Ref range: Not Detected       Status: Final  Parainfluenza Virus 3 by PCR                  Date: 02/24/2023  Value: Not Detected                     Ref range: Not Detected       Status: Final  Parainfluenza Virus 4 by PCR                  Date: 02/24/2023  Value: Not Detected                     Ref range: Not Detected       Status: Final  Respiratory Syncytial Virus by PCR            Date: 02/24/2023  Value: Not Detected                     Ref range: Not Detected       Status: Final  CULTURE, RESPIRATORY                          Date: 02/24/2023  Value:               Status: Preliminary                   Value:Direct Exam:  >25 NEUTROPHILS/LPF  Direct Exam:  < 10 EPITHELIAL CELLS/LPF  Direct Exam:  MIXED BACTERIAL MORPHOTYPES SEEN ON GRAM STAIN. Cult,Respiratory:  NORMAL RESPIRATORY KYM  Cult,Respiratory:  MODERATE GROWTH  Performed at [facility] [id number] [place] [address]  [phone]    Lactic Acid                                   Date: 02/24/2023  Value: 1.0         Ref range: 0.5 - 2.2 mmol/L   Status: Final  Procalcitonin                                 Date: 02/24/2023  Value: 0.09        Ref range: 0.00 - 0.15 ng/mL  Status: Final                Comment: Suspected Sepsis:  Low likelihood of sepsis  <.50 ng/mL    Increased likelihood of sepsis 0.50-2.00 ng/mL  Antibiotics encouraged    High risk of sepsis/shock   >2.00 ng/mL  Antibiotics strongly encouraged    Suspected Lower Respiratory Tract Infections:  Low likelihood of bacterial infection  <0.24 ng/mL    Increased likelihood of bacterial infection >0.24 ng/mL  Antibiotics encouraged    With successful antibiotic therapy, PCT levels should decrease  rapidly.  (Half-life of 24 to 36 hours.)    Procalcitonin values from samples collected within the first  6 hours of systemic infection may still be low. Retesting may be indicated. Values from day 1 and day 4 can be entered into the Change in  Procalcitonin Calculator to determine the patient's  Mortality Risk Prognosis  ([email or web address])    In healthy neonates, plasma Procalcitonin (PCT) concentrations  increase gradually after birth, reaching peak values at about  24 hours of age then decrease to normal values below 0.5                          ng/mL  by 48-72 hours of age. Sodium                                        Date: 02/25/2023  Value: 145 (A)     Ref range: 135 - 144 mEq/L    Status: Final  Potassium reflex Magnesium                    Date: 02/25/2023  Value: 4.0         Ref range: 3.4 - 4.9 mEq/L    Status: Final  Chloride                                      Date: 02/25/2023  Value: 107         Ref range: 95 - 107 mEq/L     Status: Final  CO2                                           Date: 02/25/2023  Value: 24          Ref range: 20 - 31 mEq/L      Status: Final  Anion Gap                                     Date: 02/25/2023  Value: 14          Ref range: 9 - 15 mEq/L       Status: Final  Glucose                                       Date: 02/25/2023  Value: 122 (A)     Ref range: 70 - 99 mg/dL      Status: Final  BUN                                           Date: 02/25/2023  Value: 8           Ref range: 6 - 20 mg/dL       Status: Final  Creatinine                                    Date: 02/25/2023  Value: 0.61        Ref range: 0.50 - 0.90 mg/dL  Status: Final  Est, Glom Filt Rate                           Date: 02/25/2023  Value: >60.0       Ref range: >60                Status: Final                Comment: Pediatric calculator link  [employer].. [email or web address]  Effective Oct 3, 2022  These results are not intended for use in patients  <25years of age.   eGFR results are calculated without  a race factor using the 2021 CKD-EPI equation. Careful  clinical correlation is recommended, particularly when  comparing to results calculated using previous equations. The CKD-EPI equation is less accurate in patients with  extremes of muscle mass, extra-renal metabolism of  creatinine, excessive creatinine ingestion, or following  therapy that affects renal tubular secretion. Calcium                                       Date: 02/25/2023  Value: 9.3         Ref range: 8.5 - 9.9 mg/dL    Status: Final  Lactic Acid                                   Date: 02/25/2023  Value: 1.3         Ref range: 0.5 - 2.2 mmol/L   Status: Final  Procalcitonin                                 Date: 02/25/2023  Value: 0.06        Ref range: 0.00 - 0.15 ng/mL  Status: Final                Comment: Suspected Sepsis:  Low likelihood of sepsis  <.50 ng/mL    Increased likelihood of sepsis 0.50-2.00 ng/mL  Antibiotics encouraged    High risk of sepsis/shock   >2.00 ng/mL  Antibiotics strongly encouraged    Suspected Lower Respiratory Tract Infections:  Low likelihood of bacterial infection  <0.24 ng/mL    Increased likelihood of bacterial infection >0.24 ng/mL  Antibiotics encouraged    With successful antibiotic therapy, PCT levels should decrease  rapidly. (Half-life of 24 to 36 hours.)    Procalcitonin values from samples collected within the first  6 hours of systemic infection may still be low. Retesting may be indicated. Values from day 1 and day 4 can be entered into the Change in  Procalcitonin Calculator to determine the patient's  Mortality Risk Prognosis  ([email or web address])    In healthy neonates, plasma Procalcitonin (PCT) concentrations  increase gradually after birth, reaching peak values at about  24 hours of age then decrease to normal values below 0.5                          ng/mL  by 48-72 hours of age.     WBC                                           Date: 02/25/2023  Value: 7.5         Ref range: 4.8 - 10.8 K/uL    Status: Final  RBC                                           Date: 02/25/2023  Value: 4.28        Ref range: 4.20 - 5.40 M/uL   Status: Final  Hemoglobin                                    Date: 02/25/2023  Value: 13.4        Ref range: 12.0 - 16.0 g/dL   Status: Final  Hematocrit                                    Date: 02/25/2023  Value: 39.4        Ref range: 37.0 - 47.0 %      Status: Final  MCV                                           Date: 02/25/2023  Value: 92.2        Ref range: 79.4 - 94.8 fL     Status: Final  MCH                                           Date: 02/25/2023  Value: 31.3        Ref range: 27.0 - 31.3 pg     Status: Final  MCHC                                          Date: 02/25/2023  Value: 34.0        Ref range: 33.0 - 37.0 %      Status: Final  RDW                                           Date: 02/25/2023  Value: 15.0 (A)    Ref range: 11.5 - 14.5 %      Status: Final  Platelets                                     Date: 02/25/2023  Value: 388         Ref range: 130 - 400 K/uL     Status: Final  Neutrophils %                                 Date: 02/25/2023  Value: 73.7        Ref range: %                  Status: Final  Lymphocytes %                                 Date: 02/25/2023  Value: 21.8        Ref range: %                  Status: Final  Monocytes %                                   Date: 02/25/2023  Value: 4.3         Ref range: %                  Status: Final  Eosinophils %                                 Date: 02/25/2023  Value: 0.0         Ref range: %                  Status: Final  Basophils %                                   Date: 02/25/2023  Value: 0.2         Ref range: %                  Status: Final  Neutrophils Absolute                          Date: 02/25/2023  Value: 5.5         Ref range: 1.4 - 6.5 K/uL     Status: Final  Lymphocytes Absolute                          Date: 02/25/2023  Value: 1.6         Ref range: 1.0 - 4.8 K/uL     Status: Final  Monocytes Absolute Date: 02/25/2023  Value: 0.3         Ref range: 0.2 - 0.8 K/uL     Status: Final  Eosinophils Absolute                          Date: 02/25/2023  Value: 0.0         Ref range: 0.0 - 0.7 K/uL     Status: Final  Basophils Absolute                            Date: 02/25/2023  Value: 0.0         Ref range: 0.0 - 0.2 K/uL     Status: Final  Ventricular Rate                              Date: 02/25/2023  Value: 53          Ref range: BPM                Status: Preliminary  Atrial Rate                                   Date: 02/25/2023  Value: 53          Ref range: BPM                Status: Preliminary  P-R Interval                                  Date: 02/25/2023  Value: 144         Ref range: ms                 Status: Preliminary  QRS Duration                                  Date: 02/25/2023  Value: 86          Ref range: ms                 Status: Preliminary  Q-T Interval                                  Date: 02/25/2023  Value: 430         Ref range: ms                 Status: Preliminary  QTc Calculation (Bazett)                      Date: 02/25/2023  Value: 403         Ref range: ms                 Status: Preliminary  P Axis                                        Date: 02/25/2023  Value: 50          Ref range: degrees            Status: Preliminary  R Axis                                        Date: 02/25/2023  Value: 25          Ref range: degrees            Status: Preliminary  T Axis                                        Date: 02/25/2023  Value: 30          Ref range: degrees            Status: Preliminary  ------------    Radiology last 7 days:  XR CHEST PORTABLE    Result Date: 2/24/2023  Prominent ground-glass infiltrates suspicious for COVID pneumonia.         Pending Labs     Order Current Status    Culture, Respiratory Preliminary result        Discharge Medications    Discharge Medication List as of 2/25/2023  2:15 PM        Discharge Medication List as of 2/25/2023  2:15 PM        Discharge Medication List as of 2/25/2023  2:15 PM    CONTINUE these medications which have NOT CHANGED    progesterone (PROMETRIUM) 200 MG CAPS capsule  Take 1 capsule by mouth nightly, Disp-90 capsule, R-3  Normal    Cyclobenzaprine HCl (FLEXERIL PO)  Take by mouth  Historical Med    albuterol sulfate HFA (VENTOLIN HFA) 108 (90 Base) MCG/ACT inhaler  Inhale 2 puffs into the lungs 4 times daily as needed for Wheezing, Disp-18 g, R-0  Normal    levothyroxine (SYNTHROID) 100 MCG tablet  TAKE ONE TABLET ONE TIME DAILY, Disp-90 tablet, R-3  Normal    flecainide (TAMBOCOR) 100 MG tablet  Take 1 tablet by mouth as needed (PRN afib.), Disp-10 tablet, R-0  Normal    ondansetron (ZOFRAN) 8 MG tablet  Take 1 tablet by mouth every 12 hours as needed for Nausea or Vomiting, Disp-30 tablet, R-02  Normal    rivaroxaban (XARELTO) 20 MG TABS tablet  TAKE 1 TABLET BY MOUTH ONE TIME A DAY, Disp-90 tablet, R-3  Normal    dilTIAZem (CARDIZEM CD) 180 MG extended release capsule  TAKE 1 CAPSULE BY MOUTH ONE TIME A DAY, Disp-90 capsule, R-3  Normal    butalbital-acetaminophen-caffeine (FIORICET, ESGIC) -40 MG per tablet  Take 1 tablet by mouth every 4 hours as needed for Headaches, Disp-12 tablet, R-0  Normal    diclofenac sodium (VOLTAREN) 1 % GEL  Apply 2 g topically 4 times daily as needed for Pain, Topical, 4 TIMES DAILY PRN Starting Wed 7/20/2022, Disp-2 g, R-0, Normal    busPIRone (BUSPAR) 7.5 MG tablet  TAKE ONE TABLET ONE TIME DAILY AS NEEDED (GENERIC FOR BUSPAR), Disp-90 tablet, R-3  Normal    Cholecalciferol (VITAMIN D3) 50 MCG (2000 UT) CAPS  Take by mouth daily  Historical Med    Bacillus Coagulans-Inulin (PROBIOTIC FORMULA PO)  Take 1 capsule by mouth daily  Historical Med    Folic Acid (FOLATE PO)  Take 1 tablet by mouth daily  Historical Med    Multiple Vitamin (MULTIVITAMIN PO)  Take 1 tablet by mouth daily  Historical Med    sodium chloride (ALTAMIST SPRAY) 0.65 % nasal spray  1 spray by Nasal route as needed for Congestion, Disp-1 Bottle, R-3  Normal    Handicap Placard MISC  Starting Fri 11/13/2020, Disp-1 each,R-0, Print  Exp 5 years       magnesium (MAGNESIUM-OXIDE) 250 MG TABS tablet  Take 250 mg by mouth daily   Historical Med    vitamin B-12 (CYANOCOBALAMIN) 500 MCG tablet  Take 500 mcg by mouth daily  Historical Med    zinc 50 MG CAPS  Take by mouth daily   Historical Med    Echinacea 400 MG CAPS  Take by mouth daily   Historical Med    Calcium Carbonate-Vit D-Min (CALCIUM 1200) 6164-7868 MG-UNIT CHEW  Take by mouth daily   Historical Med    ferrous sulfate 325 (65 FE) MG tablet  Take 65 mg by mouth daily (with breakfast)   Historical Med    fluticasone (FLONASE) 50 MCG/ACT nasal spray  1 spray by Nasal route daily. , Disp-1 Bottle, R-06          Discharge Medication List as of 2/25/2023  2:15 PM    STOP taking these medications    moxifloxacin (AVELOX) 400 MG tablet  Comments:  Reason for Stopping:    omeprazole (PRILOSEC) 40 MG delayed release capsule  Comments:  Reason for Stopping:    traMADol (ULTRAM) 50 MG tablet  Comments:  Reason for Stopping:    acetaminophen (APAP EXTRA STRENGTH) 500 MG tablet  Comments:  Reason for Stopping:          Time Spent on Discharge:  35 minutes were spent in patient examination, evaluation, counseling as well as medication reconciliation, prescriptions for required medications, discharge plan, and follow up.     Electronically signed by Gunjan Farrell DO on 2/25/23 at 4:20 PM EST

## 2023-02-25 NOTE — PROGRESS NOTES
[facility] EMERGENCY [facility] AT [place] Respiratory Therapy Evaluation   Current Order:  albuterol mdi q4prn   Home Regimen: prn   Ordering Physician: kasey  Re-evaluation Date:  eval done   Diagnosis: atypical pna   Patient Status: Stable / Unstable + Physician notified    The following MDI Criteria must be met in order to convert aerosol to MDI with spacer. If unable to meet, MDI will be converted to aerosol:  []  Patient able to demonstrate the ability to use MDI effectively  []  Patient alert and cooperative  []  Patient able to take deep breath with 5-10 second hold  []  Medication(s) available in this delivery method   []  Peak flow greater than or equal to 200 ml/min            Current Order Substituted To  (same drug, same frequency)   Aerosol to MDI [] Albuterol Sulfate 0.083% unit dose by aerosol Albuterol Sulfate MDI 2 puffs by inhalation with spacer    [] Levalbuterol 1.25 mg unit dose by aerosol Levalbuterol MDI 2 puffs by inhalation with spacer    [] Levalbuterol 0.63 mg unit dose by aerosol Levalbuterol MDI 2 puffs by inhalation with spacer    [] Ipratropium Bromide 0.02% unit dose by aerosol Ipratropium Bromide MDI 2 puffs by inhalation with spacer    [] Duoneb (Ipratropium + Albuterol) unit dose by aerosol Ipratropium MDI + Albuterol MDI 2 puffs by inhalation w/spacer   MDI to Aerosol [] Albuterol Sulfate MDI Albuterol Sulfate 0.083% unit dose by aerosol    [] Levalbuterol MDI 2 puffs by inhalation Levalbuterol 1.25 mg unit dose by aerosol    [] Ipratropium Bromide MDI by inhalation Ipratropium Bromide 0.02% unit dose by aerosol    [] Combivent (Ipratropium + Albuterol) MDI by inhalation Duoneb (Ipratropium + Albuterol) unit dose by aerosol       Treatment Assessment [Frequency/Schedule]:  Change frequency to: ______no changes____________________________________________per Protocol, P&T, MEC      Points 0 1 2 3 4   Pulmonary Status  Non-Smoker  []   Smoking history   < 20 pack years  []   Smoking history  ?  20 pack years  []   Pulmonary Disorder  (acute or chronic)  [x]   Severe or Chronic w/ Exacerbation  []     Surgical Status No [x]   Surgeries     General []   Surgery Lower []   Abdominal Thoracic or []   Upper Abdominal Thoracic with  PulmonaryDisorder  []     Chest X-ray Clear/Not  Ordered     []  Chronic Changes  Results Pending  []  Infiltrates, atelectasis, pleural effusion, or edema  [x]  Infiltrates in more than one lobe []  Infiltrate + Atelectasis, &/or pleural effusion  []    Respiratory Pattern Regular,  RR = 12-20 [x]  Increased,  RR = 21-25 []  MAC, irregular,  or RR = 26-30 []  Decreased FEV1  or RR = 31-35 []  Severe SOB, use  of accessory muscles, or RR ? 35  []    Mental Status Alert, oriented,  Cooperative [x]  Confused but Follows commands []  Lethargic or unable to follow commands []  Obtunded  []  Comatose  []    Breath Sounds Clear to  auscultation  [x]  Decreased unilaterally or  in bases only []  Decreased  bilaterally  []  Crackles or intermittent wheezes []  Wheezes []    Cough Strong, Spontan., & nonproductive [x]  Strong,  spontaneous, &  productive []  Weak,  Nonproductive []  Weak, productive or  with wheezes []  No spontaneous  cough or may require suctioning []    Level of Activity Ambulatory [x]  Ambulatory w/ Assist  []  Non-ambulatory []  Paraplegic []  Quadriplegic []    Total    Score:____5___     Triage Score:______5__      Tri       Triage:     1. (>20) Freq: Q3    2. (16-20) Freq: Q4   3. (11-15) Freq: QID & Albuterol Q2 PRN    4. (6-10) Freq: TID & Albuterol Q2 PRN    5. (0-5) Freq Q4prn

## 2023-02-26 LAB — CULTURE, RESPIRATORY: NORMAL

## 2023-02-27 ENCOUNTER — CARE COORDINATION (OUTPATIENT)
Dept: OTHER | Facility: CLINIC | Age: 48
End: 2023-02-27

## 2023-02-27 ENCOUNTER — TELEPHONE (OUTPATIENT)
Dept: INFECTIOUS DISEASES | Age: 48
End: 2023-02-27

## 2023-02-27 NOTE — CARE COORDINATION
[facility] Care Transitions Initial Follow Up Call    Call within 2 business days of discharge: Yes    Patient Current Location:  Home: [address] [place] [address]    Care Transition Nurse contacted the patient by telephone to perform post hospital discharge assessment. Verified name and  with patient as identifiers. Provided introduction to self, and explanation of the Care Transition Nurse role. Patient: Reggie Cantu Patient : 1975   MRN: [id number]  Reason for Admission: Atypical pneumonia  Discharge Date: 23 RARS: Readmission Risk Score: 9.3      Last Discharge  Street       Date Complaint Diagnosis Description Type Department Provider    23 Shortness of Breath Pneumonia of right lung due to infectious organism, unspecified part of lung . .. ED to Hosp-Admission (Discharged) (ADMITTED) [id number] Lewis Zavala DO; Artur Stands. .. Was this an external facility discharge? No Discharge Facility: [address] [place] [address]    Challenges to be reviewed by the provider   Additional needs identified to be addressed with provider: No  none               Method of communication with provider: chart routing- to notify PCP of new CT episode    ACM received return call from patient this date. She states breathing better since home. Still with productive cough- yellow/green mucous. She does not have pulse oximeter at home. Denies difficulty breathing. States shortness of breath with moving too quickly or doing too much activity. Reports shortness of breath resolved with rest. Patient reports ongoing chest and sinus congestion. Still with fatigue. States pain with coughing at times. Patient states discussed return to work with hospital provider and was told ok to return to work when she feels ready and to wear mask. 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 Also states planning to return to weekly [employer] league on Wednesday this week.  ACM discussed good handwashing and infection control. Also advised patient to avoid going to bowling or work if fever. She verbalized understanding. Reports max temp throughout infection the past few weeks 99.1. Patient denies nausea since home. Reports appetite improving. Has eaten 2 meals today. States has been drinking plenty of water. Patient reports 1 diarrhea stool today. ACM discussed when to drink electrolyte replacement- such as Gatorade. States she has been using humidifier with sleep. Using Albuterol inhaler since home. Taking Flonase nasal spray BID. Not using sodium chloride nasal spray since home states it causes nasal passages to feel \"wet\". Patient reports doing chest percussion to self to help break up mucous. States she has noticed this helping. States received phone call from Dr. Xiomara Murray office to schedule follow up but unsure why she needs to see Dr. Melany Narvaez as she was told the pneumonia is viral. [facility] Dr. Melany Narvaez did not see her while inpatient. Patient discussed this with Dr. Xiomara Murray office when they called today. Patient denies immediate needs for ACM. Agreeable to follow up calls. Care Transition Nurse reviewed discharge instructions, medical action plan, and red flags with patient who verbalized understanding. The patient was given an opportunity to ask questions and does not have any further questions or concerns at this time. Were discharge instructions available to patient? Yes. Reviewed appropriate site of care based on symptoms and resources available to patient including: PCP  Benefits related nurse triage line  [facility] Messaging. The patient agrees to contact the PCP office for questions related to their healthcare. Advance Care Planning:   Does patient have an Advance Directive: decision maker updated. Medication reconciliation was performed with patient, who verbalizes understanding of administration of home medications.      Was patient discharged with a pulse oximeter? no    Non-face-to-face services provided:  Obtained and reviewed discharge summary and/or continuity of care documents  Assessment and support for treatment adherence and medication management-complete medication review performed with patient      Care Transitions 24 Hour Call    Do you have a copy of your discharge instructions?: Yes  Do you have all of your prescriptions and are they filled?: Yes  Have you scheduled your follow up appointment?: Yes  How are you going to get to your appointment?: Car - drive self  Do you have support at home?: Parent(s)  Do you feel like you have everything you need to keep you well at home?: Yes  Care Transitions Interventions         Discussed follow-up appointments. If no appointment was previously scheduled, appointment scheduling offered: No.   Is follow up appointment scheduled within 7 days of discharge? No.    Follow Up  Future Appointments   Date Time Provider Corby Andersen   3/7/2023  4:30 PM [facility]   3/23/2023  3:30 PM Liz Srinivasan MD [facility] [address]   8/17/2023  4:00 PM [facility]   9/21/2023  3:30 PM Glendy Barriga DO [address]   10/5/2023  3:45 PM Ronald Christie DO [facility]     Rambo Gandhi MD   Orthopedic Surgery [phone] [address] [address]      Follow up on 3/6/2023  For left shoulder     Care Transition Nurse provided contact information. Plan for follow-up call in 5-7 days based on severity of symptoms and risk factors. Plan for next call: symptom management-monitor respiratory symptoms    APOORVA Hernandez RN  Associate Care Manager  Phone: [phone]  Email: [email or web address]

## 2023-02-27 NOTE — TELEPHONE ENCOUNTER
Per Dr Xiomara Banegas request is to reach out to patient to be seen this week. Called patient, LMOVM. Offered an appt for this Wed. 3/1/23. Request a return call. Patient returned call, and said she is unable to see Dr Xiomara Banegas at 12:30pm on 3/1/23. But perhaps at 2:40pm. Samm Corcoran will have to request if Dr Xiomara Banegas available at that time.

## 2023-02-28 NOTE — TELEPHONE ENCOUNTER
2/28/23. [facility]. Provided an update: Dr Alma Delia Dillon is able to see her in clinic this Thurs, 3/2/23 at 2:40pm.  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 Request a return call.

## 2023-03-01 LAB
EKG ATRIAL RATE: 53 BPM
EKG P AXIS: 50 DEGREES
EKG P-R INTERVAL: 144 MS
EKG Q-T INTERVAL: 430 MS
EKG QRS DURATION: 86 MS
EKG QTC CALCULATION (BAZETT): 403 MS
EKG R AXIS: 25 DEGREES
EKG T AXIS: 30 DEGREES
EKG VENTRICULAR RATE: 53 BPM

## 2023-03-06 ENCOUNTER — CARE COORDINATION (OUTPATIENT)
Dept: OTHER | Facility: CLINIC | Age: 48
End: 2023-03-06

## 2023-03-07 ENCOUNTER — OFFICE VISIT (OUTPATIENT)
Dept: FAMILY MEDICINE CLINIC | Age: 48
End: 2023-03-07

## 2023-03-07 VITALS
WEIGHT: 208 LBS | TEMPERATURE: 98.7 F | SYSTOLIC BLOOD PRESSURE: 138 MMHG | BODY MASS INDEX: 31.52 KG/M2 | DIASTOLIC BLOOD PRESSURE: 82 MMHG | OXYGEN SATURATION: 98 % | HEART RATE: 63 BPM | HEIGHT: 68 IN

## 2023-03-07 DIAGNOSIS — Z09 HOSPITAL DISCHARGE FOLLOW-UP: Primary | ICD-10-CM

## 2023-03-07 DIAGNOSIS — R11.0 NAUSEA: ICD-10-CM

## 2023-03-07 RX ORDER — ONDANSETRON HYDROCHLORIDE 8 MG/1
8 TABLET, FILM COATED ORAL EVERY 12 HOURS PRN
Qty: 30 TABLET | Refills: 2 | Status: SHIPPED | OUTPATIENT
Start: 2023-03-07

## 2023-03-07 NOTE — CARE COORDINATION
ACM attempted to reach patient for follow up call regarding respiratory symptoms. HIPAA compliant message left requesting a return phone call at patients convenience. Will continue to follow. APOORVA Lee RN  Associate Care Manager  Phone: [phone]  Email: [email or web address]

## 2023-03-07 NOTE — PROGRESS NOTES
Post-Discharge Transitional Care Follow Up      Song Corcoran   YOB: 1975    Date of Office Visit:  3/7/2023  Date of Hospital Admission: 2/24/23  Date of Hospital Discharge: 2/25/23  Readmission Risk Score (high >=14%. Medium >=10%):Readmission Risk Score: 9.3      Care management risk score Rising risk (score 2-5) and Complex Care (Scores >=6): No Risk Score On File     Non face to face  following discharge, date last encounter closed (first attempt may have been earlier): 02/27/2023     Call initiated 2 business days of discharge: Yes     Hospital discharge follow-up  -     ID DISCHARGE MEDS RECONCILED W/ CURRENT OUTPATIENT MED LIST  Nausea  -     ondansetron (ZOFRAN) 8 MG tablet; Take 1 tablet by mouth every 12 hours as needed for Nausea or Vomiting, Disp-30 tablet, R-02Normal    Medical Decision Making: moderate complexity  Return if symptoms worsen or fail to improve. On this date 3/7/2023 I have spent 30 minutes reviewing previous notes, test results and face to face with the patient discussing the diagnosis and importance of compliance with the treatment plan as well as documenting on the day of the visit. Subjective:   HPI  Chief Complaint   Patient presents with    Follow-Up from [facility]. Sob, ready care said it was sinus infection, dx with pneumonia        Inpatient course: Discharge summary reviewed- see chart. 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 She has had symptoms intermittently for the preceding several weeks, increasing approximately 1 week ago for which she was seen at outpatient urgent care and placed on a lety quinolone, but with symptoms continuing to worsen over the next several days until she presented to the ED for evaluation.   She received empiric broad-spectrum antibiotics in the ED, as well as sputum culture, and respiratory pathogen panel with COVID-19 via PCR test was collected. Hospitalist service was consulted for admission. She had no significant hypoxia in the ED but did have subjective dyspnea and intermittent cough productive of yellow-green sputum. After admission to the medical floor, respiratory pathogen panel did result positive for human metapneumovirus and negative for all other tested respiratory pathogen's. Patient additionally had negative procalcitonin, arguing against acute bacterial infection, including atypicals or encapsulated organisms. Patient was monitored overnight, did not demonstrate any hypoxia. Infectious disease service was consulted. Patient demonstrated improvement in isolated mild leukocytosis present on admission and had repeat negative procalcitonin the next morning. Infectious disease service reviewed available information and did feel that this was most consistent with viral bronchitis, and that patient could reasonably be discharged with outpatient follow-up given lack of hypoxia and improvement in laboratory measurements. Patient was deemed sufficiently improved and appropriate for discharge home with outpatient follow-up as of 2/25/2023. Interval history/Current status:     Doing much better  Breathing has improved      Patient Active Problem List   Diagnosis    Hypothyroidism    Obesity    Meniere disease    Irregular periods    Asplenia    Allergic rhinitis    Migraine    Multinodular goiter    Migraine headache    Insomnia    Hypertension    Menorrhagia    Pelvic pain    Postoperative pain    Paroxysmal atrial fibrillation (HCC)    Tonsillar hypertrophy    History of ITP    Atypical pneumonia       Medications listed as ordered at the time of discharge from hospital     Medication List            Accurate as of March 7, 2023  4:59 PM. If you have any questions, ask your nurse or doctor.          CHANGE how you take these medications      busPIRone 7.5 MG tablet  Commonly known as: BUSPAR  TAKE ONE TABLET ONE TIME DAILY AS NEEDED (GENERIC FOR BUSPAR)  What changed: additional instructions     flecainide 100 MG tablet  Commonly known as: TAMBOCOR  Take 1 tablet by mouth as needed (PRN afib.)  What changed: reasons to take this     fluticasone 50 MCG/ACT nasal spray  Commonly known as: Flonase  1 spray by Nasal route daily.  What changed: additional instructions            CONTINUE taking these medications      albuterol sulfate  (90 Base) MCG/ACT inhaler  Commonly known as: Ventolin HFA  Inhale 2 puffs into the lungs 4 times daily as needed for Wheezing     butalbital-acetaminophen-caffeine -40 MG per tablet  Commonly known as: FIORICET, ESGIC  Take 1 tablet by mouth every 4 hours as needed for Headaches     Calcium 1200 3400-5661 MG-UNIT Chew     diclofenac sodium 1 % Gel  Commonly known as: VOLTAREN  Apply 2 g topically 4 times daily as needed for Pain     dilTIAZem 180 MG extended release capsule  Commonly known as: CARDIZEM CD  TAKE 1 CAPSULE BY MOUTH ONE TIME A DAY     Echinacea 400 MG Caps     ferrous sulfate 325 (65 Fe) MG tablet  Commonly known as: IRON 325     FLEXERIL PO     FOLATE PO     Handicap Placard Misc  by Does not apply route Exp 5 years     levothyroxine 100 MCG tablet  Commonly known as: SYNTHROID  TAKE ONE TABLET ONE TIME DAILY     magnesium 250 MG Tabs tablet  Commonly known as: MAGNESIUM-OXIDE     MULTIVITAMIN PO     ondansetron 8 MG tablet  Commonly known as: ZOFRAN  Take 1 tablet by mouth every 12 hours as needed for Nausea or Vomiting     PROBIOTIC FORMULA PO     progesterone 200 MG Caps capsule  Commonly known as: Prometrium  Take 1 capsule by mouth nightly     rivaroxaban 20 MG Tabs tablet  Commonly known as: Xarelto  TAKE 1 TABLET BY MOUTH ONE TIME A DAY     sodium chloride 0.65 % nasal spray  Commonly known as: Altamist Spray  1 spray by Nasal route as  needed for Congestion     vitamin B-12 500 MCG tablet  Commonly known as: CYANOCOBALAMIN     zinc 50 MG Caps               Where to Get Your Medications        These medications were sent to [facility], [address]  [address], [address] [facility]      Phone: [phone]   ondansetron 8 MG tablet          Medications marked \"taking\" at this time  Outpatient Medications Marked as Taking for the 3/7/23 encounter (Office Visit) with Butch Avila MD   Medication Sig Dispense Refill    ondansetron (ZOFRAN) 8 MG tablet Take 1 tablet by mouth every 12 hours as needed for Nausea or Vomiting 30 tablet 02    progesterone (PROMETRIUM) 200 MG CAPS capsule Take 1 capsule by mouth nightly 90 capsule 3    Cyclobenzaprine HCl (FLEXERIL PO) Take by mouth as needed (muscle spasms)      albuterol sulfate HFA (VENTOLIN HFA) 108 (90 Base) MCG/ACT inhaler Inhale 2 puffs into the lungs 4 times daily as needed for Wheezing 18 g 0    levothyroxine (SYNTHROID) 100 MCG tablet TAKE ONE TABLET ONE TIME DAILY 90 tablet 3    flecainide (TAMBOCOR) 100 MG tablet Take 1 tablet by mouth as needed (PRN afib.) (Patient taking differently: Take 100 mg by mouth as needed (For a fib lasting 2 days)) 10 tablet 0    rivaroxaban (XARELTO) 20 MG TABS tablet TAKE 1 TABLET BY MOUTH ONE TIME A DAY 90 tablet 3    dilTIAZem (CARDIZEM CD) 180 MG extended release capsule TAKE 1 CAPSULE BY MOUTH ONE TIME A DAY 90 capsule 3    butalbital-acetaminophen-caffeine (FIORICET, ESGIC) -40 MG per tablet Take 1 tablet by mouth every 4 hours as needed for Headaches 12 tablet 0    diclofenac sodium (VOLTAREN) 1 % GEL Apply 2 g topically 4 times daily as needed for Pain 2 g 0    busPIRone (BUSPAR) 7.5 MG tablet TAKE ONE TABLET ONE TIME DAILY AS NEEDED (GENERIC FOR BUSPAR) (Patient taking differently: TAKE ONE TABLET ONE TIME DAILY AS NEEDED (GENERIC FOR BUSPAR)  Takes once daily at night) 90 tablet 3    Bacillus Coagulans-Inulin (PROBIOTIC FORMULA PO) Take 1 capsule by mouth daily      Folic Acid (FOLATE PO) Take 1 tablet by mouth daily      Multiple Vitamin (MULTIVITAMIN PO) Take 1 tablet by mouth daily      sodium chloride (ALTAMIST SPRAY) 0.65 % nasal spray 1 spray by Nasal route as needed for Congestion 1 Bottle 3    Handicap Placard MISC by Does not apply route Exp 5 years 1 each 0    magnesium (MAGNESIUM-OXIDE) 250 MG TABS tablet Take 250 mg by mouth daily       vitamin B-12 (CYANOCOBALAMIN) 500 MCG tablet Take 500 mcg by mouth daily      zinc 50 MG CAPS Take by mouth daily       Echinacea 400 MG CAPS Take by mouth daily       Calcium Carbonate-Vit D-Min (CALCIUM 1200) 2650-6057 MG-UNIT CHEW Take by mouth daily       ferrous sulfate 325 (65 FE) MG tablet Take 65 mg by mouth daily (with breakfast)       fluticasone (FLONASE) 50 MCG/ACT nasal spray 1 spray by Nasal route daily. (Patient taking differently: 1 spray by Nasal route daily Taking BID currently) 1 Bottle 06        Medications patient taking as of now reconciled against medications ordered at time of hospital discharge: Yes    Review of Systems  Otherwise physically feels healthy without sob/palpitations/chest pain/f/c/n/v/weight gain/weight loss/abd pain      Objective:    /82 (Site: Left Upper Arm)   Pulse 63   Temp 98.7 °F (37.1 °C)   Ht 5' 8\" (1.727 m)   Wt 208 lb (94.3 kg)   LMP 11/04/2019   SpO2 98%   BMI 31.63 kg/m²   Physical Exam  Physical Exam:  /82 (Site: Left Upper Arm)   Pulse 63   Temp 98.7 °F (37.1 °C)   Ht 5' 8\" (1.727 m)   Wt 208 lb (94.3 kg)   LMP 11/04/2019   SpO2 98%   BMI 31.63 kg/m²     Gen: Well, NAD, Alert, Oriented x 3   HEENT: EOMI, eyes clear, MMM  Skin: without rash or jaundice  Neck: no significant lymphadenopathy or thyromegaly  Lungs: CTA B w/out Rales/Wheezes/Rhonchi, Good respiratory effort   Heart: RRR, S1S2, w/out M/R/G, non-displaced PMI   Ext: No C/C/E Bilaterally.    Neuro: Neurovascularly intact w/ Sensory/Motor intact UE/LE Bilaterally. An electronic signature was used to authenticate this note.   --Brenda Buck MD

## 2023-03-08 ENCOUNTER — CARE COORDINATION (OUTPATIENT)
Dept: OTHER | Facility: CLINIC | Age: 48
End: 2023-03-08

## 2023-03-08 NOTE — CARE COORDINATION
[facility] Care Transitions Follow Up Call    Patient Current Location: [address] Transition Nurse contacted the patient by telephone to follow up after admission on 23. Verified name and  with patient as identifiers. Patient: Alphonse Elizabeth  Patient : 1975   MRN: [id number]  Reason for Admission: Atypical pneumonia  Discharge Date: 23 RARS: Readmission Risk Score: 9.3      Needs to be reviewed by the provider   Additional needs identified to be addressed with provider: No  none             Method of communication with provider: none. Spoke to patient this date. She states had PCP follow up yesterday. Denies changes to plan of care. She is feeling much better. States she woke up during the night last weekend with harsh cough. States she coughed up mucous and has felt better, no coughing since then. She was told per PCP yesterday that lung sounds are clear. 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 States next pneumonia vaccine due . Patient states sinuses are now draining due to weather changes. She spoke to PCP about this yesterday and was told to use saline nasal spray PRN or Flonase. Patient states she will be calling to schedule colonoscopy and PT for shoulder next week. States had recent OV with orthopedics and was told need to have a few more PT sessions- then do HEP. States she was told per Dr. Roseanna Hernández- no need for follow up in office again unless pain worsens. Patient has contact info for colonoscopy scheduling and PT. ACM will follow up with patient 1 more time; will sign off if no needs. Addressed changes since last contact:  symptom management-monitor respiratory symptoms  Discussed follow-up appointments. If no appointment was previously scheduled, appointment scheduling offered: No.   Is follow up appointment scheduled within 7 days of discharge?  No.    Follow Up  Future Appointments   Date Time Provider Corby Andersen 3/23/2023  3:30 PM Sudhakar Glez  [address]   8/17/2023  4:00 PM Elliot Mancia MD [facility]   9/21/2023  3:30 PM Tristin Soni DO [facility] [address]   10/5/2023  3:45 PM Ronald Inderjocelyn Patrick  [address]     [id number] [address] follow up appointment(s): None    Care Transition Nurse reviewed medical action plan with patient and discussed any barriers to care and/or understanding of plan of care after discharge. Discussed appropriate site of care based on symptoms and resources available to patient including: PCP  Benefits related nurse triage line. The patient agrees to contact the PCP office for questions related to their healthcare. Advance Care Planning:   not on file. Patients top risk factors for readmission: medical condition-recent hospitalization for pneumonia, asplenia   Interventions to address risk factors:  Patient aware of need for regular follow up appts and to have pneumonia vaccine Q 5 years     Care Transitions Subsequent and Final Call    Subsequent and Final Calls  Do you have any ongoing symptoms?: No  Have your medications changed?: No  Do you have any questions related to your medications?: No  Do you currently have any active services?: No  Do you have any needs or concerns that I can assist you with?: No  Identified Barriers: None  Care Transitions Interventions  Other Interventions:             Care Transition Nurse provided contact information for future needs. Plan for follow-up call in 10-14 days based on severity of symptoms and risk factors. Plan for next call:  scheduling colonoscopy and PT; possible final outreach unless new needs arise. APOORVA Bagley RN  Associate Care Manager  Phone: [phone]  Email: [email or web address]

## 2023-03-20 DIAGNOSIS — E89.0 POSTOPERATIVE HYPOTHYROIDISM: ICD-10-CM

## 2023-03-20 DIAGNOSIS — R73.9 HYPERGLYCEMIA: ICD-10-CM

## 2023-03-20 LAB
ANION GAP SERPL CALCULATED.3IONS-SCNC: 13 MEQ/L (ref 9–15)
BUN SERPL-MCNC: 12 MG/DL (ref 6–20)
CALCIUM SERPL-MCNC: 9.6 MG/DL (ref 8.5–9.9)
CHLORIDE SERPL-SCNC: 108 MEQ/L (ref 95–107)
CO2 SERPL-SCNC: 24 MEQ/L (ref 20–31)
CREAT SERPL-MCNC: 0.67 MG/DL (ref 0.5–0.9)
GLUCOSE SERPL-MCNC: 80 MG/DL (ref 70–99)
HBA1C MFR BLD: 5.6 % (ref 4.8–5.9)
POTASSIUM SERPL-SCNC: 4.2 MEQ/L (ref 3.4–4.9)
SODIUM SERPL-SCNC: 145 MEQ/L (ref 135–144)
T4 FREE SERPL-MCNC: 1.22 NG/DL (ref 0.84–1.68)
TSH REFLEX: 2.38 UIU/ML (ref 0.44–3.86)

## 2023-03-22 ENCOUNTER — CARE COORDINATION (OUTPATIENT)
Dept: OTHER | Facility: CLINIC | Age: 48
End: 2023-03-22

## 2023-03-22 NOTE — CARE COORDINATION
Antonia, [facility]     [id number] [address] follow up appointment(s): None    Care Transition Nurse reviewed medical action plan and red flags with patient and discussed any barriers to care and/or understanding of plan of care after discharge. Discussed appropriate site of care based on symptoms and resources available to patient including: PCP  Specialist  Benefits related nurse triage line  Urgent care clinics  MyChart Messaging. The patient agrees to contact the PCP office for questions related to their healthcare. Advance Care Planning:   not on file. Patients top risk factors for readmission: medical condition-Multiple Chronic Conditions    Interventions to address risk factors: Assessment and support for treatment adherence and medication management-Patient is able to describe Red Flag symptoms to report, she is taking precautions to minimize her exposure to illness, is able to describe hr medications and importance and benefits of compliance, and importance and benefits of follow up appt compliance and Establishment or re-establishment of referrals-Patient will obtain an order for PT in the future if she feels she needs to    Offered patient enrollment in the Remote Patient Monitoring (RPM) program for in-home monitoring: NA.     Care Transitions Subsequent and Final Call    Schedule Follow Up Appointment with PCP: Completed  Subsequent and Final Calls  Do you have any ongoing symptoms?: No  Have your medications changed?: No  Do you have any questions related to your medications?: No  Do you currently have any active services?: Yes  Do you have any needs or concerns that I can assist you with?: No  Identified Barriers: None  Care Transitions Interventions    Physical Therapy: Completed      Specialty Service Referral: Completed    Other Interventions:             Care Transition Nurse provided contact information for future needs.  No further follow-up call indicated based on severity of

## 2023-03-23 ENCOUNTER — OFFICE VISIT (OUTPATIENT)
Dept: ENDOCRINOLOGY | Age: 48
End: 2023-03-23
Payer: COMMERCIAL

## 2023-03-23 VITALS
WEIGHT: 209 LBS | SYSTOLIC BLOOD PRESSURE: 121 MMHG | OXYGEN SATURATION: 97 % | HEART RATE: 63 BPM | HEIGHT: 68 IN | BODY MASS INDEX: 31.67 KG/M2 | DIASTOLIC BLOOD PRESSURE: 75 MMHG

## 2023-03-23 DIAGNOSIS — E89.0 POSTOPERATIVE HYPOTHYROIDISM: Primary | ICD-10-CM

## 2023-03-23 DIAGNOSIS — R73.9 HYPERGLYCEMIA: ICD-10-CM

## 2023-03-23 PROCEDURE — 3074F SYST BP LT 130 MM HG: CPT | Performed by: INTERNAL MEDICINE

## 2023-03-23 PROCEDURE — 99213 OFFICE O/P EST LOW 20 MIN: CPT | Performed by: INTERNAL MEDICINE

## 2023-03-23 PROCEDURE — 3078F DIAST BP <80 MM HG: CPT | Performed by: INTERNAL MEDICINE

## 2023-03-23 ASSESSMENT — ENCOUNTER SYMPTOMS: EYES NEGATIVE: 1

## 2023-03-23 NOTE — PROGRESS NOTES
Patient will like to know if he will received the call today.  To please return call today.    for Wheezing, Disp: 18 g, Rfl: 0    levothyroxine (SYNTHROID) 100 MCG tablet, TAKE ONE TABLET ONE TIME DAILY, Disp: 90 tablet, Rfl: 3    flecainide (TAMBOCOR) 100 MG tablet, Take 1 tablet by mouth as needed (PRN afib.) (Patient taking differently: Take 100 mg by mouth as needed (For a fib lasting 2 days)), Disp: 10 tablet, Rfl: 0    rivaroxaban (XARELTO) 20 MG TABS tablet, TAKE 1 TABLET BY MOUTH ONE TIME A DAY, Disp: 90 tablet, Rfl: 3    dilTIAZem (CARDIZEM CD) 180 MG extended release capsule, TAKE 1 CAPSULE BY MOUTH ONE TIME A DAY, Disp: 90 capsule, Rfl: 3    butalbital-acetaminophen-caffeine (FIORICET, ESGIC) -40 MG per tablet, Take 1 tablet by mouth every 4 hours as needed for Headaches, Disp: 12 tablet, Rfl: 0    diclofenac sodium (VOLTAREN) 1 % GEL, Apply 2 g topically 4 times daily as needed for Pain, Disp: 2 g, Rfl: 0    busPIRone (BUSPAR) 7.5 MG tablet, TAKE ONE TABLET ONE TIME DAILY AS NEEDED (GENERIC FOR BUSPAR) (Patient taking differently: TAKE ONE TABLET ONE TIME DAILY AS NEEDED (GENERIC FOR BUSPAR) Takes once daily at night), Disp: 90 tablet, Rfl: 3    Bacillus Coagulans-Inulin (PROBIOTIC FORMULA PO), Take 1 capsule by mouth daily, Disp: , Rfl:     Folic Acid (FOLATE PO), Take 1 tablet by mouth daily, Disp: , Rfl:     Multiple Vitamin (MULTIVITAMIN PO), Take 1 tablet by mouth daily, Disp: , Rfl:     sodium chloride (ALTAMIST SPRAY) 0.65 % nasal spray, 1 spray by Nasal route as needed for Congestion, Disp: 1 Bottle, Rfl: 3    Handicap Placard MISC, by Does not apply route Exp 5 years, Disp: 1 each, Rfl: 0    magnesium (MAGNESIUM-OXIDE) 250 MG TABS tablet, Take 250 mg by mouth daily , Disp: , Rfl:     vitamin B-12 (CYANOCOBALAMIN) 500 MCG tablet, Take 500 mcg by mouth daily, Disp: , Rfl:     zinc 50 MG CAPS, Take by mouth daily , Disp: , Rfl:     Echinacea 400 MG CAPS, Take by mouth daily , Disp: , Rfl:     Calcium Carbonate-Vit D-Min (CALCIUM 1200) 8400-1630 MG-UNIT CHEW, Take by mouth daily ,

## 2023-03-27 ENCOUNTER — TELEPHONE (OUTPATIENT)
Dept: GASTROENTEROLOGY | Age: 48
End: 2023-03-27

## 2023-04-05 ENCOUNTER — PATIENT MESSAGE (OUTPATIENT)
Dept: FAMILY MEDICINE CLINIC | Age: 48
End: 2023-04-05

## 2023-04-05 DIAGNOSIS — M54.50 ACUTE BILATERAL LOW BACK PAIN WITHOUT SCIATICA: ICD-10-CM

## 2023-04-05 DIAGNOSIS — G43.109 MIGRAINE WITH AURA AND WITHOUT STATUS MIGRAINOSUS, NOT INTRACTABLE: ICD-10-CM

## 2023-04-06 RX ORDER — BUTALBITAL, ACETAMINOPHEN AND CAFFEINE 50; 325; 40 MG/1; MG/1; MG/1
1 TABLET ORAL EVERY 4 HOURS PRN
Qty: 12 TABLET | Refills: 0 | Status: SHIPPED | OUTPATIENT
Start: 2023-04-06

## 2023-04-06 RX ORDER — CYCLOBENZAPRINE HCL 10 MG
10 TABLET ORAL 3 TIMES DAILY PRN
Qty: 30 TABLET | Refills: 0 | Status: SHIPPED | OUTPATIENT
Start: 2023-04-06 | End: 2023-04-16

## 2023-04-06 NOTE — TELEPHONE ENCOUNTER
From: Jose Shaikh  To: Dr. Arun Díaz: 4/5/2023 10:40 PM EDT  Subject: Refill     May I please get a refill of the flexeril, Tian wouldnt let me request a refill from my med list. Thank you and have a great day and Happy Easter!!      Thanks,   Tip Shook

## 2023-04-06 NOTE — TELEPHONE ENCOUNTER
Future Appointments    Encounter Information    Provider Department Appt Notes   8/17/2023 Helen Montelongo MD [facility] Primary Care six month follow up   9/21/2023 Albina Whitley, [address] Obstetrics and Gynecology annual   10/5/2023 Emmanuel Cunha DO [facility] Cardiology 1 year f/u     Past Visits    Date Provider Specialty Visit Type Primary Dx   03/23/2023 Ashley Garvey MD Endocrinology Office Visit Postoperative hypothyroidism   03/07/2023 Helen Montelongo MD [address] [place] [address] discharge follow-up

## 2023-05-18 RX ORDER — BUSPIRONE HYDROCHLORIDE 7.5 MG/1
TABLET ORAL
Qty: 90 TABLET | Refills: 3 | Status: SHIPPED | OUTPATIENT
Start: 2023-05-18

## 2023-05-22 RX ORDER — POLYETHYLENE GLYCOL 3350, SODIUM CHLORIDE, SODIUM BICARBONATE, POTASSIUM CHLORIDE 420; 11.2; 5.72; 1.48 G/4L; G/4L; G/4L; G/4L
4000 POWDER, FOR SOLUTION ORAL ONCE
Qty: 4000 ML | Refills: 0 | Status: SHIPPED | OUTPATIENT
Start: 2023-05-22 | End: 2023-05-22

## 2023-07-10 ENCOUNTER — HOSPITAL ENCOUNTER (OUTPATIENT)
Dept: WOMENS IMAGING | Age: 48
Discharge: HOME | End: 2023-07-12
Payer: COMMERCIAL

## 2023-07-10 DIAGNOSIS — Z12.31 SCREENING MAMMOGRAM FOR BREAST CANCER: ICD-10-CM

## 2023-07-10 PROCEDURE — 77063 BREAST TOMOSYNTHESIS BI: CPT

## 2023-07-12 ENCOUNTER — PREP FOR PROCEDURE (OUTPATIENT)
Dept: GASTROENTEROLOGY | Age: 48
End: 2023-07-12

## 2023-07-12 DIAGNOSIS — R92.8 ABNORMAL MAMMOGRAM: Primary | ICD-10-CM

## 2023-07-13 RX ORDER — SODIUM CHLORIDE 9 MG/ML
INJECTION, SOLUTION INTRAVENOUS CONTINUOUS
OUTPATIENT
Start: 2023-07-13

## 2023-07-13 RX ORDER — SODIUM CHLORIDE 9 MG/ML
INJECTION, SOLUTION INTRAVENOUS PRN
OUTPATIENT
Start: 2023-07-13

## 2023-07-13 RX ORDER — SODIUM CHLORIDE 0.9 % (FLUSH) 0.9 %
5-40 SYRINGE (ML) INJECTION EVERY 12 HOURS SCHEDULED
OUTPATIENT
Start: 2023-07-13

## 2023-07-28 ENCOUNTER — HOSPITAL ENCOUNTER (OUTPATIENT)
Dept: ULTRASOUND IMAGING | Age: 48
End: 2023-07-28
Payer: COMMERCIAL

## 2023-07-28 ENCOUNTER — HOSPITAL ENCOUNTER (OUTPATIENT)
Dept: WOMENS IMAGING | Age: 48
Discharge: HOME | End: 2023-07-28
Payer: COMMERCIAL

## 2023-07-28 DIAGNOSIS — R92.8 ABNORMAL MAMMOGRAM: ICD-10-CM

## 2023-07-28 PROCEDURE — G0279 TOMOSYNTHESIS, MAMMO: HCPCS

## 2023-07-28 PROCEDURE — 76642 ULTRASOUND BREAST LIMITED: CPT

## 2023-08-17 ENCOUNTER — OFFICE VISIT (OUTPATIENT)
Dept: FAMILY MEDICINE CLINIC | Age: 48
End: 2023-08-17
Payer: COMMERCIAL

## 2023-08-17 VITALS
BODY MASS INDEX: 30.62 KG/M2 | OXYGEN SATURATION: 98 % | HEIGHT: 68 IN | TEMPERATURE: 98.3 F | DIASTOLIC BLOOD PRESSURE: 88 MMHG | HEART RATE: 61 BPM | WEIGHT: 202 LBS | SYSTOLIC BLOOD PRESSURE: 124 MMHG

## 2023-08-17 DIAGNOSIS — R73.9 HYPERGLYCEMIA: ICD-10-CM

## 2023-08-17 DIAGNOSIS — I48.91 ATRIAL FIBRILLATION WITH RVR (HCC): ICD-10-CM

## 2023-08-17 DIAGNOSIS — M54.50 ACUTE BILATERAL LOW BACK PAIN WITHOUT SCIATICA: ICD-10-CM

## 2023-08-17 DIAGNOSIS — G43.109 MIGRAINE WITH AURA AND WITHOUT STATUS MIGRAINOSUS, NOT INTRACTABLE: ICD-10-CM

## 2023-08-17 DIAGNOSIS — Q89.01 ASPLENIA: ICD-10-CM

## 2023-08-17 DIAGNOSIS — D68.69 SECONDARY HYPERCOAGULABLE STATE (HCC): ICD-10-CM

## 2023-08-17 DIAGNOSIS — E03.9 HYPOTHYROIDISM, UNSPECIFIED TYPE: ICD-10-CM

## 2023-08-17 DIAGNOSIS — K21.9 GASTROESOPHAGEAL REFLUX DISEASE WITHOUT ESOPHAGITIS: Primary | ICD-10-CM

## 2023-08-17 PROCEDURE — 3074F SYST BP LT 130 MM HG: CPT | Performed by: FAMILY MEDICINE

## 2023-08-17 PROCEDURE — 3079F DIAST BP 80-89 MM HG: CPT | Performed by: FAMILY MEDICINE

## 2023-08-17 PROCEDURE — 99214 OFFICE O/P EST MOD 30 MIN: CPT | Performed by: FAMILY MEDICINE

## 2023-08-17 RX ORDER — CYCLOBENZAPRINE HCL 10 MG
10 TABLET ORAL 3 TIMES DAILY PRN
Qty: 30 TABLET | Refills: 0 | Status: SHIPPED | OUTPATIENT
Start: 2023-08-17 | End: 2023-08-27

## 2023-08-17 RX ORDER — BUTALBITAL, ACETAMINOPHEN AND CAFFEINE 50; 325; 40 MG/1; MG/1; MG/1
1 TABLET ORAL EVERY 4 HOURS PRN
Qty: 12 TABLET | Refills: 5 | Status: SHIPPED | OUTPATIENT
Start: 2023-08-17

## 2023-08-17 ASSESSMENT — ENCOUNTER SYMPTOMS
CHEST TIGHTNESS: 0
DIARRHEA: 0
BLOOD IN STOOL: 0
CONSTIPATION: 0
SORE THROAT: 0
NAUSEA: 0
VOMITING: 0
ABDOMINAL DISTENTION: 0
COLOR CHANGE: 0
COUGH: 0
ABDOMINAL PAIN: 0
SHORTNESS OF BREATH: 0

## 2023-09-03 ENCOUNTER — OFFICE VISIT (OUTPATIENT)
Dept: FAMILY MEDICINE CLINIC | Age: 48
End: 2023-09-03
Payer: COMMERCIAL

## 2023-09-03 VITALS
HEART RATE: 74 BPM | OXYGEN SATURATION: 98 % | WEIGHT: 202 LBS | TEMPERATURE: 97.9 F | BODY MASS INDEX: 30.71 KG/M2 | DIASTOLIC BLOOD PRESSURE: 74 MMHG | SYSTOLIC BLOOD PRESSURE: 128 MMHG

## 2023-09-03 DIAGNOSIS — J01.90 ACUTE BACTERIAL SINUSITIS: Primary | ICD-10-CM

## 2023-09-03 DIAGNOSIS — B96.89 ACUTE BACTERIAL SINUSITIS: Primary | ICD-10-CM

## 2023-09-03 PROCEDURE — 99213 OFFICE O/P EST LOW 20 MIN: CPT | Performed by: PHYSICIAN ASSISTANT

## 2023-09-03 PROCEDURE — 3078F DIAST BP <80 MM HG: CPT | Performed by: PHYSICIAN ASSISTANT

## 2023-09-03 PROCEDURE — 3074F SYST BP LT 130 MM HG: CPT | Performed by: PHYSICIAN ASSISTANT

## 2023-09-03 RX ORDER — CEPHALEXIN 500 MG/1
500 CAPSULE ORAL 2 TIMES DAILY
Qty: 14 CAPSULE | Refills: 0 | Status: SHIPPED | OUTPATIENT
Start: 2023-09-03 | End: 2023-09-10

## 2023-09-03 RX ORDER — GUAIFENESIN 600 MG/1
600 TABLET, EXTENDED RELEASE ORAL 2 TIMES DAILY
Qty: 30 TABLET | Refills: 0 | Status: SHIPPED | OUTPATIENT
Start: 2023-09-03 | End: 2023-09-18

## 2023-09-14 ENCOUNTER — PREP FOR PROCEDURE (OUTPATIENT)
Dept: GASTROENTEROLOGY | Age: 48
End: 2023-09-14

## 2023-09-14 RX ORDER — SODIUM CHLORIDE 9 MG/ML
INJECTION, SOLUTION INTRAVENOUS PRN
Status: CANCELLED | OUTPATIENT
Start: 2023-09-14

## 2023-09-14 RX ORDER — SODIUM CHLORIDE 0.9 % (FLUSH) 0.9 %
5-40 SYRINGE (ML) INJECTION EVERY 12 HOURS SCHEDULED
Status: CANCELLED | OUTPATIENT
Start: 2023-09-14

## 2023-09-14 RX ORDER — SODIUM CHLORIDE 9 MG/ML
INJECTION, SOLUTION INTRAVENOUS CONTINUOUS
Status: CANCELLED | OUTPATIENT
Start: 2023-09-14

## 2023-09-18 ENCOUNTER — ANESTHESIA EVENT (OUTPATIENT)
Dept: ENDOSCOPY | Age: 48
End: 2023-09-18
Payer: COMMERCIAL

## 2023-09-18 ENCOUNTER — ANESTHESIA (OUTPATIENT)
Dept: ENDOSCOPY | Age: 48
End: 2023-09-18
Payer: COMMERCIAL

## 2023-09-18 ENCOUNTER — HOSPITAL ENCOUNTER (OUTPATIENT)
Age: 48
Setting detail: OUTPATIENT SURGERY
Discharge: HOME | End: 2023-09-18
Attending: INTERNAL MEDICINE | Admitting: INTERNAL MEDICINE
Payer: COMMERCIAL

## 2023-09-18 VITALS
RESPIRATION RATE: 18 BRPM | OXYGEN SATURATION: 96 % | WEIGHT: 197 LBS | BODY MASS INDEX: 29.86 KG/M2 | HEIGHT: 68 IN | SYSTOLIC BLOOD PRESSURE: 117 MMHG | DIASTOLIC BLOOD PRESSURE: 58 MMHG | TEMPERATURE: 97.9 F | HEART RATE: 47 BPM

## 2023-09-18 PROCEDURE — 6360000002 HC RX W HCPCS: Performed by: NURSE ANESTHETIST, CERTIFIED REGISTERED

## 2023-09-18 PROCEDURE — 3609027000 HC COLONOSCOPY: Performed by: INTERNAL MEDICINE

## 2023-09-18 PROCEDURE — 7100000010 HC PHASE II RECOVERY - FIRST 15 MIN: Performed by: INTERNAL MEDICINE

## 2023-09-18 PROCEDURE — 2580000003 HC RX 258

## 2023-09-18 PROCEDURE — 2500000003 HC RX 250 WO HCPCS: Performed by: NURSE ANESTHETIST, CERTIFIED REGISTERED

## 2023-09-18 PROCEDURE — 2709999900 HC NON-CHARGEABLE SUPPLY: Performed by: INTERNAL MEDICINE

## 2023-09-18 PROCEDURE — 6370000000 HC RX 637 (ALT 250 FOR IP): Performed by: INTERNAL MEDICINE

## 2023-09-18 PROCEDURE — 3700000000 HC ANESTHESIA ATTENDED CARE: Performed by: INTERNAL MEDICINE

## 2023-09-18 PROCEDURE — 3700000001 HC ADD 15 MINUTES (ANESTHESIA): Performed by: INTERNAL MEDICINE

## 2023-09-18 PROCEDURE — 45378 DIAGNOSTIC COLONOSCOPY: CPT | Performed by: INTERNAL MEDICINE

## 2023-09-18 PROCEDURE — 2580000003 HC RX 258: Performed by: INTERNAL MEDICINE

## 2023-09-18 RX ORDER — SODIUM CHLORIDE 9 MG/ML
INJECTION, SOLUTION INTRAVENOUS PRN
Status: DISCONTINUED | OUTPATIENT
Start: 2023-09-18 | End: 2023-09-18 | Stop reason: HOSPADM

## 2023-09-18 RX ORDER — SODIUM CHLORIDE 9 MG/ML
INJECTION, SOLUTION INTRAVENOUS CONTINUOUS
Status: DISCONTINUED | OUTPATIENT
Start: 2023-09-18 | End: 2023-09-18 | Stop reason: HOSPADM

## 2023-09-18 RX ORDER — SIMETHICONE 20 MG/.3ML
EMULSION ORAL PRN
Status: DISCONTINUED | OUTPATIENT
Start: 2023-09-18 | End: 2023-09-18 | Stop reason: ALTCHOICE

## 2023-09-18 RX ORDER — MAGNESIUM HYDROXIDE 1200 MG/15ML
LIQUID ORAL PRN
Status: DISCONTINUED | OUTPATIENT
Start: 2023-09-18 | End: 2023-09-18 | Stop reason: ALTCHOICE

## 2023-09-18 RX ORDER — PROPOFOL 10 MG/ML
INJECTION, EMULSION INTRAVENOUS PRN
Status: DISCONTINUED | OUTPATIENT
Start: 2023-09-18 | End: 2023-09-18 | Stop reason: SDUPTHER

## 2023-09-18 RX ORDER — SODIUM CHLORIDE 9 MG/ML
INJECTION, SOLUTION INTRAVENOUS
Status: COMPLETED
Start: 2023-09-18 | End: 2023-09-18

## 2023-09-18 RX ORDER — LIDOCAINE HYDROCHLORIDE 20 MG/ML
INJECTION, SOLUTION INFILTRATION; PERINEURAL PRN
Status: DISCONTINUED | OUTPATIENT
Start: 2023-09-18 | End: 2023-09-18 | Stop reason: SDUPTHER

## 2023-09-18 RX ORDER — SODIUM CHLORIDE 0.9 % (FLUSH) 0.9 %
5-40 SYRINGE (ML) INJECTION EVERY 12 HOURS SCHEDULED
Status: DISCONTINUED | OUTPATIENT
Start: 2023-09-18 | End: 2023-09-18 | Stop reason: HOSPADM

## 2023-09-18 RX ADMIN — PROPOFOL 50 MG: 10 INJECTION, EMULSION INTRAVENOUS at 08:18

## 2023-09-18 RX ADMIN — PROPOFOL 100 MG: 10 INJECTION, EMULSION INTRAVENOUS at 08:13

## 2023-09-18 RX ADMIN — PROPOFOL 50 MG: 10 INJECTION, EMULSION INTRAVENOUS at 08:15

## 2023-09-18 RX ADMIN — PROPOFOL 50 MG: 10 INJECTION, EMULSION INTRAVENOUS at 08:21

## 2023-09-18 RX ADMIN — PROPOFOL 50 MG: 10 INJECTION, EMULSION INTRAVENOUS at 08:24

## 2023-09-18 RX ADMIN — SODIUM CHLORIDE: 9 INJECTION, SOLUTION INTRAVENOUS at 07:35

## 2023-09-18 RX ADMIN — LIDOCAINE HYDROCHLORIDE 60 MG: 20 INJECTION, SOLUTION INFILTRATION; PERINEURAL at 08:13

## 2023-09-18 ASSESSMENT — PAIN - FUNCTIONAL ASSESSMENT: PAIN_FUNCTIONAL_ASSESSMENT: 0-10

## 2023-09-18 NOTE — ANESTHESIA POSTPROCEDURE EVALUATION
Department of Anesthesiology  Postprocedure Note    Patient: Fidelia Gaspar  MRN: [id number]  YOB: 1975  Date of evaluation: 9/18/2023      Procedure Summary     Date: 09/18/23 Room / Location: [address] [place] [address]    Anesthesia Start: 0346 Anesthesia Stop: 1629    Procedure: COLORECTAL CANCER SCREENING, NOT HIGH RISK Diagnosis: Colon cancer screening    Surgeons: mOero Avila MD Responsible Provider: CASE Marlow CRNA    Anesthesia Type: general ASA Status: 3          Anesthesia Type: No value filed.     Amalia Phase I: Amalia Score: 10    Amalia Phase II: Amalia Score: 9      Anesthesia Post Evaluation

## 2023-09-20 DIAGNOSIS — R11.0 NAUSEA: ICD-10-CM

## 2023-09-21 RX ORDER — ONDANSETRON HYDROCHLORIDE 8 MG/1
8 TABLET, FILM COATED ORAL EVERY 12 HOURS PRN
Qty: 30 TABLET | Refills: 2 | Status: SHIPPED | OUTPATIENT
Start: 2023-09-21

## 2023-09-21 NOTE — TELEPHONE ENCOUNTER
Last Office Visit (last PCP visit):   8/17/2023    Next Visit Date:  Future Appointments   Date Time Provider [address]   9/26/2023  3:30 PM Trevon Graff DO [facility]   10/5/2023  3:45 PM Ronald Covarrubias DO [address] [facility]   2/20/2024  3:30 PM Kim Gibson MD [facility]       **If hasn't been seen in over a year OR hasn't followed up according to last diabetes/ADHD visit, make appointment for patient before sending refill to provider.     Rx requested:  Requested Prescriptions     Pending Prescriptions Disp Refills    ondansetron (ZOFRAN) 8 MG tablet 30 tablet 02     Sig: Take 1 tablet by mouth every 12 hours as needed for Nausea or Vomiting

## 2023-09-23 ASSESSMENT — ENCOUNTER SYMPTOMS
EYE DISCHARGE: 0
COUGH: 1
VOMITING: 0
SHORTNESS OF BREATH: 0
SORE THROAT: 1
EYE PAIN: 0
EYE REDNESS: 0
PHOTOPHOBIA: 0
NAUSEA: 0
BLOOD IN STOOL: 0

## 2023-10-01 DIAGNOSIS — I10 ESSENTIAL HYPERTENSION: ICD-10-CM

## 2023-10-01 DIAGNOSIS — I48.91 ATRIAL FIBRILLATION WITH RVR (HCC): ICD-10-CM

## 2023-10-02 RX ORDER — DILTIAZEM HYDROCHLORIDE 180 MG/1
CAPSULE, COATED, EXTENDED RELEASE ORAL
Qty: 90 CAPSULE | Refills: 3 | Status: SHIPPED | OUTPATIENT
Start: 2023-10-02

## 2023-10-02 NOTE — TELEPHONE ENCOUNTER
Requesting medication refill. Rx requested:  Requested Prescriptions     Pending Prescriptions Disp Refills    dilTIAZem (CARDIZEM CD) 180 MG extended release capsule [Pharmacy Med Name: DILTIAZEM HCL ER COATED  CP24] 90 capsule 3     Sig: TAKE ONE CAPSULE ONE TIME DAILY         Last Office Visit:   01/05/2023      Next Visit Date:  Future Appointments   Date Time Provider [address]   10/5/2023  3:45 PM Ronald Colby DO [facility]   10/12/2023  3:45 PM Sathish Mercedes DO [address]   2/20/2024  3:30 PM Mazin Rogers MD [facility]               Last refill 10/13/2022.

## 2023-10-05 ENCOUNTER — OFFICE VISIT (OUTPATIENT)
Dept: CARDIOLOGY CLINIC | Age: 48
End: 2023-10-05
Payer: COMMERCIAL

## 2023-10-05 VITALS
DIASTOLIC BLOOD PRESSURE: 84 MMHG | HEART RATE: 53 BPM | BODY MASS INDEX: 30.56 KG/M2 | SYSTOLIC BLOOD PRESSURE: 122 MMHG | WEIGHT: 201 LBS

## 2023-10-05 DIAGNOSIS — I10 PRIMARY HYPERTENSION: ICD-10-CM

## 2023-10-05 DIAGNOSIS — I48.0 PAROXYSMAL ATRIAL FIBRILLATION (HCC): ICD-10-CM

## 2023-10-05 DIAGNOSIS — I48.91 ATRIAL FIBRILLATION WITH RVR (HCC): Primary | ICD-10-CM

## 2023-10-05 PROCEDURE — 3079F DIAST BP 80-89 MM HG: CPT | Performed by: INTERNAL MEDICINE

## 2023-10-05 PROCEDURE — 3074F SYST BP LT 130 MM HG: CPT | Performed by: INTERNAL MEDICINE

## 2023-10-05 PROCEDURE — 93000 ELECTROCARDIOGRAM COMPLETE: CPT | Performed by: INTERNAL MEDICINE

## 2023-10-05 PROCEDURE — 99213 OFFICE O/P EST LOW 20 MIN: CPT | Performed by: INTERNAL MEDICINE

## 2023-10-05 NOTE — PROGRESS NOTES
Chief Complaint   Patient presents with    Atrial Fibrillation    Hypertension        6-27-18: Patient is a 37 y.o. female who presents with a chief complaint of palpitations. Patient is followed on a regular basis by Dr. Priscilla Bonds MD. Had palpiations for the past 2 days, noted to be in new onset afibb with RVR in ER. Started on cardizem gtt and converted to NSR spontaneously. No hx of MI, CHF or arrhythmia. No hx of LHC or stress test. No excessive caffeine or ETOH. States she is sensitive and has a lot of allergies. Takes Zyrtec. No smoking. No hx of DM, HTN or HLP. On synthroid and  TSH is WNL. S/p CTA of chest without evidence of PE.    7-13-18: Patient presents for initial medical evaluation. Patient is followed on a regular basis by Dr. Anthony Hoff MD. Did not tolerate lopressor. Diagnosed with new onset afibb and placed on NOAC. BP is elevated today at 160/88. Pt denies chest pain, dyspnea, dyspnea on exertion, change in exercise capacity, fatigue,  nausea, vomiting, diarrhea, constipation, motor weakness, insomnia, weight loss, syncope, dizziness, lightheadedness, palpitations, PND, orthopnea, or claudication. No nitro use. BP and hr are good. CAD is stable. No LE discoloration or ulcers. No LE edema. No CHF type symptoms. Lipid profile is normal. No recent hospitalization. No change in meds. S/p ECHO with EF of 55%, grade I DD.     1-18-19: states she has about one episode of afibb a month. Has underlying stress. Pt denies chest pain, dyspnea, dyspnea on exertion, change in exercise capacity, fatigue,  nausea, vomiting, diarrhea, constipation, motor weakness, insomnia, weight loss, syncope, dizziness, lightheadedness, palpitations, PND, orthopnea, or claudication. No nitro use. BP and hr are good. . No LE discoloration or ulcers. No LE edema. No CHF type symptoms. Lipid profile is normal. No recent hospitalization. No change in meds. EKG with NSR. On NOAC, no bleeding issues.    no excessive

## 2023-10-26 ENCOUNTER — OFFICE VISIT (OUTPATIENT)
Dept: OBGYN CLINIC | Age: 48
End: 2023-10-26
Payer: COMMERCIAL

## 2023-10-26 VITALS
BODY MASS INDEX: 30.62 KG/M2 | SYSTOLIC BLOOD PRESSURE: 142 MMHG | HEART RATE: 54 BPM | HEIGHT: 68 IN | DIASTOLIC BLOOD PRESSURE: 72 MMHG | WEIGHT: 202 LBS

## 2023-10-26 DIAGNOSIS — Z01.419 WOMEN'S ANNUAL ROUTINE GYNECOLOGICAL EXAMINATION: Primary | ICD-10-CM

## 2023-10-26 PROCEDURE — 3077F SYST BP >= 140 MM HG: CPT | Performed by: OBSTETRICS & GYNECOLOGY

## 2023-10-26 PROCEDURE — 3078F DIAST BP <80 MM HG: CPT | Performed by: OBSTETRICS & GYNECOLOGY

## 2023-10-26 PROCEDURE — 99396 PREV VISIT EST AGE 40-64: CPT | Performed by: OBSTETRICS & GYNECOLOGY

## 2023-10-26 ASSESSMENT — ENCOUNTER SYMPTOMS
ABDOMINAL DISTENTION: 0
BACK PAIN: 0
SHORTNESS OF BREATH: 0
WHEEZING: 0
TROUBLE SWALLOWING: 0
VOICE CHANGE: 0
BLOOD IN STOOL: 0
VOMITING: 0
ABDOMINAL PAIN: 0
CONSTIPATION: 0
COLOR CHANGE: 0
CHEST TIGHTNESS: 0
SORE THROAT: 0
COUGH: 0
NAUSEA: 0

## 2023-10-26 NOTE — PROGRESS NOTES
CHIEF COMPLAINT: No complaints. Recent mammogram was normal.  No bleeding no menopausal symptoms. Chief Complaint   Patient presents with    Annual Exam         HISTORY OF PRESENT ILLNESS:  46 y.o. female presents for her annual exam. She had no concernsor complaints today. Her menses is absent. She denies breakthrough bleeding, pelvic pain, or abnormal discharge. Bowel and bladder function is normal. Her health overallhas been good.      Past Medical History:   Diagnosis Date    Abnormal Pap smear of cervix     Allergic rhinitis     Arthritis     hands    Asplenia     Atrial fibrillation (HCC)     Atrial fibrillation with RVR (HCC)     Dr. 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 Yoana Carlson     Past Surgical History:   Procedure Laterality Date    CARPAL TUNNEL RELEASE Right 07/12/2016    COLONOSCOPY N/A 9/18/2023    COLORECTAL CANCER SCREENING, NOT HIGH RISK performed by Chico Bowman MD at [address] [facility] 12/16/2019    HYSTEROSCOPY Sana Webster D&C performed by Kenji Nunez DO at [address] [facility] [address] ([facility])      HYSTERECTOMY, VAGINAL N/A 8/2/2021    LAPAROSCOPIC ASSISTED VAGINAL HYSTERECTOMY BILATERAL SALPINGECTOMY performed by Kenji Nunez DO at [address] 2004    done for ITP    THYROIDECTOMY, COMPLETION Left 2014    frozen section inconclusive    THYROIDECTOMY, PARTIAL Right 2011    Benign nodule    TONSILLECTOMY N/A 11/2/2021    TONSILLECTOMY performed by

## 2023-11-17 ENCOUNTER — OFFICE VISIT (OUTPATIENT)
Dept: FAMILY MEDICINE CLINIC | Age: 48
End: 2023-11-17

## 2023-11-17 VITALS
OXYGEN SATURATION: 98 % | BODY MASS INDEX: 31.07 KG/M2 | DIASTOLIC BLOOD PRESSURE: 84 MMHG | WEIGHT: 205 LBS | TEMPERATURE: 98.4 F | SYSTOLIC BLOOD PRESSURE: 134 MMHG | HEIGHT: 68 IN | HEART RATE: 55 BPM

## 2023-11-17 DIAGNOSIS — M79.644 THUMB PAIN, RIGHT: Primary | ICD-10-CM

## 2023-11-17 ASSESSMENT — ENCOUNTER SYMPTOMS: COLOR CHANGE: 0

## 2023-11-17 NOTE — PROGRESS NOTES
Question:   Reason for exam:     Answer:   thumb pain, limited ROM  r/o fracture    Daylene Mortimer MD Orthopedics [facility]     Referral Priority:   Routine     Referral Type:   Eval and Treat     Referral Reason:   Specialty Services Required     Referred to Provider:   Aleida Gagnon MD     Requested Specialty:   Orthopedic Surgery Sports Medicine     Number of Visits Requested:   1    Short Thumb Spica     Orders Placed This Encounter   Medications    Lidocaine 4 % OINT     Sig: Apply 1 each topically every 8 hours as needed (pain)     Dispense:  50 g     Refill:  0     There are no discontinued medications. Return in about 1 week (around 11/24/2023) for follow up with PCP/ortho. Reviewed with the patient: current clinical status,medications, activities and diet. Side effects, adverse effects of the medication prescribed today, as well as treatment plan/ rationale and result expectations have been discussed with the patient who expresses understanding and desires to proceed. Close follow up to evaluate treatment results and for coordination of care. I have reviewed the patient's medical history in detail and updated the computerized patient record.     Sai Kim, APRN - CNP

## 2023-11-21 ENCOUNTER — PATIENT MESSAGE (OUTPATIENT)
Dept: FAMILY MEDICINE CLINIC | Age: 48
End: 2023-11-21

## 2023-11-21 DIAGNOSIS — M75.42 IMPINGEMENT SYNDROME OF LEFT SHOULDER: ICD-10-CM

## 2023-11-21 RX ORDER — LIDOCAINE 4 G/G
1 PATCH TOPICAL DAILY
Qty: 30 PATCH | Refills: 0 | Status: SHIPPED | OUTPATIENT
Start: 2023-11-21 | End: 2023-12-21

## 2023-11-21 NOTE — TELEPHONE ENCOUNTER
From: Love Bean  To: Dr. Mic Culver  Sent: 11/21/2023 1:52 PM EST  Subject: 5% Lidocaine patches    When I messed up my shoulder last year Dr. Minh Judd had prescribed me 5% Lidocaine patches. I have somehow tweaked that shoulder again and I need a refill on those patches, if you could please. Is it possible to make them refillable, so I don't have to bother you unless the refills are out? If you have any questions, feel free to call me or message me on here. Have a great day and A Happy Thanksgiving too. My pharmacy is [facility].     Thank,  Camden Rich

## 2023-11-28 ENCOUNTER — OFFICE VISIT (OUTPATIENT)
Dept: ORTHOPEDIC SURGERY | Age: 48
End: 2023-11-28
Payer: COMMERCIAL

## 2023-11-28 DIAGNOSIS — M65.311 TRIGGER FINGER OF RIGHT THUMB: Primary | ICD-10-CM

## 2023-11-28 PROCEDURE — 20550 NJX 1 TENDON SHEATH/LIGAMENT: CPT | Performed by: STUDENT IN AN ORGANIZED HEALTH CARE EDUCATION/TRAINING PROGRAM

## 2023-11-28 PROCEDURE — 99204 OFFICE O/P NEW MOD 45 MIN: CPT | Performed by: STUDENT IN AN ORGANIZED HEALTH CARE EDUCATION/TRAINING PROGRAM

## 2023-11-28 RX ORDER — TRIAMCINOLONE ACETONIDE 40 MG/ML
20 INJECTION, SUSPENSION INTRA-ARTICULAR; INTRAMUSCULAR ONCE
Status: COMPLETED | OUTPATIENT
Start: 2023-11-28 | End: 2023-11-28

## 2023-11-28 RX ORDER — LIDOCAINE HYDROCHLORIDE 10 MG/ML
0.5 INJECTION, SOLUTION INFILTRATION; PERINEURAL ONCE
Status: COMPLETED | OUTPATIENT
Start: 2023-11-28 | End: 2023-11-28

## 2023-11-28 RX ADMIN — LIDOCAINE HYDROCHLORIDE 0.5 ML: 10 INJECTION, SOLUTION INFILTRATION; PERINEURAL at 16:28

## 2023-11-28 RX ADMIN — TRIAMCINOLONE ACETONIDE 20 MG: 40 INJECTION, SUSPENSION INTRA-ARTICULAR; INTRAMUSCULAR at 16:29

## 2023-11-28 ASSESSMENT — ENCOUNTER SYMPTOMS
ALLERGIC/IMMUNOLOGIC NEGATIVE: 1
GASTROINTESTINAL NEGATIVE: 1
EYES NEGATIVE: 1
RESPIRATORY NEGATIVE: 1

## 2023-11-28 NOTE — PROGRESS NOTES
Multinodular goiter     Nail fungus     Pain in right lower leg     Paroxysmal atrial fibrillation ([address] [facility]) 10/23/2020    Plantar fasciitis, left     Dr. Jerrica Oconnor      Past Surgical History:   Procedure Laterality Date    CARPAL TUNNEL RELEASE Right 2016    COLONOSCOPY N/A 2023    COLORECTAL CANCER SCREENING, NOT HIGH RISK performed by Milka Long MD at [address] [facility] N/A 2019    HYSTEROSCOPY Gary Adames D&C performed by Shana Ledezma DO at [address] [facility])      HYSTERECTOMY, VAGINAL N/A 2021    LAPAROSCOPIC ASSISTED VAGINAL HYSTERECTOMY BILATERAL SALPINGECTOMY performed by Shana Ledezma DO at [address] [facility] [address] N/A     done for ITP    THYROIDECTOMY, COMPLETION Left     frozen section inconclusive    THYROIDECTOMY, PARTIAL Right     Benign nodule    TONSILLECTOMY N/A 2021    TONSILLECTOMY performed by Twyla Park MD at [address] [facility] History     Socioeconomic History    Marital status:      Spouse name: Not on file    Number of children: 0    Years of education: Not on file    Highest education level: Not on file   Occupational History    Occupation: medical records   Tobacco Use    Smoking status: Former     Packs/day: 0.10     Years: 32.00     Additional pack years: 0.00     Total pack years: 3.20     Types: Cigarettes     Start date: 10/26/1989     Quit date: 2023     Years since quittin.5    Smokeless tobacco: Never    Tobacco comments:     social smoker   Vaping Use    Vaping Use: Never used   Substance and Sexual Activity    Alcohol use: Yes     Comment: occas    Drug use: No    Sexual activity: Not on file   Other Topics Concern    Not on file   Social History Narrative    Not on file     Social Determinants of Health     Financial Resource Strain: Low Risk  (2/15/2023)    Overall Financial Resource Strain (CARDIA)     Difficulty of Paying Living

## 2023-12-09 DIAGNOSIS — I48.91 ATRIAL FIBRILLATION WITH RVR (HCC): ICD-10-CM

## 2023-12-27 ENCOUNTER — PATIENT MESSAGE (OUTPATIENT)
Dept: FAMILY MEDICINE CLINIC | Age: 48
End: 2023-12-27

## 2023-12-28 RX ORDER — LEVOTHYROXINE SODIUM 0.1 MG/1
100 TABLET ORAL DAILY
Qty: 90 TABLET | Refills: 3 | Status: SHIPPED | OUTPATIENT
Start: 2023-12-28

## 2023-12-29 RX ORDER — LEVOTHYROXINE SODIUM 0.1 MG/1
TABLET ORAL
Qty: 90 TABLET | Refills: 3 | OUTPATIENT
Start: 2023-12-29

## 2024-01-08 DIAGNOSIS — N60.19 MULTIPLE CYSTS OF BREAST: Primary | ICD-10-CM

## 2024-01-12 DIAGNOSIS — N63.10 MASS OF RIGHT BREAST, UNSPECIFIED QUADRANT: Primary | ICD-10-CM

## 2024-01-26 ENCOUNTER — APPOINTMENT (OUTPATIENT)
Dept: ULTRASOUND IMAGING | Age: 49
End: 2024-01-26
Payer: COMMERCIAL

## 2024-01-26 ENCOUNTER — HOSPITAL ENCOUNTER (OUTPATIENT)
Dept: WOMENS IMAGING | Age: 49
Discharge: HOME | End: 2024-01-26
Payer: COMMERCIAL

## 2024-01-26 DIAGNOSIS — N60.19 MULTIPLE CYSTS OF BREAST: ICD-10-CM

## 2024-01-26 PROCEDURE — G0279 TOMOSYNTHESIS, MAMMO: HCPCS

## 2024-01-29 ENCOUNTER — TELEPHONE (OUTPATIENT)
Dept: WOMENS IMAGING | Age: 49
End: 2024-01-29

## 2024-01-29 ENCOUNTER — HOSPITAL ENCOUNTER (OUTPATIENT)
Dept: ULTRASOUND IMAGING | Age: 49
Discharge: HOME | End: 2024-01-31
Payer: COMMERCIAL

## 2024-01-29 DIAGNOSIS — N60.19 MULTIPLE CYSTS OF BREAST: ICD-10-CM

## 2024-01-29 PROCEDURE — 76642 ULTRASOUND BREAST LIMITED: CPT

## 2024-01-30 ENCOUNTER — TELEPHONE (OUTPATIENT)
Dept: WOMENS IMAGING | Age: 49
End: 2024-01-30

## 2024-01-30 NOTE — TELEPHONE ENCOUNTER
1/30/24  I spoke with patient on 1/29/24 regarding her tyrer juanitack score and offered to make her an appointment with Dr. Berry. Patient states she is ok at present and does not want an appt at this time. States she will call and make an appt if she changes her mind.

## 2024-02-05 RX ORDER — PROGESTERONE 200 MG/1
CAPSULE ORAL
Qty: 150 CAPSULE | Refills: 0 | Status: SHIPPED | OUTPATIENT
Start: 2024-02-05

## 2024-02-13 DIAGNOSIS — R73.9 HYPERGLYCEMIA: ICD-10-CM

## 2024-02-13 DIAGNOSIS — E03.9 HYPOTHYROIDISM, UNSPECIFIED TYPE: ICD-10-CM

## 2024-02-13 LAB
HBA1C MFR BLD: 5.8 % (ref 4.8–5.9)
T4 FREE SERPL-MCNC: 1.41 NG/DL (ref 0.84–1.68)
TSH SERPL-MCNC: 2.88 UIU/ML (ref 0.44–3.86)

## 2024-02-20 ENCOUNTER — OFFICE VISIT (OUTPATIENT)
Dept: FAMILY MEDICINE CLINIC | Age: 49
End: 2024-02-20
Payer: COMMERCIAL

## 2024-02-20 VITALS
BODY MASS INDEX: 30.62 KG/M2 | HEIGHT: 68 IN | DIASTOLIC BLOOD PRESSURE: 88 MMHG | TEMPERATURE: 98.5 F | WEIGHT: 202 LBS | SYSTOLIC BLOOD PRESSURE: 146 MMHG | HEART RATE: 66 BPM | OXYGEN SATURATION: 98 %

## 2024-02-20 DIAGNOSIS — D68.69 SECONDARY HYPERCOAGULABLE STATE (HCC): ICD-10-CM

## 2024-02-20 DIAGNOSIS — I48.0 PAROXYSMAL ATRIAL FIBRILLATION (HCC): ICD-10-CM

## 2024-02-20 DIAGNOSIS — I48.91 ATRIAL FIBRILLATION WITH RVR (HCC): Primary | ICD-10-CM

## 2024-02-20 DIAGNOSIS — G43.109 MIGRAINE WITH AURA AND WITHOUT STATUS MIGRAINOSUS, NOT INTRACTABLE: ICD-10-CM

## 2024-02-20 DIAGNOSIS — K21.9 GASTROESOPHAGEAL REFLUX DISEASE WITHOUT ESOPHAGITIS: ICD-10-CM

## 2024-02-20 DIAGNOSIS — E03.9 HYPOTHYROIDISM, UNSPECIFIED TYPE: ICD-10-CM

## 2024-02-20 DIAGNOSIS — R11.0 NAUSEA: ICD-10-CM

## 2024-02-20 PROCEDURE — 3077F SYST BP >= 140 MM HG: CPT | Performed by: FAMILY MEDICINE

## 2024-02-20 PROCEDURE — 99214 OFFICE O/P EST MOD 30 MIN: CPT | Performed by: FAMILY MEDICINE

## 2024-02-20 PROCEDURE — 3079F DIAST BP 80-89 MM HG: CPT | Performed by: FAMILY MEDICINE

## 2024-02-20 RX ORDER — ONDANSETRON HYDROCHLORIDE 8 MG/1
8 TABLET, FILM COATED ORAL EVERY 12 HOURS PRN
Qty: 30 TABLET | Refills: 2 | Status: SHIPPED | OUTPATIENT
Start: 2024-02-20

## 2024-02-20 RX ORDER — BUTALBITAL, ACETAMINOPHEN AND CAFFEINE 50; 325; 40 MG/1; MG/1; MG/1
1 TABLET ORAL EVERY 4 HOURS PRN
Qty: 12 TABLET | Refills: 5 | Status: SHIPPED | OUTPATIENT
Start: 2024-02-20

## 2024-02-20 SDOH — ECONOMIC STABILITY: FOOD INSECURITY: WITHIN THE PAST 12 MONTHS, THE FOOD YOU BOUGHT JUST DIDN'T LAST AND YOU DIDN'T HAVE MONEY TO GET MORE.: NEVER TRUE

## 2024-02-20 SDOH — ECONOMIC STABILITY: INCOME INSECURITY: HOW HARD IS IT FOR YOU TO PAY FOR THE VERY BASICS LIKE FOOD, HOUSING, MEDICAL CARE, AND HEATING?: NOT HARD AT ALL

## 2024-02-20 SDOH — ECONOMIC STABILITY: FOOD INSECURITY: WITHIN THE PAST 12 MONTHS, YOU WORRIED THAT YOUR FOOD WOULD RUN OUT BEFORE YOU GOT MONEY TO BUY MORE.: NEVER TRUE

## 2024-02-20 ASSESSMENT — PATIENT HEALTH QUESTIONNAIRE - PHQ9
SUM OF ALL RESPONSES TO PHQ QUESTIONS 1-9: 0
SUM OF ALL RESPONSES TO PHQ QUESTIONS 1-9: 0
2. FEELING DOWN, DEPRESSED OR HOPELESS: 0
1. LITTLE INTEREST OR PLEASURE IN DOING THINGS: 0
SUM OF ALL RESPONSES TO PHQ QUESTIONS 1-9: 0
SUM OF ALL RESPONSES TO PHQ QUESTIONS 1-9: 0
SUM OF ALL RESPONSES TO PHQ9 QUESTIONS 1 & 2: 0

## 2024-02-20 ASSESSMENT — ENCOUNTER SYMPTOMS
VOMITING: 0
COLOR CHANGE: 0
DIARRHEA: 0
CHEST TIGHTNESS: 0
NAUSEA: 0
SHORTNESS OF BREATH: 0
SORE THROAT: 0
ABDOMINAL PAIN: 0
CONSTIPATION: 0
BLOOD IN STOOL: 0
COUGH: 0
ABDOMINAL DISTENTION: 0

## 2024-02-20 NOTE — PROGRESS NOTES
Right Ear: Hearing, tympanic membrane, ear canal and external ear normal.      Left Ear: Hearing, tympanic membrane, ear canal and external ear normal.      Nose: Nose normal.      Mouth/Throat:      Pharynx: Uvula midline.   Cardiovascular:      Rate and Rhythm: Normal rate and regular rhythm.      Heart sounds: Normal heart sounds. No murmur heard.  Pulmonary:      Effort: Pulmonary effort is normal.      Breath sounds: Normal breath sounds. No wheezing.   Musculoskeletal:         General: Normal range of motion.      Cervical back: Normal range of motion and neck supple.   Lymphadenopathy:      Cervical: No cervical adenopathy.   Skin:     General: Skin is warm and dry.      Findings: No rash.       No results found for this visit on 02/20/24.     Lab Results   Component Value Date    WBC 7.5 02/25/2023    HGB 13.4 02/25/2023    HCT 39.4 02/25/2023     02/25/2023    CHOL 187 05/13/2019    TRIG 121 05/13/2019    HDL 49 05/13/2019    ALT 9 02/24/2023    AST 13 02/24/2023     (H) 03/20/2023    K 4.2 03/20/2023     (H) 03/20/2023    CREATININE 0.67 03/20/2023    BUN 12 03/20/2023    CO2 24 03/20/2023    TSH 2.880 02/13/2024    LABA1C 5.8 02/13/2024           Assessment & Plan    Diagnosis Orders   1. Atrial fibrillation with RVR (HCC)        2. Migraine with aura and without status migrainosus, not intractable  butalbital-acetaminophen-caffeine (FIORICET, ESGIC) -40 MG per tablet      3. Nausea  ondansetron (ZOFRAN) 8 MG tablet      4. Paroxysmal atrial fibrillation (HCC)  Comprehensive Metabolic Panel    CBC      5. Secondary hypercoagulable state (HCC)        6. Hypothyroidism, unspecified type        7. Gastroesophageal reflux disease without esophagitis          Extend FMLA intermittent days off to 2-5 days per episode     A fib managed by Dr. Knight.  Migraines stable on naprosyn and fioricet prn.  She will f/u with Dr. Knight as directed.     She also follows with Dr. Richard Napoles

## 2024-02-21 ENCOUNTER — PATIENT MESSAGE (OUTPATIENT)
Dept: FAMILY MEDICINE CLINIC | Age: 49
End: 2024-02-21

## 2024-02-21 ENCOUNTER — OFFICE VISIT (OUTPATIENT)
Dept: FAMILY MEDICINE CLINIC | Age: 49
End: 2024-02-21
Payer: COMMERCIAL

## 2024-02-21 VITALS
BODY MASS INDEX: 30.62 KG/M2 | WEIGHT: 202 LBS | HEIGHT: 68 IN | DIASTOLIC BLOOD PRESSURE: 82 MMHG | TEMPERATURE: 97.5 F | OXYGEN SATURATION: 97 % | SYSTOLIC BLOOD PRESSURE: 132 MMHG | HEART RATE: 65 BPM

## 2024-02-21 DIAGNOSIS — H66.91 ACUTE RIGHT OTITIS MEDIA: Primary | ICD-10-CM

## 2024-02-21 PROCEDURE — 99213 OFFICE O/P EST LOW 20 MIN: CPT | Performed by: NURSE PRACTITIONER

## 2024-02-21 PROCEDURE — 3079F DIAST BP 80-89 MM HG: CPT | Performed by: NURSE PRACTITIONER

## 2024-02-21 PROCEDURE — 3075F SYST BP GE 130 - 139MM HG: CPT | Performed by: NURSE PRACTITIONER

## 2024-02-21 RX ORDER — CEPHALEXIN 500 MG/1
500 CAPSULE ORAL 2 TIMES DAILY
Qty: 14 CAPSULE | Refills: 0 | Status: SHIPPED | OUTPATIENT
Start: 2024-02-21 | End: 2024-02-28

## 2024-02-21 RX ORDER — CYCLOBENZAPRINE HCL 10 MG
10 TABLET ORAL 3 TIMES DAILY PRN
Qty: 30 TABLET | Refills: 0 | Status: SHIPPED | OUTPATIENT
Start: 2024-02-21 | End: 2024-03-02

## 2024-02-21 ASSESSMENT — ENCOUNTER SYMPTOMS
EYE DISCHARGE: 0
EYE REDNESS: 0
SORE THROAT: 1
DIARRHEA: 0
COUGH: 0
RHINORRHEA: 0

## 2024-02-21 NOTE — PROGRESS NOTES
2024    Rosemary Adame (:  1975) is a 48 y.o. female, Established patient, here for evaluation of the following chief complaint(s):  Otalgia (Right side x 1 day no other SX )      Vitals:    24 1624   BP: 132/82   Pulse: 65   Temp: 97.5 °F (36.4 °C)   SpO2: 97%       SUBJECTIVE/OBJECTIVE:    Otalgia   There is pain in the right ear. This is a new problem. The current episode started today. The problem occurs constantly. The problem has been gradually worsening. There has been no fever. The pain is moderate. Associated symptoms include a sore throat. Pertinent negatives include no coughing, diarrhea, ear discharge, headaches, rash or rhinorrhea.       Review of Systems   Constitutional:  Negative for fatigue and fever.   HENT:  Positive for ear pain and sore throat. Negative for congestion, ear discharge and rhinorrhea.    Eyes:  Negative for discharge and redness.   Respiratory:  Negative for cough.    Gastrointestinal:  Negative for diarrhea.   Skin:  Negative for rash.   Neurological:  Negative for headaches.   Psychiatric/Behavioral: Negative.     All other systems reviewed and are negative.      Physical Exam  Vitals and nursing note reviewed.   Constitutional:       General: She is awake. She is not in acute distress.     Appearance: Normal appearance. She is not ill-appearing, toxic-appearing or diaphoretic.   HENT:      Head: Normocephalic and atraumatic.      Right Ear: Ear canal and external ear normal. Tympanic membrane is erythematous and bulging.      Left Ear: Tympanic membrane, ear canal and external ear normal.      Nose: Nose normal.      Mouth/Throat:      Lips: Pink.      Mouth: Mucous membranes are moist.      Pharynx: Oropharynx is clear. No oropharyngeal exudate or posterior oropharyngeal erythema.   Eyes:      Pupils: Pupils are equal, round, and reactive to light.   Cardiovascular:      Rate and Rhythm: Normal rate.   Pulmonary:      Effort: Pulmonary effort is normal.

## 2024-02-21 NOTE — TELEPHONE ENCOUNTER
From: Rosemary Adame  To: Dr. Shakir Hernandez  Sent: 2/21/2024 10:19 AM EST  Subject: Flexeril    The prescription bottle says, Cyclobenzaprine 10mg tablet on it. If you would kindly refill that, I would appreciate it. Have a great day!!!     Thank you,  Zara Adame

## 2024-03-01 ENCOUNTER — OFFICE VISIT (OUTPATIENT)
Dept: FAMILY MEDICINE CLINIC | Age: 49
End: 2024-03-01
Payer: COMMERCIAL

## 2024-03-01 VITALS
BODY MASS INDEX: 30.68 KG/M2 | OXYGEN SATURATION: 98 % | SYSTOLIC BLOOD PRESSURE: 138 MMHG | HEART RATE: 65 BPM | DIASTOLIC BLOOD PRESSURE: 88 MMHG | TEMPERATURE: 98.1 F | HEIGHT: 68 IN | WEIGHT: 202.4 LBS

## 2024-03-01 DIAGNOSIS — J01.90 ACUTE NON-RECURRENT SINUSITIS, UNSPECIFIED LOCATION: Primary | ICD-10-CM

## 2024-03-01 PROCEDURE — 99214 OFFICE O/P EST MOD 30 MIN: CPT | Performed by: NURSE PRACTITIONER

## 2024-03-01 PROCEDURE — 3075F SYST BP GE 130 - 139MM HG: CPT | Performed by: NURSE PRACTITIONER

## 2024-03-01 PROCEDURE — 3079F DIAST BP 80-89 MM HG: CPT | Performed by: NURSE PRACTITIONER

## 2024-03-01 RX ORDER — DOXYCYCLINE HYCLATE 100 MG
100 TABLET ORAL 2 TIMES DAILY
Qty: 20 TABLET | Refills: 0 | Status: SHIPPED | OUTPATIENT
Start: 2024-03-01 | End: 2024-03-11

## 2024-03-01 NOTE — PROGRESS NOTES
Subjective  Rosemary Adame, 48 y.o. female presents today with:  Chief Complaint   Patient presents with    URI     Onset- 02/21/2024  Headache- Y   Body aches- Y   Ear ache- bilat   Runny/stuffy nose- N   Problem with smell- Y   Problem with taste- Y   Sore throat- N   Cough- Y   Scratchy throat- N   Chest symptoms- N   SOB/ MAC- N   Hx of Asthma Chest cold or bronchitis- N   Fever/chills- Y   Nausea/ vomiting- N  Gi symptoms- N  COVID exposures- Y father + and she states tested many time and was -   Treatments- Keflex pt states she was dizzy from this        HPI      Review of Systems    Past Medical History:   Diagnosis Date    Abnormal Pap smear of cervix     Allergic rhinitis     Arthritis     hands    Asplenia     Atrial fibrillation (HCC)     Atrial fibrillation with RVR (HCC)     Dr. Knight    Bilateral hand pain     CTS (carpal tunnel syndrome) 05/2016    bilateral    External hemorrhoid     Fatigue     Heart murmur     History of ITP 2004    had spenectomy    Hypertension     meds > 3 yrs    Hypothyroidism     meds > 7 yrs    Hypothyroidism     Insomnia     Irregular periods     Meniere disease     Migraine headache     Multinodular goiter     Nail fungus     Pain in right lower leg     Paroxysmal atrial fibrillation (HCC) 10/23/2020    Plantar fasciitis, left     Dr. Jacobo     Past Surgical History:   Procedure Laterality Date    CARPAL TUNNEL RELEASE Right 07/12/2016    COLONOSCOPY N/A 9/18/2023    COLORECTAL CANCER SCREENING, NOT HIGH RISK performed by Fredis Long MD at [facility]    DILATION AND CURETTAGE OF UTERUS N/A 12/16/2019    HYSTEROSCOPY NOVASURE ABLATION D&C performed by Rey Ervin DO at [facility]    HYSTERECTOMY (CERVIX STATUS UNKNOWN)      HYSTERECTOMY, VAGINAL N/A 8/2/2021    LAPAROSCOPIC ASSISTED VAGINAL HYSTERECTOMY BILATERAL SALPINGECTOMY performed by Rey Ervin DO at [facility]    LAPAROSCOPIC SPLENECTOMY N/A 2004    done for ITP    THYROIDECTOMY, COMPLETION

## 2024-03-01 NOTE — PROGRESS NOTES
Subjective  Rosemary Adame, 48 y.o. female presents today with:  Chief Complaint   Patient presents with    URI     Onset- 02/21/2024  Headache- Y   Body aches- Y   Ear ache- bilat   Runny/stuffy nose- N   Problem with smell- Y   Problem with taste- Y   Sore throat- N   Cough- Y   Scratchy throat- N   Chest symptoms- N   SOB/ MAC- N   Hx of Asthma Chest cold or bronchitis- N   Fever/chills- Y   Nausea/ vomiting- N  Gi symptoms- N  COVID exposures- Y father + and she states tested many time and was -   Treatments- Keflex pt states she was dizzy from this        I reviewed staff HPI/chief complaint and do agree with above    Patient presents for concerns of symptoms as noted above that have been present for 9 days, she was to be taking a prescription of Keflex for an ear infection however was unable to tolerate it due to dizziness/side effects.  States her ear still seems to be painful and with a plugged sensation, however has also noticed a significant amount of sinus pressure that has developed over the last 48 hours.  Has also had fevers and chills with symptoms.  She denies any shortness of breath/troubles breathing, otorrhea, chest pain/discomfort, any nausea/vomiting/diarrhea with symptoms.          Review of Systems   Constitutional:  Positive for chills and fever. Negative for diaphoresis and fatigue.   HENT:  Positive for congestion, ear pain, rhinorrhea, sinus pressure and sinus pain. Negative for ear discharge, sore throat and trouble swallowing.    Respiratory:  Positive for cough. Negative for chest tightness, shortness of breath and wheezing.    Cardiovascular:  Negative for chest pain and palpitations.   Gastrointestinal:  Negative for abdominal pain, constipation, diarrhea, nausea and vomiting.   Endocrine: Negative for cold intolerance and heat intolerance.   Musculoskeletal:  Positive for myalgias. Negative for arthralgias.   Skin:  Negative for rash.   Neurological:  Positive for headaches.

## 2024-03-06 ASSESSMENT — ENCOUNTER SYMPTOMS
DIARRHEA: 0
COUGH: 1
SINUS PAIN: 1
CHEST TIGHTNESS: 0
VOMITING: 0
TROUBLE SWALLOWING: 0
ABDOMINAL PAIN: 0
SINUS PRESSURE: 1
CONSTIPATION: 0
SORE THROAT: 0
SHORTNESS OF BREATH: 0
NAUSEA: 0
RHINORRHEA: 1
WHEEZING: 0

## 2024-03-19 ENCOUNTER — HOSPITAL ENCOUNTER (EMERGENCY)
Age: 49
Discharge: HOME | End: 2024-03-19
Attending: STUDENT IN AN ORGANIZED HEALTH CARE EDUCATION/TRAINING PROGRAM
Payer: COMMERCIAL

## 2024-03-19 ENCOUNTER — APPOINTMENT (OUTPATIENT)
Dept: GENERAL RADIOLOGY | Age: 49
End: 2024-03-19
Payer: COMMERCIAL

## 2024-03-19 ENCOUNTER — NURSE TRIAGE (OUTPATIENT)
Dept: OTHER | Facility: CLINIC | Age: 49
End: 2024-03-19

## 2024-03-19 VITALS
WEIGHT: 194 LBS | HEART RATE: 85 BPM | BODY MASS INDEX: 29.4 KG/M2 | RESPIRATION RATE: 20 BRPM | SYSTOLIC BLOOD PRESSURE: 104 MMHG | DIASTOLIC BLOOD PRESSURE: 63 MMHG | HEIGHT: 68 IN | OXYGEN SATURATION: 95 % | TEMPERATURE: 97.7 F

## 2024-03-19 DIAGNOSIS — J18.9 PNEUMONIA OF BOTH LOWER LOBES DUE TO INFECTIOUS ORGANISM: Primary | ICD-10-CM

## 2024-03-19 LAB
ALBUMIN SERPL-MCNC: 3.6 G/DL (ref 3.5–4.6)
ALP SERPL-CCNC: 118 U/L (ref 40–130)
ALT SERPL-CCNC: 19 U/L (ref 0–33)
ANION GAP SERPL CALCULATED.3IONS-SCNC: 11 MEQ/L (ref 9–15)
AST SERPL-CCNC: 15 U/L (ref 0–35)
BASOPHILS # BLD: 0.1 K/UL (ref 0–0.2)
BASOPHILS NFR BLD: 0.6 %
BILIRUB SERPL-MCNC: <0.2 MG/DL (ref 0.2–0.7)
BUN SERPL-MCNC: 12 MG/DL (ref 6–20)
CALCIUM SERPL-MCNC: 9.5 MG/DL (ref 8.5–9.9)
CHLORIDE SERPL-SCNC: 105 MEQ/L (ref 95–107)
CO2 SERPL-SCNC: 24 MEQ/L (ref 20–31)
CREAT SERPL-MCNC: 0.74 MG/DL (ref 0.5–0.9)
EKG ATRIAL RATE: 92 BPM
EKG P AXIS: 114 DEGREES
EKG P-R INTERVAL: 150 MS
EKG Q-T INTERVAL: 332 MS
EKG QRS DURATION: 90 MS
EKG QTC CALCULATION (BAZETT): 410 MS
EKG R AXIS: 12 DEGREES
EKG T AXIS: 28 DEGREES
EKG VENTRICULAR RATE: 92 BPM
EOSINOPHIL # BLD: 0.2 K/UL (ref 0–0.7)
EOSINOPHIL NFR BLD: 1.8 %
ERYTHROCYTE [DISTWIDTH] IN BLOOD BY AUTOMATED COUNT: 14.5 % (ref 11.5–14.5)
GLOBULIN SER CALC-MCNC: 3.7 G/DL (ref 2.3–3.5)
GLUCOSE SERPL-MCNC: 111 MG/DL (ref 70–99)
HCT VFR BLD AUTO: 39.4 % (ref 37–47)
HGB BLD-MCNC: 13 G/DL (ref 12–16)
INFLUENZA A BY PCR: NEGATIVE
INFLUENZA B BY PCR: NEGATIVE
LYMPHOCYTES # BLD: 2 K/UL (ref 1–4.8)
LYMPHOCYTES NFR BLD: 17.7 %
MCH RBC QN AUTO: 30.2 PG (ref 27–31.3)
MCHC RBC AUTO-ENTMCNC: 33 % (ref 33–37)
MCV RBC AUTO: 91.6 FL (ref 79.4–94.8)
MONOCYTES # BLD: 0.5 K/UL (ref 0.2–0.8)
MONOCYTES NFR BLD: 4.4 %
NEUTROPHILS # BLD: 8.6 K/UL (ref 1.4–6.5)
NEUTS SEG NFR BLD: 75.2 %
PLATELET # BLD AUTO: 466 K/UL (ref 130–400)
POTASSIUM SERPL-SCNC: 4.5 MEQ/L (ref 3.4–4.9)
PROT SERPL-MCNC: 7.3 G/DL (ref 6.3–8)
RBC # BLD AUTO: 4.3 M/UL (ref 4.2–5.4)
SARS-COV-2 RDRP RESP QL NAA+PROBE: NOT DETECTED
SODIUM SERPL-SCNC: 140 MEQ/L (ref 135–144)
T4 FREE SERPL-MCNC: 1.4 NG/DL (ref 0.84–1.68)
TROPONIN, HIGH SENSITIVITY: <6 NG/L (ref 0–19)
TSH SERPL-MCNC: 3.54 UIU/ML (ref 0.44–3.86)
WBC # BLD AUTO: 11.5 K/UL (ref 4.8–10.8)

## 2024-03-19 PROCEDURE — 99285 EMERGENCY DEPT VISIT HI MDM: CPT

## 2024-03-19 PROCEDURE — 36415 COLL VENOUS BLD VENIPUNCTURE: CPT

## 2024-03-19 PROCEDURE — 87502 INFLUENZA DNA AMP PROBE: CPT

## 2024-03-19 PROCEDURE — 93005 ELECTROCARDIOGRAM TRACING: CPT | Performed by: STUDENT IN AN ORGANIZED HEALTH CARE EDUCATION/TRAINING PROGRAM

## 2024-03-19 PROCEDURE — 84443 ASSAY THYROID STIM HORMONE: CPT

## 2024-03-19 PROCEDURE — 87635 SARS-COV-2 COVID-19 AMP PRB: CPT

## 2024-03-19 PROCEDURE — 80053 COMPREHEN METABOLIC PANEL: CPT

## 2024-03-19 PROCEDURE — 71045 X-RAY EXAM CHEST 1 VIEW: CPT

## 2024-03-19 PROCEDURE — 93010 ELECTROCARDIOGRAM REPORT: CPT | Performed by: INTERNAL MEDICINE

## 2024-03-19 PROCEDURE — 6370000000 HC RX 637 (ALT 250 FOR IP): Performed by: STUDENT IN AN ORGANIZED HEALTH CARE EDUCATION/TRAINING PROGRAM

## 2024-03-19 PROCEDURE — 85025 COMPLETE CBC W/AUTO DIFF WBC: CPT

## 2024-03-19 PROCEDURE — 84484 ASSAY OF TROPONIN QUANT: CPT

## 2024-03-19 PROCEDURE — 84439 ASSAY OF FREE THYROXINE: CPT

## 2024-03-19 RX ORDER — DOXYCYCLINE HYCLATE 100 MG/1
100 CAPSULE ORAL ONCE
Status: COMPLETED | OUTPATIENT
Start: 2024-03-19 | End: 2024-03-19

## 2024-03-19 RX ORDER — CEFUROXIME AXETIL 500 MG/1
500 TABLET ORAL 2 TIMES DAILY
Qty: 14 TABLET | Refills: 0 | Status: SHIPPED | OUTPATIENT
Start: 2024-03-19 | End: 2024-03-26

## 2024-03-19 RX ORDER — DOXYCYCLINE HYCLATE 100 MG
100 TABLET ORAL 2 TIMES DAILY
Qty: 14 TABLET | Refills: 0 | Status: SHIPPED | OUTPATIENT
Start: 2024-03-19 | End: 2024-03-26

## 2024-03-19 RX ORDER — CEFUROXIME AXETIL 500 MG/1
500 TABLET ORAL ONCE
Status: COMPLETED | OUTPATIENT
Start: 2024-03-19 | End: 2024-03-19

## 2024-03-19 RX ADMIN — DOXYCYCLINE HYCLATE 100 MG: 100 CAPSULE ORAL at 12:13

## 2024-03-19 RX ADMIN — CEFUROXIME AXETIL 500 MG: 500 TABLET, FILM COATED ORAL at 12:36

## 2024-03-19 ASSESSMENT — PAIN SCALES - GENERAL: PAINLEVEL_OUTOF10: 9

## 2024-03-19 ASSESSMENT — LIFESTYLE VARIABLES
HOW MANY STANDARD DRINKS CONTAINING ALCOHOL DO YOU HAVE ON A TYPICAL DAY: 3 OR 4
HOW OFTEN DO YOU HAVE A DRINK CONTAINING ALCOHOL: 2-4 TIMES A MONTH

## 2024-03-19 ASSESSMENT — PAIN DESCRIPTION - LOCATION: LOCATION: CHEST

## 2024-03-19 ASSESSMENT — PAIN - FUNCTIONAL ASSESSMENT: PAIN_FUNCTIONAL_ASSESSMENT: 0-10

## 2024-03-19 NOTE — DISCHARGE INSTRUCTIONS
Take both of your antibiotics as prescribed and to completion    Drink plenty of water while taking the antibiotics.  Avoid drinking alcohol or drinks that have caffeine in it while taking antibiotics.       For fever or pain use acetaminophen (Tylenol) or ibuprofen (Motrin / Advil), unless prescribed medications that have acetaminophen or ibuprofen (or similar medications) in it.  You can take over the counter acetaminophen tablets (1 - 2 tablets of the 500-mg strength every 6 hours) or ibuprofen tablets (2 tablets every 4 hours).    PLEASE RETURN TO THE EMERGENCY DEPARTMENT IMMEDIATELY for worsening symptoms, shortness of breathing, change in the amount of sputum that you cough up or a change in the color of your sputum, using your inhaler more frequently or if your inhaler only lasts up to 2 hours (if given an inhaler), or if you develop any concerning symptoms such as: high fever not relieved by acetaminophen (Tylenol) and/or ibuprofen (Motrin / Advil), chills, shortness of breath, chest pain, feeling of your heart fluttering or racing, persistent nausea and/or vomiting, vomiting up blood, blood in your stool, loss of consciousness, numbness, weakness or tingling in the arms or legs or change in color of the extremities, changes in mental status, persistent headache, blurry vision, loss of bladder / bowel control, unable to follow up with your physician, or other any other care or concern.

## 2024-03-19 NOTE — ED PROVIDER NOTES
MD Soledad   zinc 50 MG CAPS Take by mouth daily     Soledad Chaparro MD   Calcium Carbonate-Vit D-Min (CALCIUM 1200) 8150-4041 MG-UNIT CHEW Take by mouth daily     Soledad Chaparro MD   ferrous sulfate 325 (65 FE) MG tablet Take 65 mg by mouth daily (with breakfast)     Soledad Chaparro MD   fluticasone (FLONASE) 50 MCG/ACT nasal spray 1 spray by Nasal route daily.  Patient taking differently: 1 spray by Nasal route daily Taking BID currently 1/27/15   Sudhakar Ramos MD       REVIEW OF SYSTEMS    (2-9 systems for level 4, 10 ormore for level 5)      Review of Systems   All other systems reviewed and are negative.      PHYSICAL EXAM   (up to 7 for level 4, 8 or more for level 5)      INITIAL VITALS:   /63   Pulse 85   Temp 97.7 °F (36.5 °C) (Oral)   Resp 20   Ht 1.727 m (5' 8\")   Wt 88 kg (194 lb)   LMP 11/04/2019   SpO2 95%   BMI 29.50 kg/m²     Physical Exam  Constitutional:       General: She is not in acute distress.     Appearance: She is well-developed. She is not ill-appearing, toxic-appearing or diaphoretic.   HENT:      Head: Normocephalic and atraumatic.      Nose: Congestion present.      Mouth/Throat:      Mouth: Mucous membranes are moist.      Pharynx: Oropharynx is clear.   Eyes:      General:         Right eye: No discharge.         Left eye: No discharge.      Conjunctiva/sclera: Conjunctivae normal.   Cardiovascular:      Rate and Rhythm: Normal rate and regular rhythm.      Pulses: Normal pulses.      Heart sounds: Normal heart sounds. No murmur heard.     No friction rub. No gallop.   Pulmonary:      Effort: Pulmonary effort is normal. No respiratory distress.      Breath sounds: No stridor. Rales (Mild inspiratory crackles to bilateral lobes) present. No wheezing or rhonchi.   Abdominal:      General: Abdomen is flat.      Palpations: Abdomen is soft.   Musculoskeletal:         General: No swelling. Normal range of motion.      Cervical back: Normal range of motion

## 2024-03-19 NOTE — TELEPHONE ENCOUNTER
Location of patient: [place]    Subjective: Caller states \"hard to breath\"     Current Symptoms: Having sob.  Did have some shoulder pain and chest pain over the weekend but that is gone.  Feeling hard to get full sentences out.  Chest pain is gone.  Not able to take a deep breath-will hurt.  Will wake up multiple times in the night having a hard time breathing.  Seems to trigger her AFib    Onset:   last week    Associated Symptoms: NA    Pain Severity: denies    Temperature: denies     What has been tried: steam    LMP: NA Pregnant: NA    Recommended disposition: Go to ED Now    Care advice provided, patient verbalizes understanding; denies any other questions or concerns; instructed to call back for any new or worsening symptoms.    Patient/caller agrees to proceed to [facility]    Attention Provider:  Thank you for allowing me to participate in the care of your patient.  The patient was connected to triage in response to symptoms provided.   Please do not respond through this encounter as the response is not directed to a shared pool.    Reason for Disposition   MODERATE difficulty breathing (e.g., speaks in phrases, SOB even at rest, pulse 100-120) of new-onset or worse than normal    Protocols used: Breathing Difficulty-ADULT-[place]

## 2024-03-19 NOTE — ED TRIAGE NOTES
Pt states it hurts when she takes a deep breath   Pt states that she can't breathe because she has been in a-fib the last 2 days  Pt states she has mid sternal chest pain (hurts when she takes a deep breath)  Pt states she slept funny on her left arm (had pain in her shoulder) states she is seeing the ortho doctor tomorrow   Pt states her pain is a 9/10  Pt is afebrile  Pt has a slow but steady gait   Pt is A&Ox4

## 2024-03-19 NOTE — ED NOTES
Amb to bathroom and back with steady gait. Placed back onto monitor. States she got a little winded walking to the bathroom.

## 2024-03-21 ENCOUNTER — HOSPITAL ENCOUNTER (OUTPATIENT)
Dept: GENERAL RADIOLOGY | Age: 49
Discharge: HOME | End: 2024-03-23
Payer: COMMERCIAL

## 2024-03-21 ENCOUNTER — OFFICE VISIT (OUTPATIENT)
Dept: ORTHOPEDIC SURGERY | Age: 49
End: 2024-03-21

## 2024-03-21 VITALS — HEIGHT: 68 IN | BODY MASS INDEX: 29.4 KG/M2 | WEIGHT: 194 LBS

## 2024-03-21 DIAGNOSIS — M25.512 LEFT SHOULDER PAIN, UNSPECIFIED CHRONICITY: ICD-10-CM

## 2024-03-21 DIAGNOSIS — R52 PAIN: ICD-10-CM

## 2024-03-21 DIAGNOSIS — M25.512 TRIGGER POINT OF LEFT SHOULDER REGION: Primary | ICD-10-CM

## 2024-03-21 PROCEDURE — 73030 X-RAY EXAM OF SHOULDER: CPT

## 2024-03-21 ASSESSMENT — ENCOUNTER SYMPTOMS
EYES NEGATIVE: 1
RESPIRATORY NEGATIVE: 1
GASTROINTESTINAL NEGATIVE: 1

## 2024-03-21 NOTE — PROGRESS NOTES
Tylenol #3 per pt    Percocet [Oxycodone-Acetaminophen] Itching, Swelling and Rash    Bee Venom Itching and Swelling    Seasonal      Sinus sx    Tape [Adhesive Tape]      Skin irritation     Hydrocodone Rash    Protonix [Pantoprazole Sodium] Swelling and Rash     Caused thrush     Current Outpatient Medications on File Prior to Visit   Medication Sig Dispense Refill    doxycycline hyclate (VIBRA-TABS) 100 MG tablet Take 1 tablet by mouth 2 times daily for 7 days 14 tablet 0    cefUROXime (CEFTIN) 500 MG tablet Take 1 tablet by mouth 2 times daily for 7 days 14 tablet 0    butalbital-acetaminophen-caffeine (FIORICET, ESGIC) -40 MG per tablet Take 1 tablet by mouth every 4 hours as needed for Headaches 12 tablet 5    ondansetron (ZOFRAN) 8 MG tablet Take 1 tablet by mouth every 12 hours as needed for Nausea or Vomiting 30 tablet 02    progesterone (PROMETRIUM) 200 MG CAPS capsule TAKE ONE CAPSULE BY MOUTH EVERY NIGHT 150 capsule 0    levothyroxine (SYNTHROID) 100 MCG tablet Take 1 tablet by mouth Daily 90 tablet 3    rivaroxaban (XARELTO) 20 MG TABS tablet TAKE ONE TABLET ONE TIME DAILY 90 tablet 3    Lidocaine 4 % OINT Apply 1 each topically every 8 hours as needed (pain) 50 g 0    dilTIAZem (CARDIZEM CD) 180 MG extended release capsule TAKE ONE CAPSULE ONE TIME DAILY 90 capsule 3    busPIRone (BUSPAR) 7.5 MG tablet TAKE ONE TABLET ONE TIME DAILY AS NEEDED (GENERIC FOR BUSPAR) 90 tablet 3    Cyclobenzaprine HCl (FLEXERIL PO) Take by mouth as needed (muscle spasms)      albuterol sulfate HFA (VENTOLIN HFA) 108 (90 Base) MCG/ACT inhaler Inhale 2 puffs into the lungs 4 times daily as needed for Wheezing 18 g 0    flecainide (TAMBOCOR) 100 MG tablet Take 1 tablet by mouth as needed (PRN afib.) (Patient taking differently: Take 1 tablet by mouth as needed (For a fib lasting 2 days)) 10 tablet 0    diclofenac sodium (VOLTAREN) 1 % GEL Apply 2 g topically 4 times daily as needed for Pain 2 g 0    Bacillus

## 2024-03-26 ENCOUNTER — OFFICE VISIT (OUTPATIENT)
Dept: FAMILY MEDICINE CLINIC | Age: 49
End: 2024-03-26
Payer: COMMERCIAL

## 2024-03-26 VITALS
HEART RATE: 60 BPM | DIASTOLIC BLOOD PRESSURE: 76 MMHG | HEIGHT: 68 IN | OXYGEN SATURATION: 99 % | TEMPERATURE: 98.8 F | WEIGHT: 202 LBS | BODY MASS INDEX: 30.62 KG/M2 | SYSTOLIC BLOOD PRESSURE: 136 MMHG

## 2024-03-26 DIAGNOSIS — J18.9 PNEUMONIA OF BOTH LOWER LOBES DUE TO INFECTIOUS ORGANISM: Primary | ICD-10-CM

## 2024-03-26 PROCEDURE — 3078F DIAST BP <80 MM HG: CPT | Performed by: FAMILY MEDICINE

## 2024-03-26 PROCEDURE — 99213 OFFICE O/P EST LOW 20 MIN: CPT | Performed by: FAMILY MEDICINE

## 2024-03-26 PROCEDURE — 3075F SYST BP GE 130 - 139MM HG: CPT | Performed by: FAMILY MEDICINE

## 2024-03-26 RX ORDER — ALBUTEROL SULFATE 90 UG/1
2 AEROSOL, METERED RESPIRATORY (INHALATION) 4 TIMES DAILY PRN
Qty: 18 G | Refills: 1 | Status: SHIPPED | OUTPATIENT
Start: 2024-03-26

## 2024-03-26 ASSESSMENT — ENCOUNTER SYMPTOMS
DIARRHEA: 0
ABDOMINAL DISTENTION: 0
COLOR CHANGE: 0
BLOOD IN STOOL: 0
CONSTIPATION: 0
CHEST TIGHTNESS: 0

## 2024-03-26 NOTE — PROGRESS NOTES
Subjective  Rosemary Adame, 49 y.o. female presents today with:  Chief Complaint   Patient presents with    Follow-Up from Hospital     Sinus infection day after seeing you, saw walk in and was put on antibiotics for 7 days, then put on keflex, kalli put her on doxycycline for 20 days, Tuesday had sob, chest xray and blood work, pneumonia, finished antibiotics today        HPI  ER follow up   S/p keflex and doxy course  Sinus infection    Was better, then started to feel bad around 3/16  Trouble breathing   Went to ER this time        Wt Readings from Last 3 Encounters:   03/26/24 91.6 kg (202 lb)   03/21/24 88 kg (194 lb)   03/19/24 88 kg (194 lb)         Review of Systems   Constitutional:  Negative for unexpected weight change.   Eyes:  Negative for visual disturbance.   Respiratory:  Negative for chest tightness.    Cardiovascular:  Negative for leg swelling.   Gastrointestinal:  Negative for abdominal distention, blood in stool, constipation and diarrhea.   Genitourinary:  Negative for dyspareunia, dysuria, frequency, menstrual problem, pelvic pain, vaginal bleeding and vaginal discharge.   Musculoskeletal:  Negative for neck stiffness.   Skin:  Negative for color change.   Neurological:  Negative for syncope and light-headedness.   Hematological:  Negative for adenopathy.   Psychiatric/Behavioral:  Negative for dysphoric mood and sleep disturbance. The patient is not nervous/anxious.        Past Medical History:   Diagnosis Date    Abnormal Pap smear of cervix     Allergic rhinitis     Arthritis     hands    Asplenia     Atrial fibrillation (HCC)     Atrial fibrillation with RVR (HCC)     Dr. 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

## 2024-04-09 RX ORDER — PROGESTERONE 200 MG/1
CAPSULE ORAL
Qty: 150 CAPSULE | Refills: 0 | Status: SHIPPED | OUTPATIENT
Start: 2024-04-09

## 2024-04-09 RX ORDER — BUSPIRONE HYDROCHLORIDE 7.5 MG/1
TABLET ORAL
Qty: 90 TABLET | Refills: 3 | Status: SHIPPED | OUTPATIENT
Start: 2024-04-09

## 2024-05-24 RX ORDER — FLECAINIDE ACETATE 100 MG/1
100 TABLET ORAL PRN
Qty: 10 TABLET | Refills: 1 | Status: SHIPPED | OUTPATIENT
Start: 2024-05-24

## 2024-06-18 ENCOUNTER — OFFICE VISIT (OUTPATIENT)
Age: 49
End: 2024-06-18
Payer: COMMERCIAL

## 2024-06-18 VITALS
HEIGHT: 68 IN | WEIGHT: 205.4 LBS | OXYGEN SATURATION: 98 % | SYSTOLIC BLOOD PRESSURE: 160 MMHG | HEART RATE: 72 BPM | RESPIRATION RATE: 16 BRPM | DIASTOLIC BLOOD PRESSURE: 90 MMHG | BODY MASS INDEX: 31.13 KG/M2 | TEMPERATURE: 97.7 F

## 2024-06-18 DIAGNOSIS — J30.9 ALLERGIC RHINITIS, UNSPECIFIED SEASONALITY, UNSPECIFIED TRIGGER: Primary | ICD-10-CM

## 2024-06-18 DIAGNOSIS — R09.81 SINUS CONGESTION: ICD-10-CM

## 2024-06-18 PROCEDURE — 3077F SYST BP >= 140 MM HG: CPT | Performed by: FAMILY MEDICINE

## 2024-06-18 PROCEDURE — 3079F DIAST BP 80-89 MM HG: CPT | Performed by: FAMILY MEDICINE

## 2024-06-18 PROCEDURE — 99213 OFFICE O/P EST LOW 20 MIN: CPT | Performed by: FAMILY MEDICINE

## 2024-06-18 RX ORDER — LEVOCETIRIZINE DIHYDROCHLORIDE 5 MG/1
5 TABLET, FILM COATED ORAL NIGHTLY
Qty: 30 TABLET | Refills: 1 | Status: SHIPPED | OUTPATIENT
Start: 2024-06-18

## 2024-06-18 RX ORDER — FLUTICASONE PROPIONATE 50 MCG
1 SPRAY, SUSPENSION (ML) NASAL DAILY
Qty: 32 G | Refills: 3 | Status: SHIPPED | OUTPATIENT
Start: 2024-06-18

## 2024-06-18 NOTE — PROGRESS NOTES
Cc: B/L ear pain      Subjective:       Rosemary Adame is a 49 y.o. female who presents for evaluation and treatment of allergic symptoms.  Symptoms include eye itching, eye irritation, and watery eyes and are present in a seasonal pattern.  Precipitants include weather changes.  Treatment in the past has included nasal saline.  Treatment currently includes nasal saline, oral antihistamines: claritin  and is not effective in the patient's opinion.  Patient's medications, allergies, past medical, surgical, social and family histories were reviewed and updated as appropriate.    Review of Systems  Pertinent items are noted in HPI.      Objective:      BP (!) 140/80 (Site: Right Upper Arm, Position: Sitting, Cuff Size: Large Adult)   Pulse 72   Temp 97.7 °F (36.5 °C)   Resp 16   Ht 1.727 m (5' 8\")   Wt 93.2 kg (205 lb 6.4 oz)   LMP 11/04/2019   SpO2 98%   BMI 31.23 kg/m²   General: alert, appears stated age, and cooperative   Eyes:  positive findings: conjunctiva: trace injection   Ears: normal appearance   Nose: mucosal edema and sinus tendernessmaxillary sinus bilaterally   Oropharynx:   mild erythema.   Neck:  no adenopathy and supple, symmetrical, trachea midline.        Assessment:      Allergic rhinitis      Plan:      Diagnoses and all orders for this visit:  Allergic rhinitis, unspecified seasonality, unspecified trigger  Sinus congestion  -     fluticasone (FLONASE) 50 MCG/ACT nasal spray; 1 spray by Each Nostril route daily  Other orders  -     levocetirizine (XYZAL) 5 MG tablet; Take 1 tablet by mouth nightly    Diagnosis and management discussed with patient.  Patient agreeable with Plan.  Return to clinic with unresolved or worsening Symptoms.  Kyle Jones MD

## 2024-06-27 ENCOUNTER — TELEPHONE (OUTPATIENT)
Dept: OBGYN CLINIC | Age: 49
End: 2024-06-27

## 2024-06-27 DIAGNOSIS — I10 ESSENTIAL HYPERTENSION: ICD-10-CM

## 2024-06-27 DIAGNOSIS — Z12.31 ENCOUNTER FOR SCREENING MAMMOGRAM FOR MALIGNANT NEOPLASM OF BREAST: Primary | ICD-10-CM

## 2024-06-27 DIAGNOSIS — I48.91 ATRIAL FIBRILLATION WITH RVR (HCC): ICD-10-CM

## 2024-06-27 RX ORDER — PROGESTERONE 200 MG/1
CAPSULE ORAL
Qty: 150 CAPSULE | Refills: 0 | Status: SHIPPED | OUTPATIENT
Start: 2024-06-27

## 2024-06-27 RX ORDER — CYCLOBENZAPRINE HCL 10 MG
10 TABLET ORAL 3 TIMES DAILY PRN
Qty: 30 TABLET | Refills: 0 | Status: SHIPPED | OUTPATIENT
Start: 2024-06-27

## 2024-06-27 NOTE — TELEPHONE ENCOUNTER
Requesting medication refill. Please approve or deny this request.    Rx requested:  Requested Prescriptions     Pending Prescriptions Disp Refills    dilTIAZem (CARDIZEM CD) 180 MG extended release capsule 90 capsule 3     Sig: TAKE ONE CAPSULE ONE TIME DAILY         Last Office Visit:   10/5/2023      Next Visit Date:  Future Appointments   Date Time Provider Department Center   8/12/2024  3:30 PM Shakir Hernandez MD [facility]   10/4/2024  9:30 AM Ronald Knight DO [facility]   10/31/2024  3:30 PM Rey Ervin DO [facility]

## 2024-06-27 NOTE — TELEPHONE ENCOUNTER
Comments:     Last Office Visit (last PCP visit):   3/26/2024    Next Visit Date:  Future Appointments   Date Time Provider Department Center   8/12/2024  3:30 PM Shakir Hernandez MD [facility]   10/4/2024  9:30 AM Ronald Knight DO [facility]   10/31/2024  3:30 PM Rey Ervin,  [facility]       **If hasn't been seen in over a year OR hasn't followed up according to last diabetes/ADHD visit, make appointment for patient before sending refill to provider.    Rx requested:  Requested Prescriptions     Pending Prescriptions Disp Refills    cyclobenzaprine (FLEXERIL) 10 MG tablet [Pharmacy Med Name: CYCLOBENZAPRINE HCL 10MG TABS] 30 tablet 0     Sig: TAKE ONE TABLET BY MOUTH THREE TIMES A DAY AS NEEDED FOR MUSCLE SPASMS

## 2024-06-28 RX ORDER — DILTIAZEM HYDROCHLORIDE 180 MG/1
CAPSULE, COATED, EXTENDED RELEASE ORAL
Qty: 90 CAPSULE | Refills: 3 | Status: SHIPPED | OUTPATIENT
Start: 2024-06-28

## 2024-07-10 ENCOUNTER — HOSPITAL ENCOUNTER (OUTPATIENT)
Dept: WOMENS IMAGING | Age: 49
Discharge: HOME | End: 2024-07-12
Attending: OBSTETRICS & GYNECOLOGY
Payer: COMMERCIAL

## 2024-07-10 ENCOUNTER — TELEPHONE (OUTPATIENT)
Dept: WOMENS IMAGING | Age: 49
End: 2024-07-10

## 2024-07-10 VITALS — BODY MASS INDEX: 32.17 KG/M2 | HEIGHT: 67 IN

## 2024-07-10 DIAGNOSIS — Z12.31 ENCOUNTER FOR SCREENING MAMMOGRAM FOR MALIGNANT NEOPLASM OF BREAST: ICD-10-CM

## 2024-07-10 PROCEDURE — 77063 BREAST TOMOSYNTHESIS BI: CPT

## 2024-07-10 NOTE — TELEPHONE ENCOUNTER
07/10/24  Message left for patient to call back to discuss mammogram and TC score.  Will call again.

## 2024-07-18 ENCOUNTER — OFFICE VISIT (OUTPATIENT)
Dept: FAMILY MEDICINE CLINIC | Age: 49
End: 2024-07-18
Payer: COMMERCIAL

## 2024-07-18 VITALS
HEIGHT: 67 IN | DIASTOLIC BLOOD PRESSURE: 82 MMHG | TEMPERATURE: 98.2 F | OXYGEN SATURATION: 97 % | SYSTOLIC BLOOD PRESSURE: 136 MMHG | WEIGHT: 197 LBS | BODY MASS INDEX: 30.92 KG/M2 | HEART RATE: 58 BPM

## 2024-07-18 DIAGNOSIS — M25.561 ACUTE PAIN OF RIGHT KNEE: Primary | ICD-10-CM

## 2024-07-18 PROCEDURE — 3079F DIAST BP 80-89 MM HG: CPT

## 2024-07-18 PROCEDURE — 99213 OFFICE O/P EST LOW 20 MIN: CPT

## 2024-07-18 PROCEDURE — 3075F SYST BP GE 130 - 139MM HG: CPT

## 2024-07-18 ASSESSMENT — ENCOUNTER SYMPTOMS
COLOR CHANGE: 1
SHORTNESS OF BREATH: 0

## 2024-07-18 NOTE — PROGRESS NOTES
[facility]  [address], [address]  [facility] [address]  Dept: [phone]  Dept Fax: [phone]  Loc: [phone]     2024    Rosemary Adame (:  1975) is a 49 y.o. female, Established patient, here for evaluation of the following chief complaint(s):  Fall (X 1 week, helping friends decorate. Notes that she was not watching where she was going. Rt knee was inpact) and Knee Injury (24 POI RT knee. Swelling, bruising, trouble bearing weight at times)      Vitals:    24 1554   BP: 136/82   Pulse:    Temp:    SpO2:        SUBJECTIVE/OBJECTIVE:    Patient presents with right knee pain, swelling, and bruising x1 week that has been slowly improving. One week ago, she tripped and landed on her knees. ROM is improving. No popping or locking. There is some posterior knee pain and calf pain when walking at times. It is uncomfortable when the knee is bent while sitting and while standing. No severe pain. No pain with walking. She has been icing, elevating, using Voltaren gel, and taking Aleve occasionally.        Review of Systems   Constitutional:  Negative for chills and fever.   Respiratory:  Negative for shortness of breath.    Cardiovascular:  Negative for chest pain.   Musculoskeletal:  Positive for arthralgias and joint swelling.   Skin:  Positive for color change.   Neurological:  Negative for headaches.       Physical Exam  Constitutional:       Appearance: Normal appearance.   HENT:      Head: Normocephalic and atraumatic.   Cardiovascular:      Rate and Rhythm: Normal rate.   Pulmonary:      Effort: Pulmonary effort is normal.   Musculoskeletal:      Right knee: Swelling and ecchymosis present. No bony tenderness or crepitus. No tenderness. Normal pulse.        Legs:    Skin:     General: Skin is warm and dry.   Neurological:      Mental Status: She is alert.   Psychiatric:         Mood and Affect:

## 2024-07-19 DIAGNOSIS — M25.561 ACUTE PAIN OF RIGHT KNEE: Primary | ICD-10-CM

## 2024-07-23 ENCOUNTER — OFFICE VISIT (OUTPATIENT)
Dept: FAMILY MEDICINE CLINIC | Age: 49
End: 2024-07-23
Payer: COMMERCIAL

## 2024-07-23 VITALS
HEART RATE: 62 BPM | WEIGHT: 207 LBS | DIASTOLIC BLOOD PRESSURE: 72 MMHG | BODY MASS INDEX: 31.37 KG/M2 | OXYGEN SATURATION: 98 % | HEIGHT: 68 IN | SYSTOLIC BLOOD PRESSURE: 132 MMHG

## 2024-07-23 DIAGNOSIS — M65.311 TRIGGER THUMB OF RIGHT HAND: Primary | ICD-10-CM

## 2024-07-23 DIAGNOSIS — M65.331 TRIGGER FINGER, RIGHT MIDDLE FINGER: ICD-10-CM

## 2024-07-23 PROCEDURE — 3078F DIAST BP <80 MM HG: CPT | Performed by: FAMILY MEDICINE

## 2024-07-23 PROCEDURE — 20600 DRAIN/INJ JOINT/BURSA W/O US: CPT | Performed by: FAMILY MEDICINE

## 2024-07-23 PROCEDURE — 3075F SYST BP GE 130 - 139MM HG: CPT | Performed by: FAMILY MEDICINE

## 2024-07-23 PROCEDURE — 99213 OFFICE O/P EST LOW 20 MIN: CPT | Performed by: FAMILY MEDICINE

## 2024-07-23 RX ORDER — DEXAMETHASONE SODIUM PHOSPHATE 10 MG/ML
5 INJECTION INTRAMUSCULAR; INTRAVENOUS ONCE
Status: DISCONTINUED | OUTPATIENT
Start: 2024-07-23 | End: 2024-07-24

## 2024-07-23 RX ORDER — BUPIVACAINE HYDROCHLORIDE 2.5 MG/ML
0.5 INJECTION, SOLUTION INFILTRATION; PERINEURAL ONCE
Status: COMPLETED | OUTPATIENT
Start: 2024-07-23 | End: 2024-07-24

## 2024-07-23 RX ADMIN — DEXAMETHASONE SODIUM PHOSPHATE 5 MG: 10 INJECTION INTRAMUSCULAR; INTRAVENOUS at 16:30

## 2024-07-23 ASSESSMENT — ENCOUNTER SYMPTOMS: BACK PAIN: 0

## 2024-07-23 NOTE — PROGRESS NOTES
verified, and the procedure itself was verified    Before aspiration/injection risks and benefits of a cortisone injection including infection, local skin irritation, skin atrophy, continued pain/discomfort, elevated blood sugar were discussed with the patient.    Patient verbalized understanding and wanted to proceed with planned procedure. Verbal consent was given.     Prior to injection the location was prepped in usual sterile fashion. No complications or concerns during the procedure. Patient tolerated the procedure well.    Post-procedure discomfort can be alleviated with additional medications/ice/elevation/rest over the first 24 hours as recommended.      If fluid was aspirated that was abnormal it was sent for further studies in sterile specimen container to the lab.        Orders Placed This Encounter   Medications    BUPivacaine (MARCAINE) 0.25 % injection 1.25 mg    DISCONTD: dexAMETHasone (DECADRON) injection 5 mg    dexAMETHasone (DECADRON) injection 5 mg        Subjective      Review of Systems   Musculoskeletal:  Positive for myalgias. Negative for arthralgias, back pain, gait problem, joint swelling and neck pain.        Right thumb pain   Skin:  Negative for wound.   Neurological:  Negative for dizziness, weakness and numbness.      Allergies   Allergen Reactions    Penicillins Anaphylaxis    Codeine Itching, Swelling and Rash     OK with Tylenol #3 per pt    Percocet [Oxycodone-Acetaminophen] Itching, Swelling and Rash    Bee Venom Itching and Swelling    Seasonal      Sinus sx    Tape [Adhesive Tape]      Skin irritation     Hydrocodone Rash    Protonix [Pantoprazole Sodium] Swelling and Rash     Caused thrush       Current Outpatient Medications:     dilTIAZem (CARDIZEM CD) 180 MG extended release capsule, TAKE ONE CAPSULE ONE TIME DAILY, Disp: 90 capsule, Rfl: 3    cyclobenzaprine (FLEXERIL) 10 MG tablet, TAKE ONE TABLET BY MOUTH THREE TIMES A DAY AS NEEDED FOR MUSCLE SPASMS, Disp: 30 tablet,

## 2024-07-24 ENCOUNTER — OFFICE VISIT (OUTPATIENT)
Dept: FAMILY MEDICINE CLINIC | Age: 49
End: 2024-07-24
Payer: COMMERCIAL

## 2024-07-24 VITALS
BODY MASS INDEX: 31.37 KG/M2 | HEIGHT: 68 IN | DIASTOLIC BLOOD PRESSURE: 76 MMHG | WEIGHT: 207 LBS | SYSTOLIC BLOOD PRESSURE: 144 MMHG | OXYGEN SATURATION: 97 % | HEART RATE: 71 BPM

## 2024-07-24 DIAGNOSIS — L02.92 BOIL: Primary | ICD-10-CM

## 2024-07-24 PROCEDURE — 99213 OFFICE O/P EST LOW 20 MIN: CPT | Performed by: NURSE PRACTITIONER

## 2024-07-24 PROCEDURE — 3077F SYST BP >= 140 MM HG: CPT | Performed by: NURSE PRACTITIONER

## 2024-07-24 PROCEDURE — 3078F DIAST BP <80 MM HG: CPT | Performed by: NURSE PRACTITIONER

## 2024-07-24 RX ORDER — DEXAMETHASONE SODIUM PHOSPHATE 10 MG/ML
5 INJECTION INTRAMUSCULAR; INTRAVENOUS ONCE
Status: COMPLETED | OUTPATIENT
Start: 2024-07-24 | End: 2024-07-23

## 2024-07-24 RX ORDER — DOXYCYCLINE HYCLATE 100 MG
100 TABLET ORAL 2 TIMES DAILY
Qty: 14 TABLET | Refills: 0 | Status: SHIPPED | OUTPATIENT
Start: 2024-07-24 | End: 2024-07-31

## 2024-07-24 RX ADMIN — BUPIVACAINE HYDROCHLORIDE 1.25 MG: 2.5 INJECTION, SOLUTION INFILTRATION; PERINEURAL at 07:46

## 2024-07-24 ASSESSMENT — ENCOUNTER SYMPTOMS
SHORTNESS OF BREATH: 0
COUGH: 0
CONSTIPATION: 0
COLOR CHANGE: 0
ABDOMINAL PAIN: 0
DIARRHEA: 0
BACK PAIN: 0
TROUBLE SWALLOWING: 0
EYE PAIN: 0
CHEST TIGHTNESS: 0

## 2024-07-24 NOTE — PROGRESS NOTES
Negative for cough, chest tightness and shortness of breath.    Cardiovascular:  Negative for chest pain, palpitations and leg swelling.   Gastrointestinal:  Negative for abdominal pain, constipation and diarrhea.   Endocrine: Negative for polydipsia, polyphagia and polyuria.   Genitourinary:  Negative for difficulty urinating and dysuria.   Musculoskeletal:  Negative for arthralgias and back pain.   Skin:  Negative for color change and rash.   Neurological:  Negative for dizziness and light-headedness.   Psychiatric/Behavioral:  Negative for dysphoric mood. The patient is not nervous/anxious.        Objective  Vitals:    07/24/24 1235 07/24/24 1240   BP: (!) 142/78 (!) 144/76   Pulse: 71    SpO2: 97%    Weight: 93.9 kg (207 lb)    Height: 1.727 m (5' 8\")      Physical Exam  Constitutional:       General: She is not in acute distress.     Appearance: Normal appearance. She is obese. She is not ill-appearing, toxic-appearing or diaphoretic.   HENT:      Head: Normocephalic and atraumatic.      Right Ear: External ear normal.      Left Ear: External ear normal.      Nose: Nose normal. No congestion or rhinorrhea.   Eyes:      Extraocular Movements: Extraocular movements intact.      Conjunctiva/sclera: Conjunctivae normal.      Pupils: Pupils are equal, round, and reactive to light.   Pulmonary:      Effort: Pulmonary effort is normal.   Genitourinary:     Comments: Deferred  Musculoskeletal:         General: Normal range of motion.      Cervical back: Normal range of motion and neck supple.      Right lower leg: No edema.      Left lower leg: No edema.   Skin:     General: Skin is warm.      Capillary Refill: Capillary refill takes less than 2 seconds.      Coloration: Skin is not jaundiced.      Findings: No erythema or lesion.   Neurological:      General: No focal deficit present.      Mental Status: She is alert and oriented to person, place, and time. Mental status is at baseline.      Cranial Nerves: No cranial

## 2024-08-05 DIAGNOSIS — I48.0 PAROXYSMAL ATRIAL FIBRILLATION (HCC): ICD-10-CM

## 2024-08-05 LAB
ALBUMIN SERPL-MCNC: 3.9 G/DL (ref 3.5–4.6)
ALP SERPL-CCNC: 114 U/L (ref 40–130)
ALT SERPL-CCNC: 13 U/L (ref 0–33)
ANION GAP SERPL CALCULATED.3IONS-SCNC: 9 MEQ/L (ref 9–15)
AST SERPL-CCNC: 16 U/L (ref 0–35)
BILIRUB SERPL-MCNC: <0.2 MG/DL (ref 0.2–0.7)
BUN SERPL-MCNC: 15 MG/DL (ref 6–20)
CALCIUM SERPL-MCNC: 9.4 MG/DL (ref 8.5–9.9)
CHLORIDE SERPL-SCNC: 103 MEQ/L (ref 95–107)
CO2 SERPL-SCNC: 28 MEQ/L (ref 20–31)
CREAT SERPL-MCNC: 0.76 MG/DL (ref 0.5–0.9)
ERYTHROCYTE [DISTWIDTH] IN BLOOD BY AUTOMATED COUNT: 14.5 % (ref 11.5–14.5)
GLOBULIN SER CALC-MCNC: 3.4 G/DL (ref 2.3–3.5)
GLUCOSE SERPL-MCNC: 104 MG/DL (ref 70–99)
HCT VFR BLD AUTO: 39.6 % (ref 37–47)
HGB BLD-MCNC: 13.2 G/DL (ref 12–16)
MCH RBC QN AUTO: 30.3 PG (ref 27–31.3)
MCHC RBC AUTO-ENTMCNC: 33.3 % (ref 33–37)
MCV RBC AUTO: 90.8 FL (ref 79.4–94.8)
PLATELET # BLD AUTO: 445 K/UL (ref 130–400)
POTASSIUM SERPL-SCNC: 5 MEQ/L (ref 3.4–4.9)
PROT SERPL-MCNC: 7.3 G/DL (ref 6.3–8)
RBC # BLD AUTO: 4.36 M/UL (ref 4.2–5.4)
SODIUM SERPL-SCNC: 140 MEQ/L (ref 135–144)
WBC # BLD AUTO: 13.3 K/UL (ref 4.8–10.8)

## 2024-08-12 ENCOUNTER — OFFICE VISIT (OUTPATIENT)
Dept: FAMILY MEDICINE CLINIC | Age: 49
End: 2024-08-12
Payer: COMMERCIAL

## 2024-08-12 VITALS
WEIGHT: 203 LBS | HEIGHT: 68 IN | OXYGEN SATURATION: 98 % | SYSTOLIC BLOOD PRESSURE: 132 MMHG | TEMPERATURE: 98.8 F | DIASTOLIC BLOOD PRESSURE: 86 MMHG | HEART RATE: 77 BPM | BODY MASS INDEX: 30.77 KG/M2

## 2024-08-12 DIAGNOSIS — M65.311 TRIGGER THUMB OF RIGHT HAND: ICD-10-CM

## 2024-08-12 DIAGNOSIS — M25.561 ACUTE PAIN OF RIGHT KNEE: ICD-10-CM

## 2024-08-12 DIAGNOSIS — I48.0 PAROXYSMAL ATRIAL FIBRILLATION (HCC): Primary | ICD-10-CM

## 2024-08-12 DIAGNOSIS — G43.109 MIGRAINE WITH AURA AND WITHOUT STATUS MIGRAINOSUS, NOT INTRACTABLE: ICD-10-CM

## 2024-08-12 DIAGNOSIS — F51.01 PRIMARY INSOMNIA: ICD-10-CM

## 2024-08-12 DIAGNOSIS — D68.69 SECONDARY HYPERCOAGULABLE STATE (HCC): ICD-10-CM

## 2024-08-12 DIAGNOSIS — E03.9 HYPOTHYROIDISM, UNSPECIFIED TYPE: ICD-10-CM

## 2024-08-12 PROCEDURE — 3075F SYST BP GE 130 - 139MM HG: CPT | Performed by: FAMILY MEDICINE

## 2024-08-12 PROCEDURE — 99214 OFFICE O/P EST MOD 30 MIN: CPT | Performed by: FAMILY MEDICINE

## 2024-08-12 PROCEDURE — 3079F DIAST BP 80-89 MM HG: CPT | Performed by: FAMILY MEDICINE

## 2024-08-12 ASSESSMENT — ENCOUNTER SYMPTOMS
BLOOD IN STOOL: 0
SORE THROAT: 0
COUGH: 0
CONSTIPATION: 0
VOMITING: 0
DIARRHEA: 0
CHEST TIGHTNESS: 0
ABDOMINAL PAIN: 0
NAUSEA: 0
ABDOMINAL DISTENTION: 0
COLOR CHANGE: 0
SHORTNESS OF BREATH: 0

## 2024-08-12 NOTE — PROGRESS NOTES
Subjective  Rosemary Adame, 49 y.o. female presents today with:  Chief Complaint   Patient presents with    Atrial Fibrillation     Went into A-fib Thursday        Migraine   Pertinent negatives include no abdominal pain, coughing, dizziness, fever, hearing loss, nausea, neck pain, sore throat, vomiting or weakness.   Hypertension  Pertinent negatives include no chest pain, headaches, neck pain, palpitations or shortness of breath.   Other  Pertinent negatives include no abdominal pain, arthralgias, chest pain, chills, coughing, fatigue, fever, headaches, myalgias, nausea, neck pain, rash, sore throat, vomiting or weakness.   Shoulder Pain   Pertinent negatives include no fever.   Atrial Fibrillation  Symptoms are negative for chest pain, dizziness, palpitations, shortness of breath and weakness.     Here today for 6 month follow-up on chronic problems including A. Fib, hypothyroidism, allergic rhinitis, migraines.  Also c/o above. Hx of A. fib with rapid ventricular response.  Her regular cardiologist is Dr. Harvey.  She sees Dr. Ramos for management of her thyroid.  She also has a past medical history significant for migraine headaches and allergic rhinitis.  Her ObGyn is Dr. Ervin.   She is taking synthroid, OCP's, zyrtec and flonase. For her migraines she uses fioricet prn.     We complete FMLA for her     Fell and bruised her right knee a couple months back    Chronic thumb pain  Saw Dr. Rosas  Had shot      She does monitor bp at home     PAF hx  Has episodes more frequently and will last longer   Flecanide periodically         Wt Readings from Last 3 Encounters:   08/12/24 92.1 kg (203 lb)   07/24/24 93.9 kg (207 lb)   07/23/24 93.9 kg (207 lb)         Review of Systems   Constitutional:  Negative for chills, fatigue, fever and unexpected weight change.   HENT:  Negative for hearing loss and sore throat.    Eyes:  Negative for visual disturbance.   Respiratory:  Negative for cough, chest tightness and

## 2024-09-01 DIAGNOSIS — N92.6 IRREGULAR PERIODS: ICD-10-CM

## 2024-09-01 DIAGNOSIS — N95.1 PERIMENOPAUSE: Primary | ICD-10-CM

## 2024-09-03 RX ORDER — PROGESTERONE 200 MG/1
CAPSULE ORAL
Qty: 150 CAPSULE | Refills: 0 | Status: SHIPPED | OUTPATIENT
Start: 2024-09-03

## 2024-09-17 DIAGNOSIS — G43.109 MIGRAINE WITH AURA AND WITHOUT STATUS MIGRAINOSUS, NOT INTRACTABLE: ICD-10-CM

## 2024-09-18 RX ORDER — BUTALBITAL, ACETAMINOPHEN AND CAFFEINE 50; 325; 40 MG/1; MG/1; MG/1
1 TABLET ORAL EVERY 4 HOURS PRN
Qty: 12 TABLET | Refills: 5 | Status: SHIPPED | OUTPATIENT
Start: 2024-09-18

## 2024-10-11 ENCOUNTER — OFFICE VISIT (OUTPATIENT)
Dept: CARDIOLOGY CLINIC | Age: 49
End: 2024-10-11
Payer: COMMERCIAL

## 2024-10-11 VITALS
HEART RATE: 76 BPM | OXYGEN SATURATION: 96 % | BODY MASS INDEX: 31.02 KG/M2 | WEIGHT: 204 LBS | DIASTOLIC BLOOD PRESSURE: 72 MMHG | SYSTOLIC BLOOD PRESSURE: 106 MMHG

## 2024-10-11 DIAGNOSIS — I10 PRIMARY HYPERTENSION: ICD-10-CM

## 2024-10-11 DIAGNOSIS — I48.91 ATRIAL FIBRILLATION WITH RVR (HCC): ICD-10-CM

## 2024-10-11 DIAGNOSIS — I10 ESSENTIAL HYPERTENSION: Primary | ICD-10-CM

## 2024-10-11 DIAGNOSIS — I48.0 PAROXYSMAL ATRIAL FIBRILLATION (HCC): ICD-10-CM

## 2024-10-11 PROBLEM — D68.69 SECONDARY HYPERCOAGULABLE STATE (HCC): Status: RESOLVED | Noted: 2023-08-17 | Resolved: 2024-10-11

## 2024-10-11 PROCEDURE — 99214 OFFICE O/P EST MOD 30 MIN: CPT | Performed by: INTERNAL MEDICINE

## 2024-10-11 PROCEDURE — 3078F DIAST BP <80 MM HG: CPT | Performed by: INTERNAL MEDICINE

## 2024-10-11 PROCEDURE — 3074F SYST BP LT 130 MM HG: CPT | Performed by: INTERNAL MEDICINE

## 2024-10-11 PROCEDURE — 93000 ELECTROCARDIOGRAM COMPLETE: CPT | Performed by: INTERNAL MEDICINE

## 2024-10-11 NOTE — PROGRESS NOTES
(SYNTHROID) 100 MCG tablet Take 1 tablet by mouth Daily 90 tablet 3    rivaroxaban (XARELTO) 20 MG TABS tablet TAKE ONE TABLET ONE TIME DAILY 90 tablet 3    Lidocaine 4 % OINT Apply 1 each topically every 8 hours as needed (pain) 50 g 0    diclofenac sodium (VOLTAREN) 1 % GEL Apply 2 g topically 4 times daily as needed for Pain 2 g 0    Bacillus Coagulans-Inulin (PROBIOTIC FORMULA PO) Take 1 capsule by mouth daily      Folic Acid (FOLATE PO) Take 1 tablet by mouth daily      Multiple Vitamin (MULTIVITAMIN PO) Take 1 tablet by mouth daily      sodium chloride (ALTAMIST SPRAY) 0.65 % nasal spray 1 spray by Nasal route as needed for Congestion 1 Bottle 3    Handicap Placard MISC by Does not apply route Exp 5 years 1 each 0    magnesium (MAGNESIUM-OXIDE) 250 MG TABS tablet Take 1 tablet by mouth daily      vitamin B-12 (CYANOCOBALAMIN) 500 MCG tablet Take 1 tablet by mouth daily      zinc 50 MG CAPS Take by mouth daily       Calcium Carbonate-Vit D-Min (CALCIUM 1200) 5579-8702 MG-UNIT CHEW Take by mouth daily       ferrous sulfate 325 (65 FE) MG tablet Take 65 mg by mouth daily (with breakfast)        No current facility-administered medications for this visit.       Penicillins, Codeine, Percocet [oxycodone-acetaminophen], Bee venom, Seasonal, Tape [adhesive tape], Hydrocodone, and Protonix [pantoprazole sodium]    Review of Systems:  General ROS: negative  Psychological ROS: negative  Hematological and Lymphatic ROS: No history of blood clots or bleeding disorder.   Respiratory ROS: no cough, shortness of breath, or wheezing  Cardiovascular ROS: no chest pain or dyspnea on exertion  Gastrointestinal ROS: no abdominal pain, change in bowel habits, or black or bloody stools  Genito-Urinary ROS: no dysuria, trouble voiding, or hematuria  Musculoskeletal ROS: negative  Neurological ROS: no TIA or stroke symptoms  Dermatological ROS: negative    VITALS:  Blood pressure 106/72, pulse 76, weight 92.5 kg (204 lb), last

## 2024-10-14 ENCOUNTER — OFFICE VISIT (OUTPATIENT)
Dept: ORTHOPEDIC SURGERY | Age: 49
End: 2024-10-14
Payer: COMMERCIAL

## 2024-10-14 VITALS — WEIGHT: 206 LBS | HEIGHT: 68 IN | BODY MASS INDEX: 31.22 KG/M2

## 2024-10-14 DIAGNOSIS — M65.311 TRIGGER FINGER OF RIGHT THUMB: Primary | ICD-10-CM

## 2024-10-14 DIAGNOSIS — M65.331 TRIGGER FINGER, RIGHT MIDDLE FINGER: ICD-10-CM

## 2024-10-14 PROCEDURE — 20550 NJX 1 TENDON SHEATH/LIGAMENT: CPT | Performed by: STUDENT IN AN ORGANIZED HEALTH CARE EDUCATION/TRAINING PROGRAM

## 2024-10-14 PROCEDURE — 99214 OFFICE O/P EST MOD 30 MIN: CPT | Performed by: STUDENT IN AN ORGANIZED HEALTH CARE EDUCATION/TRAINING PROGRAM

## 2024-10-14 RX ORDER — TRIAMCINOLONE ACETONIDE 40 MG/ML
20 INJECTION, SUSPENSION INTRA-ARTICULAR; INTRAMUSCULAR ONCE
Status: COMPLETED | OUTPATIENT
Start: 2024-10-14 | End: 2024-10-14

## 2024-10-14 RX ORDER — LIDOCAINE HYDROCHLORIDE 10 MG/ML
0.5 INJECTION, SOLUTION INFILTRATION; PERINEURAL ONCE
Status: COMPLETED | OUTPATIENT
Start: 2024-10-14 | End: 2024-10-14

## 2024-10-14 RX ADMIN — LIDOCAINE HYDROCHLORIDE 0.5 ML: 10 INJECTION, SOLUTION INFILTRATION; PERINEURAL at 14:49

## 2024-10-14 RX ADMIN — LIDOCAINE HYDROCHLORIDE 0.5 ML: 10 INJECTION, SOLUTION INFILTRATION; PERINEURAL at 14:48

## 2024-10-14 RX ADMIN — TRIAMCINOLONE ACETONIDE 20 MG: 40 INJECTION, SUSPENSION INTRA-ARTICULAR; INTRAMUSCULAR at 14:51

## 2024-10-14 RX ADMIN — TRIAMCINOLONE ACETONIDE 20 MG: 40 INJECTION, SUSPENSION INTRA-ARTICULAR; INTRAMUSCULAR at 14:50

## 2024-10-14 NOTE — PROGRESS NOTES
Multiple Vitamin (MULTIVITAMIN PO) Take 1 tablet by mouth daily      sodium chloride (ALTAMIST SPRAY) 0.65 % nasal spray 1 spray by Nasal route as needed for Congestion 1 Bottle 3    Handicap Placard MISC by Does not apply route Exp 5 years 1 each 0    magnesium (MAGNESIUM-OXIDE) 250 MG TABS tablet Take 1 tablet by mouth daily      vitamin B-12 (CYANOCOBALAMIN) 500 MCG tablet Take 1 tablet by mouth daily      zinc 50 MG CAPS Take by mouth daily       Calcium Carbonate-Vit D-Min (CALCIUM 1200) 4334-5005 MG-UNIT CHEW Take by mouth daily       ferrous sulfate 325 (65 FE) MG tablet Take 65 mg by mouth daily (with breakfast)        No current facility-administered medications on file prior to visit.       Review of Systems   Constitutional: Negative.    Musculoskeletal:  Positive for arthralgias.   Skin: Negative.    Neurological: Negative.    All other systems reviewed and are negative.      Objective:   Ht 1.727 m (5' 8\")   Wt 93.4 kg (206 lb)   LMP 11/04/2019   BMI 31.32 kg/m²     Physical Exam:  Ortho Exam    Right hand: Skin intact.  There is tenderness to palpation over the volar MCP joint of the thumb at the A1 pulley.  There is tenderness to palpation over the volar MCP joint of the middle finger at the A1 pulley.  No catching or triggering noted on exam.  She has full range of motion of all digits, no contractures.  Intact EPL and FPL.  She can make a full fist and extend all digits fully.  Sensation intact to light touch throughout the median, radial, ulnar nerve distributions.  Hand is warm well-perfused.     Radiographs and Laboratory Studies:     Diagnostic Imaging Studies:    Three-view x-ray of the right thumb dated 11/17/2023 was reviewed by me demonstrates no acute fracture. The alignment of the thumb MCP and interphalangeal joints is normal and reduced.     Laboratory Studies:   Lab Results   Component Value Date    WBC 13.3 (H) 08/05/2024    HGB 13.2 08/05/2024    HCT 39.6 08/05/2024    MCV 90.8

## 2024-10-31 ENCOUNTER — OFFICE VISIT (OUTPATIENT)
Dept: OBGYN CLINIC | Age: 49
End: 2024-10-31
Payer: COMMERCIAL

## 2024-10-31 VITALS
WEIGHT: 209 LBS | HEART RATE: 72 BPM | BODY MASS INDEX: 31.67 KG/M2 | SYSTOLIC BLOOD PRESSURE: 120 MMHG | HEIGHT: 68 IN | DIASTOLIC BLOOD PRESSURE: 80 MMHG

## 2024-10-31 DIAGNOSIS — Z01.419 WOMEN'S ANNUAL ROUTINE GYNECOLOGICAL EXAMINATION: Primary | ICD-10-CM

## 2024-10-31 PROCEDURE — 99396 PREV VISIT EST AGE 40-64: CPT | Performed by: OBSTETRICS & GYNECOLOGY

## 2024-10-31 PROCEDURE — 3079F DIAST BP 80-89 MM HG: CPT | Performed by: OBSTETRICS & GYNECOLOGY

## 2024-10-31 PROCEDURE — 3074F SYST BP LT 130 MM HG: CPT | Performed by: OBSTETRICS & GYNECOLOGY

## 2024-10-31 RX ORDER — CLOTRIMAZOLE AND BETAMETHASONE DIPROPIONATE 10; .64 MG/G; MG/G
CREAM TOPICAL
Qty: 45 G | Refills: 0 | Status: SHIPPED | OUTPATIENT
Start: 2024-10-31

## 2024-10-31 ASSESSMENT — ENCOUNTER SYMPTOMS
TROUBLE SWALLOWING: 0
CHEST TIGHTNESS: 0
SHORTNESS OF BREATH: 0
COLOR CHANGE: 0
BACK PAIN: 0
ABDOMINAL DISTENTION: 0
NAUSEA: 0
ABDOMINAL PAIN: 0
CONSTIPATION: 0
BLOOD IN STOOL: 0
COUGH: 0
VOMITING: 0
SORE THROAT: 0
WHEEZING: 0
VOICE CHANGE: 0

## 2024-11-06 ENCOUNTER — PATIENT MESSAGE (OUTPATIENT)
Dept: FAMILY MEDICINE CLINIC | Age: 49
End: 2024-11-06

## 2024-11-06 RX ORDER — LIDOCAINE 50 MG/G
1 PATCH TOPICAL DAILY
Qty: 30 PATCH | Refills: 0 | Status: SHIPPED | OUTPATIENT
Start: 2024-11-06 | End: 2024-12-06

## 2024-11-20 DIAGNOSIS — I48.91 ATRIAL FIBRILLATION WITH RVR (HCC): ICD-10-CM

## 2024-11-20 RX ORDER — LEVOTHYROXINE SODIUM 100 UG/1
100 TABLET ORAL DAILY
Qty: 90 TABLET | Refills: 1 | Status: SHIPPED | OUTPATIENT
Start: 2024-11-20

## 2024-11-20 NOTE — TELEPHONE ENCOUNTER
Requesting medication refill. Please approve or deny this request.    Rx requested:  Requested Prescriptions     Pending Prescriptions Disp Refills    rivaroxaban (XARELTO) 20 MG TABS tablet 90 tablet 3     Sig: TAKE ONE TABLET ONE TIME DAILY         Last Office Visit:   10/11/2024      Next Visit Date:  Future Appointments   Date Time Provider Department Center   11/22/2024 11:00 AM Ilan Palmer MD [facility]   2/14/2025 10:30 AM Shakir Hernnadez MD [facility]   10/17/2025 11:00 AM Ronald Knight DO [facility]   11/7/2025 11:00 AM Rey Ervin DO [facility]

## 2024-11-22 ENCOUNTER — OFFICE VISIT (OUTPATIENT)
Dept: SURGERY | Age: 49
End: 2024-11-22
Payer: COMMERCIAL

## 2024-11-22 VITALS
TEMPERATURE: 98.1 F | HEART RATE: 83 BPM | BODY MASS INDEX: 31.67 KG/M2 | WEIGHT: 209 LBS | OXYGEN SATURATION: 99 % | HEIGHT: 68 IN

## 2024-11-22 DIAGNOSIS — K64.4 EXTERNAL HEMORRHOID: Primary | ICD-10-CM

## 2024-11-22 PROCEDURE — 99203 OFFICE O/P NEW LOW 30 MIN: CPT | Performed by: COLON & RECTAL SURGERY

## 2024-11-22 ASSESSMENT — ENCOUNTER SYMPTOMS
SHORTNESS OF BREATH: 0
ABDOMINAL PAIN: 0
CHEST TIGHTNESS: 0
COLOR CHANGE: 0

## 2024-11-22 NOTE — PROGRESS NOTES
Subjective:      Patient ID: Rosemary Adame is a 49 y.o. female who presents for:  Chief Complaint   Patient presents with    New Patient       This is a 49-year-old female who has an external hemorrhoid which is symptomatic.    I was asked to see her regarding excision.    Past medical and surgical history reviewed.  Denies rectal bleeding.  Colonoscopy up-to-date        Past Medical History:   Diagnosis Date    Abnormal Pap smear of cervix     Allergic rhinitis     Arthritis     hands    Asplenia     Atrial fibrillation (HCC)     Atrial fibrillation with RVR (HCC)     Dr. Knight    Bilateral hand pain     CTS (carpal tunnel syndrome) 05/2016    bilateral    External hemorrhoid     Fatigue     Heart murmur     History of ITP 2004    had spenectomy    Hypertension     meds > 3 yrs    Hypothyroidism     meds > 7 yrs    Hypothyroidism     Insomnia     Irregular periods     Meniere disease     Migraine headache     Multinodular goiter     Nail fungus     Pain in right lower leg     Paroxysmal atrial fibrillation (HCC) 10/23/2020    Plantar fasciitis, left     Dr. Jacobo     Past Surgical History:   Procedure Laterality Date    CARPAL TUNNEL RELEASE Right 07/12/2016    COLONOSCOPY N/A 9/18/2023    COLORECTAL CANCER SCREENING, NOT HIGH RISK performed by Fredis Long MD at [facility]    DILATION AND CURETTAGE OF UTERUS N/A 12/16/2019    HYSTEROSCOPY NOVASURE ABLATION D&C performed [place] [facility]    HYSTERECTOMY (CERVIX STATUS UNKNOWN)      HYSTERECTOMY, VAGINAL N/A 8/2/2021    LAPAROSCOPIC ASSISTED VAGINAL HYSTERECTOMY BILATERAL SALPINGECTOMY performed by Rey Ervin DO [place] [facility]    LAPAROSCOPIC SPLENECTOMY N/A 2004    done for ITP    THYROIDECTOMY, COMPLETION Left 2014    frozen section inconclusive    THYROIDECTOMY, PARTIAL Right 2011    Benign nodule    TONSILLECTOMY N/A 11/2/2021    TONSILLECTOMY performed by Keegan Alonzo MD at [facility]     Social History     Socioeconomic

## 2024-11-25 DIAGNOSIS — N95.1 PERIMENOPAUSE: ICD-10-CM

## 2024-11-25 DIAGNOSIS — N92.6 IRREGULAR PERIODS: ICD-10-CM

## 2024-11-25 RX ORDER — PROGESTERONE 200 MG/1
CAPSULE ORAL
Qty: 150 CAPSULE | Refills: 0 | Status: SHIPPED | OUTPATIENT
Start: 2024-11-25

## 2024-12-09 ENCOUNTER — PATIENT MESSAGE (OUTPATIENT)
Dept: FAMILY MEDICINE CLINIC | Age: 49
End: 2024-12-09

## 2024-12-09 RX ORDER — LIDOCAINE 50 MG/G
1 PATCH TOPICAL DAILY
Qty: 30 PATCH | Refills: 5 | Status: SHIPPED | OUTPATIENT
Start: 2024-12-09

## 2024-12-09 NOTE — TELEPHONE ENCOUNTER
Comments: Pt requesting additional refills. Please advise!    Last Office Visit (last PCP visit):   8/12/2024    Next Visit Date:  Future Appointments   Date Time Provider Department Center   2/14/2025 10:30 AM Shakir Hernandez MD [facility]   10/17/2025 11:00 AM Holiday, Ronald BLAKE DO [place] [facility]   11/7/2025 11:00 AM Rey Ervin,  [facility]       **If hasn't been seen in over a year OR hasn't followed up according to last diabetes/ADHD visit, make appointment for patient before sending refill to provider.    Rx requested:  Requested Prescriptions     Pending Prescriptions Disp Refills    lidocaine (LIDODERM) 5 % 30 patch 1     Sig: Place 1 patch onto the skin daily 12 hours on, 12 hours off.

## 2024-12-11 NOTE — ED TRIAGE NOTES
Pt reports SOB/ feeling unwell since Sunday, went to ready clinic Tuesday, placed on antibiotic for possible sinus infection. Patient reports no relief in symptoms. Reports frequent coughing, reports greenish yellow mucus. Reports having current diarrhea. Reports also being in afib prior to arrival. Reports pain in chest from coughing 6/10.  Electronically signed by Miguel Angel Tadeo RN on 2/24/2023 at 10:15 AM Signed prescription now faxed to [facility]. Mother, Vickiarsen.

## 2024-12-20 ENCOUNTER — OFFICE VISIT (OUTPATIENT)
Dept: FAMILY MEDICINE CLINIC | Age: 49
End: 2024-12-20
Payer: COMMERCIAL

## 2024-12-20 VITALS
DIASTOLIC BLOOD PRESSURE: 82 MMHG | HEIGHT: 68 IN | BODY MASS INDEX: 30.62 KG/M2 | WEIGHT: 202 LBS | SYSTOLIC BLOOD PRESSURE: 134 MMHG | HEART RATE: 70 BPM | OXYGEN SATURATION: 98 %

## 2024-12-20 DIAGNOSIS — R39.9 UTI SYMPTOMS: ICD-10-CM

## 2024-12-20 DIAGNOSIS — N39.0 ACUTE UTI: Primary | ICD-10-CM

## 2024-12-20 DIAGNOSIS — R10.9 FLANK PAIN: ICD-10-CM

## 2024-12-20 LAB
BILIRUBIN, POC: NORMAL
BLOOD URINE, POC: NORMAL
CLARITY, POC: CLEAR
COLOR, POC: YELLOW
GLUCOSE URINE, POC: NORMAL MG/DL
KETONES, POC: NORMAL MG/DL
LEUKOCYTE EST, POC: NORMAL
NITRITE, POC: NORMAL
PH, POC: 5.5
PROTEIN, POC: NORMAL MG/DL
SPECIFIC GRAVITY, POC: 1.03
UROBILINOGEN, POC: NORMAL MG/DL

## 2024-12-20 PROCEDURE — 99214 OFFICE O/P EST MOD 30 MIN: CPT | Performed by: NURSE PRACTITIONER

## 2024-12-20 PROCEDURE — 81003 URINALYSIS AUTO W/O SCOPE: CPT | Performed by: NURSE PRACTITIONER

## 2024-12-20 PROCEDURE — 3075F SYST BP GE 130 - 139MM HG: CPT | Performed by: NURSE PRACTITIONER

## 2024-12-20 PROCEDURE — 3079F DIAST BP 80-89 MM HG: CPT | Performed by: NURSE PRACTITIONER

## 2024-12-20 RX ORDER — NITROFURANTOIN 25; 75 MG/1; MG/1
100 CAPSULE ORAL 2 TIMES DAILY
Qty: 10 CAPSULE | Refills: 0 | Status: SHIPPED | OUTPATIENT
Start: 2024-12-20 | End: 2024-12-25

## 2024-12-21 LAB — BACTERIA UR CULT: NORMAL

## 2024-12-24 DIAGNOSIS — N20.1 RIGHT URETERAL STONE: Primary | ICD-10-CM

## 2024-12-24 RX ORDER — TRAMADOL HYDROCHLORIDE 50 MG/1
50 TABLET ORAL EVERY 8 HOURS PRN
Qty: 15 TABLET | Refills: 0 | Status: SHIPPED | OUTPATIENT
Start: 2024-12-24 | End: 2024-12-29

## 2024-12-24 RX ORDER — TAMSULOSIN HYDROCHLORIDE 0.4 MG/1
0.4 CAPSULE ORAL DAILY
Qty: 30 CAPSULE | Refills: 5 | Status: SHIPPED | OUTPATIENT
Start: 2024-12-24

## 2025-01-06 ENCOUNTER — OFFICE VISIT (OUTPATIENT)
Dept: FAMILY MEDICINE CLINIC | Age: 50
End: 2025-01-06
Payer: COMMERCIAL

## 2025-01-06 VITALS
OXYGEN SATURATION: 97 % | HEART RATE: 71 BPM | HEIGHT: 68 IN | WEIGHT: 203 LBS | TEMPERATURE: 98.5 F | DIASTOLIC BLOOD PRESSURE: 78 MMHG | SYSTOLIC BLOOD PRESSURE: 134 MMHG | BODY MASS INDEX: 30.77 KG/M2

## 2025-01-06 DIAGNOSIS — J02.9 SORE THROAT: Primary | ICD-10-CM

## 2025-01-06 DIAGNOSIS — H69.91 DYSFUNCTION OF RIGHT EUSTACHIAN TUBE: ICD-10-CM

## 2025-01-06 LAB
Lab: NORMAL
PERFORMING INSTRUMENT: NORMAL
QC PASS/FAIL: NORMAL
S PYO AG THROAT QL: NORMAL
SARS-COV-2, POC: NORMAL

## 2025-01-06 PROCEDURE — 87426 SARSCOV CORONAVIRUS AG IA: CPT | Performed by: NURSE PRACTITIONER

## 2025-01-06 PROCEDURE — 87880 STREP A ASSAY W/OPTIC: CPT | Performed by: NURSE PRACTITIONER

## 2025-01-06 PROCEDURE — 99213 OFFICE O/P EST LOW 20 MIN: CPT | Performed by: NURSE PRACTITIONER

## 2025-01-06 PROCEDURE — 3078F DIAST BP <80 MM HG: CPT | Performed by: NURSE PRACTITIONER

## 2025-01-06 PROCEDURE — 3075F SYST BP GE 130 - 139MM HG: CPT | Performed by: NURSE PRACTITIONER

## 2025-01-06 ASSESSMENT — ENCOUNTER SYMPTOMS
SHORTNESS OF BREATH: 0
TROUBLE SWALLOWING: 0
SINUS PRESSURE: 0
CONSTIPATION: 0
WHEEZING: 0
COUGH: 0
VOMITING: 0
DIARRHEA: 0
ABDOMINAL PAIN: 0
NAUSEA: 0
CHEST TIGHTNESS: 0
RHINORRHEA: 0
SORE THROAT: 1

## 2025-01-06 ASSESSMENT — PATIENT HEALTH QUESTIONNAIRE - PHQ9
1. LITTLE INTEREST OR PLEASURE IN DOING THINGS: NOT AT ALL
SUM OF ALL RESPONSES TO PHQ QUESTIONS 1-9: 0
SUM OF ALL RESPONSES TO PHQ9 QUESTIONS 1 & 2: 0
2. FEELING DOWN, DEPRESSED OR HOPELESS: NOT AT ALL
SUM OF ALL RESPONSES TO PHQ QUESTIONS 1-9: 0

## 2025-01-06 NOTE — PROGRESS NOTES
THYROIDECTOMY, PARTIAL Right 2011    Benign nodule    TONSILLECTOMY N/A 2021    TONSILLECTOMY [place]zo [place] [facility]        Social History     Socioeconomic History    Marital status:      Spouse name: None    Number of children: 0    Years of education: None    Highest education level: None   Occupational History    Occupation: medical records   Tobacco Use    Smoking status: Former     Current packs/day: 0.00     Average packs/day: 0.1 packs/day for 33.6 years (3.4 ttl pk-yrs)     Types: Cigarettes     Start date: 10/26/1989     Quit date: 2023     Years since quittin.6    Smokeless tobacco: Never    Tobacco comments:     Quit smoking again   Vaping Use    Vaping status: Never Used   Substance and Sexual Activity    Alcohol use: Yes     Alcohol/week: 3.0 standard drinks of alcohol     Types: 1 Glasses of wine, 1 Cans of beer, 1 Drinks containing 0.5 oz of alcohol per week     Comment: I may have a drink once or twice a month.    Drug use: No    Sexual activity: Not Currently     Partners: Male     Social Determinants of Health     Financial Resource Strain: Low Risk  (2024)    Overall Financial Resource Strain (CARDIA)     Difficulty of Paying Living Expenses: Not hard at all   Food Insecurity: No Food Insecurity (2024)    Hunger Vital Sign     Worried About Running Out of Food in the Last Year: Never true     Ran Out of Food in the Last Year: Never true   Transportation Needs: Unknown (2024)    PRAPARE - Transportation     Lack of Transportation (Non-Medical): No   Housing Stability: Unknown (2024)    Housing Stability Vital Sign     Unstable Housing in the Last Year: No        PHYSICAL EXAM     Vitals:    25 1325   BP: 134/78   Site: Left Upper Arm   Position: Sitting   Cuff Size: Medium Adult   Pulse: 71   Temp: 98.5 °F (36.9 °C)   TempSrc: Oral   SpO2: 97%   Weight: 92.1 kg (203 lb)   Height: 1.727 m (5' 8\")     Physical Exam  Constitutional:

## 2025-02-08 DIAGNOSIS — E03.9 HYPOTHYROIDISM, UNSPECIFIED TYPE: ICD-10-CM

## 2025-02-08 DIAGNOSIS — R73.9 HYPERGLYCEMIA: ICD-10-CM

## 2025-02-08 DIAGNOSIS — I48.0 PAROXYSMAL ATRIAL FIBRILLATION (HCC): ICD-10-CM

## 2025-02-08 DIAGNOSIS — Z13.220 SCREENING, LIPID: ICD-10-CM

## 2025-02-08 LAB
ALBUMIN SERPL-MCNC: 3.7 G/DL (ref 3.5–4.6)
ALP SERPL-CCNC: 115 U/L (ref 40–130)
ALT SERPL-CCNC: 13 U/L (ref 0–33)
ANION GAP SERPL CALCULATED.3IONS-SCNC: 10 MEQ/L (ref 9–15)
AST SERPL-CCNC: 12 U/L (ref 0–35)
BILIRUB SERPL-MCNC: <0.2 MG/DL (ref 0.2–0.7)
BUN SERPL-MCNC: 9 MG/DL (ref 6–20)
CALCIUM SERPL-MCNC: 9.3 MG/DL (ref 8.5–9.9)
CHLORIDE SERPL-SCNC: 104 MEQ/L (ref 95–107)
CHOLEST SERPL-MCNC: 193 MG/DL (ref 0–199)
CO2 SERPL-SCNC: 25 MEQ/L (ref 20–31)
CREAT SERPL-MCNC: 0.69 MG/DL (ref 0.5–0.9)
ERYTHROCYTE [DISTWIDTH] IN BLOOD BY AUTOMATED COUNT: 14.6 % (ref 11.5–14.5)
GLOBULIN SER CALC-MCNC: 3.1 G/DL (ref 2.3–3.5)
GLUCOSE SERPL-MCNC: 95 MG/DL (ref 70–99)
HCT VFR BLD AUTO: 38.8 % (ref 37–47)
HDLC SERPL-MCNC: 46 MG/DL (ref 40–59)
HGB BLD-MCNC: 13.1 G/DL (ref 12–16)
LDLC SERPL CALC-MCNC: 131 MG/DL (ref 0–129)
MCH RBC QN AUTO: 30.8 PG (ref 27–31.3)
MCHC RBC AUTO-ENTMCNC: 33.8 % (ref 33–37)
MCV RBC AUTO: 91.1 FL (ref 79.4–94.8)
PLATELET # BLD AUTO: 436 K/UL (ref 130–400)
POTASSIUM SERPL-SCNC: 4.1 MEQ/L (ref 3.4–4.9)
PROT SERPL-MCNC: 6.8 G/DL (ref 6.3–8)
RBC # BLD AUTO: 4.26 M/UL (ref 4.2–5.4)
SODIUM SERPL-SCNC: 139 MEQ/L (ref 135–144)
T4 FREE SERPL-MCNC: 1.24 NG/DL (ref 0.84–1.68)
TRIGL SERPL-MCNC: 78 MG/DL (ref 0–150)
TSH REFLEX: 4.55 UIU/ML (ref 0.44–3.86)
WBC # BLD AUTO: 9.1 K/UL (ref 4.8–10.8)

## 2025-02-09 LAB
ESTIMATED AVERAGE GLUCOSE: 111 MG/DL
HBA1C MFR BLD: 5.5 % (ref 4–6)

## 2025-02-11 SDOH — ECONOMIC STABILITY: FOOD INSECURITY: WITHIN THE PAST 12 MONTHS, YOU WORRIED THAT YOUR FOOD WOULD RUN OUT BEFORE YOU GOT MONEY TO BUY MORE.: NEVER TRUE

## 2025-02-11 SDOH — ECONOMIC STABILITY: INCOME INSECURITY: IN THE LAST 12 MONTHS, WAS THERE A TIME WHEN YOU WERE NOT ABLE TO PAY THE MORTGAGE OR RENT ON TIME?: NO

## 2025-02-11 SDOH — ECONOMIC STABILITY: FOOD INSECURITY: WITHIN THE PAST 12 MONTHS, THE FOOD YOU BOUGHT JUST DIDN'T LAST AND YOU DIDN'T HAVE MONEY TO GET MORE.: NEVER TRUE

## 2025-02-14 ENCOUNTER — OFFICE VISIT (OUTPATIENT)
Dept: FAMILY MEDICINE CLINIC | Age: 50
End: 2025-02-14
Payer: COMMERCIAL

## 2025-02-14 VITALS
HEART RATE: 71 BPM | BODY MASS INDEX: 31.67 KG/M2 | SYSTOLIC BLOOD PRESSURE: 118 MMHG | TEMPERATURE: 97.5 F | WEIGHT: 209 LBS | DIASTOLIC BLOOD PRESSURE: 80 MMHG | OXYGEN SATURATION: 97 % | HEIGHT: 68 IN

## 2025-02-14 DIAGNOSIS — G43.109 MIGRAINE WITH AURA AND WITHOUT STATUS MIGRAINOSUS, NOT INTRACTABLE: ICD-10-CM

## 2025-02-14 DIAGNOSIS — I48.0 PAROXYSMAL ATRIAL FIBRILLATION (HCC): ICD-10-CM

## 2025-02-14 DIAGNOSIS — Q89.01 ASPLENIA: ICD-10-CM

## 2025-02-14 DIAGNOSIS — K64.4 EXTERNAL HEMORRHOIDS: ICD-10-CM

## 2025-02-14 DIAGNOSIS — E03.9 HYPOTHYROIDISM, UNSPECIFIED TYPE: Primary | ICD-10-CM

## 2025-02-14 DIAGNOSIS — K64.4 EXTERNAL HEMORRHOID: ICD-10-CM

## 2025-02-14 PROCEDURE — 3074F SYST BP LT 130 MM HG: CPT | Performed by: FAMILY MEDICINE

## 2025-02-14 PROCEDURE — 3079F DIAST BP 80-89 MM HG: CPT | Performed by: FAMILY MEDICINE

## 2025-02-14 PROCEDURE — 99214 OFFICE O/P EST MOD 30 MIN: CPT | Performed by: FAMILY MEDICINE

## 2025-02-14 RX ORDER — HYDROCORTISONE ACETATE 25 MG/1
25 SUPPOSITORY RECTAL 2 TIMES DAILY
Qty: 60 SUPPOSITORY | Refills: 1 | Status: SHIPPED | OUTPATIENT
Start: 2025-02-14

## 2025-02-14 RX ORDER — LEVOTHYROXINE SODIUM 112 UG/1
112 TABLET ORAL DAILY
Qty: 90 TABLET | Refills: 1 | Status: SHIPPED | OUTPATIENT
Start: 2025-02-14

## 2025-02-14 ASSESSMENT — ENCOUNTER SYMPTOMS
NAUSEA: 0
DIARRHEA: 0
VOMITING: 0
COUGH: 0
COLOR CHANGE: 0
CHEST TIGHTNESS: 0
CONSTIPATION: 0
BLOOD IN STOOL: 0
SHORTNESS OF BREATH: 0
ABDOMINAL DISTENTION: 0
ABDOMINAL PAIN: 0
SORE THROAT: 0

## 2025-02-14 NOTE — PROGRESS NOTES
Subjective  Rosemary Adame, 49 y.o. female presents today with:  Chief Complaint   Patient presents with    Atrial Fibrillation     6 month  Started biotin     Med Refill Clerical     Suppositories        Migraine   Pertinent negatives include no abdominal pain, coughing, dizziness, fever, hearing loss, nausea, neck pain, sore throat, vomiting or weakness.   Hypertension  Pertinent negatives include no chest pain, headaches, neck pain, palpitations or shortness of breath.   Other  Pertinent negatives include no abdominal pain, arthralgias, chest pain, chills, coughing, fatigue, fever, headaches, myalgias, nausea, neck pain, rash, sore throat, vomiting or weakness.   Shoulder Pain   Pertinent negatives include no fever.   Atrial Fibrillation  Symptoms are negative for chest pain, dizziness, palpitations, shortness of breath and weakness.     Here today for 6 month follow-up on chronic problems including A. Fib, hypothyroidism, allergic rhinitis, migraines.  Also c/o above. Hx of A. fib with rapid ventricular response.  Her regular cardiologist is Dr. Harvey.  She sees Dr. Ramos for management of her thyroid.  She also has a past medical history significant for migraine headaches and allergic rhinitis.  Her ObGyn is Dr. Ervin.   She is taking synthroid, OCP's, zyrtec and flonase. For her migraines she uses fioricet prn.     We complete FMLA for her     Sees Dr. Rosas for hand issues       She does monitor bp at home     PAF hx  Has episodes more frequently and will last longer   Flecanide periodically         Wt Readings from Last 3 Encounters:   02/14/25 94.8 kg (209 lb)   01/06/25 92.1 kg (203 lb)   12/20/24 91.6 kg (202 lb)         Review of Systems   Constitutional:  Negative for chills, fatigue, fever and unexpected weight change.   HENT:  Negative for hearing loss and sore throat.    Eyes:  Negative for visual disturbance.   Respiratory:  Negative for cough, chest tightness and shortness of breath.

## 2025-02-25 RX ORDER — FLECAINIDE ACETATE 100 MG/1
100 TABLET ORAL PRN
Qty: 30 TABLET | Refills: 1 | Status: SHIPPED | OUTPATIENT
Start: 2025-02-25

## 2025-02-25 NOTE — TELEPHONE ENCOUNTER
Requesting medication refill. Please approve or deny this request.    Rx requested:  Requested Prescriptions     Pending Prescriptions Disp Refills    flecainide (TAMBOCOR) 100 MG tablet 10 tablet 1     Sig: Take 1 tablet by mouth as needed (PRN afib.)         Last Office Visit:   10/11/2024      Next Visit Date:  Future Appointments   Date Time Provider Department Center   8/15/2025 11:30 AM Shakir Hernandez MD [facility]   10/17/2025 11:00 AM Ronald Knight DO [facility]   11/7/2025 11:00 AM Rey Ervin DO [facility]

## 2025-03-05 NOTE — PLAN OF CARE
Continued Stay SW/CM Assessment/Plan of Care Note       Active Substitute Decision Maker (SDM)       ESDRAS DORAN Surrogate Primary Contact - Spouse   Primary Phone: [phone] (Mobile)                     Progress note:  Per RN, anticipate dc ready today.  SW provided [place] RT with [facility] RT's contact information.      See SW/CM flowsheets for other objective data.    Disposition Recommendations:  Preliminary discharge destination: Planned Discharge Destination: Rehabilitation/Skilled Care  SW/CM recommendation for discharge: LTAC    Destination Pharmacy: [facility] - [place], [place] - [address] [facility] Pharmacy confirmed with facility, Preferred pharmacy updated    Discharge Plan/Needs:     Continued Care and Services - Admitted Since 2/1/2025       Destination  Coordination complete.      Service Provider Request Status Selected Services Address Phone Fax    [facility] - Short Term Rehabilitation-findhelp  Selected Skilled Nursing [facility] [address] [place] [id number] [phone]    [facility] Short Term Rehabilitation-[facility]help Pending - No Request Sent -- [address] [facility] [address] [phone]    [facility] Declined -- [address] [facility] [address] [facility] [address] [phone]    [facility] Declined -- [address] [address] [phone]    [facility]  Declined -- [address] [facility] [address] [phone]                      Devices/ Equipment that need to be arranged for discharge: None at this time       Prior To Hospitalization:    Living Situation: Spouse and residing at House    .  Support Systems: Spouse   Home Devices/Equipment: Home Oxygen (T), Nebulizer (T)  (Spouse does not know)          Mobility Assist Devices: None   Type of Service Prior to Hospitalization: None            [facility]  PHYSICAL THERAPY PLAN OF CARE   [facility]      [address], [address]   [place] [address]     Ph: [phone] Fax: [phone]      [] Certification  [] Recertification [x]  Plan of Care  [] Progress Note [] Discharge      Referring Provider: Reddy Amanda MD      From:  Jeanna Nolen PT   Patient: Rosemary Adame (47 y.o. female) : 1975 Date: 2023   Medical Diagnosis: Other specified joint disorders, unspecified shoulder [M25.819]    Treatment Diagnosis: shoulder pain, Lt UE weakness    Progress Report Period from:  2023  to 2023    Visits to Date: 1 No Show: 0 Cancelled Appts: 0    OBJECTIVE:   Short Term Goals - Time Frame for Short Term Goals: 2 wks    Goals Current/Discharge status  Status   Short Term Goal 1: Independent with HEP to promote home management of symptoms  Need for HEP New   Short Term Goal 2: Report </= 5/10 worst pain with reaching and daily activities  Reports 0-10/10 pain, sharp and stabbing with certain movements or the next day after activities. 1/10 currently  New     Long Term Goals - Time Frame for Long Term Goals : 6 wks  Goals Current/ Discharge status Status   Long Term Goal 1: Improve Lt UE strength = to right to return to bowling without modifications or increased pain    Strength RUE  R Shoulder Flexion: 4/5  R Shoulder Extension: 4/5  R Shoulder ABduction: 4+/5  R Shoulder Internal Rotation: 4+/5  R Shoulder External Rotation: 4+/5  R Elbow Flexion: 5/5  R Elbow Extension: 5/5  R Wrist Flexion: 4+/5  R Wrist Extension: 4+/5  Strength LUE  L Shoulder Flexion: 4-/5  L Shoulder Extension: 4-/5  L Shoulder ABduction: 4+/5  L Shoulder Internal Rotation: 4+/5  L Shoulder External Rotation: 4-/5  L Elbow Flexion: 5/5  L Elbow Extension: 5/5  L Wrist Flexion: 4+/5  L Wrist Extension: 4+/5    New   Long Term Goal 2: Improve Lt shoulder flexion = Rt to improve ability to reach overhead during dressing and ADLs      Patient/Family discharge goal (s):  LTAC     Resources provided:           Prior Function  Bed Mobility: Independent (02/20/25 1025 : Julianna Aviles, PT)  Transfers: Independent (02/20/25 1025 : Julianna Aviles, PT)  Ambulation in the Home: Independent (02/20/25 1025 : Julianna Aviles, PT)  Ambulation in the Community: Independent (02/20/25 1025 : Julianna Aviles, PT)    Current Function  Last Filed Values         Value Time User    Current Function  significantly below baseline level of function 3/3/2025  3:34 PM Gracie Villeda PT    Therapy Impairments  activity tolerance; balance; cognition; strength; ROM; safety awareness; motor planning; communication; executive functioning 3/3/2025  3:34 PM Gracie Villeda, PT    ADLs Requiring Support  bed mobility; transfers; ambulation; stairs 3/3/2025  3:34 PM Gracie Villeda PT            Therapy Recommendations for Discharge:   PT:      Last Filed Values         Value Time User    PT Discharge Needs  intensive daily therapy  LTAC 3/3/2025  3:34 PM Gracie Villeda, PT          OT:       Last Filed Values         Value Time User    OT Discharge Needs  intensive daily therapy 3/4/2025  3:59 PM Heather Ku OT          SLP:    Last Filed Values       None            Mobility Equipment Recommended for Discharge:        Barriers to Discharge  Identified Barriers to Discharge/Transition Planning: Consult Pending/Clearance                   AROM RUE (degrees)  R Shoulder Int Rotation  (0-70): 70 supine, T10  R Shoulder Ext Rotation (0-90): 90 supine, reach T5     AROM LUE (degrees)  L Shoulder Flexion (0-180): 160  L Shoulder ABduction (0-180): 180 supine  L Shoulder Int Rotation  (0-70): 70 supine, reach T11  L Shoulder Ext Rotation  (0-90): 90 supine, reach T3  Spine  Cervical: flex 57, ext 42, 48 bilateral New   Long Term Goal 3: UEFI >/= 72/80 to demonstrate improved overall activity tolerance 63/80       Body Structures, Functions, Activity Limitations Requiring Skilled Therapeutic Intervention: Decreased ROM, Decreased ADL status, Decreased strength, Increased pain, Decreased posture  Assessment: Pt presents with increased Lt shoulder pain with overhead reaching, reaching out of base of support and increased activity. Pt with complex medical history. Reports cleared by cardiac for Lt UE symptoms. (+) Zavala test, relief with disraction and posterior glide. Pt with nearly full ROM except flexion. Noted excess soft tissue anterior left shoulder with palpation compared to right. Movable and not painful. Visible with upright posture. Will monitor. Pt would benefit from skilled PT to address shoulder deficits and return to previous activity without pain. PT Education: Goals;PT Role;Plan of Care;Evaluative findings;Home Exercise Program;Disease Specific Education; Anatomy of condition    PLAN: [x] Evaluate and Treat  Frequency/Duration:  Plan Frequency: 1-2  Plan weeks: 5-6  Current Treatment Recommendations: Strengthening, ADL/Self-care training, Manual, Home exercise program, Safety education & training, Patient/Caregiver education & training, Modalities, Dry needling  Modalities: Heat/Cold, E-stim - unattended, Ultrasound     Precautions:      Afib, blood thinners                Patient Status:[x] Continue/ Initiate plan of Care    [] Discharge PT. Recommend pt continue with HEP.      [] Additional visits requested, Please re-certify for additional visits:    [] Hold         Signature: Electronically signed by Bong Gibson PT on 1/23/23 at 5:05 PM EST    If you have any questions or concerns, please don't hesitate to call. Thank you for your referral.    I have reviewed this plan of care and certify a need for medically necessary rehabilitation services.     Physician Signature:__________________________________________________________  Date:  Please sign and return

## 2025-03-17 ENCOUNTER — OFFICE VISIT (OUTPATIENT)
Dept: FAMILY MEDICINE CLINIC | Age: 50
End: 2025-03-17
Payer: COMMERCIAL

## 2025-03-17 VITALS
TEMPERATURE: 98.8 F | HEIGHT: 66 IN | SYSTOLIC BLOOD PRESSURE: 132 MMHG | WEIGHT: 203 LBS | DIASTOLIC BLOOD PRESSURE: 70 MMHG | HEART RATE: 68 BPM | OXYGEN SATURATION: 98 % | BODY MASS INDEX: 32.62 KG/M2

## 2025-03-17 DIAGNOSIS — J01.90 ACUTE BACTERIAL SINUSITIS: Primary | ICD-10-CM

## 2025-03-17 DIAGNOSIS — B96.89 ACUTE BACTERIAL SINUSITIS: Primary | ICD-10-CM

## 2025-03-17 PROCEDURE — 99213 OFFICE O/P EST LOW 20 MIN: CPT | Performed by: PHYSICIAN ASSISTANT

## 2025-03-17 PROCEDURE — 3078F DIAST BP <80 MM HG: CPT | Performed by: PHYSICIAN ASSISTANT

## 2025-03-17 PROCEDURE — 3075F SYST BP GE 130 - 139MM HG: CPT | Performed by: PHYSICIAN ASSISTANT

## 2025-03-17 RX ORDER — DOXYCYCLINE HYCLATE 100 MG
100 TABLET ORAL 2 TIMES DAILY
Qty: 14 TABLET | Refills: 0 | Status: SHIPPED | OUTPATIENT
Start: 2025-03-17 | End: 2025-03-24

## 2025-03-17 ASSESSMENT — ENCOUNTER SYMPTOMS
SORE THROAT: 0
SINUS PRESSURE: 0
GASTROINTESTINAL NEGATIVE: 1
EYES NEGATIVE: 1
COUGH: 1

## 2025-03-17 NOTE — PATIENT INSTRUCTIONS
[facility] Therbiotic complete daily probiotic   Green tea is good for your immune system   Vitamin c rich foods: colored bell peppers and oranges for immune support   Consider Nutriferon by Shaklee for immune support

## 2025-03-17 NOTE — PROGRESS NOTES
[facility], [facility]  [address], [address]  [facility] [address]  Dept: [phone]  Loc: [phone]     Pt Name: Rosemary Adame  MRN: [id number]  Birthdate 1975      HISTORY OF PRESENT ILLNESS    Rosemary Adame is a 50 y.o. female who presents to the [facility] with   Chief Complaint   Patient presents with    Sinusitis     Nasal congestion with headache x 1 week       She has been using nasal saline spray, humidifier, and julio pot regularly. She says this has been going on for about 9 days. She is now having more drainage and having a cough. Her cough is not necessarily productive.  She has a history of allergies. She had ear pain last week, but it's better now. She did have a fever over the weekend, but that is better today. When she is blowing her nose it is mostly milky white.       REVIEW OF SYSTEMS       Review of Systems   Constitutional:  Positive for fever.        Fever resolved    HENT:  Positive for congestion. Negative for sinus pressure and sore throat.    Eyes: Negative.    Respiratory:  Positive for cough.    Cardiovascular: Negative.    Gastrointestinal: Negative.    Endocrine: Negative.    Genitourinary: Negative.    Musculoskeletal: Negative.    Skin: Negative.    Neurological: Negative.    Psychiatric/Behavioral: Negative.           PAST MEDICAL HISTORY     Past Medical History:   Diagnosis Date    Abnormal Pap smear of cervix     Allergic rhinitis     Anxiety 2019    Arthritis     hands    Asplenia     Atrial fibrillation (HCC)     Atrial fibrillation with RVR (HCC)     Dr. Knight    Bilateral hand pain     Chronic back pain     Fell a few years ago and get chronic back pain/strain    CTS (carpal tunnel syndrome) 05/2016    bilateral    External hemorrhoid     Fatigue     Heart murmur     History of ITP 2004    had spenectomy    Hypertension     meds > 3 yrs    Hypothyroidism     meds > 7 yrs

## 2025-04-08 DIAGNOSIS — N95.1 PERIMENOPAUSE: ICD-10-CM

## 2025-04-08 DIAGNOSIS — N92.6 IRREGULAR PERIODS: ICD-10-CM

## 2025-04-08 RX ORDER — BUSPIRONE HYDROCHLORIDE 7.5 MG/1
TABLET ORAL
Qty: 90 TABLET | Refills: 2 | Status: SHIPPED | OUTPATIENT
Start: 2025-04-08

## 2025-04-08 RX ORDER — PROGESTERONE 200 MG/1
CAPSULE ORAL
Qty: 150 CAPSULE | Refills: 0 | Status: SHIPPED | OUTPATIENT
Start: 2025-04-08

## 2025-04-14 ENCOUNTER — PATIENT MESSAGE (OUTPATIENT)
Dept: FAMILY MEDICINE CLINIC | Age: 50
End: 2025-04-14

## 2025-04-14 DIAGNOSIS — G43.109 MIGRAINE WITH AURA AND WITHOUT STATUS MIGRAINOSUS, NOT INTRACTABLE: Primary | ICD-10-CM

## 2025-04-14 RX ORDER — RIMEGEPANT SULFATE 75 MG/75MG
2 TABLET, ORALLY DISINTEGRATING ORAL PRN
Qty: 2 TABLET | Refills: 0 | Status: SHIPPED | COMMUNITY
Start: 2025-04-14

## 2025-04-14 RX ORDER — TRAMADOL HYDROCHLORIDE 50 MG/1
50 TABLET ORAL EVERY 6 HOURS PRN
Qty: 12 TABLET | Refills: 0 | Status: SHIPPED | OUTPATIENT
Start: 2025-04-14 | End: 2025-04-17

## 2025-04-14 RX ORDER — METHYLPREDNISOLONE 4 MG/1
TABLET ORAL
Qty: 1 KIT | Refills: 0 | Status: SHIPPED | OUTPATIENT
Start: 2025-04-14

## 2025-04-14 NOTE — TELEPHONE ENCOUNTER
Orders Placed This Encounter   Medications    methylPREDNISolone (MEDROL DOSEPACK) 4 MG tablet     Sig: Take by mouth.     Dispense:  1 kit     Refill:  0    traMADol (ULTRAM) 50 MG tablet     Sig: Take 1 tablet by mouth every 6 hours as needed for Pain for up to 3 days. Intended supply: 3 days. Take lowest dose possible to manage pain Max Daily Amount: 200 mg     Dispense:  12 tablet     Refill:  0     Reduce doses taken as pain becomes manageable       The above med(s) were e-scripted to the patient's pharmacy.   Please advise patient  Shakir Hernandez MD

## 2025-04-17 RX ORDER — RIMEGEPANT SULFATE 75 MG/75MG
1 TABLET, ORALLY DISINTEGRATING ORAL PRN
Qty: 8 TABLET | Refills: 5 | Status: SHIPPED | OUTPATIENT
Start: 2025-04-17 | End: 2025-04-18 | Stop reason: SDUPTHER

## 2025-04-18 ENCOUNTER — TELEPHONE (OUTPATIENT)
Dept: FAMILY MEDICINE CLINIC | Age: 50
End: 2025-04-18

## 2025-04-18 DIAGNOSIS — G43.109 MIGRAINE WITH AURA AND WITHOUT STATUS MIGRAINOSUS, NOT INTRACTABLE: Primary | ICD-10-CM

## 2025-04-18 RX ORDER — RIMEGEPANT SULFATE 75 MG/75MG
1 TABLET, ORALLY DISINTEGRATING ORAL DAILY PRN
Qty: 27 TABLET | Refills: 3 | Status: SHIPPED | OUTPATIENT
Start: 2025-04-18 | End: 2025-04-24 | Stop reason: SDUPTHER

## 2025-04-18 NOTE — TELEPHONE ENCOUNTER
Orders Placed This Encounter   Medications    rimegepant sulfate (NURTEC) 75 MG TBDP     Sig: Take 1 each by mouth daily as needed (migraines)     Dispense:  27 tablet     Refill:  3       The above med(s) were e-scripted to the patient's pharmacy.   Please advise patient  Shakir Hernandez MD

## 2025-04-18 NOTE — TELEPHONE ENCOUNTER
[facility] calling about the nurtec   They need a frequency it only says as needed.    Phone [phone] option 1

## 2025-04-23 ENCOUNTER — TELEPHONE (OUTPATIENT)
Dept: FAMILY MEDICINE CLINIC | Age: 50
End: 2025-04-23

## 2025-04-23 DIAGNOSIS — G43.109 MIGRAINE WITH AURA AND WITHOUT STATUS MIGRAINOSUS, NOT INTRACTABLE: ICD-10-CM

## 2025-04-23 NOTE — TELEPHONE ENCOUNTER
[facility] is calling to report a drug interaction   The nurtec and the diltiazem.  The second dose of nurtec should be taken 48 hours after the first.    They will need an updated prescription.

## 2025-04-24 RX ORDER — RIMEGEPANT SULFATE 75 MG/75MG
1 TABLET, ORALLY DISINTEGRATING ORAL
Qty: 27 TABLET | Refills: 3 | Status: SHIPPED | OUTPATIENT
Start: 2025-04-24

## 2025-04-24 NOTE — TELEPHONE ENCOUNTER
Orders Placed This Encounter   Medications    rimegepant sulfate (NURTEC) 75 MG TBDP     Sig: Take 1 each by mouth every 48 hours     Dispense:  27 tablet     Refill:  3       The above med(s) were e-scripted to the patient's pharmacy.   Please advise patient  Shakir Hernandez MD

## 2025-04-29 ENCOUNTER — OFFICE VISIT (OUTPATIENT)
Dept: FAMILY MEDICINE CLINIC | Age: 50
End: 2025-04-29
Payer: COMMERCIAL

## 2025-04-29 VITALS
DIASTOLIC BLOOD PRESSURE: 68 MMHG | HEIGHT: 66 IN | TEMPERATURE: 98.6 F | SYSTOLIC BLOOD PRESSURE: 124 MMHG | BODY MASS INDEX: 32.95 KG/M2 | WEIGHT: 205 LBS | OXYGEN SATURATION: 98 % | HEART RATE: 76 BPM

## 2025-04-29 DIAGNOSIS — H69.93 EUSTACHIAN TUBE DYSFUNCTION, BILATERAL: ICD-10-CM

## 2025-04-29 DIAGNOSIS — H65.93 MIDDLE EAR EFFUSION, BILATERAL: Primary | ICD-10-CM

## 2025-04-29 PROCEDURE — 99212 OFFICE O/P EST SF 10 MIN: CPT | Performed by: NURSE PRACTITIONER

## 2025-04-29 PROCEDURE — 3074F SYST BP LT 130 MM HG: CPT | Performed by: NURSE PRACTITIONER

## 2025-04-29 PROCEDURE — 3078F DIAST BP <80 MM HG: CPT | Performed by: NURSE PRACTITIONER

## 2025-04-29 NOTE — PROGRESS NOTES
Subjective  Rosemary LUKE Adame, 50 y.o. female presents today with:  Chief Complaint   Patient presents with    Ear Pain     Right / left x 1 week nothing otc        HPI  Patient here with ear pain    Clenched teeth in her mouth really hard last night  R side of face hurts, making ear sore   Using nettipot   Nasal saline     Seasonal allergies have been bad, has a lot of PND using flonase and xyzal   Review of Systems    Past Medical History:   Diagnosis Date    Abnormal Pap smear of cervix     Allergic rhinitis     Anxiety 2019    Arthritis     hands    Asplenia     Atrial fibrillation (HCC)     Atrial fibrillation with RVR ([facility])     Dr. Knight    Bilateral hand pain     Chronic back pain     Fell a few years ago and get chronic back pain/strain    CTS (carpal tunnel syndrome) 05/2016    bilateral    External hemorrhoid     Fatigue     Heart murmur     History of ITP 2004    had spenectomy    Hypertension     meds > 3 yrs    Hypothyroidism     meds > 7 yrs    Hypothyroidism     Insomnia     Irregular periods     Meniere disease     Migraine headache     Multinodular goiter     Nail fungus     Obesity Unsure    Pain in right lower leg     Paroxysmal atrial fibrillation (HCC) 10/23/2020    Plantar fasciitis, left     Dr. Jacobo     Past Surgical History:   Procedure Laterality Date    CARPAL TUNNEL RELEASE Right 07/12/2016    COLONOSCOPY N/A 9/18/2023    COLORECTAL CANCER SCREENING, NOT HIGH RISK performed by Fredis Long MD at [facility]    DILATION AND CURETTAGE OF UTERUS N/A 12/16/2019    HYSTEROSCOPY NOVASURE ABLATION D&C performed by Rey Ervin DO at [facility] OR    HYSTERECTOMY (CERVIX STATUS UNKNOWN)      HYSTERECTOMY, VAGINAL N/A 8/2/2021    LAPAROSCOPIC ASSISTED VAGINAL HYSTERECTOMY BILATERAL SALPINGECTOMY performed by Rey Ervin DO at [facility] OR    LAPAROSCOPIC SPLENECTOMY N/A 2004    done for ITP    THYROIDECTOMY, COMPLETION Left 2014    frozen section inconclusive    THYROIDECTOMY,

## 2025-05-09 DIAGNOSIS — E03.9 HYPOTHYROIDISM, UNSPECIFIED TYPE: ICD-10-CM

## 2025-05-09 RX ORDER — LEVOTHYROXINE SODIUM 112 UG/1
112 TABLET ORAL DAILY
Qty: 90 TABLET | Refills: 1 | Status: SHIPPED | OUTPATIENT
Start: 2025-05-09

## 2025-05-09 NOTE — TELEPHONE ENCOUNTER
[facility] is calling to request a refill     Comments:      Last Office Visit (last PCP visit):   2/14/2025     Next Visit Date:    Future Appointments   Date Time Provider Department Center   8/8/2025 11:00 AM Shakir Hernandez MD [facility]   10/17/2025 11:00 AM Holiday, DO [facility]   11/7/2025 11:00 AM Rey Ervin DO [facility]        **If hasn't been seen in over a year OR hasn't followed up according to last diabetes/ADHD visit, make appointment for patient before sending refill to provider.     Rx requested:    Requested Prescriptions     Pending Prescriptions Disp Refills    levothyroxine (SYNTHROID) 112 MCG tablet 90 tablet 1     Sig: Take 1 tablet by mouth daily

## 2025-05-28 DIAGNOSIS — R31.0 GROSS HEMATURIA: ICD-10-CM

## 2025-05-28 DIAGNOSIS — Z87.442 HISTORY OF KIDNEY STONES: ICD-10-CM

## 2025-05-28 LAB
BILIRUB UR QL STRIP: NEGATIVE
CLARITY UR: CLEAR
COLOR UR: YELLOW
GLUCOSE UR STRIP-MCNC: NEGATIVE MG/DL
HGB UR QL STRIP: NEGATIVE
KETONES UR STRIP-MCNC: NEGATIVE MG/DL
LEUKOCYTE ESTERASE UR QL STRIP: NEGATIVE
NITRITE UR QL STRIP: NEGATIVE
PH UR STRIP: 8 [PH] (ref 5–9)
PROT UR STRIP-MCNC: NEGATIVE MG/DL
SP GR UR STRIP: 1 (ref 1–1.03)
URINE REFLEX TO CULTURE: NORMAL
UROBILINOGEN UR STRIP-ACNC: 0.2 E.U./DL

## 2025-05-29 ENCOUNTER — RESULTS FOLLOW-UP (OUTPATIENT)
Dept: FAMILY MEDICINE CLINIC | Age: 50
End: 2025-05-29

## 2025-06-14 ENCOUNTER — NURSE TRIAGE (OUTPATIENT)
Dept: OTHER | Facility: CLINIC | Age: 50
End: 2025-06-14

## 2025-06-14 ENCOUNTER — APPOINTMENT (OUTPATIENT)
Dept: CT IMAGING | Age: 50
End: 2025-06-14
Payer: COMMERCIAL

## 2025-06-14 ENCOUNTER — HOSPITAL ENCOUNTER (EMERGENCY)
Age: 50
Discharge: HOME | End: 2025-06-15
Attending: EMERGENCY MEDICINE
Payer: COMMERCIAL

## 2025-06-14 VITALS
BODY MASS INDEX: 32.62 KG/M2 | SYSTOLIC BLOOD PRESSURE: 158 MMHG | WEIGHT: 203 LBS | DIASTOLIC BLOOD PRESSURE: 77 MMHG | TEMPERATURE: 98.2 F | HEIGHT: 66 IN | OXYGEN SATURATION: 98 % | HEART RATE: 85 BPM | RESPIRATION RATE: 19 BRPM

## 2025-06-14 DIAGNOSIS — R31.9 HEMATURIA, UNSPECIFIED TYPE: Primary | ICD-10-CM

## 2025-06-14 LAB
ANION GAP SERPL CALCULATED.3IONS-SCNC: 13 MEQ/L (ref 9–15)
BACTERIA URNS QL MICRO: ABNORMAL /HPF
BASOPHILS # BLD: 0.1 K/UL (ref 0–0.1)
BASOPHILS NFR BLD: 0.6 % (ref 0.1–1.2)
BILIRUB UR QL STRIP: NEGATIVE
BUN SERPL-MCNC: 18 MG/DL (ref 6–20)
CALCIUM SERPL-MCNC: 8.7 MG/DL (ref 8.5–9.9)
CHLORIDE SERPL-SCNC: 106 MEQ/L (ref 95–107)
CLARITY UR: ABNORMAL
CO2 SERPL-SCNC: 23 MEQ/L (ref 20–31)
COLOR UR: ABNORMAL
CREAT SERPL-MCNC: 0.8 MG/DL (ref 0.5–0.9)
EOSINOPHIL # BLD: 0.1 K/UL (ref 0–0.4)
EOSINOPHIL NFR BLD: 0.7 % (ref 0.7–5.8)
EPI CELLS #/AREA URNS HPF: ABNORMAL /HPF
ERYTHROCYTE [DISTWIDTH] IN BLOOD BY AUTOMATED COUNT: 14.6 % (ref 11.7–14.4)
GLUCOSE SERPL-MCNC: 104 MG/DL (ref 70–99)
GLUCOSE UR STRIP-MCNC: NEGATIVE MG/DL
HCT VFR BLD AUTO: 38.8 % (ref 37–47)
HGB BLD-MCNC: 13 G/DL (ref 11.2–15.7)
HGB UR QL STRIP: ABNORMAL
IMM GRANULOCYTES # BLD: 0 K/UL
IMM GRANULOCYTES NFR BLD: 0.2 %
KETONES UR STRIP-MCNC: NEGATIVE MG/DL
LEUKOCYTE ESTERASE UR QL STRIP: NEGATIVE
LYMPHOCYTES # BLD: 4.6 K/UL (ref 1.2–3.7)
LYMPHOCYTES NFR BLD: 34 %
MCH RBC QN AUTO: 30.2 PG (ref 25.6–32.2)
MCHC RBC AUTO-ENTMCNC: 33.5 % (ref 32.2–35.5)
MCV RBC AUTO: 90.2 FL (ref 79.4–94.8)
MONOCYTES # BLD: 1 K/UL (ref 0.2–0.9)
MONOCYTES NFR BLD: 7.3 % (ref 4.7–12.5)
NEUTROPHILS # BLD: 7.8 K/UL (ref 1.6–6.1)
NEUTS SEG NFR BLD: 57.2 % (ref 34–71.1)
NITRITE UR QL STRIP: NEGATIVE
PH UR STRIP: 6.5 [PH] (ref 5–9)
PLATELET # BLD AUTO: 399 K/UL (ref 182–369)
POTASSIUM SERPL-SCNC: 3.7 MEQ/L (ref 3.4–4.9)
PROT UR STRIP-MCNC: 30 MG/DL
RBC # BLD AUTO: 4.3 M/UL (ref 3.93–5.22)
RBC #/AREA URNS HPF: >100 /HPF (ref 0–2)
SODIUM SERPL-SCNC: 142 MEQ/L (ref 135–144)
SP GR UR STRIP: >=1.03 (ref 1–1.03)
URINE REFLEX TO CULTURE: ABNORMAL
UROBILINOGEN UR STRIP-ACNC: 0.2 E.U./DL
WBC # BLD AUTO: 13.7 K/UL (ref 4–10)
WBC #/AREA URNS HPF: ABNORMAL /HPF (ref 0–5)

## 2025-06-14 PROCEDURE — 80048 BASIC METABOLIC PNL TOTAL CA: CPT

## 2025-06-14 PROCEDURE — 85025 COMPLETE CBC W/AUTO DIFF WBC: CPT

## 2025-06-14 PROCEDURE — 74176 CT ABD & PELVIS W/O CONTRAST: CPT

## 2025-06-14 PROCEDURE — 81001 URINALYSIS AUTO W/SCOPE: CPT

## 2025-06-14 PROCEDURE — 99284 EMERGENCY DEPT VISIT MOD MDM: CPT

## 2025-06-14 PROCEDURE — 36415 COLL VENOUS BLD VENIPUNCTURE: CPT

## 2025-06-14 ASSESSMENT — LIFESTYLE VARIABLES
HOW OFTEN DO YOU HAVE A DRINK CONTAINING ALCOHOL: MONTHLY OR LESS
HOW MANY STANDARD DRINKS CONTAINING ALCOHOL DO YOU HAVE ON A TYPICAL DAY: 1 OR 2

## 2025-06-14 ASSESSMENT — PAIN - FUNCTIONAL ASSESSMENT: PAIN_FUNCTIONAL_ASSESSMENT: NONE - DENIES PAIN

## 2025-06-15 NOTE — ED PROVIDER NOTES
[facility] EMERGENCY DEPARTMENT  EMERGENCY DEPARTMENT ENCOUNTER      Pt Name: Rosemary Adame  MRN: [id number]  Birthdate 1975  Date of evaluation: 6/14/2025  Provider: MU GUZMAN DO    CHIEF COMPLAINT       Chief Complaint   Patient presents with    Hematuria     X 2 days         HISTORY OF PRESENT ILLNESS   (Location/Symptom, Timing/Onset, Context/Setting, Quality, Duration, Modifying Factors, Severity)  Note limiting factors.   Rosemary Adaem is a 50 y.o. female who presents to the emergency department with blood in her urine.     The history is provided by the patient.   Hematuria  This is a new problem. The current episode started today. The problem is unchanged. She describes the hematuria as gross hematuria. The hematuria occurs during the initial portion of her urinary stream. She reports no clotting in her urine stream. Her pain is at a severity of 0/10. She is experiencing no pain. She describes her urine color as dark red. Irritative symptoms do not include frequency, nocturia or urgency. Pertinent negatives include no abdominal pain, bladder pain, chills, dysuria, fever, flank pain, hesitancy, inability to urinate, nausea or vomiting.       Nursing Notes were reviewed.    REVIEW OF SYSTEMS    (2-9 systems for level 4, 10 or more for level 5)     Review of Systems   Constitutional:  Negative for chills and fever.   HENT:  Negative for ear pain and sore throat.    Eyes:  Negative for discharge and redness.   Respiratory:  Negative for cough and shortness of breath.    Cardiovascular:  Negative for chest pain and palpitations.   Gastrointestinal:  Negative for abdominal pain, nausea and vomiting.   Genitourinary:  Positive for hematuria. Negative for difficulty urinating, dysuria, flank pain, frequency, hesitancy, nocturia and urgency.   Musculoskeletal:  Negative for back pain and neck pain.   Skin:  Negative for rash and wound.   Neurological:  Negative for dizziness and syncope.

## 2025-06-16 ENCOUNTER — OFFICE VISIT (OUTPATIENT)
Age: 50
End: 2025-06-16
Payer: COMMERCIAL

## 2025-06-16 ENCOUNTER — CLINICAL SUPPORT (OUTPATIENT)
Dept: AUDIOLOGY | Facility: CLINIC | Age: 50
End: 2025-06-16
Payer: COMMERCIAL

## 2025-06-16 ENCOUNTER — CARE COORDINATION (OUTPATIENT)
Dept: OTHER | Facility: CLINIC | Age: 50
End: 2025-06-16

## 2025-06-16 ENCOUNTER — APPOINTMENT (OUTPATIENT)
Dept: OTOLARYNGOLOGY | Facility: CLINIC | Age: 50
End: 2025-06-16
Payer: COMMERCIAL

## 2025-06-16 VITALS
OXYGEN SATURATION: 98 % | WEIGHT: 203 LBS | HEIGHT: 66 IN | HEART RATE: 74 BPM | TEMPERATURE: 97.6 F | BODY MASS INDEX: 32.62 KG/M2

## 2025-06-16 DIAGNOSIS — M65.311 TRIGGER FINGER OF RIGHT THUMB: Primary | ICD-10-CM

## 2025-06-16 DIAGNOSIS — H69.93 DYSFUNCTION OF BOTH EUSTACHIAN TUBES: Primary | ICD-10-CM

## 2025-06-16 DIAGNOSIS — Z01.10 ENCOUNTER FOR HEARING EXAMINATION WITHOUT ABNORMAL FINDINGS: ICD-10-CM

## 2025-06-16 DIAGNOSIS — H93.8X3 PRESSURE SENSATION IN BOTH EARS: ICD-10-CM

## 2025-06-16 DIAGNOSIS — M65.331 TRIGGER FINGER, RIGHT MIDDLE FINGER: ICD-10-CM

## 2025-06-16 DIAGNOSIS — H69.93 DISORDER OF BOTH EUSTACHIAN TUBES: Primary | ICD-10-CM

## 2025-06-16 PROCEDURE — 20550 NJX 1 TENDON SHEATH/LIGAMENT: CPT | Performed by: STUDENT IN AN ORGANIZED HEALTH CARE EDUCATION/TRAINING PROGRAM

## 2025-06-16 PROCEDURE — 99203 OFFICE O/P NEW LOW 30 MIN: CPT | Performed by: OTOLARYNGOLOGY

## 2025-06-16 PROCEDURE — 99214 OFFICE O/P EST MOD 30 MIN: CPT | Performed by: STUDENT IN AN ORGANIZED HEALTH CARE EDUCATION/TRAINING PROGRAM

## 2025-06-16 PROCEDURE — 92557 COMPREHENSIVE HEARING TEST: CPT | Performed by: AUDIOLOGIST

## 2025-06-16 PROCEDURE — 92567 TYMPANOMETRY: CPT | Performed by: AUDIOLOGIST

## 2025-06-16 RX ORDER — LIDOCAINE HYDROCHLORIDE 10 MG/ML
0.5 INJECTION, SOLUTION INFILTRATION; PERINEURAL ONCE
Status: COMPLETED | OUTPATIENT
Start: 2025-06-16 | End: 2025-06-16

## 2025-06-16 RX ORDER — TRIAMCINOLONE ACETONIDE 40 MG/ML
20 INJECTION, SUSPENSION INTRA-ARTICULAR; INTRAMUSCULAR ONCE
Status: COMPLETED | OUTPATIENT
Start: 2025-06-16 | End: 2025-06-16

## 2025-06-16 RX ADMIN — TRIAMCINOLONE ACETONIDE 20 MG: 40 INJECTION, SUSPENSION INTRA-ARTICULAR; INTRAMUSCULAR at 09:49

## 2025-06-16 RX ADMIN — TRIAMCINOLONE ACETONIDE 20 MG: 40 INJECTION, SUSPENSION INTRA-ARTICULAR; INTRAMUSCULAR at 09:50

## 2025-06-16 RX ADMIN — LIDOCAINE HYDROCHLORIDE 0.5 ML: 10 INJECTION, SOLUTION INFILTRATION; PERINEURAL at 09:51

## 2025-06-16 NOTE — PROGRESS NOTES
Orthopedic Surgery and Sports Medicine    Subjective:      Patient ID: Rosemary Adame is a 50 y.o. female who presents today for:  Chief Complaint   Patient presents with    Follow-up     Trigger finger - right thumb, right middle. Pt requesting injections as this helps the best. Numbness/tingling when wakes up. Pain is 9-10/10. 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     MARTIR Riley is a 50-year-old female presents today for follow-up evaluation of her right thumb and middle trigger fingers.  She has had corticosteroid injections in the past.  She still has pain along the volar base of the thumb as well as pain along the volar base of the middle finger.  She does have catching of both of them but not locking.    She has a history of a right carpal tunnel release on that hand by Dr. Amanda but she denies any numbness.  She is an avid [employer].  She states that bowling does not really bother her fingers because she keeps them straight in the ball.      I last gave her injections in October 2024.  They were very helpful.  They recently started to wear off.  Her middle trigger finger is bothering her more than the thumb.  She was considering trigger finger release surgery but states that right now is not a good time.  Her car broke and she needs to attend to that.  She is also currently passing a kidney stone.       Previous treatment: thumb brace, thumb and middle trigger finger cortisone injections  NSAIDs: No, cannot take due to her cardiac meds  Physical therapy: No     Hand Dominance: right  Occupation:   Workers Compensation:   Have you missed work for this issue? No  Is this issue being addressed under a worker's compensation claim? No    Past Medical History:   Diagnosis Date    Abnormal Pap smear of cervix     Allergic rhinitis     Anxiety 2019    Arthritis     hands    Asplenia     Atrial fibrillation ([facility])     Atrial fibrillation with RVR ([facility])     Dr. Knight    Bilateral hand pain     Chronic back pain

## 2025-06-16 NOTE — PROGRESS NOTES
Ms. Barclay, age 50, was seen today for a hearing evaluation during her ENT visit with Dr. Payne.  She reported concern for ongoing ETD symptoms which seem to be secondary to allergies which have been worse since the winter.  She reports taking an allergy medication each night which seems to help her symptoms.  She does not want to continue taking them but is worried her symptoms will return without.  She reports utilizing Flonase only rarely.    Results:  Otoscopy revealed clear ear canals and tympanic membranes were visualized bilaterally.  Tympanometry revealed normal, Type A tympanograms, indicating normal ear canal volume, peak pressure and compliance bilaterally.   Audiometric thresholds revealed normal hearing sensitivity bilaterally.  Word recognition scores were excellent bilaterally.    Recommendations:  Follow-up with PCP, Dr. Jurado, as medically directed.  Follow-up with ENT, Dr. Payne, as medically directed.  Retest hearing as needed.

## 2025-06-16 NOTE — PROGRESS NOTES
"Subjective   Patient ID: Marilyn Barclay \"Srikanth" is a 50 y.o. female who presents for ETD.    HPI  New patient being seen for eustachian tube dysfunction. She reports ongoing eustachian tube dysfunction symptoms including ear pressure and fullness which have been worse this spring with allergies. She takes an allergy pill every night which she feels does give her some relief but she would like to get off of if possible. She uses Flonase only occasionally.     All remaining head neck inquiry otherwise negative.     Review of Systems   Constitutional: Negative.    HENT: Negative.     Respiratory: Negative.     Cardiovascular: Negative.    Neurological: Negative.      Physical Exam  General appearance: No acute distress. Normal facies. Symmetric facial movement. No gross lesions of the face are noted.  Ears:  The external ear structures appear normal. The ear canals patent and the tympanic membranes are intact without evidence of air-fluid levels, retraction, or congenital defects.    Nose:  Anterior rhinoscopy notes essentially a midline nasal septum. Examination is noted for normal healthy mucosal membranes without any evidence of lesions, polyps, or exudate.   Throat/Oral mucosa:  The tongue is normally mobile. There are no lesions on the gingiva, buccal, or oral mucosa. There are no oral cavity masses.  Neck:  The neck is negative for mass lymphadenopathy. The trachea and parotid are clear. The thyroid bed is grossly unremarkable. The salivary gland structures are grossly unremarkable.    Audiogram:  Audiometric thresholds revealed normal hearing sensitivity bilaterally. Tympanometry revealed normal, Type A tympanograms, indicating normal ear canal volume, peak pressure and compliance bilaterally. Word recognition scores were excellent bilaterally.     Assessment/Plan   Bilateral eustachian tube dysfunction. Reassurance provided that physical examination of ears and Audiogram today show nothing worrisome. Would " recommend that she utilize twice daily nasal steroid spray for symptoms. She can also take antihistamine as needed. Follow-up with me as needed.

## 2025-06-17 ENCOUNTER — OFFICE VISIT (OUTPATIENT)
Dept: OBGYN CLINIC | Age: 50
End: 2025-06-17
Payer: COMMERCIAL

## 2025-06-17 VITALS
HEIGHT: 66 IN | HEART RATE: 68 BPM | WEIGHT: 207 LBS | DIASTOLIC BLOOD PRESSURE: 82 MMHG | BODY MASS INDEX: 33.27 KG/M2 | SYSTOLIC BLOOD PRESSURE: 136 MMHG

## 2025-06-17 DIAGNOSIS — N20.0 KIDNEY STONE: Primary | ICD-10-CM

## 2025-06-17 LAB
BACTERIA URNS QL MICRO: NEGATIVE /HPF
BILIRUB UR QL STRIP: ABNORMAL
CLARITY UR: ABNORMAL
COLOR UR: ABNORMAL
EPI CELLS #/AREA URNS HPF: ABNORMAL /HPF
GLUCOSE UR STRIP-MCNC: NEGATIVE MG/DL
HGB UR QL STRIP: ABNORMAL
KETONES UR STRIP-MCNC: NEGATIVE MG/DL
LEUKOCYTE ESTERASE UR QL STRIP: NEGATIVE
NITRITE UR QL STRIP: NEGATIVE
PH UR STRIP: 6 [PH] (ref 5–9)
PROT UR STRIP-MCNC: 30 MG/DL
RBC #/AREA URNS HPF: ABNORMAL /HPF (ref 0–2)
SP GR UR STRIP: >=1.03 (ref 1–1.03)
UROBILINOGEN UR STRIP-ACNC: 0.2 E.U./DL
WBC #/AREA URNS HPF: ABNORMAL /HPF (ref 0–5)

## 2025-06-17 PROCEDURE — 99213 OFFICE O/P EST LOW 20 MIN: CPT | Performed by: OBSTETRICS & GYNECOLOGY

## 2025-06-17 PROCEDURE — 3079F DIAST BP 80-89 MM HG: CPT | Performed by: OBSTETRICS & GYNECOLOGY

## 2025-06-17 PROCEDURE — 3075F SYST BP GE 130 - 139MM HG: CPT | Performed by: OBSTETRICS & GYNECOLOGY

## 2025-06-17 RX ORDER — ALFUZOSIN HYDROCHLORIDE 10 MG/1
10 TABLET, EXTENDED RELEASE ORAL DAILY
Qty: 30 TABLET | Refills: 3 | Status: SHIPPED | OUTPATIENT
Start: 2025-06-17

## 2025-06-17 NOTE — CARE COORDINATION
Ambulatory Care Coordination Note     6/17/2025 12:27 PM     ACM outreach attempt by this ACM today to offer care management services. ACM was unable to reach the patient by telephone today;   left voice message requesting a return phone call to this ACM.     ACM: Shantell Estrada RN     Care Summary Note:     PCP/Specialist follow up:   Future Appointments         Provider Specialty Dept Phone    7/7/2025 3:00 PM Erick Tucker MD Obstetrics and Gynecology [phone]    8/8/2025 11:00 AM Shakir Hernandez MD Family Medicine [phone]    10/17/2025 11:00 AM Ronald Knight DO Cardiology [phone]    11/7/2025 11:00 AM Rey Ervin DO Obstetrics and Gynecology [phone]            Follow Up:   Plan for next ACM outreach in approximately 1-2 days  to complete:  - outreach attempt to offer care management services.     Shantell MCCLENDON, RN- [facility]  Associate Care Manager  [phone]  [email or web address]

## 2025-06-17 NOTE — PROGRESS NOTES
Rosemary Adame is a 50 y.o. female who presents here today for complaints of Hematuria (History of kidney stones. Referred by Dr. Hernandez.)        History of Present Illness  The patient presents for evaluation of hematuria.    She experienced a small kidney stone passage on New Year's Marleni 2024, initially mistaking it for a urinary tract infection (UTI) due to the absence of typical UTI symptoms such as burning or pain. Her primary symptom was increased urinary frequency. A subsequent urine test revealed microscopic hematuria without any signs of infection. An x-ray confirmed the presence of a small kidney stone. On 05/27/2025, she noticed more than pink blood in her urine, which prompted her to consult with Dr. Poole, who suspected another kidney stone. He recommended an ultrasound of the retroperitoneal area, but she did not follow through with this due to a negative urine test for infection.     She reports feeling well overall, with the exception of frequent urination. Over the past weekend, she observed bright red blood in her urine during a family gathering, leading her to seek emergency care at [facility] where a CT scan identified a 4 mm kidney stone. She reports no renal pain and notes that her urine appears normal unless the stone is moving, at which point she experiences mild discomfort. She has been managing her symptoms with Urex, which she finds helpful. She also mentions taking a high dose of vitamins and questions if this could be contributing to her condition. She recalls that her calcium levels were previously low.    She has an autoimmune disease. She had a lot of pain yesterday because she had hydrocortisone injections in her fingers for trigger finger in her thumb.    PAST MEDICAL HISTORY: Autoimmune disease         Vitals:  /82 (BP Site: Left Upper Arm, Patient Position: Sitting, BP Cuff Size: Medium Adult)   Pulse 68   Ht 1.676 m (5' 6\")   Wt 93.9 kg (207 lb)   LMP 11/04/2019

## 2025-06-18 LAB — BACTERIA UR CULT: NORMAL

## 2025-06-19 ENCOUNTER — CARE COORDINATION (OUTPATIENT)
Dept: OTHER | Facility: CLINIC | Age: 50
End: 2025-06-19

## 2025-06-19 NOTE — CARE COORDINATION
Ambulatory Care Coordination Note     6/19/2025 11:00 AM     Patient outreach attempt by this ACM today to offer care management services. ACM was unable to reach the patient by telephone today;   left voice message requesting a return phone call to this ACM.  [employer] message sent requesting patient to contact this ACM.     ACM: Shantell Estrada RN     Care Summary Note:     PCP/Specialist follow up:   Future Appointments         Provider Specialty Dept Phone    7/7/2025 3:00 PM Erick Tucker MD Obstetrics and Gynecology [phone]    8/8/2025 11:00 AM Shakir Hernandez MD Family Medicine [phone]    10/17/2025 11:00 AM Ronald Knight DO Cardiology [phone]    11/7/2025 11:00 AM Rey Ervin DO Obstetrics and Gynecology [phone]            Follow Up:   Plan for next ACM outreach in approximately 1 week to complete:  - outreach attempt to offer care management services.     Shantell MCCLENDON, RN- [facility]  Associate Care Manager  [phone]  [email or web address]

## 2025-06-24 DIAGNOSIS — I25.10 CORONARY ATHEROSCLEROSIS DUE TO CALCIFIED CORONARY LESION: Primary | ICD-10-CM

## 2025-06-24 DIAGNOSIS — I25.84 CORONARY ATHEROSCLEROSIS DUE TO CALCIFIED CORONARY LESION: Primary | ICD-10-CM

## 2025-06-26 ENCOUNTER — CARE COORDINATION (OUTPATIENT)
Dept: OTHER | Facility: CLINIC | Age: 50
End: 2025-06-26

## 2025-06-26 NOTE — CARE COORDINATION
Ambulatory Care Coordination Note     6/26/2025 12:05 PM     patient outreach attempt by this ACM today to offer care management services. ACM was unable to reach the patient by telephone today;   left voice message requesting a return phone call to this ACM.  [employer] message sent requesting patient to contact this ACM.     Patient closed (unable to reach patient) from the High Risk Care Management program on 6/26/2025.  No further Ambulatory Care Manager follow up scheduled.     Shantell MCCLENDON, RN- [facility]  Associate Care Manager  [phone]  [email or web address]

## 2025-06-27 DIAGNOSIS — Z12.31 ENCOUNTER FOR SCREENING MAMMOGRAM FOR BREAST CANCER: Primary | ICD-10-CM

## 2025-07-03 DIAGNOSIS — N95.1 PERIMENOPAUSE: ICD-10-CM

## 2025-07-03 DIAGNOSIS — N92.6 IRREGULAR PERIODS: ICD-10-CM

## 2025-07-03 RX ORDER — PROGESTERONE 200 MG/1
CAPSULE ORAL
Qty: 150 CAPSULE | Refills: 0 | Status: SHIPPED | OUTPATIENT
Start: 2025-07-03

## 2025-07-09 ENCOUNTER — NURSE TRIAGE (OUTPATIENT)
Dept: OTHER | Facility: CLINIC | Age: 50
End: 2025-07-09

## 2025-07-09 ENCOUNTER — OFFICE VISIT (OUTPATIENT)
Dept: FAMILY MEDICINE CLINIC | Age: 50
End: 2025-07-09
Payer: COMMERCIAL

## 2025-07-09 ENCOUNTER — HOSPITAL ENCOUNTER (EMERGENCY)
Age: 50
Discharge: HOME | End: 2025-07-09
Attending: EMERGENCY MEDICINE
Payer: COMMERCIAL

## 2025-07-09 ENCOUNTER — APPOINTMENT (OUTPATIENT)
Dept: GENERAL RADIOLOGY | Age: 50
End: 2025-07-09
Payer: COMMERCIAL

## 2025-07-09 VITALS
OXYGEN SATURATION: 96 % | HEART RATE: 53 BPM | DIASTOLIC BLOOD PRESSURE: 68 MMHG | RESPIRATION RATE: 20 BRPM | TEMPERATURE: 98.1 F | SYSTOLIC BLOOD PRESSURE: 122 MMHG

## 2025-07-09 VITALS
OXYGEN SATURATION: 98 % | HEART RATE: 62 BPM | SYSTOLIC BLOOD PRESSURE: 136 MMHG | DIASTOLIC BLOOD PRESSURE: 76 MMHG | TEMPERATURE: 97.6 F | RESPIRATION RATE: 16 BRPM

## 2025-07-09 DIAGNOSIS — E89.0 POSTOPERATIVE HYPOTHYROIDISM: Primary | ICD-10-CM

## 2025-07-09 DIAGNOSIS — R42 DIZZINESS: Primary | ICD-10-CM

## 2025-07-09 DIAGNOSIS — R42 DIZZINESS: ICD-10-CM

## 2025-07-09 DIAGNOSIS — H65.93 MIDDLE EAR EFFUSION, BILATERAL: ICD-10-CM

## 2025-07-09 DIAGNOSIS — R00.1 BRADYCARDIA WITH 41-50 BEATS PER MINUTE: ICD-10-CM

## 2025-07-09 LAB
ANION GAP SERPL CALCULATED.3IONS-SCNC: 13 MEQ/L (ref 9–15)
BASOPHILS # BLD: 0.1 K/UL (ref 0–0.2)
BASOPHILS NFR BLD: 0.5 %
BUN SERPL-MCNC: 14 MG/DL (ref 6–20)
CALCIUM SERPL-MCNC: 9.5 MG/DL (ref 8.5–9.9)
CHLORIDE SERPL-SCNC: 105 MEQ/L (ref 95–107)
CO2 SERPL-SCNC: 23 MEQ/L (ref 20–31)
CREAT SERPL-MCNC: 0.64 MG/DL (ref 0.5–0.9)
EOSINOPHIL # BLD: 0.1 K/UL (ref 0–0.7)
EOSINOPHIL NFR BLD: 0.5 %
ERYTHROCYTE [DISTWIDTH] IN BLOOD BY AUTOMATED COUNT: 14.4 % (ref 11.5–14.5)
GLUCOSE SERPL-MCNC: 96 MG/DL (ref 70–99)
HCT VFR BLD AUTO: 41.7 % (ref 37–47)
HGB BLD-MCNC: 13.8 G/DL (ref 12–16)
LYMPHOCYTES # BLD: 4.1 K/UL (ref 1–4.8)
LYMPHOCYTES NFR BLD: 26.9 %
MCH RBC QN AUTO: 30.3 PG (ref 27–31.3)
MCHC RBC AUTO-ENTMCNC: 33.1 % (ref 33–37)
MCV RBC AUTO: 91.4 FL (ref 79.4–94.8)
MONOCYTES # BLD: 1 K/UL (ref 0.2–0.8)
MONOCYTES NFR BLD: 6.4 %
NEUTROPHILS # BLD: 9.9 K/UL (ref 1.4–6.5)
NEUTS SEG NFR BLD: 65.4 %
PLATELET # BLD AUTO: 445 K/UL (ref 130–400)
POTASSIUM SERPL-SCNC: 4.1 MEQ/L (ref 3.4–4.9)
RBC # BLD AUTO: 4.56 M/UL (ref 4.2–5.4)
SODIUM SERPL-SCNC: 141 MEQ/L (ref 135–144)
TROPONIN, HIGH SENSITIVITY: <6 NG/L (ref 0–19)
TROPONIN, HIGH SENSITIVITY: <6 NG/L (ref 0–19)
WBC # BLD AUTO: 15.1 K/UL (ref 4.8–10.8)

## 2025-07-09 PROCEDURE — 85025 COMPLETE CBC W/AUTO DIFF WBC: CPT

## 2025-07-09 PROCEDURE — 99285 EMERGENCY DEPT VISIT HI MDM: CPT

## 2025-07-09 PROCEDURE — 93000 ELECTROCARDIOGRAM COMPLETE: CPT | Performed by: NURSE PRACTITIONER

## 2025-07-09 PROCEDURE — 71046 X-RAY EXAM CHEST 2 VIEWS: CPT

## 2025-07-09 PROCEDURE — 93005 ELECTROCARDIOGRAM TRACING: CPT | Performed by: EMERGENCY MEDICINE

## 2025-07-09 PROCEDURE — 6370000000 HC RX 637 (ALT 250 FOR IP): Performed by: EMERGENCY MEDICINE

## 2025-07-09 PROCEDURE — 3075F SYST BP GE 130 - 139MM HG: CPT | Performed by: NURSE PRACTITIONER

## 2025-07-09 PROCEDURE — 84484 ASSAY OF TROPONIN QUANT: CPT

## 2025-07-09 PROCEDURE — 3078F DIAST BP <80 MM HG: CPT | Performed by: NURSE PRACTITIONER

## 2025-07-09 PROCEDURE — 99213 OFFICE O/P EST LOW 20 MIN: CPT | Performed by: NURSE PRACTITIONER

## 2025-07-09 PROCEDURE — 80048 BASIC METABOLIC PNL TOTAL CA: CPT

## 2025-07-09 RX ORDER — MECLIZINE HCL 12.5 MG 12.5 MG/1
12.5 TABLET ORAL 3 TIMES DAILY PRN
Qty: 15 TABLET | Refills: 0 | Status: SHIPPED | OUTPATIENT
Start: 2025-07-09 | End: 2025-07-09 | Stop reason: ALTCHOICE

## 2025-07-09 RX ORDER — MECLIZINE HCL 12.5 MG 12.5 MG/1
12.5 TABLET ORAL 3 TIMES DAILY PRN
Qty: 30 TABLET | Refills: 0 | Status: SHIPPED | OUTPATIENT
Start: 2025-07-09 | End: 2025-07-19

## 2025-07-09 RX ORDER — MECLIZINE HCL 12.5 MG 12.5 MG/1
12.5 TABLET ORAL 3 TIMES DAILY PRN
Qty: 15 TABLET | Refills: 0 | Status: SHIPPED | OUTPATIENT
Start: 2025-07-09 | End: 2025-07-09

## 2025-07-09 RX ORDER — MECLIZINE HYDROCHLORIDE 25 MG/1
12.5 TABLET ORAL ONCE
Status: COMPLETED | OUTPATIENT
Start: 2025-07-09 | End: 2025-07-09

## 2025-07-09 RX ORDER — PREDNISONE 50 MG/1
50 TABLET ORAL DAILY
Qty: 5 TABLET | Refills: 0 | Status: SHIPPED | OUTPATIENT
Start: 2025-07-09 | End: 2025-07-14

## 2025-07-09 RX ADMIN — MECLIZINE HYDROCHLORIDE 12.5 MG: 25 TABLET ORAL at 20:05

## 2025-07-09 ASSESSMENT — PAIN SCALES - GENERAL: PAINLEVEL_OUTOF10: 6

## 2025-07-09 ASSESSMENT — ENCOUNTER SYMPTOMS
NAUSEA: 1
ABDOMINAL PAIN: 0
VOMITING: 0
BACK PAIN: 0
SINUS PAIN: 0
SINUS PAIN: 0
SHORTNESS OF BREATH: 0
EYE PAIN: 0
COUGH: 0
SINUS PRESSURE: 0
EYE PAIN: 0
COUGH: 0
NAUSEA: 0
ABDOMINAL PAIN: 0
EYE DISCHARGE: 0
VOMITING: 0
SORE THROAT: 0
EYE ITCHING: 0
SHORTNESS OF BREATH: 0
WHEEZING: 0
EYE REDNESS: 0
EYE REDNESS: 0

## 2025-07-09 ASSESSMENT — PAIN DESCRIPTION - LOCATION: LOCATION: HEAD

## 2025-07-09 ASSESSMENT — PAIN - FUNCTIONAL ASSESSMENT: PAIN_FUNCTIONAL_ASSESSMENT: 0-10

## 2025-07-09 NOTE — ED TRIAGE NOTES
Pt went to urgent care today and was sent to ED. Pt has had dizziness and vertigo since Saturday. Pt sent for bradycardia, HR 56 at this time. Pt has hx of a-fib RVR and is on cardizem.  Pt also complaining of headache.

## 2025-07-09 NOTE — ED NOTES
Patient normally takes Cardizem at 1730 held at this time due to bradycardia and vertigo symptoms.

## 2025-07-09 NOTE — PATIENT INSTRUCTIONS
If symptoms do not improve return for re-evaluation or see primary care provider. Go to the ER for worsening symptoms  We will call with results.

## 2025-07-09 NOTE — PROGRESS NOTES
Subjective  Rosemary Romeroce, 50 y.o. female presents today with:  Chief Complaint   Patient presents with    Dizziness     Since Saturday       HPI  Vertigo since Saturday/pre-syncope sensation   Feels like moving when she isn't got a migraine last week  Used the Nurtec Thurday + improvement   Head movement triggers     Headache sinus pressure like   Ears not bothersome  Cheri Mcdonald had her increase Flonase use - with improvement of Eustachian Tube Dysfunction   When she is driving feel fatigue like she might fall asleep   Can happen while sitting at the desk too   + fatigue   Getting sleep     Feels like she is in a fog   Eating well but just doesn't want to , loss of appetite     Staying hydrated    Does have hx of thyroidectomy on synthroid   Review of Systems   Constitutional:  Positive for fatigue. Negative for activity change, appetite change and fever.   HENT:  Negative for congestion, ear discharge, ear pain, sinus pressure, sinus pain and sore throat.    Eyes:  Negative for pain, discharge, redness and itching.   Respiratory:  Negative for cough, shortness of breath and wheezing.    Cardiovascular:  Negative for chest pain and palpitations.   Gastrointestinal:  Positive for nausea. Negative for abdominal pain and vomiting.   Neurological:  Positive for dizziness and headaches. Negative for weakness and numbness.       Past Medical History:   Diagnosis Date    Abnormal Pap smear of cervix     Allergic rhinitis     Anxiety 2019    Arthritis     hands    Asplenia     Atrial fibrillation (HCC)     Atrial fibrillation with RVR (HCC)     Dr. 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 
Calcium Carbonate-Vit D-Min (CALCIUM 1200) 5299-7963 MG-UNIT CHEW Take by mouth daily       ferrous sulfate 325 (65 FE) MG tablet Take 65 mg by mouth daily (with breakfast)        No current facility-administered medications for this visit.     PMH, Surgical Hx, Family Hx, and Social Hx reviewed and updated.  Health Maintenance reviewed.    Objective  There were no vitals filed for this visit.  BP Readings from Last 3 Encounters:   06/17/25 136/82   06/14/25 (!) 158/77   04/29/25 124/68     Wt Readings from Last 3 Encounters:   06/17/25 93.9 kg (207 lb)   06/16/25 92.1 kg (203 lb)   06/14/25 92.1 kg (203 lb)     Physical Exam  Assessment & Plan   {No diagnosis found. (Refresh or delete this SmartLink)}  No orders of the defined types were placed in this encounter.    No orders of the defined types were placed in this encounter.    There are no discontinued medications.  No follow-ups on file.    Reviewed with the patient: current clinical status,medications, activities and diet.     Side effects, adverse effects of the medication prescribed today, as well as treatment plan/ rationale and result expectations have been discussed with the patient who expresses understanding and desires to proceed.    Close follow up to evaluate treatment results and for coordination of care.  I have reviewed the patient's medical history in detail and updated the computerized patient record.    CASE Aceves - CNP

## 2025-07-09 NOTE — ED PROVIDER NOTES
[facility] EMERGENCY DEPARTMENT  EMERGENCY DEPARTMENT ENCOUNTER      Pt Name: Rosemary Adame  MRN: [id number]  Birthdate 1975  Date of evaluation: 7/9/2025  Provider: Naseem Gold DO  6:25 PM EDT    CHIEF COMPLAINT       Chief Complaint   Patient presents with    Dizziness    Bradycardia         HISTORY OF PRESENT ILLNESS   (Location/Symptom, Timing/Onset, Context/Setting, Quality, Duration, Modifying Factors, Severity)  Note limiting factors.   Rosemary Adame is a 50 y.o. female who presents to the emergency department      50-year-old female presents to the emergency department for evaluation of vertigo that began on Saturday. She was seen at urgent care earlier today and referred to the ED due to bradycardia, with heart rate noted to be 56 bpm on arrival. She has a known history of atrial fibrillation with rapid ventricular response (RVR) and is currently on Cardizem. In addition to dizziness, she reports a headache but denies chest pain, palpitations, syncope, or recent changes in medications. She went to the walk in clinic today, and recommend she come to the ED for further evaluation.           Nursing Notes were reviewed.    REVIEW OF SYSTEMS    (2-9 systems for level 4, 10 or more for level 5)     Review of Systems   Constitutional:  Positive for fatigue. Negative for chills and fever.   HENT:  Negative for ear pain and sinus pain.    Eyes:  Negative for pain and redness.   Respiratory:  Negative for cough and shortness of breath.    Cardiovascular:  Negative for chest pain.   Gastrointestinal:  Negative for abdominal pain, nausea and vomiting.   Genitourinary:  Negative for dysuria and flank pain.   Musculoskeletal:  Negative for back pain.   Skin:  Negative for rash.   Neurological:  Positive for dizziness. Negative for headaches.   Psychiatric/Behavioral:  Negative for confusion. The patient is not nervous/anxious.        Except as noted above the remainder of the review of systems was

## 2025-07-10 ENCOUNTER — CARE COORDINATION (OUTPATIENT)
Dept: OTHER | Facility: CLINIC | Age: 50
End: 2025-07-10

## 2025-07-10 LAB
EKG ATRIAL RATE: 50 BPM
EKG DIAGNOSIS: NORMAL
EKG P AXIS: 36 DEGREES
EKG P-R INTERVAL: 160 MS
EKG Q-T INTERVAL: 436 MS
EKG QRS DURATION: 88 MS
EKG QTC CALCULATION (BAZETT): 397 MS
EKG R AXIS: 8 DEGREES
EKG T AXIS: 26 DEGREES
EKG VENTRICULAR RATE: 50 BPM

## 2025-07-10 PROCEDURE — 93010 ELECTROCARDIOGRAM REPORT: CPT | Performed by: INTERNAL MEDICINE

## 2025-07-10 NOTE — CARE COORDINATION
Ambulatory Care Coordination Note     7/10/2025 4:28 PM     Patient outreach attempt by this ACM today to offer care management services. ACM was unable to reach the patient by telephone today;   left voice message requesting a return phone call to this ACM.     ACM: Annette Barbosa RN     Care Summary Note:   Pt was seen at [facility] ER 7/9/25  Diagnosis:  Dizziness    PCP/Specialist follow up:   Future Appointments         Provider Specialty Dept Phone    7/11/2025 1:20 PM (Ar[facility] 1:05 PM[facility] [phone]    7/15/2025 3:00 PM Erick Tucker MD Obstetrics and Gynecology [phone]    8/8/2025 11:00 AM Shakir Hernandez MD Family Medicine [phone]    10/17/2025 11:00 AM Ronald Knight DO Cardiology [phone]    11/7/2025 11:00 AM Rey Ervin DO Obstetrics and Gynecology [phone]            Follow Up:   Plan for next AC outreach in approximately 1-2 days  to complete:  - outreach attempt to offer care management services.      Annette Barbosa RN, [facility] Associate Care Manager  [place] [address]  Cell [phone] Fax [phone]

## 2025-07-11 ENCOUNTER — CARE COORDINATION (OUTPATIENT)
Dept: OTHER | Facility: CLINIC | Age: 50
End: 2025-07-11

## 2025-07-11 ENCOUNTER — HOSPITAL ENCOUNTER (OUTPATIENT)
Dept: WOMENS IMAGING | Age: 50
Discharge: HOME | End: 2025-07-13
Attending: OBSTETRICS & GYNECOLOGY
Payer: COMMERCIAL

## 2025-07-11 DIAGNOSIS — Z12.31 ENCOUNTER FOR SCREENING MAMMOGRAM FOR BREAST CANCER: ICD-10-CM

## 2025-07-11 PROCEDURE — 77063 BREAST TOMOSYNTHESIS BI: CPT

## 2025-07-11 NOTE — CARE COORDINATION
Ambulatory Care Coordination Note     7/11/2025 4:08 PM     Patient outreach attempt by this AC today to offer care management services. ACM was unable to reach the patient by telephone today;   left voice message requesting a return phone call to this ACM.  ipnexushart message sent requesting patient to contact this ACM.     ACM: Annette Barbosa RN     Care Summary Note:   Pt was seen at [facility] ER 7/9/25  Diagnosis:  Dizziness    PCP/Specialist follow up:   Future Appointments         Provider Specialty Dept Phone    7/15/2025 3:00 PM Erick Tucker MD Obstetrics and Gynecology [phone]    8/8/2025 11:00 AM Shakir Hernandez MD Family Medicine [phone]    10/17/2025 11:00 AM Ronald Knight DO Cardiology [phone]    11/7/2025 11:00 AM Rey Ervin DO Obstetrics and Gynecology [phone]            Follow Up:   Plan for next AC outreach in approximately 2 weeks to complete:  - outreach attempt to offer care management services.     Annette Barbosa RN, [facility] Associate Care Manager  [facility]   [address] [place] [address]  Cell [phone] Fax [phone]

## 2025-07-15 ENCOUNTER — PROCEDURE VISIT (OUTPATIENT)
Dept: OBGYN CLINIC | Age: 50
End: 2025-07-15

## 2025-07-15 VITALS
HEART RATE: 64 BPM | WEIGHT: 205 LBS | SYSTOLIC BLOOD PRESSURE: 126 MMHG | DIASTOLIC BLOOD PRESSURE: 74 MMHG | HEIGHT: 66 IN | BODY MASS INDEX: 32.95 KG/M2

## 2025-07-15 DIAGNOSIS — R31.29 MICROHEMATURIA: Primary | ICD-10-CM

## 2025-07-15 DIAGNOSIS — R30.0 DYSURIA: ICD-10-CM

## 2025-07-15 DIAGNOSIS — N95.1 PERIMENOPAUSE: ICD-10-CM

## 2025-07-15 LAB
BACTERIA URNS QL MICRO: NEGATIVE /HPF
BILIRUB UR QL STRIP: NEGATIVE
CLARITY UR: ABNORMAL
COLOR UR: YELLOW
CRYSTALS URNS MICRO: ABNORMAL /HPF
CRYSTALS URNS MICRO: ABNORMAL /HPF
EPI CELLS #/AREA URNS AUTO: ABNORMAL /HPF (ref 0–5)
GLUCOSE UR STRIP-MCNC: NEGATIVE MG/DL
HGB UR QL STRIP: ABNORMAL
HYALINE CASTS #/AREA URNS AUTO: ABNORMAL /HPF (ref 0–5)
KETONES UR STRIP-MCNC: ABNORMAL MG/DL
LEUKOCYTE ESTERASE UR QL STRIP: ABNORMAL
NITRITE UR QL STRIP: NEGATIVE
PH UR STRIP: 5.5 [PH] (ref 5–9)
PROT UR STRIP-MCNC: ABNORMAL MG/DL
RBC #/AREA URNS AUTO: ABNORMAL /HPF (ref 0–5)
SP GR UR STRIP: 1.02 (ref 1–1.03)
UROBILINOGEN UR STRIP-ACNC: 0.2 E.U./DL
WBC #/AREA URNS AUTO: ABNORMAL /HPF (ref 0–5)

## 2025-07-15 NOTE — PROGRESS NOTES
Molar        Multiple        Live Births   0              Family History   Problem Relation Age of Onset    High Blood Pressure Mother     Anemia Mother     Atrial Fibrillation Mother     Heart Failure Mother     Other Mother         pacemaker    Heart Disease Mother     Sleep Apnea Mother     Vision Loss Mother         histoplasmosis    Arthritis Mother     Stroke Mother         When I was born she had stroke    Heart Disease Father         CAD / Triple bypass    Diabetes Father         Only from being overweight    Other Father         Fuck's dystropy / corneal transplants / AAA    Coronary Art Dis Father     Arthritis Father     Hearing Loss Father     Sleep Apnea Sister     Other Sister         killed by drunk     Alcohol Abuse Brother     Cancer Paternal Grandmother     Breast Cancer Paternal Grandmother     Alcohol Abuse Maternal Uncle     Breast Cancer Paternal Aunt 40    Cancer Paternal Aunt     Colon Cancer Neg Hx      Social History     Socioeconomic History    Marital status: Single     Spouse name: Not on file    Number of children: 0    Years of education: Not on file    Highest education level: Not on file   Occupational History    Occupation: medical records   Tobacco Use    Smoking status: Former     Current packs/day: 0.00     Average packs/day: 0.1 packs/day for 33.6 years (3.4 ttl pk-yrs)     Types: Cigarettes     Start date: 10/26/1989     Quit date: 2023     Years since quittin.2    Smokeless tobacco: Never    Tobacco comments:     Quit smoking again   Vaping Use    Vaping status: Never Used   Substance and Sexual Activity    Alcohol use: Not Currently    Drug use: No    Sexual activity: Not Currently     Partners: Male   Other Topics Concern    Not on file   Social History Narrative    Not on file     Social Drivers of Health     Financial Resource Strain: Low Risk  (2024)    Overall Financial Resource Strain (CARDIA)     Difficulty of Paying Living Expenses: Not hard at all

## 2025-07-17 LAB — BACTERIA UR CULT: NORMAL

## 2025-07-22 ENCOUNTER — APPOINTMENT (OUTPATIENT)
Dept: CARDIOLOGY | Facility: CLINIC | Age: 50
End: 2025-07-22
Payer: COMMERCIAL

## 2025-07-22 VITALS
WEIGHT: 206.2 LBS | SYSTOLIC BLOOD PRESSURE: 130 MMHG | HEIGHT: 66 IN | HEART RATE: 53 BPM | BODY MASS INDEX: 33.14 KG/M2 | DIASTOLIC BLOOD PRESSURE: 72 MMHG

## 2025-07-22 DIAGNOSIS — R42 VERTIGO: ICD-10-CM

## 2025-07-22 DIAGNOSIS — E78.2 MIXED HYPERLIPIDEMIA: ICD-10-CM

## 2025-07-22 DIAGNOSIS — I10 PRIMARY HYPERTENSION: ICD-10-CM

## 2025-07-22 DIAGNOSIS — I48.0 PAROXYSMAL ATRIAL FIBRILLATION (MULTI): Primary | ICD-10-CM

## 2025-07-22 DIAGNOSIS — R00.1 SINUS BRADYCARDIA: ICD-10-CM

## 2025-07-22 PROBLEM — I48.19 PERSISTENT ATRIAL FIBRILLATION (MULTI): Status: ACTIVE | Noted: 2025-07-22

## 2025-07-22 PROCEDURE — 3075F SYST BP GE 130 - 139MM HG: CPT | Performed by: INTERNAL MEDICINE

## 2025-07-22 PROCEDURE — 99205 OFFICE O/P NEW HI 60 MIN: CPT | Performed by: INTERNAL MEDICINE

## 2025-07-22 PROCEDURE — 93000 ELECTROCARDIOGRAM COMPLETE: CPT | Performed by: INTERNAL MEDICINE

## 2025-07-22 PROCEDURE — 3008F BODY MASS INDEX DOCD: CPT | Performed by: INTERNAL MEDICINE

## 2025-07-22 PROCEDURE — 3078F DIAST BP <80 MM HG: CPT | Performed by: INTERNAL MEDICINE

## 2025-07-22 RX ORDER — FLECAINIDE ACETATE 100 MG/1
50 TABLET ORAL DAILY PRN
COMMUNITY
Start: 2025-02-25

## 2025-07-22 RX ORDER — CYCLOBENZAPRINE HCL 10 MG
10 TABLET ORAL 3 TIMES DAILY PRN
COMMUNITY
Start: 2024-06-27

## 2025-07-22 RX ORDER — BUSPIRONE HYDROCHLORIDE 7.5 MG/1
7.5 TABLET ORAL DAILY
COMMUNITY
Start: 2021-02-23

## 2025-07-22 RX ORDER — LANOLIN ALCOHOL/MO/W.PET/CERES
1000 CREAM (GRAM) TOPICAL DAILY
COMMUNITY

## 2025-07-22 RX ORDER — DILTIAZEM HYDROCHLORIDE 180 MG/1
180 CAPSULE, COATED, EXTENDED RELEASE ORAL DAILY
COMMUNITY
Start: 2025-06-16

## 2025-07-22 RX ORDER — FLUTICASONE PROPIONATE 50 MCG
1 SPRAY, SUSPENSION (ML) NASAL DAILY
COMMUNITY
Start: 2024-06-18

## 2025-07-22 RX ORDER — ALFUZOSIN HYDROCHLORIDE 10 MG/1
10 TABLET, EXTENDED RELEASE ORAL DAILY
COMMUNITY
Start: 2025-05-28

## 2025-07-22 RX ORDER — RIMEGEPANT SULFATE 75 MG/75MG
1 TABLET, ORALLY DISINTEGRATING ORAL
COMMUNITY
Start: 2025-04-24

## 2025-07-22 RX ORDER — FOLIC ACID 1 MG/1
1 TABLET ORAL DAILY
COMMUNITY

## 2025-07-22 RX ORDER — DICLOFENAC SODIUM 10 MG/G
4 GEL TOPICAL DAILY PRN
COMMUNITY

## 2025-07-22 RX ORDER — LIDOCAINE 50 MG/G
1 PATCH TOPICAL DAILY PRN
COMMUNITY
Start: 2024-12-09

## 2025-07-22 RX ORDER — PROGESTERONE 200 MG/1
CAPSULE ORAL
COMMUNITY
Start: 2025-07-03

## 2025-07-22 RX ORDER — ONDANSETRON 8 MG/1
8 TABLET, FILM COATED ORAL EVERY 8 HOURS PRN
COMMUNITY
Start: 2021-02-03

## 2025-07-22 RX ORDER — LEVOTHYROXINE SODIUM 112 UG/1
112 TABLET ORAL DAILY
COMMUNITY
Start: 2025-05-09

## 2025-07-22 RX ORDER — ALBUTEROL SULFATE 90 UG/1
2 INHALANT RESPIRATORY (INHALATION) 4 TIMES DAILY PRN
COMMUNITY
Start: 2024-03-26

## 2025-07-22 RX ORDER — HYDROCORTISONE ACETATE 25 MG/1
25 SUPPOSITORY RECTAL DAILY PRN
COMMUNITY
Start: 2021-03-29

## 2025-07-22 RX ORDER — LEVOCETIRIZINE DIHYDROCHLORIDE 5 MG/1
2.5 TABLET, FILM COATED ORAL NIGHTLY
COMMUNITY
Start: 2024-06-18

## 2025-07-22 RX ORDER — TALC
250 POWDER (GRAM) TOPICAL DAILY
COMMUNITY

## 2025-07-22 RX ORDER — FERROUS SULFATE 325(65) MG
325 TABLET, DELAYED RELEASE (ENTERIC COATED) ORAL DAILY
COMMUNITY

## 2025-07-22 NOTE — PROGRESS NOTES
Patient:  Marilyn Barclay  YOB: 1975  MRN: [id number]       Impression/Plan:     Diagnoses and all orders for this visit:  Paroxysmal atrial fibrillation (Multi)  -     She has a couple of episodes twice a month  -     He has not been on chronic antiarrhythmic and would not have her on chronic flecainide given her resting bradycardia on relatively low-dose Cardizem.  Would not advise pill in the pocket approach for A-fib the true dosing for pill in the pocket approach is 300 mg and that needs to be first tried in a monitored setting and I am afraid would result in significant bradycardia.  -     Need to be sure she does not have significant coronary disease of flecainide even being considered and therefore obtain exercise stress test.  Also echo to assess if some of her fatigue and increasing episodes of A-fib are related to any myocardial or valvular issues.  -     Continue anticoagulation.  For long-term antiarrhythmic would need to consider Tikosyn as marked sinus bradycardia at this time even low-dose Cardizem.  Would also consider PVI ablation but her mother had issues with ablation and it was not successful for her and she is a bit reluctant to consider that option.  -       Family history of atrial fibrillation.  Thyroid levels have been normal.  She does not drink alcohol.  Blood pressure is relatively mild she has no symptoms to suggest sleep apnea this may be familial atriopathy  -      Her current chads vasc is 2, would continue anticoagulation for now.  -     Holter to assess degree of bradycardia and frequency of atrial fibrillation  -     Transthoracic Echo Complete; Future  -     Holter Or Event Cardiac Monitor; Future  -     Stress Test; Future  Sinus bradycardia         -    Is not on chronic flecainide and would not use that on a chronic basis secondary to the bradycardia that she has on just low-dose beta-blocker.  To suggest a component of sinus node disease.  Monitoring to better  assess degree that that is problematic for her.          -    As noted above does have a component of sinus node disease  Primary hypertension  -     Fair control at this time  Mixed hyperlipidemia  -     LDL of 130 is concerning particularly with the HDL in a woman less than 50 and family history of coronary disease albeit not premature.  Her CT of the abdomen did show aortic calcification suggesting plaque buildup and would obtain coronary calcium score as well and then strongly consider statin therapy  -     Stress Test; Future  -     CT cardiac scoring wo IV contrast; Future  Vertigo  -     Symptoms are classic for vertigo as opposed to rhythm induced symptom at this point      Chief Complaint/Active Symptoms:      Chief Complaint   Patient presents with    New Patient Visit     Pt wants to establish with Dr. Gifford. Previous Dr. Aguilar pt.       Marilyn Barclay is a 50 y.o. female who presents with hypertension, hyperlipidemia and paroxysmal atrial fibrillation first diagnosed June 2018.      [facility] cardiology [facility] .    She reports having first been diagnosed with atrial fibrillation 2018 when she had paroxysms of palpitation and subsequent monitoring demonstrated atrial fibrillation.  Echocardiogram at that time was unremarkable.  She has had no stress testing.  She was begun on Cardizem.  Also told to use flecainide as pill in the pocket but just given 100 mg tablets.  She is only occasionally taken of flecainide.    She describes having twice a month episodes of atrial fibrillation occasionally can last 2 to 3 days.  She does not get dizzy or lightheaded but gets fatigued when those occur.  She also has true vertigo and has had Ménière's in the past.  She says sometimes she just feels like she is moving on a carousel.  That can get worse if she moves her head a certain way.    She does feel fatigued occasionally she will feel short  of breath with that usually when the episodes of atrial fibrillation.  She has had no bleeding on anticoagulation.  She has not had chest tightness pressure heaviness.    She was in [facility] emergency department 7/9/2025 with dizziness and bradycardia.  Her main complaint however was clearly vertiginous type of dizziness.  She had no chest pain or palpitation at that particular evaluation initial blood pressure 67/80 with heart rate of 64 O2 saturation 98%.  Heart rate 56 sinus rhythm.  Antivert was helpful for her she did not have any cardiac issues during that hospital ER stay.    7/9/2025 BMP normal, troponins normal, BC normal except white count 15 which is chronic  2/8/2025 cholesterol 193 HDL 46     ECG 7/22/2025  Sinus bradycardia, rate 53 otherwise normal.    June 2025 CT of abdomen and pelvis moderate atherosclerotic calcification distal infrarenal aorta but no aneurysm.    Reviewed all records from [facility].      Past Medical Hx  Paroxysmal atrial fibrillation  Hypertension  Hyperlipidemia  Migraine headache  Ménière disease  Asplenia  Multinodular goiter with hypothyroidism  Depression/anxiety  Previous ITP    Past surgical history  Carpal tunnel release  Hysterectomy  Thyroidectomy partial  Tonsillectomy    Social history  /no children  Stop smoking cigarettes 2023 previously well under a half a pack a day  No alcohol abuse  No drug abuse    Family Hx  Mother with hypertension/atrial fibrillation/pacemaker  Father with coronary artery disease and diabetes  Sister with sleep apnea               Review of Systems: Unremarkable except as noted above    Meds     Current Outpatient Medications   Medication Instructions    albuterol 90 mcg/actuation inhaler 2 puffs, 4 times daily PRN    alfuzosin (UROXATRAL) 10 mg, Daily    busPIRone (BUSPAR) 7.5 mg, Daily    calcium carb/vit D3/minerals (CALCIUM-VITAMIN D ORAL) Take by mouth. Take 1 tablet daily     "cyanocobalamin (VITAMIN B-12) 1,000 mcg, Daily    cyclobenzaprine (FLEXERIL) 10 mg, 3 times daily PRN    diclofenac sodium (VOLTAREN) 4 g, Daily PRN    dilTIAZem CD (CARDIZEM CD) 180 mg, Daily    ferrous sulfate 325 mg, Daily    flecainide (TAMBOCOR) 50 mg, Daily PRN    fluticasone (Flonase) 50 mcg/actuation nasal spray 1 spray, Daily    folic acid (FOLVITE) 1 mg, Daily    hydrocortisone (ANUSOL-HC) 25 mg, Daily PRN    L. acidophilus/pectin, citrus (ACIDOPHILUS PROBIOTIC ORAL) Take by mouth. Take 1 capsule daily    levocetirizine (XYZAL) 2.5 mg, oral, Nightly    levothyroxine (SYNTHROID, LEVOXYL) 112 mcg, Daily    lidocaine (Lidoderm) 5 % patch 1 patch, Daily PRN    magnesium oxide (MAG-OX) 250 mg, Daily    Nurtec ODT 75 mg tablet,disintegrating 1 each    ondansetron (ZOFRAN) 8 mg, Every 8 hours PRN    progesterone (Prometrium) 200 mg capsule TAKE ONE CAPSULE BY MOUTH EVERY NIGHT    rivaroxaban (XARELTO) 20 mg, Daily with evening meal        Allergies   Allergies[1]      Annotated Problems     Specialty Problems    None       Problem List     Patient Active Problem List    Diagnosis Date Noted    Disorder of both eustachian tubes 06/16/2025    Pressure sensation in both ears 06/16/2025       Objective:     Vitals:    07/22/25 1521   BP: 130/72   BP Location: Left arm   Patient Position: Sitting   Pulse: 53   Weight: 93.5 kg (206 lb 3.2 oz)   Height: 1.676 m (5' 6\")      Wt Readings from Last 4 Encounters:   07/22/25 93.5 kg (206 lb 3.2 oz)   06/18/21 99.8 kg (220 lb)           LAB:     Lab Results   Component Value Date    HGBA1C 5.5 02/08/2025         Physical Exam     General Appearance: alert and oriented to person, place and time, in no acute distress  Cardiovascular: Bradycardic rate, regular rhythm, normal S1 and S2, no murmurs, rubs, clicks, or gallops,  no JVD  Pulmonary/Chest: clear to auscultation bilaterally- no wheezes, rales or rhonchi, normal air movement, no respiratory distress  Abdomen: soft, " non-tender, non-distended, normal bowel sounds, no masses   Extremities: no cyanosis, clubbing or edema  Skin: warm and dry, no rash or erythema  Eyes: EOMI  Neck: supple and non-tender without mass, no thyromegaly   Neurological: alert, oriented, normal speech, no focal findings or movement disorder noted  Vascular: Pulses 2+ no bruits      Provider attestation-scribe documentation  Any medical record entries made by the scribe were at my discretion and personally dictated by me.  I have reviewed the chart and agree that the record accurately reflects my personal performance of the history, physical exam, discussion and plan.                 [1]   Allergies  Allergen Reactions    Penicillins Anaphylaxis and Unknown     Reaction as a child.    Pantoprazole Itching, Rash and Swelling     Caused thrush    Percocet [Oxycodone-Acetaminophen] Rash

## 2025-07-23 ENCOUNTER — TELEPHONE (OUTPATIENT)
Dept: PRIMARY CARE | Facility: CLINIC | Age: 50
End: 2025-07-23
Payer: COMMERCIAL

## 2025-07-24 ENCOUNTER — TRANSCRIBE ORDERS (OUTPATIENT)
Dept: ADMINISTRATIVE | Age: 50
End: 2025-07-24

## 2025-07-24 DIAGNOSIS — E78.2 MIXED HYPERLIPIDEMIA: ICD-10-CM

## 2025-07-24 DIAGNOSIS — R42 VERTIGO: ICD-10-CM

## 2025-07-24 DIAGNOSIS — I48.19 PERSISTENT ATRIAL FIBRILLATION (HCC): ICD-10-CM

## 2025-07-24 DIAGNOSIS — I10 PRIMARY HYPERTENSION: Primary | ICD-10-CM

## 2025-07-25 ENCOUNTER — PATIENT MESSAGE (OUTPATIENT)
Dept: CARDIOLOGY | Facility: CLINIC | Age: 50
End: 2025-07-25
Payer: COMMERCIAL

## 2025-07-28 ENCOUNTER — CARE COORDINATION (OUTPATIENT)
Dept: OTHER | Facility: CLINIC | Age: 50
End: 2025-07-28

## 2025-07-28 RX ORDER — SULFAMETHOXAZOLE AND TRIMETHOPRIM 800; 160 MG/1; MG/1
1 TABLET ORAL 2 TIMES DAILY
Qty: 20 TABLET | Refills: 0 | Status: SHIPPED | OUTPATIENT
Start: 2025-07-28 | End: 2025-07-28

## 2025-07-28 RX ORDER — SULFAMETHOXAZOLE AND TRIMETHOPRIM 800; 160 MG/1; MG/1
1 TABLET ORAL 2 TIMES DAILY
Qty: 10 TABLET | Refills: 0 | Status: SHIPPED | OUTPATIENT
Start: 2025-07-28 | End: 2025-08-02

## 2025-07-28 NOTE — CARE COORDINATION
Ambulatory Care Coordination Note     7/28/2025 6:15 PM     patient outreach attempt by this ACM today to offer care management services. ACM was unable to reach the patient by telephone;   left voice message requesting a return phone call to this ACM.  [employer]opol[employer] message sent requesting patient to contact this ACM.     Patient closed (unable to reach patient) from the High Risk Care Management program on 7/28/2025.  Patient unable to contact pt.  Care management goals have been completed. No further Ambulatory Care Manager follow up scheduled.     Annette Barbosa RN, [facility] Associate Care Manager  [facility]   [address] [place] [address]  Cell [phone] Fax [phone]

## 2025-07-29 NOTE — TELEPHONE ENCOUNTER
Spoke with pt advised that she may want to change holter appt.  Typically are unable to complete stress test with a monitor attached. Pt states that she will call office tomorrow on her lunch to reschedule holter. Julio Cesar HUERTA

## 2025-07-30 ENCOUNTER — TELEPHONE (OUTPATIENT)
Dept: CARDIOLOGY | Facility: CLINIC | Age: 50
End: 2025-07-30
Payer: COMMERCIAL

## 2025-07-30 ENCOUNTER — TELEPHONE (OUTPATIENT)
Dept: PRIMARY CARE | Facility: CLINIC | Age: 50
End: 2025-07-30
Payer: COMMERCIAL

## 2025-07-30 NOTE — TELEPHONE ENCOUNTER
Transthoracic Echo 47263 DX: I10, I48.0, E78.2, R42 at [facility] does not require prior auth per the [employer] check code tool-Decision ID [id number].     Stress Test 62440 DX: I10, I48.0, E78.2, R42 at [facility] does not require prior auth per the [employer]MR check code tool-Decision ID [id number].

## 2025-07-30 NOTE — TELEPHONE ENCOUNTER
14 day zio patch 11397/45198 no auth is required per [facility] reference# [id number]

## 2025-08-01 ENCOUNTER — OFFICE VISIT (OUTPATIENT)
Dept: FAMILY MEDICINE CLINIC | Age: 50
End: 2025-08-01

## 2025-08-01 ENCOUNTER — PATIENT MESSAGE (OUTPATIENT)
Dept: FAMILY MEDICINE CLINIC | Age: 50
End: 2025-08-01

## 2025-08-01 VITALS
WEIGHT: 205 LBS | TEMPERATURE: 97.6 F | HEART RATE: 81 BPM | SYSTOLIC BLOOD PRESSURE: 112 MMHG | HEIGHT: 66 IN | BODY MASS INDEX: 32.95 KG/M2 | OXYGEN SATURATION: 98 % | DIASTOLIC BLOOD PRESSURE: 80 MMHG

## 2025-08-01 DIAGNOSIS — I48.91 ATRIAL FIBRILLATION WITH RVR (HCC): ICD-10-CM

## 2025-08-01 DIAGNOSIS — E03.9 HYPOTHYROIDISM, UNSPECIFIED TYPE: Primary | ICD-10-CM

## 2025-08-01 DIAGNOSIS — E03.9 HYPOTHYROIDISM, UNSPECIFIED TYPE: ICD-10-CM

## 2025-08-01 DIAGNOSIS — R11.0 NAUSEA: ICD-10-CM

## 2025-08-01 DIAGNOSIS — K64.4 EXTERNAL HEMORRHOID: ICD-10-CM

## 2025-08-01 DIAGNOSIS — Z86.2 HISTORY OF ITP: ICD-10-CM

## 2025-08-01 DIAGNOSIS — I48.0 PAROXYSMAL ATRIAL FIBRILLATION (HCC): ICD-10-CM

## 2025-08-01 DIAGNOSIS — E89.0 POSTOPERATIVE HYPOTHYROIDISM: ICD-10-CM

## 2025-08-01 DIAGNOSIS — Z87.442 HISTORY OF KIDNEY STONES: ICD-10-CM

## 2025-08-01 LAB — TSH REFLEX: 0.51 UIU/ML (ref 0.44–3.86)

## 2025-08-01 RX ORDER — ONDANSETRON 8 MG/1
8 TABLET, FILM COATED ORAL EVERY 12 HOURS PRN
Qty: 30 TABLET | Refills: 2 | Status: SHIPPED | OUTPATIENT
Start: 2025-08-01

## 2025-08-01 RX ORDER — MECLIZINE HCL 12.5 MG 12.5 MG/1
12.5 TABLET ORAL 3 TIMES DAILY PRN
Qty: 30 TABLET | Refills: 0 | Status: SHIPPED | OUTPATIENT
Start: 2025-08-01 | End: 2025-08-11

## 2025-08-01 RX ORDER — HYDROCORTISONE ACETATE 25 MG/1
25 SUPPOSITORY RECTAL 2 TIMES DAILY
Qty: 60 SUPPOSITORY | Refills: 1 | Status: SHIPPED | OUTPATIENT
Start: 2025-08-01

## 2025-08-01 ASSESSMENT — ENCOUNTER SYMPTOMS
CHEST TIGHTNESS: 0
SHORTNESS OF BREATH: 0
COLOR CHANGE: 0
CONSTIPATION: 0
ABDOMINAL DISTENTION: 0
BLOOD IN STOOL: 0
DIARRHEA: 0

## 2025-08-01 NOTE — PROGRESS NOTES
Diabetes Father         Only from being overweight    Other Father         Fuck's dystropy / corneal transplants / AAA    Coronary Art Dis Father     Arthritis Father     Hearing Loss Father     Sleep Apnea Sister     Other Sister         killed by drunk     Alcohol Abuse Brother     Cancer Paternal Grandmother     Breast Cancer Paternal Grandmother     Alcohol Abuse Maternal Uncle     Breast Cancer Paternal Aunt 40    Cancer Paternal Aunt     Colon Cancer Neg Hx      Allergies   Allergen Reactions    Penicillins Anaphylaxis    Codeine Itching, Swelling and Rash     OK with Tylenol #3 per pt    Percocet [Oxycodone-Acetaminophen] Itching, Swelling and Rash    Bee Venom Itching and Swelling    Environmental/Seasonal      Sinus sx    Tape [Adhesive Tape]      Skin irritation     Hydrocodone Rash    Protonix [Pantoprazole Sodium] Swelling and Rash     Caused thrush     Current Outpatient Medications   Medication Sig Dispense Refill    hydrocortisone (ANUSOL-HC) 25 MG suppository Place 1 suppository rectally 2 times daily 60 suppository 1    ondansetron (ZOFRAN) 8 MG tablet Take 1 tablet by mouth every 12 hours as needed for Nausea or Vomiting 30 tablet 02    sulfamethoxazole-trimethoprim (BACTRIM DS;SEPTRA DS) 800-160 MG per tablet Take 1 tablet by mouth 2 times daily for 5 days 10 tablet 0    progesterone (PROMETRIUM) 200 MG CAPS capsule TAKE ONE CAPSULE BY MOUTH EVERY NIGHT 150 capsule 0    alfuzosin (UROXATRAL) 10 MG extended release tablet Take 1 tablet by mouth daily 30 tablet 0    levothyroxine (SYNTHROID) 112 MCG tablet Take 1 tablet by mouth daily 90 tablet 1    rimegepant sulfate (NURTEC) 75 MG TBDP Take 1 each by mouth every 48 hours 27 tablet 3    busPIRone (BUSPAR) 7.5 MG tablet TAKE ONE TABLET BY MOUTH ONCE A DAY AS NEEDED 90 tablet 2    flecainide (TAMBOCOR) 100 MG tablet Take 1 tablet by mouth as needed (PRN afib.) 30 tablet 1    lidocaine (LIDODERM) 5 % Place 1 patch onto the skin daily 12 hours

## 2025-08-21 ENCOUNTER — APPOINTMENT (OUTPATIENT)
Dept: CARDIOLOGY | Facility: CLINIC | Age: 50
End: 2025-08-21
Payer: COMMERCIAL

## 2025-08-22 ENCOUNTER — HOSPITAL ENCOUNTER (OUTPATIENT)
Age: 50
Discharge: HOME | End: 2025-08-24
Attending: INTERNAL MEDICINE
Payer: COMMERCIAL

## 2025-08-22 DIAGNOSIS — I10 PRIMARY HYPERTENSION: ICD-10-CM

## 2025-08-22 DIAGNOSIS — R42 VERTIGO: ICD-10-CM

## 2025-08-22 DIAGNOSIS — E78.2 MIXED HYPERLIPIDEMIA: ICD-10-CM

## 2025-08-22 DIAGNOSIS — I48.19 PERSISTENT ATRIAL FIBRILLATION (HCC): ICD-10-CM

## 2025-08-22 LAB
STRESS BASELINE DIAS BP: 73 MMHG
STRESS BASELINE HR: 77 BPM
STRESS BASELINE SYS BP: 126 MMHG
STRESS ESTIMATED WORKLOAD: 7.1 METS
STRESS EXERCISE DUR MIN: 6 MIN
STRESS EXERCISE DUR SEC: 4 SEC
STRESS PEAK DIAS BP: 78 MMHG
STRESS PEAK SYS BP: 177 MMHG
STRESS PERCENT HR ACHIEVED: 89 %
STRESS POST PEAK HR: 150 BPM
STRESS RATE PRESSURE PRODUCT: NORMAL BPM*MMHG
STRESS TARGET HR: 168 BPM

## 2025-08-22 PROCEDURE — 93017 CV STRESS TEST TRACING ONLY: CPT

## 2025-08-25 LAB
STRESS BASELINE DIAS BP: 73 MMHG
STRESS BASELINE HR: 77 BPM
STRESS BASELINE ST DEPRESSION: 0 MM
STRESS BASELINE SYS BP: 126 MMHG
STRESS ESTIMATED WORKLOAD: 7.1 METS
STRESS EXERCISE DUR MIN: 6 MIN
STRESS EXERCISE DUR SEC: 4 SEC
STRESS PEAK DIAS BP: 78 MMHG
STRESS PEAK SYS BP: 177 MMHG
STRESS PERCENT HR ACHIEVED: 89 %
STRESS POST PEAK HR: 150 BPM
STRESS RATE PRESSURE PRODUCT: NORMAL BPM*MMHG
STRESS TARGET HR: 168 BPM

## 2025-08-25 PROCEDURE — 93018 CV STRESS TEST I&R ONLY: CPT | Performed by: INTERNAL MEDICINE

## 2025-08-25 PROCEDURE — 93016 CV STRESS TEST SUPVJ ONLY: CPT | Performed by: INTERNAL MEDICINE

## 2025-08-27 ENCOUNTER — TELEPHONE (OUTPATIENT)
Dept: CARDIOLOGY | Facility: CLINIC | Age: 50
End: 2025-08-27
Payer: COMMERCIAL

## 2025-08-29 ENCOUNTER — ANCILLARY PROCEDURE (OUTPATIENT)
Facility: HOSPITAL | Age: 50
End: 2025-08-29
Payer: COMMERCIAL

## 2025-08-29 ENCOUNTER — OFFICE VISIT (OUTPATIENT)
Dept: FAMILY MEDICINE CLINIC | Age: 50
End: 2025-08-29

## 2025-08-29 ENCOUNTER — PATIENT MESSAGE (OUTPATIENT)
Dept: CARDIOLOGY | Facility: CLINIC | Age: 50
End: 2025-08-29
Payer: COMMERCIAL

## 2025-08-29 VITALS
DIASTOLIC BLOOD PRESSURE: 82 MMHG | TEMPERATURE: 98.6 F | OXYGEN SATURATION: 97 % | HEART RATE: 62 BPM | SYSTOLIC BLOOD PRESSURE: 132 MMHG

## 2025-08-29 DIAGNOSIS — E78.2 MIXED HYPERLIPIDEMIA: ICD-10-CM

## 2025-08-29 DIAGNOSIS — J02.9 SORE THROAT: ICD-10-CM

## 2025-08-29 DIAGNOSIS — J02.9 SORE THROAT: Primary | ICD-10-CM

## 2025-08-29 LAB — S PYO AG THROAT QL: NORMAL

## 2025-08-29 PROCEDURE — 75571 CT HRT W/O DYE W/CA TEST: CPT

## 2025-08-29 RX ORDER — CEFDINIR 300 MG/1
300 CAPSULE ORAL 2 TIMES DAILY
Qty: 20 CAPSULE | Refills: 0 | Status: SHIPPED | OUTPATIENT
Start: 2025-08-29 | End: 2025-09-08

## 2025-08-29 ASSESSMENT — ENCOUNTER SYMPTOMS
SORE THROAT: 1
SINUS PAIN: 0
SHORTNESS OF BREATH: 0
EYE ITCHING: 0
ABDOMINAL PAIN: 0
TROUBLE SWALLOWING: 0
EYE DISCHARGE: 0
EYE REDNESS: 0
DIARRHEA: 0
SINUS PRESSURE: 0
EYE PAIN: 0
COUGH: 0
RHINORRHEA: 0
NAUSEA: 0
VOMITING: 0
WHEEZING: 0

## 2025-09-01 LAB — BACTERIA THROAT AEROBE CULT: NORMAL

## 2025-09-03 ENCOUNTER — TELEPHONE (OUTPATIENT)
Dept: FAMILY MEDICINE CLINIC | Age: 50
End: 2025-09-03
Payer: COMMERCIAL

## 2025-09-03 DIAGNOSIS — R30.0 BURNING WITH URINATION: Primary | ICD-10-CM

## 2025-09-03 LAB
BILIRUBIN, POC: NORMAL
BLOOD URINE, POC: NORMAL
CLARITY, POC: NORMAL
COLOR, POC: NORMAL
GLUCOSE URINE, POC: NORMAL MG/DL
KETONES, POC: NORMAL MG/DL
LEUKOCYTE EST, POC: NORMAL
NITRITE, POC: NORMAL
PH, POC: 6
PROTEIN, POC: NORMAL MG/DL
SPECIFIC GRAVITY, POC: 1.01
UROBILINOGEN, POC: 0.2 MG/DL

## 2025-09-03 PROCEDURE — 81003 URINALYSIS AUTO W/O SCOPE: CPT | Performed by: FAMILY MEDICINE

## 2025-09-03 RX ORDER — NITROFURANTOIN 25; 75 MG/1; MG/1
100 CAPSULE ORAL 2 TIMES DAILY
Qty: 20 CAPSULE | Refills: 0 | Status: SHIPPED | OUTPATIENT
Start: 2025-09-03 | End: 2025-09-13

## 2025-09-08 ENCOUNTER — APPOINTMENT (OUTPATIENT)
Dept: CARDIOLOGY | Facility: CLINIC | Age: 50
End: 2025-09-08
Payer: COMMERCIAL

## 2025-10-08 ENCOUNTER — APPOINTMENT (OUTPATIENT)
Dept: CARDIOLOGY | Facility: CLINIC | Age: 50
End: 2025-10-08
Payer: COMMERCIAL

## 2025-10-28 ENCOUNTER — APPOINTMENT (OUTPATIENT)
Dept: CARDIOLOGY | Facility: CLINIC | Age: 50
End: 2025-10-28
Payer: COMMERCIAL

## (undated) DEVICE — BRUSH ENDO CLN L90.5IN SHTH DIA1.7MM BRIST DIA5-7MM 2-6MM

## (undated) DEVICE — SYRINGE MED 10ML LUERLOCK TIP W/O SFTY DISP

## (undated) DEVICE — COVER LT HNDL BLU PLAS

## (undated) DEVICE — AGENT HEMSTAT 3GM OXIDIZED REGENERATED CELOS ABSRB FOR CONT (ORDER MULTIPLES OF 5EA)

## (undated) DEVICE — SEALER ENDOSCP NANO COAT OPN DIV CRV L JAW LIGASURE IMPACT

## (undated) DEVICE — GOWN,AURORA,NONREINFORCED,LARGE: Brand: MEDLINE

## (undated) DEVICE — PACK,BASIC IV,SIRUS: Brand: MEDLINE

## (undated) DEVICE — GLOVE ORANGE PI 7 1/2   MSG9075

## (undated) DEVICE — YANKAUER,BULB TIP,W/O VENT,RIGID,STERILE: Brand: MEDLINE

## (undated) DEVICE — SHEARS ENDOSCP HARM 36CM ULTRASONIC CRV TIP UPGRD

## (undated) DEVICE — Z DUPLICATE USE 2431315 SET INSUF TBNG HI FLO W/ SMK EVAC FOR PNEUMOCLEAR

## (undated) DEVICE — TUBING, SUCTION, 1/4" X 10', STRAIGHT: Brand: MEDLINE

## (undated) DEVICE — TRAY PREP DRY W/ PREM GLV 2 APPL 6 SPNG 2 UNDPD 1 OVERWRAP

## (undated) DEVICE — SINGLE PORT MANIFOLD: Brand: NEPTUNE 2

## (undated) DEVICE — RED RUBBER ROBINSON URETHRAL CATHETER, RADIOPAQUE E, SMOOTH ROUNDED TIP, 12 FR (4.0 MM): Brand: DOVER

## (undated) DEVICE — SET FLD CTRL SYS INFLO AND OUTFLO TB AQUILEX

## (undated) DEVICE — LABEL MED MINI W/ MARKER

## (undated) DEVICE — TUBE SET 96 MM 64 MM H2O PERISTALTIC STD AUX CHANNEL

## (undated) DEVICE — NEPTUNE E-SEP SMOKE EVACUATION PENCIL, COATED, 70MM BLADE, PUSH BUTTON SWITCH: Brand: NEPTUNE E-SEP

## (undated) DEVICE — CORD,CAUTERY,BIPOLAR,STERILE: Brand: MEDLINE

## (undated) DEVICE — LINER PAD CONTOUR SUPER PEACH 7X14IN

## (undated) DEVICE — TIP IU L6CM DIA5.1MM LAV SIL SFT FLX DST END DISP RUMI II

## (undated) DEVICE — YANKAUER,SMOOTH HANDLE,HIGH CAPACITY: Brand: MEDLINE INDUSTRIES, INC.

## (undated) DEVICE — PAD BD FOAM 33X72X2.75 IN BLU EGGCRATE

## (undated) DEVICE — POUCH, INSTRUMENT, 3POCKET, INVISISHIELD: Brand: MEDLINE

## (undated) DEVICE — GAUZE,SPONGE,4"X4",16PLY,XRAY,STRL,LF: Brand: MEDLINE

## (undated) DEVICE — BLADE ES ELASTOMERIC COAT INSUL DURABLE BEND UPTO 90DEG

## (undated) DEVICE — TOTAL TRAY, DB, 100% SILI FOLEY, 16FR 10: Brand: MEDLINE

## (undated) DEVICE — APPLICATOR MEDICATED 26 CC SOLUTION HI LT ORNG CHLORAPREP

## (undated) DEVICE — TOWEL,OR,DSP,ST,BLUE,STD,4/PK,20PK/CS: Brand: MEDLINE

## (undated) DEVICE — PACK,SET UP,DRAPE: Brand: MEDLINE

## (undated) DEVICE — WARMER SCP 2 ANTIFOG LAP DISP

## (undated) DEVICE — PACK,SET UP,NO DRAPES: Brand: MEDLINE

## (undated) DEVICE — DRAPE, LAVH, STERILE: Brand: MEDLINE

## (undated) DEVICE — SHEET,DRAPE,53X77,STERILE: Brand: MEDLINE

## (undated) DEVICE — DEVICE ABLATION NOVASUREHANDLE DISP

## (undated) DEVICE — KIT,ANTI FOG,W/SPONGE & FLUID,SOFT PACK: Brand: MEDLINE

## (undated) DEVICE — SET ENDOSCP SEAL HYSTEROSCOPE RIG OUTFLO CHN DISP MYOSURE

## (undated) DEVICE — SURGICEL ENDOSCP APPL

## (undated) DEVICE — SUTURE VCRL SZ 4-0 L18IN ABSRB UD L19MM PS-2 3/8 CIR PRIM J496H

## (undated) DEVICE — KIT CANSTR VAC TANTEM TB FOR AQUILEX FLD CTRL SYS

## (undated) DEVICE — 4-PORT MANIFOLD: Brand: NEPTUNE 2

## (undated) DEVICE — GOWN,AURORA,NONRNF,XL,30/CS: Brand: MEDLINE

## (undated) DEVICE — ELECTRODE PT RET AD L9FT HI MOIST COND ADH HYDRGEL CORDED

## (undated) DEVICE — TROCAR: Brand: KII SLEEVE

## (undated) DEVICE — GOWN,SIRUS,POLYRNF,BRTHSLV,XLN/XL,20/CS: Brand: MEDLINE

## (undated) DEVICE — LITHOTOMY DRAPE WITH FLUID CONTROL POUCH: Brand: CONVERTORS

## (undated) DEVICE — SUTURE VCRL SZ 0 L36IN ABSRB UD L36MM CT-1 1/2 CIR J946H

## (undated) DEVICE — CATHETER,URETHRAL,VINYL,FEMALE,6",14FR: Brand: MEDLINE

## (undated) DEVICE — TROCAR: Brand: KII FIOS FIRST ENTRY

## (undated) DEVICE — PAD N ADH W3XL4IN POLY COT SFT PERF FLM EASILY CUT ABSRB

## (undated) DEVICE — Device: Brand: ENDO SMARTCAP

## (undated) DEVICE — COVER,TABLE,44X90,STERILE: Brand: MEDLINE

## (undated) DEVICE — COUNTER NDL 40 COUNT HLD 70 FOAM BLK ADH W/ MAG

## (undated) DEVICE — ADHESIVE SKIN CLSR 0.7ML TOP DERMBND ADV

## (undated) DEVICE — GLOVE ORANGE PI 8   MSG9080

## (undated) DEVICE — TELFA NON-ADHERENT ABSORBENT DRESSING: Brand: TELFA

## (undated) DEVICE — SYRINGE IRRIG 60ML SFT PLIABLE BLB EZ TO GRP 1 HND USE W/

## (undated) DEVICE — Device

## (undated) DEVICE — INTENDED FOR TISSUE SEPARATION, AND OTHER PROCEDURES THAT REQUIRE A SHARP SURGICAL BLADE TO PUNCTURE OR CUT.: Brand: BARD-PARKER ® CARBON RIB-BACK BLADES